# Patient Record
Sex: MALE | Race: WHITE | Employment: OTHER | URBAN - NONMETROPOLITAN AREA
[De-identification: names, ages, dates, MRNs, and addresses within clinical notes are randomized per-mention and may not be internally consistent; named-entity substitution may affect disease eponyms.]

---

## 2017-01-13 DIAGNOSIS — G47.00 INSOMNIA, UNSPECIFIED TYPE: ICD-10-CM

## 2017-01-13 DIAGNOSIS — F31.32 BIPOLAR AFFECTIVE DISORDER, CURRENTLY DEPRESSED, MODERATE (HCC): ICD-10-CM

## 2017-01-13 DIAGNOSIS — F41.9 ANXIETY: ICD-10-CM

## 2017-01-13 RX ORDER — BUSPIRONE HYDROCHLORIDE 10 MG/1
TABLET ORAL
Qty: 60 TABLET | Refills: 2 | Status: SHIPPED | OUTPATIENT
Start: 2017-01-13 | End: 2017-05-10 | Stop reason: SDUPTHER

## 2017-01-13 RX ORDER — RISPERIDONE 3 MG/1
TABLET, FILM COATED ORAL
Qty: 30 TABLET | Refills: 2 | Status: SHIPPED | OUTPATIENT
Start: 2017-01-13 | End: 2017-04-30 | Stop reason: SDUPTHER

## 2017-01-24 DIAGNOSIS — R06.00 NOCTURNAL DYSPNEA: ICD-10-CM

## 2017-01-24 DIAGNOSIS — R06.83 SNORING: ICD-10-CM

## 2017-01-26 ENCOUNTER — TELEPHONE (OUTPATIENT)
Dept: FAMILY MEDICINE CLINIC | Age: 42
End: 2017-01-26

## 2017-01-26 DIAGNOSIS — G47.33 OSA (OBSTRUCTIVE SLEEP APNEA): Primary | ICD-10-CM

## 2017-02-13 DIAGNOSIS — G43.809 OTHER MIGRAINE WITHOUT STATUS MIGRAINOSUS, NOT INTRACTABLE: ICD-10-CM

## 2017-02-13 RX ORDER — SUMATRIPTAN 100 MG/1
TABLET, FILM COATED ORAL
Qty: 27 TABLET | Refills: 0 | OUTPATIENT
Start: 2017-02-13

## 2017-02-13 RX ORDER — SUMATRIPTAN 100 MG/1
100 TABLET, FILM COATED ORAL
Qty: 27 TABLET | Refills: 1 | Status: CANCELLED | OUTPATIENT
Start: 2017-02-13 | End: 2017-02-13

## 2017-02-13 RX ORDER — SUMATRIPTAN 100 MG/1
100 TABLET, FILM COATED ORAL
Qty: 27 TABLET | Refills: 1 | Status: SHIPPED | OUTPATIENT
Start: 2017-02-13 | End: 2017-05-15 | Stop reason: SDUPTHER

## 2017-02-16 ENCOUNTER — HOSPITAL ENCOUNTER (EMERGENCY)
Age: 42
Discharge: HOME | End: 2017-02-16
Attending: EMERGENCY MEDICINE
Payer: MEDICARE

## 2017-02-16 ENCOUNTER — APPOINTMENT (OUTPATIENT)
Dept: GENERAL RADIOLOGY | Age: 42
End: 2017-02-16
Payer: MEDICARE

## 2017-02-16 VITALS
OXYGEN SATURATION: 99 % | HEIGHT: 71 IN | TEMPERATURE: 97.7 F | SYSTOLIC BLOOD PRESSURE: 146 MMHG | DIASTOLIC BLOOD PRESSURE: 73 MMHG | BODY MASS INDEX: 28.98 KG/M2 | WEIGHT: 207 LBS | RESPIRATION RATE: 18 BRPM | HEART RATE: 102 BPM

## 2017-02-16 DIAGNOSIS — J40 BRONCHITIS: Primary | ICD-10-CM

## 2017-02-16 PROCEDURE — 71020 XR CHEST STANDARD TWO VW: CPT | Performed by: RADIOLOGY

## 2017-02-16 PROCEDURE — 99283 EMERGENCY DEPT VISIT LOW MDM: CPT

## 2017-02-16 PROCEDURE — 71020 XR CHEST STANDARD TWO VW: CPT

## 2017-02-16 RX ORDER — PREDNISONE 50 MG/1
50 TABLET ORAL DAILY
Qty: 5 TABLET | Refills: 0 | Status: SHIPPED | OUTPATIENT
Start: 2017-02-16 | End: 2017-02-21 | Stop reason: ALTCHOICE

## 2017-02-16 RX ORDER — ALBUTEROL SULFATE 90 UG/1
2 AEROSOL, METERED RESPIRATORY (INHALATION) 4 TIMES DAILY
Qty: 1 INHALER | Refills: 0 | Status: SHIPPED | OUTPATIENT
Start: 2017-02-16 | End: 2017-05-15 | Stop reason: ALTCHOICE

## 2017-02-17 ENCOUNTER — TELEPHONE (OUTPATIENT)
Dept: FAMILY MEDICINE CLINIC | Age: 42
End: 2017-02-17

## 2017-02-21 ENCOUNTER — OFFICE VISIT (OUTPATIENT)
Dept: FAMILY MEDICINE CLINIC | Age: 42
End: 2017-02-21

## 2017-02-21 VITALS
HEART RATE: 73 BPM | OXYGEN SATURATION: 98 % | HEIGHT: 71 IN | SYSTOLIC BLOOD PRESSURE: 136 MMHG | DIASTOLIC BLOOD PRESSURE: 74 MMHG | BODY MASS INDEX: 28.42 KG/M2 | WEIGHT: 203 LBS

## 2017-02-21 DIAGNOSIS — G43.809 OTHER MIGRAINE WITHOUT STATUS MIGRAINOSUS, NOT INTRACTABLE: ICD-10-CM

## 2017-02-21 DIAGNOSIS — F31.32 BIPOLAR AFFECTIVE DISORDER, CURRENTLY DEPRESSED, MODERATE (HCC): ICD-10-CM

## 2017-02-21 DIAGNOSIS — G47.00 INSOMNIA, UNSPECIFIED TYPE: ICD-10-CM

## 2017-02-21 DIAGNOSIS — F41.9 ANXIETY: Primary | ICD-10-CM

## 2017-02-21 PROCEDURE — G8484 FLU IMMUNIZE NO ADMIN: HCPCS | Performed by: FAMILY MEDICINE

## 2017-02-21 PROCEDURE — 4004F PT TOBACCO SCREEN RCVD TLK: CPT | Performed by: FAMILY MEDICINE

## 2017-02-21 PROCEDURE — 99214 OFFICE O/P EST MOD 30 MIN: CPT | Performed by: FAMILY MEDICINE

## 2017-02-21 PROCEDURE — G8419 CALC BMI OUT NRM PARAM NOF/U: HCPCS | Performed by: FAMILY MEDICINE

## 2017-02-21 PROCEDURE — G8427 DOCREV CUR MEDS BY ELIG CLIN: HCPCS | Performed by: FAMILY MEDICINE

## 2017-02-21 RX ORDER — TOPIRAMATE 50 MG/1
50 TABLET, FILM COATED ORAL 2 TIMES DAILY
Qty: 60 TABLET | Refills: 5 | Status: SHIPPED | OUTPATIENT
Start: 2017-02-21 | End: 2017-09-05 | Stop reason: SDUPTHER

## 2017-02-21 RX ORDER — VENLAFAXINE HYDROCHLORIDE 150 MG/1
150 CAPSULE, EXTENDED RELEASE ORAL DAILY
Qty: 30 CAPSULE | Refills: 5 | Status: SHIPPED | OUTPATIENT
Start: 2017-02-21 | End: 2017-05-15 | Stop reason: SDUPTHER

## 2017-03-26 ENCOUNTER — HOSPITAL ENCOUNTER (EMERGENCY)
Age: 42
Discharge: HOME | End: 2017-03-26
Attending: EMERGENCY MEDICINE
Payer: MEDICARE

## 2017-03-26 VITALS
SYSTOLIC BLOOD PRESSURE: 125 MMHG | DIASTOLIC BLOOD PRESSURE: 64 MMHG | HEART RATE: 88 BPM | RESPIRATION RATE: 16 BRPM | OXYGEN SATURATION: 98 % | TEMPERATURE: 99.3 F

## 2017-03-26 DIAGNOSIS — J10.1 INFLUENZA B: Primary | ICD-10-CM

## 2017-03-26 LAB
DIRECT EXAM: ABNORMAL
Lab: ABNORMAL
SPECIMEN DESCRIPTION: ABNORMAL
STATUS: ABNORMAL

## 2017-03-26 PROCEDURE — 99283 EMERGENCY DEPT VISIT LOW MDM: CPT

## 2017-03-26 PROCEDURE — 94640 AIRWAY INHALATION TREATMENT: CPT

## 2017-03-26 PROCEDURE — 6370000000 HC RX 637 (ALT 250 FOR IP): Performed by: EMERGENCY MEDICINE

## 2017-03-26 PROCEDURE — 87804 INFLUENZA ASSAY W/OPTIC: CPT

## 2017-03-26 PROCEDURE — 94664 DEMO&/EVAL PT USE INHALER: CPT

## 2017-03-26 RX ORDER — BENZONATATE 100 MG/1
200 CAPSULE ORAL 3 TIMES DAILY PRN
Status: DISCONTINUED | OUTPATIENT
Start: 2017-03-26 | End: 2017-03-27 | Stop reason: HOSPADM

## 2017-03-26 RX ORDER — BENZONATATE 100 MG/1
100 CAPSULE ORAL 3 TIMES DAILY PRN
Qty: 30 CAPSULE | Refills: 0 | Status: SHIPPED | OUTPATIENT
Start: 2017-03-26 | End: 2017-04-02

## 2017-03-26 RX ORDER — ALBUTEROL SULFATE 90 UG/1
2 AEROSOL, METERED RESPIRATORY (INHALATION) EVERY 6 HOURS PRN
Status: DISCONTINUED | OUTPATIENT
Start: 2017-03-26 | End: 2017-03-27 | Stop reason: HOSPADM

## 2017-03-26 RX ORDER — ACETAMINOPHEN 325 MG/1
650 TABLET ORAL ONCE
Status: COMPLETED | OUTPATIENT
Start: 2017-03-26 | End: 2017-03-26

## 2017-03-26 RX ADMIN — BENZONATATE 200 MG: 100 CAPSULE ORAL at 22:43

## 2017-03-26 RX ADMIN — ACETAMINOPHEN 650 MG: 325 TABLET ORAL at 22:43

## 2017-03-26 RX ADMIN — ALBUTEROL SULFATE 2 PUFF: 90 AEROSOL, METERED RESPIRATORY (INHALATION) at 22:42

## 2017-03-26 ASSESSMENT — ENCOUNTER SYMPTOMS
EYE REDNESS: 0
SORE THROAT: 0
COUGH: 1
EYE DISCHARGE: 0
TROUBLE SWALLOWING: 0
VOMITING: 0
ABDOMINAL PAIN: 0
NAUSEA: 0
DIARRHEA: 0
RHINORRHEA: 1
VOICE CHANGE: 0
SHORTNESS OF BREATH: 0

## 2017-03-26 ASSESSMENT — PAIN SCALES - GENERAL: PAINLEVEL_OUTOF10: 8

## 2017-03-28 ENCOUNTER — TELEPHONE (OUTPATIENT)
Dept: FAMILY MEDICINE CLINIC | Age: 42
End: 2017-03-28

## 2017-04-30 DIAGNOSIS — G47.00 INSOMNIA, UNSPECIFIED TYPE: ICD-10-CM

## 2017-04-30 DIAGNOSIS — F31.32 BIPOLAR AFFECTIVE DISORDER, CURRENTLY DEPRESSED, MODERATE (HCC): ICD-10-CM

## 2017-05-02 RX ORDER — RISPERIDONE 3 MG/1
TABLET, FILM COATED ORAL
Qty: 30 TABLET | Refills: 5 | Status: SHIPPED | OUTPATIENT
Start: 2017-05-02 | End: 2017-08-22 | Stop reason: ALTCHOICE

## 2017-05-09 ENCOUNTER — PATIENT MESSAGE (OUTPATIENT)
Dept: FAMILY MEDICINE CLINIC | Age: 42
End: 2017-05-09

## 2017-05-10 DIAGNOSIS — F41.9 ANXIETY: ICD-10-CM

## 2017-05-10 RX ORDER — BUSPIRONE HYDROCHLORIDE 10 MG/1
TABLET ORAL
Qty: 60 TABLET | Refills: 0 | Status: SHIPPED | OUTPATIENT
Start: 2017-05-10 | End: 2017-06-09 | Stop reason: SDUPTHER

## 2017-05-12 DIAGNOSIS — F41.9 ANXIETY: ICD-10-CM

## 2017-05-12 DIAGNOSIS — F31.32 BIPOLAR AFFECTIVE DISORDER, CURRENTLY DEPRESSED, MODERATE (HCC): ICD-10-CM

## 2017-05-12 RX ORDER — BUSPIRONE HYDROCHLORIDE 10 MG/1
TABLET ORAL
Qty: 60 TABLET | Refills: 5 | OUTPATIENT
Start: 2017-05-12

## 2017-05-12 RX ORDER — VENLAFAXINE HYDROCHLORIDE 150 MG/1
150 CAPSULE, EXTENDED RELEASE ORAL DAILY
Qty: 30 CAPSULE | Refills: 5 | OUTPATIENT
Start: 2017-05-12

## 2017-05-15 ENCOUNTER — OFFICE VISIT (OUTPATIENT)
Dept: FAMILY MEDICINE CLINIC | Age: 42
End: 2017-05-15
Payer: MEDICARE

## 2017-05-15 VITALS
DIASTOLIC BLOOD PRESSURE: 60 MMHG | HEIGHT: 71 IN | BODY MASS INDEX: 27.72 KG/M2 | OXYGEN SATURATION: 98 % | SYSTOLIC BLOOD PRESSURE: 102 MMHG | WEIGHT: 198 LBS | HEART RATE: 66 BPM

## 2017-05-15 DIAGNOSIS — Z72.0 TOBACCO ABUSE: ICD-10-CM

## 2017-05-15 DIAGNOSIS — G47.00 INSOMNIA, UNSPECIFIED TYPE: ICD-10-CM

## 2017-05-15 DIAGNOSIS — R51.9 WORSENING HEADACHES: ICD-10-CM

## 2017-05-15 DIAGNOSIS — G43.909 MIGRAINE WITHOUT STATUS MIGRAINOSUS, NOT INTRACTABLE, UNSPECIFIED MIGRAINE TYPE: Primary | ICD-10-CM

## 2017-05-15 DIAGNOSIS — F31.32 BIPOLAR AFFECTIVE DISORDER, CURRENTLY DEPRESSED, MODERATE (HCC): ICD-10-CM

## 2017-05-15 DIAGNOSIS — F41.9 ANXIETY: ICD-10-CM

## 2017-05-15 PROCEDURE — 99214 OFFICE O/P EST MOD 30 MIN: CPT | Performed by: FAMILY MEDICINE

## 2017-05-15 PROCEDURE — G8419 CALC BMI OUT NRM PARAM NOF/U: HCPCS | Performed by: FAMILY MEDICINE

## 2017-05-15 PROCEDURE — 4004F PT TOBACCO SCREEN RCVD TLK: CPT | Performed by: FAMILY MEDICINE

## 2017-05-15 PROCEDURE — G8427 DOCREV CUR MEDS BY ELIG CLIN: HCPCS | Performed by: FAMILY MEDICINE

## 2017-05-15 RX ORDER — SUMATRIPTAN 100 MG/1
100 TABLET, FILM COATED ORAL
Qty: 9 TABLET | Refills: 5 | Status: SHIPPED | OUTPATIENT
Start: 2017-05-15 | End: 2017-09-05 | Stop reason: SDUPTHER

## 2017-05-15 RX ORDER — VENLAFAXINE HYDROCHLORIDE 150 MG/1
150 CAPSULE, EXTENDED RELEASE ORAL DAILY
Qty: 30 CAPSULE | Refills: 5 | Status: SHIPPED | OUTPATIENT
Start: 2017-05-15 | End: 2017-11-20 | Stop reason: SDUPTHER

## 2017-05-15 RX ORDER — ALBUTEROL SULFATE 90 UG/1
2 AEROSOL, METERED RESPIRATORY (INHALATION) 4 TIMES DAILY
Qty: 1 INHALER | Refills: 0 | Status: CANCELLED | OUTPATIENT
Start: 2017-05-15

## 2017-05-15 RX ORDER — TOPIRAMATE 25 MG/1
25 TABLET ORAL 2 TIMES DAILY
Qty: 60 TABLET | Refills: 0 | Status: SHIPPED | OUTPATIENT
Start: 2017-05-15 | End: 2017-06-09 | Stop reason: SDUPTHER

## 2017-05-15 RX ORDER — ALBUTEROL SULFATE 90 UG/1
2 AEROSOL, METERED RESPIRATORY (INHALATION) EVERY 6 HOURS PRN
Qty: 1 INHALER | Refills: 5 | Status: SHIPPED | OUTPATIENT
Start: 2017-05-15 | End: 2018-10-19 | Stop reason: SDUPTHER

## 2017-05-15 RX ORDER — VARENICLINE TARTRATE 25 MG
KIT ORAL
Qty: 53 TABLET | Refills: 0 | Status: SHIPPED | OUTPATIENT
Start: 2017-05-15 | End: 2017-06-26 | Stop reason: DRUGHIGH

## 2017-05-15 ASSESSMENT — PATIENT HEALTH QUESTIONNAIRE - PHQ9
SUM OF ALL RESPONSES TO PHQ QUESTIONS 1-9: 0
2. FEELING DOWN, DEPRESSED OR HOPELESS: 0
SUM OF ALL RESPONSES TO PHQ9 QUESTIONS 1 & 2: 0
1. LITTLE INTEREST OR PLEASURE IN DOING THINGS: 0

## 2017-05-15 ASSESSMENT — ENCOUNTER SYMPTOMS
PHOTOPHOBIA: 1
NAUSEA: 1

## 2017-06-09 DIAGNOSIS — G43.909 MIGRAINE WITHOUT STATUS MIGRAINOSUS, NOT INTRACTABLE, UNSPECIFIED MIGRAINE TYPE: ICD-10-CM

## 2017-06-09 DIAGNOSIS — F41.9 ANXIETY: ICD-10-CM

## 2017-06-09 RX ORDER — BUSPIRONE HYDROCHLORIDE 10 MG/1
TABLET ORAL
Qty: 60 TABLET | Refills: 2 | Status: SHIPPED | OUTPATIENT
Start: 2017-06-09 | End: 2017-09-05 | Stop reason: SDUPTHER

## 2017-06-09 RX ORDER — TOPIRAMATE 25 MG/1
TABLET ORAL
Qty: 60 TABLET | Refills: 0 | OUTPATIENT
Start: 2017-06-09

## 2017-06-09 RX ORDER — TOPIRAMATE 25 MG/1
25 TABLET ORAL 2 TIMES DAILY
Qty: 60 TABLET | Refills: 2 | Status: SHIPPED | OUTPATIENT
Start: 2017-06-09 | End: 2017-09-05 | Stop reason: SDUPTHER

## 2017-06-09 RX ORDER — BUSPIRONE HYDROCHLORIDE 10 MG/1
TABLET ORAL
Qty: 60 TABLET | Refills: 0 | OUTPATIENT
Start: 2017-06-09

## 2017-06-26 ENCOUNTER — TELEPHONE (OUTPATIENT)
Dept: FAMILY MEDICINE CLINIC | Age: 42
End: 2017-06-26

## 2017-06-26 RX ORDER — VARENICLINE TARTRATE 1 MG/1
1 TABLET, FILM COATED ORAL 2 TIMES DAILY
Qty: 60 TABLET | Refills: 2 | Status: SHIPPED | OUTPATIENT
Start: 2017-06-26 | End: 2017-08-22 | Stop reason: ALTCHOICE

## 2017-07-11 ENCOUNTER — TELEPHONE (OUTPATIENT)
Dept: FAMILY MEDICINE CLINIC | Age: 42
End: 2017-07-11

## 2017-08-22 ENCOUNTER — OFFICE VISIT (OUTPATIENT)
Dept: FAMILY MEDICINE CLINIC | Age: 42
End: 2017-08-22
Payer: MEDICARE

## 2017-08-22 VITALS
DIASTOLIC BLOOD PRESSURE: 66 MMHG | HEART RATE: 84 BPM | OXYGEN SATURATION: 98 % | WEIGHT: 210 LBS | BODY MASS INDEX: 29.4 KG/M2 | HEIGHT: 71 IN | SYSTOLIC BLOOD PRESSURE: 112 MMHG

## 2017-08-22 DIAGNOSIS — B35.1 ONYCHOMYCOSIS: Primary | ICD-10-CM

## 2017-08-22 DIAGNOSIS — F41.9 ANXIETY: ICD-10-CM

## 2017-08-22 DIAGNOSIS — G47.00 INSOMNIA, UNSPECIFIED TYPE: ICD-10-CM

## 2017-08-22 DIAGNOSIS — F31.32 BIPOLAR AFFECTIVE DISORDER, CURRENTLY DEPRESSED, MODERATE (HCC): ICD-10-CM

## 2017-08-22 PROCEDURE — 1036F TOBACCO NON-USER: CPT | Performed by: FAMILY MEDICINE

## 2017-08-22 PROCEDURE — G8419 CALC BMI OUT NRM PARAM NOF/U: HCPCS | Performed by: FAMILY MEDICINE

## 2017-08-22 PROCEDURE — 99214 OFFICE O/P EST MOD 30 MIN: CPT | Performed by: FAMILY MEDICINE

## 2017-08-22 PROCEDURE — G8427 DOCREV CUR MEDS BY ELIG CLIN: HCPCS | Performed by: FAMILY MEDICINE

## 2017-08-22 RX ORDER — ZOLPIDEM TARTRATE 5 MG/1
5 TABLET ORAL NIGHTLY PRN
Qty: 30 TABLET | Refills: 0 | Status: SHIPPED | OUTPATIENT
Start: 2017-08-22 | End: 2017-09-21 | Stop reason: SDUPTHER

## 2017-09-01 DIAGNOSIS — G43.909 MIGRAINE WITHOUT STATUS MIGRAINOSUS, NOT INTRACTABLE, UNSPECIFIED MIGRAINE TYPE: ICD-10-CM

## 2017-09-05 DIAGNOSIS — G47.00 INSOMNIA, UNSPECIFIED TYPE: ICD-10-CM

## 2017-09-05 DIAGNOSIS — R09.81 NASAL CONGESTION: ICD-10-CM

## 2017-09-05 DIAGNOSIS — G43.909 MIGRAINE WITHOUT STATUS MIGRAINOSUS, NOT INTRACTABLE, UNSPECIFIED MIGRAINE TYPE: ICD-10-CM

## 2017-09-05 DIAGNOSIS — F41.9 ANXIETY: ICD-10-CM

## 2017-09-05 RX ORDER — TOPIRAMATE 25 MG/1
25 TABLET ORAL 2 TIMES DAILY
Qty: 60 TABLET | Refills: 5 | Status: SHIPPED | OUTPATIENT
Start: 2017-09-05 | End: 2017-12-29 | Stop reason: DRUGHIGH

## 2017-09-05 RX ORDER — ZOLPIDEM TARTRATE 5 MG/1
5 TABLET ORAL NIGHTLY PRN
Qty: 30 TABLET | Refills: 0 | Status: CANCELLED | OUTPATIENT
Start: 2017-09-21

## 2017-09-05 RX ORDER — BUSPIRONE HYDROCHLORIDE 10 MG/1
TABLET ORAL
Qty: 60 TABLET | Refills: 5 | Status: SHIPPED | OUTPATIENT
Start: 2017-09-05 | End: 2018-03-12 | Stop reason: SDUPTHER

## 2017-09-05 RX ORDER — FLUTICASONE PROPIONATE 50 MCG
2 SPRAY, SUSPENSION (ML) NASAL DAILY
Qty: 1 BOTTLE | Refills: 5 | Status: SHIPPED | OUTPATIENT
Start: 2017-09-05 | End: 2018-11-26 | Stop reason: SDUPTHER

## 2017-09-05 RX ORDER — SUMATRIPTAN 100 MG/1
100 TABLET, FILM COATED ORAL
Qty: 9 TABLET | Refills: 5 | Status: SHIPPED | OUTPATIENT
Start: 2017-09-05 | End: 2018-02-14 | Stop reason: SDUPTHER

## 2017-09-05 RX ORDER — TOPIRAMATE 50 MG/1
50 TABLET, FILM COATED ORAL 2 TIMES DAILY
Qty: 60 TABLET | Refills: 5 | Status: SHIPPED | OUTPATIENT
Start: 2017-09-05 | End: 2017-12-29 | Stop reason: DRUGHIGH

## 2017-09-06 RX ORDER — TOPIRAMATE 25 MG/1
TABLET ORAL
Qty: 60 TABLET | Refills: 2 | OUTPATIENT
Start: 2017-09-06

## 2017-09-12 DIAGNOSIS — G47.00 INSOMNIA, UNSPECIFIED TYPE: ICD-10-CM

## 2017-09-12 RX ORDER — ZOLPIDEM TARTRATE 5 MG/1
5 TABLET ORAL NIGHTLY PRN
Qty: 30 TABLET | Refills: 0 | Status: CANCELLED | OUTPATIENT
Start: 2017-09-12

## 2017-09-18 DIAGNOSIS — G47.00 INSOMNIA, UNSPECIFIED TYPE: ICD-10-CM

## 2017-09-18 RX ORDER — ZOLPIDEM TARTRATE 5 MG/1
5 TABLET ORAL NIGHTLY PRN
Qty: 30 TABLET | Refills: 0 | Status: CANCELLED | OUTPATIENT
Start: 2017-09-18

## 2017-09-21 DIAGNOSIS — G47.00 INSOMNIA, UNSPECIFIED TYPE: ICD-10-CM

## 2017-09-21 RX ORDER — ZOLPIDEM TARTRATE 5 MG/1
TABLET ORAL
Qty: 30 TABLET | Refills: 0 | Status: SHIPPED | OUTPATIENT
Start: 2017-09-21 | End: 2017-09-22 | Stop reason: CLARIF

## 2017-09-22 DIAGNOSIS — G47.00 INSOMNIA, UNSPECIFIED TYPE: Primary | ICD-10-CM

## 2017-09-22 RX ORDER — ZOLPIDEM TARTRATE 10 MG/1
TABLET ORAL
Qty: 15 TABLET | Refills: 0 | OUTPATIENT
Start: 2017-09-22 | End: 2017-10-23 | Stop reason: SDUPTHER

## 2017-10-01 DIAGNOSIS — G89.29 CHRONIC NECK PAIN: ICD-10-CM

## 2017-10-01 DIAGNOSIS — M54.2 CHRONIC NECK PAIN: ICD-10-CM

## 2017-10-02 DIAGNOSIS — G89.29 CHRONIC NECK PAIN: ICD-10-CM

## 2017-10-02 DIAGNOSIS — M54.2 CHRONIC NECK PAIN: ICD-10-CM

## 2017-10-02 RX ORDER — CYCLOBENZAPRINE HCL 10 MG
10 TABLET ORAL 3 TIMES DAILY PRN
Qty: 90 TABLET | Refills: 1 | Status: SHIPPED | OUTPATIENT
Start: 2017-10-02 | End: 2017-10-07

## 2017-10-02 RX ORDER — CYCLOBENZAPRINE HCL 10 MG
TABLET ORAL
Qty: 90 TABLET | Refills: 2 | OUTPATIENT
Start: 2017-10-02

## 2017-10-02 RX ORDER — TOPIRAMATE 50 MG/1
50 TABLET, FILM COATED ORAL 2 TIMES DAILY
Qty: 60 TABLET | Refills: 5 | Status: CANCELLED | OUTPATIENT
Start: 2017-10-02

## 2017-10-02 NOTE — TELEPHONE ENCOUNTER
From: Ashli Cadena  To: Gregory Kamara DO  Sent: 10/1/2017 12:02 PM EDT  Subject: Medication Renewal Request    Original authorizing provider: DO Ashli Bullock would like a refill of the following medications:  cyclobenzaprine (FLEXERIL) 10 MG tablet Gregory Kamara DO]  topiramate (TOPAMAX) 50 MG tablet Gregory Kamara DO]    Preferred pharmacy: [address] [phone] - F [phone]    Comment:

## 2017-10-07 ENCOUNTER — HOSPITAL ENCOUNTER (EMERGENCY)
Age: 42
Discharge: HOME | End: 2017-10-07
Attending: EMERGENCY MEDICINE
Payer: MEDICARE

## 2017-10-07 VITALS
TEMPERATURE: 98.4 F | HEIGHT: 71 IN | BODY MASS INDEX: 26.6 KG/M2 | RESPIRATION RATE: 15 BRPM | DIASTOLIC BLOOD PRESSURE: 72 MMHG | WEIGHT: 190 LBS | SYSTOLIC BLOOD PRESSURE: 135 MMHG | OXYGEN SATURATION: 96 % | HEART RATE: 84 BPM

## 2017-10-07 DIAGNOSIS — M54.12 CERVICAL RADICULOPATHY: Primary | ICD-10-CM

## 2017-10-07 DIAGNOSIS — G89.29 CHRONIC PAIN OF BOTH SHOULDERS: ICD-10-CM

## 2017-10-07 DIAGNOSIS — M54.2 NECK PAIN: ICD-10-CM

## 2017-10-07 DIAGNOSIS — M25.511 CHRONIC PAIN OF BOTH SHOULDERS: ICD-10-CM

## 2017-10-07 DIAGNOSIS — M25.512 CHRONIC PAIN OF BOTH SHOULDERS: ICD-10-CM

## 2017-10-07 PROCEDURE — 6370000000 HC RX 637 (ALT 250 FOR IP): Performed by: EMERGENCY MEDICINE

## 2017-10-07 PROCEDURE — 99283 EMERGENCY DEPT VISIT LOW MDM: CPT

## 2017-10-07 RX ORDER — PREDNISONE 50 MG/1
50 TABLET ORAL DAILY
Qty: 4 TABLET | Refills: 0 | Status: SHIPPED | OUTPATIENT
Start: 2017-10-07 | End: 2018-01-29 | Stop reason: ALTCHOICE

## 2017-10-07 RX ORDER — PREDNISONE 20 MG/1
60 TABLET ORAL ONCE
Status: COMPLETED | OUTPATIENT
Start: 2017-10-07 | End: 2017-10-07

## 2017-10-07 RX ORDER — ORPHENADRINE CITRATE 100 MG/1
100 TABLET, EXTENDED RELEASE ORAL 2 TIMES DAILY
Qty: 30 TABLET | Refills: 0 | Status: SHIPPED | OUTPATIENT
Start: 2017-10-07 | End: 2017-10-23 | Stop reason: SDUPTHER

## 2017-10-07 RX ADMIN — PREDNISONE 60 MG: 20 TABLET ORAL at 20:34

## 2017-10-07 ASSESSMENT — ENCOUNTER SYMPTOMS
SHORTNESS OF BREATH: 0
VOMITING: 0
BACK PAIN: 0
SORE THROAT: 0
ABDOMINAL PAIN: 0
NAUSEA: 0
DIARRHEA: 0

## 2017-10-07 ASSESSMENT — PAIN DESCRIPTION - ORIENTATION: ORIENTATION: RIGHT;LEFT

## 2017-10-07 ASSESSMENT — PAIN DESCRIPTION - DESCRIPTORS: DESCRIPTORS: ACHING

## 2017-10-07 ASSESSMENT — PAIN - FUNCTIONAL ASSESSMENT: PAIN_FUNCTIONAL_ASSESSMENT: FLACC

## 2017-10-07 ASSESSMENT — PAIN DESCRIPTION - LOCATION: LOCATION: SHOULDER;NECK

## 2017-10-07 ASSESSMENT — PAIN DESCRIPTION - PAIN TYPE: TYPE: CHRONIC PAIN;ACUTE PAIN

## 2017-10-07 ASSESSMENT — PAIN SCALES - GENERAL
PAINLEVEL_OUTOF10: 8
PAINLEVEL_OUTOF10: 8

## 2017-10-08 NOTE — ED PROVIDER NOTES
never used smokeless tobacco. He reports that he does not drink alcohol or use drugs. PHYSICAL EXAM     INITIAL VITALS:  height is 5' 11\" (1.803 m) and weight is 190 lb (86.2 kg). His tympanic temperature is 98.4 °F (36.9 °C). His blood pressure is 135/72 and his pulse is 84. His respiration is 15 and oxygen saturation is 96%. Physical Exam   Constitutional: He is oriented to person, place, and time and well-developed, well-nourished, and in no distress. No distress. HENT:   Head: Normocephalic and atraumatic. Right Ear: External ear normal.   Left Ear: External ear normal.   Nose: Nose normal.   Mouth/Throat: Oropharynx is clear and moist.   Eyes: Conjunctivae and EOM are normal. Pupils are equal, round, and reactive to light. Right eye exhibits no discharge. Left eye exhibits no discharge. No scleral icterus. Neck: Normal range of motion. Neck supple. Muscular tenderness present. No spinous process tenderness present. No tracheal deviation present. Review motion somewhat limited due to discomfort of the neck. No meningeal signs. Cardiovascular: Normal rate, regular rhythm, normal heart sounds and intact distal pulses. Pulmonary/Chest: Effort normal and breath sounds normal. No stridor. No respiratory distress. He has no wheezes. Abdominal: Soft. Bowel sounds are normal. He exhibits no distension. There is no tenderness. There is no guarding. Musculoskeletal: Normal range of motion. He exhibits no edema. Neurological: He is alert and oriented to person, place, and time. He has normal motor skills, normal sensation, normal strength, normal reflexes and intact cranial nerves. No cranial nerve deficit. He exhibits normal muscle tone. He has a normal Cerebellar Exam. Gait normal. Coordination normal. GCS score is 15. Normal neurologic exam.  No focal deficits. Skin: Skin is warm and dry. No rash noted. He is not diaphoretic.    Psychiatric: Mood, memory, affect and judgment normal. Nursing note and vitals reviewed. DIFFERENTIAL DIAGNOSIS/ MDM:     At this time I will switch the patient to Norflex and put him on a short course of steroids. He has been on steroids in the past.  He was advised to follow-up with his PCP on Monday return right away for several new or concerning symptoms. He is neurologically intact without focal deficit. I do not feel this is meningitis or other infectious process. I feel this is an exacerbation of his torticollis and possibly some cervical radiculopathy as well. Patient is comfortable with this plan. I have reviewed the disposition diagnosis with the patient and or their family/guardian. I have answered their questions and given discharge instructions. They voiced understanding of these instructions and did not have any further questions or complaints. DIAGNOSTIC RESULTS     EKG: All EKG's are interpreted by the Emergency Department Physician who either signs or Co-signs this chart in the absence of a cardiologist.        RADIOLOGY:   I directly visualized the following  images and reviewed the radiologist interpretations:  No orders to display       No results found. LABS:  No results found for this visit on 10/07/17.     EMERGENCY DEPARTMENT COURSE:     The patient was given the following medications:  Orders Placed This Encounter   Medications    predniSONE (DELTASONE) 50 MG tablet     Sig: Take 1 tablet by mouth daily     Dispense:  4 tablet     Refill:  0    orphenadrine (NORFLEX) 100 MG extended release tablet     Sig: Take 1 tablet by mouth 2 times daily for 10 days Do not take this while taking Flexeril     Dispense:  30 tablet     Refill:  0    predniSONE (DELTASONE) tablet 60 mg        Vitals:    Vitals:    10/07/17 2013   BP: 135/72   Pulse: 84   Resp: 15   Temp: 98.4 °F (36.9 °C)   TempSrc: Tympanic   SpO2: 96%   Weight: 190 lb (86.2 kg)   Height: 5' 11\" (1.803 m)     -------------------------  BP: 135/72, Temp: 98.4 °F (36.9 °C), Pulse: 84, Resp: 15      Re-evaluation Notes    8:39 PM:   Doing well on reevaluation. No acute changes. Appropriate for discharge and outpatient follow-up. OARRS    Checked and negative     CONSULTS:    None    CRITICAL CARE:     None    PROCEDURES:    None    FINAL IMPRESSION      1. Cervical radiculopathy    2. Neck pain    3. Chronic pain of both shoulders          DISPOSITION/PLAN   DISPOSITION Decision to Discharge    Condition on Disposition    Improved    PATIENT REFERRED TO:  No follow-up provider specified.     DISCHARGE MEDICATIONS:  Discharge Medication List as of 10/7/2017  8:30 PM      START taking these medications    Details   predniSONE (DELTASONE) 50 MG tablet Take 1 tablet by mouth daily, Disp-4 tablet, R-0Print      orphenadrine (NORFLEX) 100 MG extended release tablet Take 1 tablet by mouth 2 times daily for 10 days Do not take this while taking Flexeril, Disp-30 tablet, R-0Print             (Please note that portions of this note were completed with a voice recognition program.  Efforts were made to edit the dictations but occasionally words are mis-transcribed.)    Анна Bruno DO  Attending Emergency Physician           Анна Bruno DO  10/07/17 9461

## 2017-10-10 ENCOUNTER — OFFICE VISIT (OUTPATIENT)
Dept: FAMILY MEDICINE CLINIC | Age: 42
End: 2017-10-10
Payer: MEDICARE

## 2017-10-10 VITALS
BODY MASS INDEX: 29.4 KG/M2 | HEART RATE: 69 BPM | WEIGHT: 210 LBS | SYSTOLIC BLOOD PRESSURE: 116 MMHG | DIASTOLIC BLOOD PRESSURE: 70 MMHG | HEIGHT: 71 IN | OXYGEN SATURATION: 98 %

## 2017-10-10 DIAGNOSIS — M54.12 CERVICAL RADICULOPATHY: Primary | ICD-10-CM

## 2017-10-10 DIAGNOSIS — G47.00 INSOMNIA, UNSPECIFIED TYPE: ICD-10-CM

## 2017-10-10 DIAGNOSIS — K21.9 GASTROESOPHAGEAL REFLUX DISEASE, ESOPHAGITIS PRESENCE NOT SPECIFIED: ICD-10-CM

## 2017-10-10 DIAGNOSIS — G89.29 CHRONIC NECK PAIN: ICD-10-CM

## 2017-10-10 DIAGNOSIS — M54.2 CHRONIC NECK PAIN: ICD-10-CM

## 2017-10-10 DIAGNOSIS — L70.9 ACNE, UNSPECIFIED ACNE TYPE: ICD-10-CM

## 2017-10-10 PROCEDURE — G8484 FLU IMMUNIZE NO ADMIN: HCPCS | Performed by: FAMILY MEDICINE

## 2017-10-10 PROCEDURE — 1036F TOBACCO NON-USER: CPT | Performed by: FAMILY MEDICINE

## 2017-10-10 PROCEDURE — G8417 CALC BMI ABV UP PARAM F/U: HCPCS | Performed by: FAMILY MEDICINE

## 2017-10-10 PROCEDURE — 99213 OFFICE O/P EST LOW 20 MIN: CPT | Performed by: FAMILY MEDICINE

## 2017-10-10 PROCEDURE — G8427 DOCREV CUR MEDS BY ELIG CLIN: HCPCS | Performed by: FAMILY MEDICINE

## 2017-10-10 RX ORDER — GABAPENTIN 100 MG/1
100 CAPSULE ORAL 3 TIMES DAILY
Qty: 90 CAPSULE | Refills: 0 | Status: SHIPPED | OUTPATIENT
Start: 2017-10-10 | End: 2017-10-30 | Stop reason: DRUGHIGH

## 2017-10-10 RX ORDER — OMEPRAZOLE 20 MG/1
20 CAPSULE, DELAYED RELEASE ORAL DAILY
Qty: 30 CAPSULE | Refills: 2 | Status: SHIPPED | OUTPATIENT
Start: 2017-10-10 | End: 2019-04-09

## 2017-10-10 NOTE — PROGRESS NOTES
Family History   Problem Relation Age of Onset    Stroke Mother     High Blood Pressure Mother     High Blood Pressure Father     Stroke Father     Glaucoma Neg Hx      Social History   Substance Use Topics    Smoking status: Former Smoker     Packs/day: 1.00     Years: 25.00     Types: Cigarettes     Quit date: 6/21/2017    Smokeless tobacco: Never Used      Comment: handout given.  Alcohol use No      Current Outpatient Prescriptions   Medication Sig Dispense Refill    gabapentin (NEURONTIN) 100 MG capsule Take 1 capsule by mouth 3 times daily 90 capsule 0    omeprazole (PRILOSEC) 20 MG delayed release capsule Take 1 capsule by mouth daily 30 capsule 2    Benzoyl Peroxide (BENZAC AC) 10 % external wash Apply topically to affected areas twice daily. 1 Bottle 2    predniSONE (DELTASONE) 50 MG tablet Take 1 tablet by mouth daily 4 tablet 0    zolpidem (AMBIEN) 10 MG tablet 0.5 -1 tablet at bedtime as needed for sleep. 15 tablet 0    fluticasone (FLONASE) 50 MCG/ACT nasal spray 2 sprays by Nasal route daily 1 Bottle 5    topiramate (TOPAMAX) 50 MG tablet Take 1 tablet by mouth 2 times daily 60 tablet 5    busPIRone (BUSPAR) 10 MG tablet TAKE ONE TABLET BY MOUTH TWICE DAILY 60 tablet 5    topiramate (TOPAMAX) 25 MG tablet Take 1 tablet by mouth 2 times daily Take with 50 mg tab for total dose of 75 mg BID. 60 tablet 5    ciclopirox (PENLAC) 8 % solution Apply topically to affected toenail nightly.  1 Bottle 2    venlafaxine (EFFEXOR XR) 150 MG extended release capsule Take 1 capsule by mouth daily 30 capsule 5    albuterol sulfate HFA (VENTOLIN HFA) 108 (90 BASE) MCG/ACT inhaler Inhale 2 puffs into the lungs every 6 hours as needed for Wheezing or Shortness of Breath 1 Inhaler 5    SUMAtriptan (IMITREX) 100 MG tablet Take 1 tablet by mouth once as needed for Migraine (May repeat after 2 hours x 1 more dose, if needed; max dose 200 mg per 24 hours.) 9 tablet 5     No current facility-administered medications for this visit. Allergies   Allergen Reactions    Naproxen      ulcers       Health Maintenance   Topic Date Due    HIV screen  09/07/1988    DTaP/Tdap/Td vaccine (1 - Tdap) 09/07/1992    Flu vaccine (1) 09/01/2017    Lipid screen  07/07/2021       Subjective:      Review of Systems   Musculoskeletal: Positive for arthralgias (shoulders) and neck pain. Psychiatric/Behavioral: Positive for sleep disturbance (stable). Objective:     Vitals:    10/10/17 1121   BP: 116/70   Site: Right Arm   Position: Sitting   Cuff Size: Large Adult   Pulse: 69   SpO2: 98%   Weight: 210 lb (95.3 kg)   Height: 5' 11\" (1.803 m)     Physical Exam   Constitutional: He is oriented to person, place, and time. He appears well-developed and well-nourished. No distress. HENT:   Head: Normocephalic and atraumatic. Right Ear: External ear normal.   Left Ear: External ear normal.   Eyes: Conjunctivae are normal.   Neck: Muscular tenderness present. Cardiovascular: Normal rate, regular rhythm and normal heart sounds. Pulmonary/Chest: Effort normal and breath sounds normal. No respiratory distress. Abdominal: Soft. Bowel sounds are normal. He exhibits no distension. There is no tenderness. Musculoskeletal: He exhibits no edema. Neurological: He is alert and oriented to person, place, and time.   + Spurling's test on the L   Skin: Skin is warm and dry. Psychiatric: He has a normal mood and affect. Assessment:      1. Cervical radiculopathy  gabapentin (NEURONTIN) 100 MG capsule   2. Chronic neck pain     3. Insomnia, unspecified type     4. Gastroesophageal reflux disease, esophagitis presence not specified  omeprazole (PRILOSEC) 20 MG delayed release capsule   5. Acne, unspecified acne type  Benzoyl Peroxide (BENZAC AC) 10 % external wash         Plan:      Return if symptoms worsen or fail to improve in 2 weeks.     No orders of the defined types were placed in this

## 2017-10-12 ENCOUNTER — PATIENT MESSAGE (OUTPATIENT)
Dept: FAMILY MEDICINE CLINIC | Age: 42
End: 2017-10-12

## 2017-10-12 NOTE — TELEPHONE ENCOUNTER
From: Tiki Donnelly  To: Samantha Ayers DO  Sent: 10/12/2017 11:33 AM EDT  Subject: Prescription Question    Thanks And alright good to know thanks for answering so quickly ur a awesome doctor glad ur ours :)  ----- Message -----  From: Samantha Ayers DO  Sent: 10/12/2017 11:21 AM EDT  To: Tiki Donnelly  Subject: RE: Prescription Question    Luis Daniel,    Yes, these are fine to take together. Hope the new medication seems to help, and that your migraine goes away quickly.     Thanks,  Dr. Anya Vasquez        ----- Message -----   From: Tiki Donnelly   Sent: 10/12/2017 9:59 AM EDT   To: Samantha Ayers DO  Subject: Prescription Question    Pari Vasquez I was wondering if it is safe to take my sumatripan and the gabapentin together or about the same time as I had a mirgraine this morning and took my migriane med's but also was going to start the new med's but don't want to risk it if they would possible do harm so thought safer to ask you first.  Nalini Fontenot

## 2017-10-16 ENCOUNTER — PATIENT MESSAGE (OUTPATIENT)
Dept: FAMILY MEDICINE CLINIC | Age: 42
End: 2017-10-16

## 2017-10-16 ENCOUNTER — HOSPITAL ENCOUNTER (EMERGENCY)
Age: 42
Discharge: HOME | End: 2017-10-16
Attending: EMERGENCY MEDICINE
Payer: MEDICARE

## 2017-10-16 VITALS
TEMPERATURE: 97.9 F | DIASTOLIC BLOOD PRESSURE: 78 MMHG | RESPIRATION RATE: 12 BRPM | OXYGEN SATURATION: 98 % | SYSTOLIC BLOOD PRESSURE: 146 MMHG | HEART RATE: 89 BPM

## 2017-10-16 DIAGNOSIS — M25.512 CHRONIC LEFT SHOULDER PAIN: Primary | ICD-10-CM

## 2017-10-16 DIAGNOSIS — G89.29 CHRONIC LEFT SHOULDER PAIN: Primary | ICD-10-CM

## 2017-10-16 PROCEDURE — 99282 EMERGENCY DEPT VISIT SF MDM: CPT

## 2017-10-16 ASSESSMENT — PAIN DESCRIPTION - ONSET: ONSET: ON-GOING

## 2017-10-16 ASSESSMENT — PAIN DESCRIPTION - PAIN TYPE: TYPE: ACUTE PAIN

## 2017-10-16 ASSESSMENT — PAIN DESCRIPTION - DESCRIPTORS: DESCRIPTORS: CONSTANT

## 2017-10-16 ASSESSMENT — PAIN DESCRIPTION - PROGRESSION: CLINICAL_PROGRESSION: NOT CHANGED

## 2017-10-16 ASSESSMENT — PAIN DESCRIPTION - FREQUENCY: FREQUENCY: CONTINUOUS

## 2017-10-16 ASSESSMENT — PAIN SCALES - GENERAL
PAINLEVEL_OUTOF10: 6
PAINLEVEL_OUTOF10: 5

## 2017-10-16 ASSESSMENT — PAIN DESCRIPTION - ORIENTATION: ORIENTATION: LEFT

## 2017-10-16 ASSESSMENT — PAIN DESCRIPTION - LOCATION: LOCATION: NECK

## 2017-10-16 NOTE — ED PROVIDER NOTES
is full range of motion of shoulder. He does have some reproducible pain with mild palpation around the joint. No step-offs or deformities. Neurovascularly intact distally    DIFFERENTIAL DIAGNOSIS/ MDM:     Chronic shoulder pain    DIAGNOSTIC RESULTS         EMERGENCY DEPARTMENT COURSE:   Vitals:    Vitals:    10/16/17 1944   BP: (!) 146/78   Pulse: 89   Resp: 12   Temp: 97.9 °F (36.6 °C)   TempSrc: Tympanic   SpO2: 98%     -------------------------  BP: (!) 146/78, Temp: 97.9 °F (36.6 °C), Pulse: 89, Resp: 12    No orders of the defined types were placed in this encounter. Re-evaluation Notes    Patient is no recent trauma to area. Do not feel plain films would be of benefit. He will be instructed to call his primary tomorrow, see if they want change in medications. He does have an allergy as he states to anti-inflammatory second toe ulcers. Return if worse no indication this is cardiac etiology        FINAL IMPRESSION      1. Chronic left shoulder pain          DISPOSITION/PLAN   DISPOSITION Decision to Discharge    Condition on Disposition    stable    PATIENT REFERRED TO:  Ashley Painting DO  [address] [facility] [address]  [phone]    In 1 day        DISCHARGE MEDICATIONS:  New Prescriptions    No medications on file       (Please note that portions of this note were completed with a voice recognition program.  Efforts were made to edit the dictations but occasionally words are mis-transcribed.)    Junior MD, F.A.C.E.P.   Attending Emergency Physician        Luciana Escalona MD  10/16/17 2002

## 2017-10-17 ENCOUNTER — CARE COORDINATION (OUTPATIENT)
Dept: CARE COORDINATION | Age: 42
End: 2017-10-17

## 2017-10-17 ENCOUNTER — PATIENT MESSAGE (OUTPATIENT)
Dept: FAMILY MEDICINE CLINIC | Age: 42
End: 2017-10-17

## 2017-10-23 DIAGNOSIS — G89.29 CHRONIC NECK PAIN: ICD-10-CM

## 2017-10-23 DIAGNOSIS — G47.00 INSOMNIA, UNSPECIFIED TYPE: Primary | ICD-10-CM

## 2017-10-23 DIAGNOSIS — M54.2 CHRONIC NECK PAIN: ICD-10-CM

## 2017-10-23 RX ORDER — ZOLPIDEM TARTRATE 10 MG/1
TABLET ORAL
Qty: 15 TABLET | Refills: 0 | Status: SHIPPED | OUTPATIENT
Start: 2017-10-23 | End: 2017-11-21 | Stop reason: SDUPTHER

## 2017-10-23 RX ORDER — ORPHENADRINE CITRATE 100 MG/1
100 TABLET, EXTENDED RELEASE ORAL 2 TIMES DAILY PRN
Qty: 60 TABLET | Refills: 0 | Status: SHIPPED | OUTPATIENT
Start: 2017-10-23 | End: 2017-12-04 | Stop reason: SDUPTHER

## 2017-10-23 NOTE — TELEPHONE ENCOUNTER
From: Lucas Ivy  Sent: 10/21/2017 4:12 PM EDT  Subject: Medication Renewal Request    Lucas Ivy would like a refill of the following medications:  zolpidem (AMBIEN) 10 MG tablet EUGENE Dominguez    Preferred pharmacy: [id number], [address] [phone] - L [phone]    Comment:  The Gabapentin @ 200 mg works it works really good together with my muscle relaxer norflex.  I need a refill on my norflex 100mg i am done to 4 pills this is on Saturday Thanks, Sarah Richards

## 2017-10-27 ENCOUNTER — PATIENT MESSAGE (OUTPATIENT)
Dept: FAMILY MEDICINE CLINIC | Age: 42
End: 2017-10-27

## 2017-10-27 NOTE — TELEPHONE ENCOUNTER
From: Shandra Burgess  To: Miracle Beltrán DO  Sent: 10/27/2017 3:14 PM EDT  Subject: Non-Urgent Medical Question    Thanks Dr Luana Leonardo we will get a hold Of our chiropractor she what she offers first and get back to you and I've been doing home exercise that i learn from going to physical therapy before and so far it not helping with easing the pain noting at the moment seems to be helping with stopping the pain other then the pain med's and the muscle relaxer's but they ware off don't last the whole 8 hours. ----- Message -----  From: Miracle Beltrán DO  Sent: 10/27/2017 8:47 AM EDT  To: Shandra Burgess  Subject: RE: Non-Urgent Medical Question    Leonel Rodriguez,    Unfortunately, I cannot place referrals to massage therapists, as they are not usually covered by insurance. That is something that you can seek out on your own, and would have to pay out of pocket for. There are some great massage therapists in [place] to choose from. If you would like a referral to physical therapy, however, that may also really help with your pain. They do different things, like stretches, exercises, ultrasound therapy, electronic stimulation and other modalities to help with pain and muscle spasm. There is a physical therapy department in the basement of the clinic, if you would like a referral there. Finally, you can also check with your chiropractor's office on if they have a massage therapist - sometimes, if they bill the massage as part of a chiropractic treatment, it is covered by insurance. You may be able to do it this way.      Hope this helps,  Dr. Launa Leonardo        ----- Message -----   From: Shandra Burgess   Sent: 10/27/2017 12:20 AM EDT   To: Miracle Beltrán DO  Subject: Non-Urgent Medical Question    we was wondering if I could get in to a specialist to give massage on my shoulders and neck as pain pill and mucsle relaxer's work till about an half hour to go then they slowly stop working i love how the work when they work for the 6 and half hours i been using ice and heat on my shoulder ice help's so does heat but don't do as good as the ice does.     Thank's Dr Ceron and you Awesome Team U have there  Work with you [employer] Ur take of our health issues  Liz Awad

## 2017-10-27 NOTE — TELEPHONE ENCOUNTER
From: Lucas Ivy  To: Beverley Johnson DO  Sent: 10/27/2017 12:20 AM EDT  Subject: Non-Urgent Medical Question    we was wondering if I could get in to a specialist to give massage on my shoulders and neck as pain pill and mucsle relaxer's work till about an half hour to go then they slowly stop working i love how the work when they work for the 6 and half hours i been using ice and heat on my shoulder ice help's so does heat but don't do as good as the ice does.     Thank's Dr Nneka Jean and you Awesome Team U have there  Work with you [employer] Ur take of our health issues  [place] Flucatie

## 2017-10-30 ENCOUNTER — PATIENT MESSAGE (OUTPATIENT)
Dept: FAMILY MEDICINE CLINIC | Age: 42
End: 2017-10-30

## 2017-10-30 DIAGNOSIS — F31.32 BIPOLAR AFFECTIVE DISORDER, CURRENTLY DEPRESSED, MODERATE (HCC): ICD-10-CM

## 2017-10-30 DIAGNOSIS — G47.00 INSOMNIA, UNSPECIFIED TYPE: ICD-10-CM

## 2017-10-30 DIAGNOSIS — M54.12 CERVICAL RADICULOPATHY: ICD-10-CM

## 2017-10-30 RX ORDER — RISPERIDONE 3 MG/1
TABLET, FILM COATED ORAL
Qty: 1 TABLET | Refills: 0
Start: 2017-10-30 | End: 2017-11-30 | Stop reason: SDUPTHER

## 2017-10-30 RX ORDER — GABAPENTIN 100 MG/1
200 CAPSULE ORAL 3 TIMES DAILY
Qty: 180 CAPSULE | Refills: 0 | Status: CANCELLED | OUTPATIENT
Start: 2017-10-30

## 2017-10-30 RX ORDER — GABAPENTIN 100 MG/1
200 CAPSULE ORAL 3 TIMES DAILY
Qty: 180 CAPSULE | Refills: 0 | Status: SHIPPED | OUTPATIENT
Start: 2017-10-30 | End: 2017-11-30 | Stop reason: SDUPTHER

## 2017-10-30 NOTE — TELEPHONE ENCOUNTER
Last appt 10-10-17. Next appt 11-27-17. OARRS reviewed. Gabapentin last filled 10-10-17 for #90. Per 10-23-17 [employer] message, pt states the 200 mg was more effective.

## 2017-10-31 NOTE — TELEPHONE ENCOUNTER
Controlled Substances Monitoring:     Attestation: The Prescription Monitoring Report for this patient was reviewed today. (Ezio Suazo, DO)  Documentation: No signs of potential drug abuse or diversion identified. (Ezio Suazo, DO)    Refilling early, as pt increased his dose and was taking 2 capsules at a time; updated Rx to new dosing instructions.

## 2017-10-31 NOTE — TELEPHONE ENCOUNTER
From: Del Villafana  To: Aidan Hong DO  Sent: 10/30/2017 8:10 PM EDT  Subject: Prescription Question    If you remember me telling you before I take the risperidone for 3 days then the ambien for a night so i don't get hooked on it so is there any way you could readd the risperisdone back to the prescrition list :) :) :) thanks     Yaritza Funes

## 2017-11-08 ENCOUNTER — PATIENT MESSAGE (OUTPATIENT)
Dept: FAMILY MEDICINE CLINIC | Age: 42
End: 2017-11-08

## 2017-11-20 DIAGNOSIS — F41.9 ANXIETY: ICD-10-CM

## 2017-11-20 DIAGNOSIS — F31.32 BIPOLAR AFFECTIVE DISORDER, CURRENTLY DEPRESSED, MODERATE (HCC): ICD-10-CM

## 2017-11-20 RX ORDER — VENLAFAXINE HYDROCHLORIDE 150 MG/1
150 CAPSULE, EXTENDED RELEASE ORAL DAILY
Qty: 30 CAPSULE | Refills: 11 | Status: SHIPPED | OUTPATIENT
Start: 2017-11-20 | End: 2018-11-24 | Stop reason: SDUPTHER

## 2017-11-20 NOTE — TELEPHONE ENCOUNTER
From: Patti Lopez  Sent: 11/19/2017 8:50 AM EST  Subject: Medication Renewal Request    Patti Lopez would like a refill of the following medications:  venlafaxine (EFFEXOR XR) 150 MG extended release capsule EUGENE Cardenas    Preferred pharmacy: [address], [address] [place] [address] [address]kw[address] [phone] - F [phone]    Comment:

## 2017-11-21 DIAGNOSIS — G47.00 INSOMNIA, UNSPECIFIED TYPE: ICD-10-CM

## 2017-11-21 RX ORDER — ZOLPIDEM TARTRATE 10 MG/1
TABLET ORAL
Qty: 15 TABLET | Refills: 0 | Status: SHIPPED | OUTPATIENT
Start: 2017-11-21 | End: 2017-12-22 | Stop reason: SDUPTHER

## 2017-11-23 ENCOUNTER — PATIENT MESSAGE (OUTPATIENT)
Dept: FAMILY MEDICINE CLINIC | Age: 42
End: 2017-11-23

## 2017-11-27 ENCOUNTER — PATIENT MESSAGE (OUTPATIENT)
Dept: FAMILY MEDICINE CLINIC | Age: 42
End: 2017-11-27

## 2017-11-27 NOTE — TELEPHONE ENCOUNTER
From: Ashli Cadena  To: Gregory Kamara DO  Sent: 11/23/2017 2:30 PM EST  Subject: Non-Urgent Medical Question    Happy Thanksgiving  [facility] hope you all there at the office have a great Day your all great at what you do keep up the great work you do   Thanks     Fanny Saini and Family :)

## 2017-11-28 NOTE — TELEPHONE ENCOUNTER
From: Joaquin Halsted  To: Dann Quinteros DO  Sent: 11/27/2017 5:32 PM EST  Subject: Non-Urgent Medical Question    Just wanted to sorry about Missing my appointment today something came up and we couldn't make it to the appointment and had to make for another day.

## 2017-11-30 DIAGNOSIS — F31.32 BIPOLAR AFFECTIVE DISORDER, CURRENTLY DEPRESSED, MODERATE (HCC): ICD-10-CM

## 2017-11-30 DIAGNOSIS — G47.00 INSOMNIA, UNSPECIFIED TYPE: ICD-10-CM

## 2017-11-30 DIAGNOSIS — M54.12 CERVICAL RADICULOPATHY: ICD-10-CM

## 2017-11-30 RX ORDER — RISPERIDONE 3 MG/1
TABLET, FILM COATED ORAL
Qty: 30 TABLET | Refills: 5 | Status: SHIPPED | OUTPATIENT
Start: 2017-11-30 | End: 2018-07-19 | Stop reason: SDUPTHER

## 2017-11-30 RX ORDER — GABAPENTIN 100 MG/1
200 CAPSULE ORAL 3 TIMES DAILY
Qty: 180 CAPSULE | Refills: 1 | Status: SHIPPED | OUTPATIENT
Start: 2017-11-30 | End: 2018-12-07

## 2017-12-01 NOTE — TELEPHONE ENCOUNTER
Controlled Substances Monitoring:     Attestation: The Prescription Monitoring Report for this patient was reviewed today.  (Cliff Leblanc, DO)  Documentation: No signs of potential drug abuse or diversion identified. (Cliff Leblanc, DO)

## 2017-12-04 DIAGNOSIS — M54.2 CHRONIC NECK PAIN: ICD-10-CM

## 2017-12-04 DIAGNOSIS — G89.29 CHRONIC NECK PAIN: ICD-10-CM

## 2017-12-04 RX ORDER — ORPHENADRINE CITRATE 100 MG/1
100 TABLET, EXTENDED RELEASE ORAL 2 TIMES DAILY PRN
Qty: 60 TABLET | Refills: 2 | Status: SHIPPED | OUTPATIENT
Start: 2017-12-04 | End: 2018-07-19 | Stop reason: SDUPTHER

## 2017-12-14 ENCOUNTER — OFFICE VISIT (OUTPATIENT)
Dept: OPTOMETRY | Age: 42
End: 2017-12-14
Payer: MEDICARE

## 2017-12-14 DIAGNOSIS — H52.203 MYOPIA OF BOTH EYES WITH ASTIGMATISM AND PRESBYOPIA: Primary | ICD-10-CM

## 2017-12-14 DIAGNOSIS — H52.4 MYOPIA OF BOTH EYES WITH ASTIGMATISM AND PRESBYOPIA: Primary | ICD-10-CM

## 2017-12-14 DIAGNOSIS — H52.13 MYOPIA OF BOTH EYES WITH ASTIGMATISM AND PRESBYOPIA: Primary | ICD-10-CM

## 2017-12-14 PROCEDURE — G8417 CALC BMI ABV UP PARAM F/U: HCPCS | Performed by: OPTOMETRIST

## 2017-12-14 PROCEDURE — G8484 FLU IMMUNIZE NO ADMIN: HCPCS | Performed by: OPTOMETRIST

## 2017-12-14 PROCEDURE — G8427 DOCREV CUR MEDS BY ELIG CLIN: HCPCS | Performed by: OPTOMETRIST

## 2017-12-14 PROCEDURE — 99213 OFFICE O/P EST LOW 20 MIN: CPT | Performed by: OPTOMETRIST

## 2017-12-14 PROCEDURE — 1036F TOBACCO NON-USER: CPT | Performed by: OPTOMETRIST

## 2017-12-14 RX ORDER — BENOXINATE HCL/FLUORESCEIN SOD 0.4%-0.25%
1 DROPS OPHTHALMIC (EYE) ONCE
Status: COMPLETED | OUTPATIENT
Start: 2017-12-14 | End: 2017-12-14

## 2017-12-14 RX ADMIN — Medication 1 DROP: at 11:39

## 2017-12-14 ASSESSMENT — KERATOMETRY
OD_K2POWER_DIOPTERS: 44.25
OD_K1POWER_DIOPTERS: 43.50
OS_AXISANGLE2_DEGREES: 003
OD_AXISANGLE_DEGREES: 110
OS_AXISANGLE_DEGREES: 093
OS_K2POWER_DIOPTERS: 44.75
OD_AXISANGLE2_DEGREES: 020
OS_K1POWER_DIOPTERS: 43.50

## 2017-12-14 ASSESSMENT — REFRACTION_MANIFEST
OS_ADD: +1.25
OD_AXIS: 090
OS_SPHERE: -0.75
OS_CYLINDER: DS
OD_CYLINDER: -0.75
OD_SPHERE: -0.25
OD_ADD: +1.25

## 2017-12-14 ASSESSMENT — REFRACTION_WEARINGRX
OS_AXIS: 094
OD_AXIS: 090
OD_SPHERE: -0.25
OS_CYLINDER: -0.25
OS_SPHERE: -0.75
OD_ADD: +1.00
OS_ADD: +1.00
SPECS_TYPE: SVL
OD_CYLINDER: -1.00

## 2017-12-14 ASSESSMENT — VISUAL ACUITY
OD_SC: 20/25 OU
METHOD: SNELLEN - LINEAR
OS_CC: 20/25
OS_CC+: -2
CORRECTION_TYPE: GLASSES
OD_CC+: -2

## 2017-12-14 ASSESSMENT — TONOMETRY
OS_IOP_MMHG: 17
IOP_METHOD: APPLANATION W FLURESS DROP
OD_IOP_MMHG: 17

## 2017-12-14 ASSESSMENT — SLIT LAMP EXAM - LIDS
COMMENTS: NORMAL
COMMENTS: NORMAL

## 2017-12-14 NOTE — PROGRESS NOTES
Sade Miramontes presents today for   Chief Complaint   Patient presents with    Blurred Vision    Vision Exam    Migraine   . HPI     Blurred Vision   In both eyes. Vision is blurred. Context:  distance vision. Comments   Last Vision Exam: 8/17/2016 AW  Last Ophthalmology Exam: n/a  Last Filled Glasses Rx: 8/17/2016 SVL Only  Insurance: Medicare/Medicaid  Update: Glasses  Takes glasses off to read. Has been getting migraines recently, has had one for the past 3 days  Does have some difficulty seeing through his glasses.                    Main Ophthalmology Exam     External Exam       Right Left    External Normal Normal          Slit Lamp Exam       Right Left    Lids/Lashes Normal Normal    Conjunctiva/Sclera White and quiet White and quiet    Cornea Clear Clear    Anterior Chamber Deep and quiet Deep and quiet    Iris Round and reactive Round and reactive    Lens Clear Clear    Vitreous Normal Normal          Fundus Exam       Right Left    Disc Normal Normal    C/D Ratio 0.2 0.2    Macula Normal Normal    Vessels Normal Normal    78 diopter                    Tonometry     Tonometry (Applanation w Fluress drop, 11:55 AM)       Right Left    Pressure 17 17    Difficult                Visual Acuity (Snellen - Linear)       Right Left    Dist cc 20/25 -2 20/25 -2    Near sc 20/25 OU     Correction:  Glasses        Keratometry     Keratometry       K1 Axis K2 Axis    Right 43.50 020 44.25 110    Left 43.50 003 44.75 093                Ophthalmology Exam     Wearing Rx       Sphere Cylinder Axis Add    Right -0.25 -1.00 090 +1.00    Left -0.75 -0.25 094 +1.00    Age:  1yr    Type:  SVL                Manifest Refraction     Manifest Refraction       Sphere Cylinder [place] Dist VA Add Near [place]    Right -0.25 -0.75 090 20/20 +1.25     Left -0.75 ds  20/20 +1.25 20/20          Manifest Refraction #2 (Auto)       Sphere Cylinder [place] Dist VA Add Near [place]    Right +0.00 -0.75 087       Left -0.50 0.00 000 Final Rx       Sphere Cylinder Axis Add    Right -0.25 -0.75 090 +1.25    Left -0.75 ds  +1.25    Type:  may do distance only if desired     Expiration Date:  12/15/2019            1. Myopia of both eyes with astigmatism and presbyopia           Patient Instructions   New glasses if desired      Return in about 1 year (around 12/14/2018) for complete eye exam.

## 2017-12-22 DIAGNOSIS — G47.00 INSOMNIA, UNSPECIFIED TYPE: ICD-10-CM

## 2017-12-22 RX ORDER — ZOLPIDEM TARTRATE 10 MG/1
TABLET ORAL
Qty: 15 TABLET | Refills: 0 | Status: SHIPPED | OUTPATIENT
Start: 2017-12-22 | End: 2018-01-22 | Stop reason: SDUPTHER

## 2017-12-22 NOTE — TELEPHONE ENCOUNTER
From: Joaquin Halsted  Sent: 12/22/2017 9:07 AM EST  Subject: Medication Renewal Request    Festus Halsted would like a refill of the following medications:  zolpidem (AMBIEN) 10 MG tablet Dann Quinteros DO]    Preferred pharmacy: [address], [address] [place] [address] [address]kw[address] [phone] Liza Stokes [phone]    Comment:  Hope You have A great Sasha :)

## 2017-12-22 NOTE — TELEPHONE ENCOUNTER
Controlled Substances Monitoring:     Attestation: The Prescription Monitoring Report for this patient was reviewed today.  (Charlie Matthew, DO)  Documentation: No signs of potential drug abuse or diversion identified. (Charlie Matthew, DO)

## 2017-12-26 ENCOUNTER — PATIENT MESSAGE (OUTPATIENT)
Dept: FAMILY MEDICINE CLINIC | Age: 42
End: 2017-12-26

## 2017-12-26 ENCOUNTER — OFFICE VISIT (OUTPATIENT)
Dept: PRIMARY CARE CLINIC | Age: 42
End: 2017-12-26
Payer: MEDICARE

## 2017-12-26 VITALS
BODY MASS INDEX: 29.23 KG/M2 | SYSTOLIC BLOOD PRESSURE: 130 MMHG | WEIGHT: 209.6 LBS | OXYGEN SATURATION: 100 % | HEART RATE: 80 BPM | DIASTOLIC BLOOD PRESSURE: 70 MMHG

## 2017-12-26 DIAGNOSIS — G43.009 MIGRAINE WITHOUT AURA AND WITHOUT STATUS MIGRAINOSUS, NOT INTRACTABLE: Primary | ICD-10-CM

## 2017-12-26 PROCEDURE — G8417 CALC BMI ABV UP PARAM F/U: HCPCS | Performed by: NURSE PRACTITIONER

## 2017-12-26 PROCEDURE — G8484 FLU IMMUNIZE NO ADMIN: HCPCS | Performed by: NURSE PRACTITIONER

## 2017-12-26 PROCEDURE — 1036F TOBACCO NON-USER: CPT | Performed by: NURSE PRACTITIONER

## 2017-12-26 PROCEDURE — G8427 DOCREV CUR MEDS BY ELIG CLIN: HCPCS | Performed by: NURSE PRACTITIONER

## 2017-12-26 PROCEDURE — 99202 OFFICE O/P NEW SF 15 MIN: CPT | Performed by: NURSE PRACTITIONER

## 2017-12-26 RX ORDER — IBUPROFEN 600 MG/1
600 TABLET ORAL EVERY 6 HOURS PRN
Qty: 120 TABLET | Refills: 0 | Status: SHIPPED | OUTPATIENT
Start: 2017-12-26 | End: 2018-09-04 | Stop reason: SINTOL

## 2017-12-26 ASSESSMENT — ENCOUNTER SYMPTOMS
VOMITING: 0
NAUSEA: 0
RESPIRATORY NEGATIVE: 1
PHOTOPHOBIA: 1

## 2017-12-26 NOTE — PROGRESS NOTES
Subjective:      Patient ID: Neli Turner is a 38 y.o. male. Headache    This is a new problem. The current episode started yesterday. The problem occurs constantly. The problem has been unchanged. The pain is located in the frontal and retro-orbital region. The pain quality is similar to prior headaches. The pain is at a severity of 7/10. Associated symptoms include phonophobia and photophobia. Pertinent negatives include no nausea or vomiting. Exacerbated by: sounds, light, smoke. Treatments tried: Imitrex. The treatment provided moderate relief. His past medical history is significant for migraine headaches. Review of Systems   Constitutional: Negative. Eyes: Positive for photophobia. Respiratory: Negative. Cardiovascular: Negative. Gastrointestinal: Negative for nausea and vomiting. Musculoskeletal: Negative. Skin: Negative. Neurological: Positive for headaches. Objective:   Physical Exam   Constitutional: He appears well-developed and well-nourished. HENT:   Head: Normocephalic and atraumatic. Right Ear: Tympanic membrane, external ear and ear canal normal.   Left Ear: Tympanic membrane, external ear and ear canal normal.   Nose: Nose normal.   Mouth/Throat: Uvula is midline, oropharynx is clear and moist and mucous membranes are normal.   Eyes: Conjunctivae, EOM and lids are normal. Pupils are equal, round, and reactive to light. Neck: Trachea normal and normal range of motion. Neck supple. Cardiovascular: Normal rate, regular rhythm, normal heart sounds and normal pulses. Pulmonary/Chest: Effort normal and breath sounds normal.   Abdominal: Soft. Bowel sounds are normal.   Musculoskeletal: Normal range of motion. Skin: Skin is warm, dry and intact. Vitals reviewed. Vitals:    12/26/17 1417   BP: 130/70   Pulse: 80   SpO2: 100%     I have reviewed pertinent family and social history. Assessment:      1.  Migraine without aura and without status migrainosus, not intractable  ibuprofen (ADVIL;MOTRIN) 600 MG tablet           Plan:      May use Ibuprofen prn pain. Refill Imitrex when able. Follow up with PCP for continuing migraines. Return to ER or UC for symptoms which worsen or fail to improve. Follow up or establish with PCP for routine health maintenance and monitoring. Allergies   Allergen Reactions    Naproxen      ulcers     Current Outpatient Prescriptions   Medication Sig Dispense Refill    ibuprofen (ADVIL;MOTRIN) 600 MG tablet Take 1 tablet by mouth every 6 hours as needed for Pain 120 tablet 0    zolpidem (AMBIEN) 10 MG tablet Take 0.5 -1 tablet by mouth at bedtime as needed for sleep. . 15 tablet 0    orphenadrine (NORFLEX) 100 MG extended release tablet Take 1 tablet by mouth 2 times daily as needed for Muscle spasms 60 tablet 2    risperiDONE (RISPERDAL) 3 MG tablet TAKE ONE TABLET BY MOUTH ONCE NIGHTLY 30 tablet 5    gabapentin (NEURONTIN) 100 MG capsule Take 2 capsules by mouth 3 times daily 180 capsule 1    venlafaxine (EFFEXOR XR) 150 MG extended release capsule Take 1 capsule by mouth daily 30 capsule 11    omeprazole (PRILOSEC) 20 MG delayed release capsule Take 1 capsule by mouth daily 30 capsule 2    Benzoyl Peroxide (BENZAC AC) 10 % external wash Apply topically to affected areas twice daily. 1 Bottle 2    predniSONE (DELTASONE) 50 MG tablet Take 1 tablet by mouth daily 4 tablet 0    fluticasone (FLONASE) 50 MCG/ACT nasal spray 2 sprays by Nasal route daily 1 Bottle 5    topiramate (TOPAMAX) 50 MG tablet Take 1 tablet by mouth 2 times daily 60 tablet 5    busPIRone (BUSPAR) 10 MG tablet TAKE ONE TABLET BY MOUTH TWICE DAILY 60 tablet 5    topiramate (TOPAMAX) 25 MG tablet Take 1 tablet by mouth 2 times daily Take with 50 mg tab for total dose of 75 mg BID. 60 tablet 5    ciclopirox (PENLAC) 8 % solution Apply topically to affected toenail nightly.  1 Bottle 2    albuterol sulfate HFA (VENTOLIN HFA) 108 (90 BASE)

## 2017-12-26 NOTE — TELEPHONE ENCOUNTER
From: Shanika Antunez  To: Cliff Leblanc DO  Sent: 12/26/2017 11:34 AM EST  Subject: Prescription Question    Is there a Alternative to sumatriptan I've had a headache all week am out of my medicine and my insurance won't cover any more until about the 8th and Sumatriptan is the only thing i find that works on getting rid of my migriane's i like them a lot i wish my insurance would work with me on covering them for me.

## 2017-12-26 NOTE — PATIENT INSTRUCTIONS
flashing lights or dark spots, or sensitivity to bright light or loud noise. Note if the headache occurred near your period. List anything that might have triggered the headache. Triggers may include certain foods (chocolate, cheese, wine) or odors, smoke, bright light, stress, or lack of sleep. · If your doctor has prescribed medicine for your migraines, take it as directed. You may have medicine that you take only when you get a migraine and medicine that you take all the time to help prevent migraines. ¨ If your doctor has prescribed medicine for when you get a headache, take it at the first sign of a migraine, unless your doctor has given you other instructions. ¨ If your doctor has prescribed medicine to prevent migraines, take it exactly as prescribed. Call your doctor if you think you are having a problem with your medicine. · Find healthy ways to deal with stress. Migraines are most common during or right after stressful times. Take time to relax before and after you do something that has caused a migraine in the past.  · Try to keep your muscles relaxed by keeping good posture. Check your jaw, face, neck, and shoulder muscles for tension. Try to relax them. When you sit at a desk, change positions often. And make sure to stretch for 30 seconds each hour. · Get plenty of sleep and exercise. · Eat meals on a regular schedule. Avoid foods and drinks that often trigger migraines. These include chocolate, alcohol (especially red wine and port), aspartame, monosodium glutamate (MSG), and some additives found in foods (such as hot dogs, jacobs, cold cuts, aged cheeses, and pickled foods). · Limit caffeine. Don't drink too much coffee, tea, or soda. But don't quit caffeine suddenly. That can also give you migraines. · Do not smoke or allow others to smoke around you. If you need help quitting, talk to your doctor about stop-smoking programs and medicines.  These can increase your chances of quitting for good.  · If you are taking birth control pills or hormone therapy, talk to your doctor about whether they are triggering your migraines. When should you call for help? Call 911 anytime you think you may need emergency care. For example, call if:  ? · You have signs of a stroke. These may include:  ¨ Sudden numbness, paralysis, or weakness in your face, arm, or leg, especially on only one side of your body. ¨ Sudden vision changes. ¨ Sudden trouble speaking. ¨ Sudden confusion or trouble understanding simple statements. ¨ Sudden problems with walking or balance. ¨ A sudden, severe headache that is different from past headaches. ?Call your doctor now or seek immediate medical care if:  ? · You have new or worse nausea and vomiting. ? · You have a new or higher fever. ? · Your headache gets much worse. ? Watch closely for changes in your health, and be sure to contact your doctor if:  ? · You are not getting better after 2 days (48 hours). Where can you learn more? Go to https://Quik.io.Enzymotec. org and sign in to your MUBI account. Enter K599 in the Upower box to learn more about \"Migraine Headache: Care Instructions. \"     If you do not have an account, please click on the \"Sign Up Now\" link. Current as of: October 14, 2016  Content Version: 11.4  © 6148-3356 Healthwise, Incorporated. Care instructions adapted under license by [facility] ([facility]). If you have questions about a medical condition or this instruction, always ask your healthcare professional. [place] [address] any warranty or liability for your use of this information.

## 2017-12-27 ENCOUNTER — PATIENT MESSAGE (OUTPATIENT)
Dept: FAMILY MEDICINE CLINIC | Age: 42
End: 2017-12-27

## 2017-12-28 ENCOUNTER — PATIENT MESSAGE (OUTPATIENT)
Dept: FAMILY MEDICINE CLINIC | Age: 42
End: 2017-12-28

## 2017-12-28 DIAGNOSIS — G43.009 MIGRAINE WITHOUT AURA AND WITHOUT STATUS MIGRAINOSUS, NOT INTRACTABLE: Primary | ICD-10-CM

## 2017-12-29 DIAGNOSIS — G43.909 MIGRAINE WITHOUT STATUS MIGRAINOSUS, NOT INTRACTABLE, UNSPECIFIED MIGRAINE TYPE: ICD-10-CM

## 2017-12-29 RX ORDER — TOPIRAMATE 100 MG/1
100 TABLET, FILM COATED ORAL 2 TIMES DAILY
Qty: 60 TABLET | Refills: 2 | Status: SHIPPED | OUTPATIENT
Start: 2017-12-29 | End: 2019-04-09 | Stop reason: ALTCHOICE

## 2017-12-29 RX ORDER — TOPIRAMATE 25 MG/1
25 TABLET ORAL 2 TIMES DAILY
Qty: 60 TABLET | Refills: 5 | Status: CANCELLED | OUTPATIENT
Start: 2017-12-29

## 2017-12-31 ENCOUNTER — PATIENT MESSAGE (OUTPATIENT)
Dept: FAMILY MEDICINE CLINIC | Age: 42
End: 2017-12-31

## 2018-01-02 NOTE — TELEPHONE ENCOUNTER
From: Aric Riggs  To: Laurie Anderson DO  Sent: 12/31/2017 8:59 AM EST  Subject: Non-Urgent Yoni Solorzano.  to you your family and your staff :) hope your year is Lisa Brayan :)

## 2018-01-03 ENCOUNTER — PATIENT MESSAGE (OUTPATIENT)
Dept: FAMILY MEDICINE CLINIC | Age: 43
End: 2018-01-03

## 2018-01-16 ENCOUNTER — PATIENT MESSAGE (OUTPATIENT)
Dept: FAMILY MEDICINE CLINIC | Age: 43
End: 2018-01-16

## 2018-01-16 DIAGNOSIS — G89.29 CHRONIC NONINTRACTABLE HEADACHE, UNSPECIFIED HEADACHE TYPE: Primary | ICD-10-CM

## 2018-01-16 DIAGNOSIS — R51.9 CHRONIC NONINTRACTABLE HEADACHE, UNSPECIFIED HEADACHE TYPE: Primary | ICD-10-CM

## 2018-01-16 NOTE — TELEPHONE ENCOUNTER
From: Nick Ashby  To: Smiley Isidro DO  Sent: 1/16/2018 11:11 AM EST  Subject: Prescription Question    The 100mg doesn't seem to help like it did the last week i had headache 3 day when i woke not bad pain just enough to say it there to bug me it not bad to take a sumatriptan every time it gets that bad i take one you get what i mean right.

## 2018-01-21 ENCOUNTER — PATIENT MESSAGE (OUTPATIENT)
Dept: FAMILY MEDICINE CLINIC | Age: 43
End: 2018-01-21

## 2018-01-22 DIAGNOSIS — G47.00 INSOMNIA, UNSPECIFIED TYPE: ICD-10-CM

## 2018-01-22 RX ORDER — ZOLPIDEM TARTRATE 10 MG/1
TABLET ORAL
Qty: 15 TABLET | Refills: 0 | Status: SHIPPED | OUTPATIENT
Start: 2018-01-22 | End: 2018-02-26 | Stop reason: SDUPTHER

## 2018-01-23 ENCOUNTER — PATIENT MESSAGE (OUTPATIENT)
Dept: FAMILY MEDICINE CLINIC | Age: 43
End: 2018-01-23

## 2018-01-23 NOTE — TELEPHONE ENCOUNTER
From: Lm Speak  To: Kyaace Isidro DO  Sent: 1/23/2018 6:58 AM EST  Subject: Non-Urgent Medical Question    Good Morning Dr Gerardo Burrell I wanted to explain the Migriane's while they was fresh on the mind i could be 3 days good no pain no migraine's all normal and then out of no where migriane and it stops if i have medican but comes back the next day around 5 or 6 pm then take more med's to get rid of it and this is why I run out of my sumatriptan so fast as i got more migraine days then i got with out it really bite's just wanted to share with my aswesome Dr. Eriln Dinh for [address] [place] [address] [address]

## 2018-01-29 ENCOUNTER — OFFICE VISIT (OUTPATIENT)
Dept: FAMILY MEDICINE CLINIC | Age: 43
End: 2018-01-29
Payer: MEDICARE

## 2018-01-29 VITALS
SYSTOLIC BLOOD PRESSURE: 110 MMHG | OXYGEN SATURATION: 98 % | WEIGHT: 211 LBS | BODY MASS INDEX: 29.43 KG/M2 | HEART RATE: 80 BPM | DIASTOLIC BLOOD PRESSURE: 78 MMHG

## 2018-01-29 DIAGNOSIS — F41.9 ANXIETY: ICD-10-CM

## 2018-01-29 DIAGNOSIS — M54.9 CHRONIC UPPER BACK PAIN: ICD-10-CM

## 2018-01-29 DIAGNOSIS — G43.909 MIGRAINE WITHOUT STATUS MIGRAINOSUS, NOT INTRACTABLE, UNSPECIFIED MIGRAINE TYPE: Primary | ICD-10-CM

## 2018-01-29 DIAGNOSIS — G47.00 INSOMNIA, UNSPECIFIED TYPE: ICD-10-CM

## 2018-01-29 DIAGNOSIS — G89.29 CHRONIC NECK PAIN: ICD-10-CM

## 2018-01-29 DIAGNOSIS — M54.2 CHRONIC NECK PAIN: ICD-10-CM

## 2018-01-29 DIAGNOSIS — G89.29 CHRONIC UPPER BACK PAIN: ICD-10-CM

## 2018-01-29 DIAGNOSIS — F31.32 BIPOLAR AFFECTIVE DISORDER, CURRENTLY DEPRESSED, MODERATE (HCC): ICD-10-CM

## 2018-01-29 PROCEDURE — G8484 FLU IMMUNIZE NO ADMIN: HCPCS | Performed by: FAMILY MEDICINE

## 2018-01-29 PROCEDURE — 99214 OFFICE O/P EST MOD 30 MIN: CPT | Performed by: FAMILY MEDICINE

## 2018-01-29 PROCEDURE — G8417 CALC BMI ABV UP PARAM F/U: HCPCS | Performed by: FAMILY MEDICINE

## 2018-01-29 PROCEDURE — G8427 DOCREV CUR MEDS BY ELIG CLIN: HCPCS | Performed by: FAMILY MEDICINE

## 2018-01-29 PROCEDURE — 1036F TOBACCO NON-USER: CPT | Performed by: FAMILY MEDICINE

## 2018-01-29 NOTE — PROGRESS NOTES
[address]  [address], [address] [place] [address] [place]  [phone]      Luis Thompson is a 38 y.o. male who presents today for his medical conditions/complaints as noted below. Luis Thompson is c/o of Back Pain (mid to low back, reports pain is a 7)      HPI:     Pt here today for follow-up of headaches and back pain. Has been taking Norflex 100 mg BID as needed - taking this usually once per day; does help with his upper back and neck pain. Pt has been going through his 9 Imitrex in approx 10 days, and then is without med for 20 days. Has not been taking any OTC analgesics for HA's. Did not see any major improvement with increasing his Topamax 100 mg BID; denies side effects. Has Neurology consult appt on 6/94 with Dr. Jessica Riggs. Pt notes that he has been more tearful and emotional recently - states that it can be over the \"smallest thing\". Has been happening over the past couple months. Denies known trigger. Still Effexor  mg daily and Buspar 10 mg BID - stable on these. Taking Risperdal 3 mg nightly for insomnia. Taking Ambien 10 mg every 2-3 nights as needed - when he feels he needs more sleep, if he has not slept well the few nights prior, he will take it (in place of the Risperdal). Quit smoking 7 months ago - 6/2017. Has been riding his exercise bike for 10 miles/day in 2 sessions.           Past Medical History:   Diagnosis Date    Allergic rhinitis     Bipolar disorder (HCC)     Diagnosed in 1998    Depression     Headache(784.0)     Osteoarthritis     TMJ (dislocation of temporomandibular joint)     Torticollis       Past Surgical History:   Procedure Laterality Date    TONSILLECTOMY       Family History   Problem Relation Age of Onset    Stroke Mother     High Blood Pressure Mother     High Blood Pressure Father     Stroke Father     Glaucoma Neg Hx     Diabetes Neg Hx     Cataracts Neg Hx      Social History Substance Use Topics    Smoking status: Former Smoker     Packs/day: 1.00     Years: 25.00     Types: Cigarettes     Start date: 10/16/1992     Quit date: 6/21/2017    Smokeless tobacco: Never Used      Comment: handout given.  Alcohol use No      Current Outpatient Prescriptions   Medication Sig Dispense Refill    Tens Unit MISC Apply to affected area as needed. 1 each 0    zolpidem (AMBIEN) 10 MG tablet Take 0.5 -1 tablet by mouth at bedtime as needed for sleep. . 15 tablet 0    topiramate (TOPAMAX) 100 MG tablet Take 1 tablet by mouth 2 times daily 60 tablet 2    ibuprofen (ADVIL;MOTRIN) 600 MG tablet Take 1 tablet by mouth every 6 hours as needed for Pain 120 tablet 0    orphenadrine (NORFLEX) 100 MG extended release tablet Take 1 tablet by mouth 2 times daily as needed for Muscle spasms 60 tablet 2    risperiDONE (RISPERDAL) 3 MG tablet TAKE ONE TABLET BY MOUTH ONCE NIGHTLY 30 tablet 5    gabapentin (NEURONTIN) 100 MG capsule Take 2 capsules by mouth 3 times daily 180 capsule 1    venlafaxine (EFFEXOR XR) 150 MG extended release capsule Take 1 capsule by mouth daily 30 capsule 11    omeprazole (PRILOSEC) 20 MG delayed release capsule Take 1 capsule by mouth daily 30 capsule 2    Benzoyl Peroxide (BENZAC AC) 10 % external wash Apply topically to affected areas twice daily. 1 Bottle 2    fluticasone (FLONASE) 50 MCG/ACT nasal spray 2 sprays by Nasal route daily 1 Bottle 5    busPIRone (BUSPAR) 10 MG tablet TAKE ONE TABLET BY MOUTH TWICE DAILY 60 tablet 5    ciclopirox (PENLAC) 8 % solution Apply topically to affected toenail nightly.  1 Bottle 2    albuterol sulfate HFA (VENTOLIN HFA) 108 (90 BASE) MCG/ACT inhaler Inhale 2 puffs into the lungs every 6 hours as needed for Wheezing or Shortness of Breath 1 Inhaler 5    SUMAtriptan (IMITREX) 100 MG tablet Take 1 tablet by mouth once as needed for Migraine (May repeat after 2 hours x 1 more dose, if needed; max dose 200 mg per 24 hours.) 9 tablet 5     No current facility-administered medications for this visit. Allergies   Allergen Reactions    Naproxen      ulcers       Health Maintenance   Topic Date Due    HIV screen  09/07/1988    DTaP/Tdap/Td vaccine (1 - Tdap) 09/07/1992    Flu vaccine (1) 09/01/2017    Lipid screen  07/07/2021       Subjective:      Review of Systems   Musculoskeletal: Positive for neck pain (chronic). Neurological: Positive for headaches. Psychiatric/Behavioral: Positive for confusion (having more trouble with his memory) and dysphoric mood. Objective:     Vitals:    01/29/18 1422   BP: 110/78   Site: Right Arm   Position: Sitting   Cuff Size: Large Adult   Pulse: 80   SpO2: 98%   Weight: 211 lb (95.7 kg)     Physical Exam   Constitutional: He is oriented to person, place, and time. He appears well-developed and well-nourished. No distress. HENT:   Head: Normocephalic and atraumatic. Right Ear: External ear normal.   Left Ear: External ear normal.   Eyes: Conjunctivae are normal.   Cardiovascular: Normal rate, regular rhythm and normal heart sounds. Pulmonary/Chest: Effort normal and breath sounds normal. No respiratory distress. Abdominal: Soft. Bowel sounds are normal. He exhibits no distension. There is no tenderness. Musculoskeletal: He exhibits no edema. Neurological: He is alert and oriented to person, place, and time. Skin: Skin is warm and dry. Psychiatric: He has a normal mood and affect. Assessment:      1. Migraine without status migrainosus, not intractable, unspecified migraine type     2. Insomnia, unspecified type     3. Anxiety     4. Bipolar affective disorder, currently depressed, moderate ([address]     5. Chronic neck pain  Tens Unit MISC   6. Chronic upper back pain  Tens Unit MISC         Plan: Will taper off Gabapentin over 10 days - instructions given to pt today. Declined flu vaccine and Tdap booster today.       Return in about 6 months (around 7/29/2018) for

## 2018-01-29 NOTE — PATIENT INSTRUCTIONS
aspartame, monosodium glutamate (MSG), and some additives found in foods (such as hot dogs, jacobs, cold cuts, aged cheeses, and pickled foods). · Limit caffeine. Don't drink too much coffee, tea, or soda. But don't quit caffeine suddenly. That can also give you migraines. · Do not smoke or allow others to smoke around you. If you need help quitting, talk to your doctor about stop-smoking programs and medicines. These can increase your chances of quitting for good. · If you are taking birth control pills or hormone therapy, talk to your doctor about whether they are triggering your migraines. When should you call for help? Call 911 anytime you think you may need emergency care. For example, call if:  ? · You have signs of a stroke. These may include:  ¨ Sudden numbness, paralysis, or weakness in your face, arm, or leg, especially on only one side of your body. ¨ Sudden vision changes. ¨ Sudden trouble speaking. ¨ Sudden confusion or trouble understanding simple statements. ¨ Sudden problems with walking or balance. ¨ A sudden, severe headache that is different from past headaches. ?Call your doctor now or seek immediate medical care if:  ? · You have new or worse nausea and vomiting. ? · You have a new or higher fever. ? · Your headache gets much worse. ? Watch closely for changes in your health, and be sure to contact your doctor if:  ? · You are not getting better after 2 days (48 hours). Where can you learn more? Go to https://Arclight [employer] Technologyliz[employer]Calysta Energy.Data Driven Delivery System. org and sign in to your Moverati account. Enter A102 in the [facility] box to learn more about \"Migraine Headache: Care Instructions. \"     If you do not have an account, please click on the \"Sign Up Now\" link. Current as of: October 14, 2016  Content Version: 11.5  © 4937-8216 Healthwise, Incorporated. Care instructions adapted under license by [facility] ([facility]).  If you have questions about a medical condition or this instruction,

## 2018-02-14 ENCOUNTER — PATIENT MESSAGE (OUTPATIENT)
Dept: FAMILY MEDICINE CLINIC | Age: 43
End: 2018-02-14

## 2018-02-14 DIAGNOSIS — G43.909 MIGRAINE WITHOUT STATUS MIGRAINOSUS, NOT INTRACTABLE, UNSPECIFIED MIGRAINE TYPE: ICD-10-CM

## 2018-02-14 RX ORDER — SUMATRIPTAN 100 MG/1
100 TABLET, FILM COATED ORAL
Qty: 9 TABLET | Refills: 5 | OUTPATIENT
Start: 2018-02-14 | End: 2018-02-14

## 2018-02-14 RX ORDER — SUMATRIPTAN 100 MG/1
100 TABLET, FILM COATED ORAL
Qty: 9 TABLET | Refills: 5 | Status: SHIPPED | OUTPATIENT
Start: 2018-02-14 | End: 2018-09-24 | Stop reason: SDUPTHER

## 2018-02-14 NOTE — TELEPHONE ENCOUNTER
From: Shirley Cruz  To: Jennifer Santillan DO  Sent: 2/14/2018 8:45 AM EST  Subject: Non-Urgent Medical Question    Good Morning Dr Connee Gilford I ran out of My Sumatriptan refills I also stopped taking Topamax as it was causing major back pain I have been having too roll out of bed and using my cane to get out of my chair since I stopped using it the pain in my back is less and I am able to move better the migraine are the same with the topamax as without so no real change with out it.   thanks   Jak Beltran

## 2018-02-25 ENCOUNTER — PATIENT MESSAGE (OUTPATIENT)
Dept: FAMILY MEDICINE CLINIC | Age: 43
End: 2018-02-25

## 2018-02-25 DIAGNOSIS — G47.00 INSOMNIA, UNSPECIFIED TYPE: ICD-10-CM

## 2018-02-27 ENCOUNTER — PATIENT MESSAGE (OUTPATIENT)
Dept: FAMILY MEDICINE CLINIC | Age: 43
End: 2018-02-27

## 2018-02-27 RX ORDER — ZOLPIDEM TARTRATE 10 MG/1
TABLET ORAL
Qty: 15 TABLET | Refills: 0 | Status: SHIPPED | OUTPATIENT
Start: 2018-02-27 | End: 2018-03-26 | Stop reason: SDUPTHER

## 2018-02-28 NOTE — TELEPHONE ENCOUNTER
Controlled Substances Monitoring: The Prescription Monitoring Report for this patient was reviewed today.  (Kyle Covarrubias, DO)    No signs of potential drug abuse or diversion identified. (Kyle Covarrubias, DO)
Last appt 1-29-18. Next appt 7-30-18. Zolpidem last filled 1-22-18 for #15 per [place].
of it, and discuss further treatment options with Dr. Jess Wen at your appointment next month. Thanks,  Dr. Brian Solorzano        ----- Message -----   From: Diane Khan. Kitty   Sent: 2/14/2018 8:45 AM EST   To: Laurie Anderson DO  Subject: Non-Urgent Medical Question    Good Morning Dr Brian Solorzano I ran out of My Sumatriptan refills I also stopped taking Topamax as it was causing major back pain I have been having too roll out of bed and using my cane to get out of my chair since I stopped using it the pain in my back is less and I am able to move better the migraine are the same with the topamax as without so no real change with out it.   thanks   Beata Jones

## 2018-03-12 ENCOUNTER — OFFICE VISIT (OUTPATIENT)
Dept: NEUROLOGY | Age: 43
End: 2018-03-12
Payer: MEDICARE

## 2018-03-12 VITALS
HEIGHT: 71 IN | SYSTOLIC BLOOD PRESSURE: 126 MMHG | OXYGEN SATURATION: 96 % | HEART RATE: 71 BPM | WEIGHT: 210.98 LBS | DIASTOLIC BLOOD PRESSURE: 74 MMHG | BODY MASS INDEX: 29.54 KG/M2

## 2018-03-12 DIAGNOSIS — F41.9 ANXIETY: ICD-10-CM

## 2018-03-12 DIAGNOSIS — Z87.891 FORMER SMOKER: ICD-10-CM

## 2018-03-12 DIAGNOSIS — G89.29 CHRONIC LOW BACK PAIN WITHOUT SCIATICA, UNSPECIFIED BACK PAIN LATERALITY: ICD-10-CM

## 2018-03-12 DIAGNOSIS — M54.2 CHRONIC NECK PAIN: ICD-10-CM

## 2018-03-12 DIAGNOSIS — F32.A DEPRESSION, UNSPECIFIED DEPRESSION TYPE: ICD-10-CM

## 2018-03-12 DIAGNOSIS — F31.32 BIPOLAR AFFECTIVE DISORDER, CURRENTLY DEPRESSED, MODERATE (HCC): ICD-10-CM

## 2018-03-12 DIAGNOSIS — R41.3 MEMORY PROBLEM: ICD-10-CM

## 2018-03-12 DIAGNOSIS — F31.9 BIPOLAR AFFECTIVE DISORDER, REMISSION STATUS UNSPECIFIED (HCC): ICD-10-CM

## 2018-03-12 DIAGNOSIS — M54.50 CHRONIC LOW BACK PAIN WITHOUT SCIATICA, UNSPECIFIED BACK PAIN LATERALITY: ICD-10-CM

## 2018-03-12 DIAGNOSIS — G89.29 CHRONIC NECK PAIN: ICD-10-CM

## 2018-03-12 DIAGNOSIS — G47.00 INSOMNIA, UNSPECIFIED TYPE: ICD-10-CM

## 2018-03-12 DIAGNOSIS — G43.019 INTRACTABLE MIGRAINE WITHOUT AURA AND WITHOUT STATUS MIGRAINOSUS: Primary | ICD-10-CM

## 2018-03-12 PROBLEM — R51 HEADACHE(784.0): Status: ACTIVE | Noted: 2018-03-12

## 2018-03-12 PROCEDURE — G8427 DOCREV CUR MEDS BY ELIG CLIN: HCPCS | Performed by: PSYCHIATRY & NEUROLOGY

## 2018-03-12 PROCEDURE — G8417 CALC BMI ABV UP PARAM F/U: HCPCS | Performed by: PSYCHIATRY & NEUROLOGY

## 2018-03-12 PROCEDURE — G8484 FLU IMMUNIZE NO ADMIN: HCPCS | Performed by: PSYCHIATRY & NEUROLOGY

## 2018-03-12 PROCEDURE — 1036F TOBACCO NON-USER: CPT | Performed by: PSYCHIATRY & NEUROLOGY

## 2018-03-12 PROCEDURE — 99205 OFFICE O/P NEW HI 60 MIN: CPT | Performed by: PSYCHIATRY & NEUROLOGY

## 2018-03-12 RX ORDER — VENLAFAXINE HYDROCHLORIDE 150 MG/1
150 CAPSULE, EXTENDED RELEASE ORAL DAILY
Qty: 30 CAPSULE | Refills: 11 | Status: CANCELLED | OUTPATIENT
Start: 2018-03-12

## 2018-03-12 ASSESSMENT — ENCOUNTER SYMPTOMS
SINUS PRESSURE: 0
WHEEZING: 0
SCALP TENDERNESS: 0
COUGH: 0
BOWEL INCONTINENCE: 0
FACIAL SWELLING: 0
CHEST TIGHTNESS: 0
CHOKING: 0
ABDOMINAL DISTENTION: 0
SHORTNESS OF BREATH: 0
EYE DISCHARGE: 0
BACK PAIN: 1
CONSTIPATION: 0
EYE PAIN: 0
VOMITING: 0
PHOTOPHOBIA: 1
HEARTBURN: 0
NAUSEA: 0
ABDOMINAL PAIN: 0
SWOLLEN GLANDS: 0
COLOR CHANGE: 0
VOICE CHANGE: 0
EYE ITCHING: 0
BLOOD IN STOOL: 0
TROUBLE SWALLOWING: 0
EYE WATERING: 0
EYE REDNESS: 0
VISUAL CHANGE: 0
SORE THROAT: 0
FACIAL SWEATING: 0
RHINORRHEA: 0
BLURRED VISION: 0
DIARRHEA: 0
APNEA: 0

## 2018-03-12 NOTE — PROGRESS NOTES
Bottle 5    busPIRone (BUSPAR) 10 MG tablet TAKE ONE TABLET BY MOUTH TWICE DAILY 60 tablet 5    ciclopirox (PENLAC) 8 % solution Apply topically to affected toenail nightly. 1 Bottle 2    albuterol sulfate HFA (VENTOLIN HFA) 108 (90 BASE) MCG/ACT inhaler Inhale 2 puffs into the lungs every 6 hours as needed for Wheezing or Shortness of Breath 1 Inhaler 5    SUMAtriptan (IMITREX) 100 MG tablet Take 1 tablet by mouth once as needed for Migraine (May repeat after 2 hours x 1 more dose, if needed; max dose 200 mg per 24 hours.) 9 tablet 5    topiramate (TOPAMAX) 100 MG tablet Take 1 tablet by mouth 2 times daily 60 tablet 2    ibuprofen (ADVIL;MOTRIN) 600 MG tablet Take 1 tablet by mouth every 6 hours as needed for Pain 120 tablet 0    gabapentin (NEURONTIN) 100 MG capsule Take 2 capsules by mouth 3 times daily 180 capsule 1    Benzoyl Peroxide (BENZAC AC) 10 % external wash Apply topically to affected areas twice daily. 1 Bottle 2     No current facility-administered medications for this visit. Allergies   Allergen Reactions    Naproxen      ulcers         Family History   Problem Relation Age of Onset    Stroke Mother     High Blood Pressure Mother     High Blood Pressure Father     Stroke Father     Glaucoma Neg Hx     Diabetes Neg Hx     Cataracts Neg Hx          Social History     Social History    Marital status:      Spouse name: N/A    Number of children: N/A    Years of education: N/A     Occupational History    Not on file. Social History Main Topics    Smoking status: Former Smoker     Packs/day: 1.00     Years: 25.00     Types: Cigarettes     Start date: 10/16/1992     Quit date: 6/21/2017    Smokeless tobacco: Never Used      Comment: handout given.      Alcohol use No    Drug use: No    Sexual activity: Yes     Partners: Female     Other Topics Concern    Not on file     Social History Narrative    No narrative on file       Vitals:    03/12/18 1042   BP: 126/74   Pulse: 71   SpO2: 96%         Wt Readings from Last 3 Encounters:   03/12/18 210 lb 15.7 oz (95.7 kg)   01/29/18 211 lb (95.7 kg)   12/26/17 209 lb 9.6 oz (95.1 kg)         BP Readings from Last 3 Encounters:   03/12/18 126/74   01/29/18 110/78   12/26/17 130/70       Hematology and Coagulation  Lab Results   Component Value Date    WBC 10.7 07/07/2016    RBC 5.21 07/07/2016    HGB 15.9 07/07/2016    HCT 47.8 07/07/2016    MCV 91.7 07/07/2016    MCH 30.5 07/07/2016    MCHC 33.3 07/07/2016    RDW 14.2 07/07/2016     07/07/2016    MPV 7.9 07/07/2016       Chemistries  Lab Results   Component Value Date     07/07/2016    K 3.8 07/07/2016     07/07/2016    CO2 25 07/07/2016    BUN 8 07/07/2016    CREATININE 1.01 07/07/2016    CALCIUM 9.1 07/07/2016       Lab Results   Component Value Date    BUN 8 07/07/2016    CREATININE 1.01 07/07/2016     Lab Results   Component Value Date    CALCIUM 9.1 07/07/2016       Review of Systems   Constitutional: Negative for appetite change, chills, diaphoresis, fatigue, fever, unexpected weight change and weight loss. HENT: Negative for congestion, dental problem, drooling, ear discharge, ear pain, facial swelling, hearing loss, mouth sores, nosebleeds, postnasal drip, rhinorrhea, sinus pressure, sore throat, tinnitus, trouble swallowing and voice change. Eyes: Positive for photophobia. Negative for blurred vision, pain, discharge, redness, itching and visual disturbance. Respiratory: Negative for apnea, cough, choking, chest tightness, shortness of breath and wheezing. Cardiovascular: Negative for chest pain, palpitations, leg swelling and near-syncope. Gastrointestinal: Negative for abdominal distention, abdominal pain, anorexia, blood in stool, bowel incontinence, constipation, diarrhea, heartburn, nausea and vomiting. Endocrine: Negative for cold intolerance, heat intolerance, polydipsia, polyphagia and polyuria.    Genitourinary: Negative for agitated, not aggressive, not hyperactive, not withdrawn, not actively hallucinating and not combative. Thought content is not paranoid and not delusional. Cognition and memory are impaired. He does not express impulsivity or inappropriate judgment. He exhibits a depressed mood. He expresses no homicidal and no suicidal ideation. He expresses no suicidal plans and no homicidal plans. He exhibits abnormal recent memory. He exhibits normal remote memory. He is attentive. Neurologic Exam    NEUROLOGICAL EXAMINATION :    A) MENTAL STATUS:                   Alert and  oriented  To time, place  And  Person. No Aphasia. No  Dysarthria. Able   To  Follow three  Step commands without   Any  Difficulty. No right  To left confusion. Normal  Speech  And language function. Insight and  Judgment ,Fund  Of  Knowledge   DECREASED              Recent  And  Remote memory   DECRESED              Attention &Concentration are   DECREASED                                                 B) CRANIAL NERVES :             2 CN : Visual  Acuity  And  Visual fields  within normal limits                      Fundi  Could  Not  Be  Could  Not  Be  Evaluated. 3,4,6 CN : Both  Pupils are   Reactive and  Equal.                          Extraocular   Movements  Are  Intact. No  Nystagmus. No  CATY. No  Afferent  Pupillary  Defect noted. 5 CN :  Normal  Facial sensations and Corneal  Reflexes         7 CN :  Normal  Facial  Symmetry  And  Strength. No facial  Weakness.          8 CN :  Hearing  Appears within normal limits        9, 10 CN: Normal spontaneous, reflex palate movements       11 CN:   Normal  Shoulder shrug and  strength       12 CN :   Normal  Tongue movements and  Tongue  In midline                      No tongue   Fasciculations or atrophy     C) MOTOR  EXAM:                 Strength  In upper  And  Lower * Second  Neurological  Opinion  And  Evaluations  In  [facility] AND [facility]  Setting  If  Patient  Is  Interested. *  If  The  Patient remains  Neurologically  Stable   Return   To  [facility] Neurology Department   IN  1-2   MONTHS  TIME   FOR  FURTHER  FOLLOW UP.                 *  If   There is  Any  Significant  Worsening   Of  Current  Symptoms  And  Or  If patient  Develops   Any additional  New  Neurological  Symptoms  Or  Significant  Concerns   Should  Call  911 or  Go  To  Emergency  Department  For  Further  Immediate  Evaluation. *   The  Neurological   Findings,  Possible  Diagnosis,  Differential diagnoses   And  Options  For    Further   Investigations   And  management   Are  Discussed  Comprehensively. Medications   And  Prescription   Risks  And  Side effects  Are   Also  Discussed. The  Above  Were  Reviewed  With  patient and girlfriend and   questions  Answered  In  Detail. More   Than   50% of face  To face Time   Was  Spent  On  Counseling   And   Coordination  Of  Care   Of   Patient's multiple   Neurological  Problems   And   Comorbid  Medical   Conditions. Electronically signed by Duglas Singleton MD   Board Certified in  Neurology &  In  [employer] ([employer])      DISCLAIMER:   Although every effort was made to ensure the accuracy of this  electronic transcription, some errors in transcription may have occurred. GENERAL PATIENT INSTRUCTIONS:     A Healthy Lifestyle: Care Instructions  Your Care Instructions  A healthy lifestyle can help you feel good, stay at a healthy weight, and have plenty of energy for both work and play. A healthy lifestyle is something you can share with your whole family. A healthy lifestyle also can lower your risk for serious health problems, such as high blood pressure, heart disease, and diabetes.   You can your use of this information.

## 2018-03-13 RX ORDER — BUSPIRONE HYDROCHLORIDE 10 MG/1
TABLET ORAL
Qty: 60 TABLET | Refills: 5 | Status: SHIPPED | OUTPATIENT
Start: 2018-03-13 | End: 2018-09-07 | Stop reason: SDUPTHER

## 2018-03-15 ENCOUNTER — PATIENT MESSAGE (OUTPATIENT)
Dept: FAMILY MEDICINE CLINIC | Age: 43
End: 2018-03-15

## 2018-03-16 NOTE — TELEPHONE ENCOUNTER
From: Shirley Cruz  To: Jennifer Santillan DO  Sent: 3/15/2018 4:53 PM EDT  Subject: Prescription Question    Good Evening Dr Connee Gilford i got a question about what Dr Melani Lemons told me to start doing taking Baby asprin do I take 4 like it says to on the bottle or a lesser dose?    Thank you    Jak Beltran

## 2018-03-23 ENCOUNTER — PATIENT MESSAGE (OUTPATIENT)
Dept: FAMILY MEDICINE CLINIC | Age: 43
End: 2018-03-23

## 2018-03-23 ENCOUNTER — HOSPITAL ENCOUNTER (OUTPATIENT)
Dept: GENERAL RADIOLOGY | Age: 43
Discharge: HOME | End: 2018-03-25
Payer: MEDICARE

## 2018-03-23 ENCOUNTER — HOSPITAL ENCOUNTER (OUTPATIENT)
Dept: LAB | Age: 43
Setting detail: SPECIMEN
Discharge: HOME | End: 2018-03-23
Payer: MEDICARE

## 2018-03-23 ENCOUNTER — HOSPITAL ENCOUNTER (OUTPATIENT)
Age: 43
Setting detail: SPECIMEN
Discharge: HOME | End: 2018-03-23
Payer: MEDICARE

## 2018-03-23 DIAGNOSIS — G43.019 INTRACTABLE MIGRAINE WITHOUT AURA AND WITHOUT STATUS MIGRAINOSUS: ICD-10-CM

## 2018-03-23 DIAGNOSIS — E78.2 MIXED HYPERLIPIDEMIA: Primary | ICD-10-CM

## 2018-03-23 DIAGNOSIS — M54.2 CHRONIC NECK PAIN: ICD-10-CM

## 2018-03-23 DIAGNOSIS — Z87.891 FORMER SMOKER: ICD-10-CM

## 2018-03-23 DIAGNOSIS — G89.29 CHRONIC LOW BACK PAIN WITHOUT SCIATICA, UNSPECIFIED BACK PAIN LATERALITY: ICD-10-CM

## 2018-03-23 DIAGNOSIS — R41.3 MEMORY PROBLEM: ICD-10-CM

## 2018-03-23 DIAGNOSIS — M54.50 CHRONIC LOW BACK PAIN WITHOUT SCIATICA, UNSPECIFIED BACK PAIN LATERALITY: ICD-10-CM

## 2018-03-23 DIAGNOSIS — G89.29 CHRONIC NECK PAIN: ICD-10-CM

## 2018-03-23 LAB
ALBUMIN SERPL-MCNC: 4.6 G/DL (ref 3.5–5.2)
ALBUMIN/GLOBULIN RATIO: 1.5 (ref 1–2.5)
ALP BLD-CCNC: 112 U/L (ref 40–129)
ALT SERPL-CCNC: 44 U/L (ref 5–41)
AMPHETAMINE SCREEN URINE: NEGATIVE
ANION GAP SERPL CALCULATED.3IONS-SCNC: 14 MMOL/L (ref 9–17)
AST SERPL-CCNC: 26 U/L
BARBITURATE SCREEN URINE: NEGATIVE
BENZODIAZEPINE SCREEN, URINE: NEGATIVE
BILIRUB SERPL-MCNC: 0.53 MG/DL (ref 0.3–1.2)
BUN BLDV-MCNC: 12 MG/DL (ref 6–20)
BUN/CREAT BLD: 13 (ref 9–20)
BUPRENORPHINE URINE: NEGATIVE
CALCIUM SERPL-MCNC: 9.3 MG/DL (ref 8.6–10.4)
CANNABINOID SCREEN URINE: NEGATIVE
CHLORIDE BLD-SCNC: 98 MMOL/L (ref 98–107)
CO2: 28 MMOL/L (ref 20–31)
COCAINE METABOLITE, URINE: NEGATIVE
CREAT SERPL-MCNC: 0.93 MG/DL (ref 0.7–1.2)
FOLATE: <2 NG/ML
GFR AFRICAN AMERICAN: >60 ML/MIN
GFR NON-AFRICAN AMERICAN: >60 ML/MIN
GFR SERPL CREATININE-BSD FRML MDRD: ABNORMAL ML/MIN/{1.73_M2}
GFR SERPL CREATININE-BSD FRML MDRD: ABNORMAL ML/MIN/{1.73_M2}
GLUCOSE BLD-MCNC: 100 MG/DL (ref 70–99)
HCT VFR BLD CALC: 44.4 % (ref 41–53)
HEMOGLOBIN: 15 G/DL (ref 13.5–17.5)
MCH RBC QN AUTO: 33.5 PG (ref 26–34)
MCHC RBC AUTO-ENTMCNC: 33.9 G/DL (ref 31–37)
MCV RBC AUTO: 99 FL (ref 80–100)
MDMA URINE: NORMAL
METHADONE SCREEN, URINE: NEGATIVE
METHAMPHETAMINE, URINE: NEGATIVE
NRBC AUTOMATED: ABNORMAL PER 100 WBC
OPIATES, URINE: NEGATIVE
OXYCODONE SCREEN URINE: NEGATIVE
PDW BLD-RTO: 14.9 % (ref 11–14.5)
PHENCYCLIDINE, URINE: NEGATIVE
PLATELET # BLD: 302 K/UL (ref 140–450)
PMV BLD AUTO: 7.7 FL (ref 6–12)
POTASSIUM SERPL-SCNC: 3.8 MMOL/L (ref 3.7–5.3)
PROPOXYPHENE, URINE: NEGATIVE
RBC # BLD: 4.48 M/UL (ref 4.5–5.9)
RHEUMATOID FACTOR: <10 IU/ML
SEDIMENTATION RATE, ERYTHROCYTE: 3 MM (ref 0–15)
SODIUM BLD-SCNC: 140 MMOL/L (ref 135–144)
T. PALLIDUM, IGG: NONREACTIVE
TEST INFORMATION: NORMAL
TOTAL PROTEIN: 7.6 G/DL (ref 6.4–8.3)
TRICYCLIC ANTIDEPRESSANTS, UR: NEGATIVE
TSH SERPL DL<=0.05 MIU/L-ACNC: 2.46 MIU/L (ref 0.3–5)
VITAMIN B-12: 232 PG/ML (ref 232–1245)
WBC # BLD: 8.2 K/UL (ref 3.5–11)

## 2018-03-23 PROCEDURE — 82607 VITAMIN B-12: CPT

## 2018-03-23 PROCEDURE — 86147 CARDIOLIPIN ANTIBODY EA IG: CPT

## 2018-03-23 PROCEDURE — 80061 LIPID PANEL: CPT

## 2018-03-23 PROCEDURE — 86038 ANTINUCLEAR ANTIBODIES: CPT

## 2018-03-23 PROCEDURE — 85730 THROMBOPLASTIN TIME PARTIAL: CPT

## 2018-03-23 PROCEDURE — 86235 NUCLEAR ANTIGEN ANTIBODY: CPT

## 2018-03-23 PROCEDURE — 85027 COMPLETE CBC AUTOMATED: CPT

## 2018-03-23 PROCEDURE — 85651 RBC SED RATE NONAUTOMATED: CPT

## 2018-03-23 PROCEDURE — 86780 TREPONEMA PALLIDUM: CPT

## 2018-03-23 PROCEDURE — 80053 COMPREHEN METABOLIC PANEL: CPT

## 2018-03-23 PROCEDURE — 82746 ASSAY OF FOLIC ACID SERUM: CPT

## 2018-03-23 PROCEDURE — 72100 X-RAY EXAM L-S SPINE 2/3 VWS: CPT

## 2018-03-23 PROCEDURE — 71046 X-RAY EXAM CHEST 2 VIEWS: CPT

## 2018-03-23 PROCEDURE — 86431 RHEUMATOID FACTOR QUANT: CPT

## 2018-03-23 PROCEDURE — 86225 DNA ANTIBODY NATIVE: CPT

## 2018-03-23 PROCEDURE — 85610 PROTHROMBIN TIME: CPT

## 2018-03-23 PROCEDURE — 85613 RUSSELL VIPER VENOM DILUTED: CPT

## 2018-03-23 PROCEDURE — 84443 ASSAY THYROID STIM HORMONE: CPT

## 2018-03-23 PROCEDURE — 80306 DRUG TEST PRSMV INSTRMNT: CPT

## 2018-03-23 PROCEDURE — 72040 X-RAY EXAM NECK SPINE 2-3 VW: CPT

## 2018-03-23 PROCEDURE — 36415 COLL VENOUS BLD VENIPUNCTURE: CPT

## 2018-03-26 DIAGNOSIS — G47.00 INSOMNIA, UNSPECIFIED TYPE: ICD-10-CM

## 2018-03-26 LAB
ANA REFERENCE RANGE:: ABNORMAL
ANTI DNA DOUBLE STRANDED: 6 IU/ML
ANTI JO-1 IGG: 18 U/ML
ANTI RNP AB: 508 U/ML
ANTI SSA: 52 U/ML
ANTI SSB: 12 U/ML
ANTI-CENTROMERE: 14 U/ML
ANTI-NUCLEAR ANTIBODY (ANA): POSITIVE
ANTI-SCLERODERMA: 6 U/ML
ANTI-SMITH: 7 U/ML
HISTONE ANTIBODY: 13 U/ML

## 2018-03-27 LAB
ANTICARDIOLIPIN IGA ANTIBODY: 8.2 APU
ANTICARDIOLIPIN IGG ANTIBODY: 8.6 GPU
CARDIOLIPIN AB IGM: 15.4 MPU
DILUTE RUSSELL VIPER VENOM TIME: NORMAL
INR BLD: 1
LUPUS ANTICOAG: NORMAL
PARTIAL THROMBOPLASTIN TIME: 26.7 SEC (ref 20.5–30.5)
PROTHROMBIN TIME: 10.4 SEC (ref 9–12)

## 2018-03-28 ENCOUNTER — PATIENT MESSAGE (OUTPATIENT)
Dept: FAMILY MEDICINE CLINIC | Age: 43
End: 2018-03-28

## 2018-03-28 DIAGNOSIS — G47.00 INSOMNIA, UNSPECIFIED TYPE: ICD-10-CM

## 2018-03-28 DIAGNOSIS — E78.2 MIXED HYPERLIPIDEMIA: ICD-10-CM

## 2018-03-28 DIAGNOSIS — E78.2 MIXED HYPERLIPIDEMIA: Primary | ICD-10-CM

## 2018-03-28 LAB
CHOLESTEROL/HDL RATIO: 8.8
CHOLESTEROL: 212 MG/DL
HDLC SERPL-MCNC: 24 MG/DL
LDL CHOLESTEROL: 123 MG/DL (ref 0–130)
TRIGL SERPL-MCNC: 327 MG/DL
VLDLC SERPL CALC-MCNC: ABNORMAL MG/DL (ref 1–30)

## 2018-03-28 RX ORDER — ZOLPIDEM TARTRATE 10 MG/1
TABLET ORAL
Qty: 15 TABLET | Refills: 0 | Status: CANCELLED | OUTPATIENT
Start: 2018-03-28 | End: 2018-04-26

## 2018-03-29 ENCOUNTER — PATIENT MESSAGE (OUTPATIENT)
Dept: FAMILY MEDICINE CLINIC | Age: 43
End: 2018-03-29

## 2018-03-29 DIAGNOSIS — G43.909 MIGRAINE WITHOUT STATUS MIGRAINOSUS, NOT INTRACTABLE, UNSPECIFIED MIGRAINE TYPE: ICD-10-CM

## 2018-03-29 RX ORDER — SUMATRIPTAN 100 MG/1
100 TABLET, FILM COATED ORAL
Qty: 9 TABLET | Refills: 5 | Status: CANCELLED | OUTPATIENT
Start: 2018-03-29 | End: 2018-03-29

## 2018-03-29 RX ORDER — ZOLPIDEM TARTRATE 10 MG/1
TABLET ORAL
Qty: 15 TABLET | Refills: 0 | Status: SHIPPED | OUTPATIENT
Start: 2018-03-30 | End: 2018-04-23 | Stop reason: SDUPTHER

## 2018-03-29 NOTE — TELEPHONE ENCOUNTER
Controlled Substances Monitoring: The Prescription Monitoring Report for this patient was reviewed today.  (Gabino Gao, DO)    No signs of potential drug abuse or diversion identified. (Gabino Gao, DO)

## 2018-03-30 DIAGNOSIS — E78.1 HYPERTRIGLYCERIDEMIA: Primary | ICD-10-CM

## 2018-03-30 RX ORDER — FENOFIBRATE 160 MG/1
160 TABLET ORAL DAILY
Qty: 90 TABLET | Refills: 1 | Status: SHIPPED | OUTPATIENT
Start: 2018-03-30 | End: 2018-09-21 | Stop reason: SDUPTHER

## 2018-04-23 DIAGNOSIS — G47.00 INSOMNIA, UNSPECIFIED TYPE: ICD-10-CM

## 2018-04-24 ENCOUNTER — HOSPITAL ENCOUNTER (OUTPATIENT)
Dept: NEUROLOGY | Age: 43
Discharge: HOME | End: 2018-04-24
Payer: MEDICARE

## 2018-04-24 DIAGNOSIS — R41.3 MEMORY PROBLEM: ICD-10-CM

## 2018-04-24 DIAGNOSIS — G43.019 INTRACTABLE MIGRAINE WITHOUT AURA AND WITHOUT STATUS MIGRAINOSUS: ICD-10-CM

## 2018-04-24 PROCEDURE — 95957 EEG DIGITAL ANALYSIS: CPT

## 2018-04-24 PROCEDURE — 95813 EEG EXTND MNTR 61-119 MIN: CPT

## 2018-04-27 RX ORDER — ZOLPIDEM TARTRATE 10 MG/1
TABLET ORAL
Qty: 15 TABLET | Refills: 0 | Status: SHIPPED | OUTPATIENT
Start: 2018-04-29 | End: 2018-05-22 | Stop reason: SDUPTHER

## 2018-05-17 ENCOUNTER — OFFICE VISIT (OUTPATIENT)
Dept: NEUROLOGY | Age: 43
End: 2018-05-17
Payer: MEDICARE

## 2018-05-17 VITALS
DIASTOLIC BLOOD PRESSURE: 76 MMHG | BODY MASS INDEX: 31.36 KG/M2 | HEART RATE: 82 BPM | OXYGEN SATURATION: 95 % | WEIGHT: 224 LBS | SYSTOLIC BLOOD PRESSURE: 124 MMHG | HEIGHT: 71 IN

## 2018-05-17 DIAGNOSIS — M54.50 CHRONIC BILATERAL LOW BACK PAIN WITHOUT SCIATICA: ICD-10-CM

## 2018-05-17 DIAGNOSIS — G89.29 CHRONIC BILATERAL LOW BACK PAIN WITHOUT SCIATICA: ICD-10-CM

## 2018-05-17 DIAGNOSIS — Z87.891 FORMER SMOKER: ICD-10-CM

## 2018-05-17 DIAGNOSIS — F41.9 ANXIETY: ICD-10-CM

## 2018-05-17 DIAGNOSIS — E53.8 FOLIC ACID DEFICIENCY: ICD-10-CM

## 2018-05-17 DIAGNOSIS — G43.019 INTRACTABLE MIGRAINE WITHOUT AURA AND WITHOUT STATUS MIGRAINOSUS: Primary | ICD-10-CM

## 2018-05-17 DIAGNOSIS — G47.00 INSOMNIA, UNSPECIFIED TYPE: ICD-10-CM

## 2018-05-17 DIAGNOSIS — E53.8 B12 DEFICIENCY: ICD-10-CM

## 2018-05-17 DIAGNOSIS — R41.3 MEMORY PROBLEM: ICD-10-CM

## 2018-05-17 DIAGNOSIS — M54.2 CHRONIC NECK PAIN: ICD-10-CM

## 2018-05-17 DIAGNOSIS — F32.A DEPRESSION, UNSPECIFIED DEPRESSION TYPE: ICD-10-CM

## 2018-05-17 DIAGNOSIS — G89.29 CHRONIC NECK PAIN: ICD-10-CM

## 2018-05-17 DIAGNOSIS — E78.1 HYPERTRIGLYCERIDEMIA: ICD-10-CM

## 2018-05-17 DIAGNOSIS — F31.32 BIPOLAR AFFECTIVE DISORDER, CURRENTLY DEPRESSED, MODERATE (HCC): ICD-10-CM

## 2018-05-17 PROCEDURE — 99214 OFFICE O/P EST MOD 30 MIN: CPT | Performed by: PSYCHIATRY & NEUROLOGY

## 2018-05-17 PROCEDURE — 1036F TOBACCO NON-USER: CPT | Performed by: PSYCHIATRY & NEUROLOGY

## 2018-05-17 PROCEDURE — G8427 DOCREV CUR MEDS BY ELIG CLIN: HCPCS | Performed by: PSYCHIATRY & NEUROLOGY

## 2018-05-17 PROCEDURE — G8417 CALC BMI ABV UP PARAM F/U: HCPCS | Performed by: PSYCHIATRY & NEUROLOGY

## 2018-05-17 RX ORDER — DIVALPROEX SODIUM 500 MG/1
TABLET, EXTENDED RELEASE ORAL
Qty: 60 TABLET | Refills: 1 | Status: SHIPPED | OUTPATIENT
Start: 2018-05-17 | End: 2018-07-19 | Stop reason: SDUPTHER

## 2018-05-17 ASSESSMENT — ENCOUNTER SYMPTOMS
CONSTIPATION: 0
FACIAL SWELLING: 0
APNEA: 0
BOWEL INCONTINENCE: 0
WHEEZING: 0
EYE ITCHING: 0
VOICE CHANGE: 0
ABDOMINAL DISTENTION: 0
CHEST TIGHTNESS: 0
CHOKING: 0
DIARRHEA: 0
EYE DISCHARGE: 0
TROUBLE SWALLOWING: 0
HEARTBURN: 0
BLOOD IN STOOL: 0
COLOR CHANGE: 0
SHORTNESS OF BREATH: 0

## 2018-05-22 ENCOUNTER — PATIENT MESSAGE (OUTPATIENT)
Dept: FAMILY MEDICINE CLINIC | Age: 43
End: 2018-05-22

## 2018-05-22 DIAGNOSIS — G47.00 INSOMNIA, UNSPECIFIED TYPE: ICD-10-CM

## 2018-05-29 RX ORDER — ZOLPIDEM TARTRATE 10 MG/1
TABLET ORAL
Qty: 15 TABLET | Refills: 0 | Status: SHIPPED | OUTPATIENT
Start: 2018-05-29 | End: 2018-07-09 | Stop reason: SDUPTHER

## 2018-06-06 ENCOUNTER — PATIENT MESSAGE (OUTPATIENT)
Dept: FAMILY MEDICINE CLINIC | Age: 43
End: 2018-06-06

## 2018-07-02 ENCOUNTER — OFFICE VISIT (OUTPATIENT)
Dept: PRIMARY CARE CLINIC | Age: 43
End: 2018-07-02
Payer: MEDICARE

## 2018-07-02 VITALS
DIASTOLIC BLOOD PRESSURE: 80 MMHG | SYSTOLIC BLOOD PRESSURE: 130 MMHG | BODY MASS INDEX: 32.51 KG/M2 | HEART RATE: 88 BPM | WEIGHT: 233 LBS | TEMPERATURE: 97.6 F | OXYGEN SATURATION: 97 %

## 2018-07-02 DIAGNOSIS — L23.7 POISON IVY: Primary | ICD-10-CM

## 2018-07-02 PROCEDURE — G8417 CALC BMI ABV UP PARAM F/U: HCPCS | Performed by: FAMILY MEDICINE

## 2018-07-02 PROCEDURE — 1036F TOBACCO NON-USER: CPT | Performed by: FAMILY MEDICINE

## 2018-07-02 PROCEDURE — 99214 OFFICE O/P EST MOD 30 MIN: CPT | Performed by: FAMILY MEDICINE

## 2018-07-02 PROCEDURE — G8427 DOCREV CUR MEDS BY ELIG CLIN: HCPCS | Performed by: FAMILY MEDICINE

## 2018-07-02 RX ORDER — PREDNISONE 20 MG/1
TABLET ORAL
Qty: 15 TABLET | Refills: 0 | Status: SHIPPED | OUTPATIENT
Start: 2018-07-02 | End: 2018-07-30 | Stop reason: ALTCHOICE

## 2018-07-02 RX ORDER — NYSTATIN 100000 [USP'U]/G
POWDER TOPICAL
Qty: 45 G | Refills: 0 | Status: SHIPPED | OUTPATIENT
Start: 2018-07-02 | End: 2019-04-09 | Stop reason: ALTCHOICE

## 2018-07-02 ASSESSMENT — ENCOUNTER SYMPTOMS
VOMITING: 0
DOUBLE VISION: 0
DIARRHEA: 0
WHEEZING: 0
CHEST TIGHTNESS: 0
EYE REDNESS: 0
BLURRED VISION: 0
NAUSEA: 0
ABDOMINAL PAIN: 0
SHORTNESS OF BREATH: 0
EYE DISCHARGE: 0
EYE ITCHING: 1

## 2018-07-02 NOTE — PROGRESS NOTES
shortness of breath and wheezing. Cardiovascular: Negative for palpitations. Gastrointestinal: Negative for abdominal pain, diarrhea, nausea and vomiting. Skin: Positive for rash. Objective:     Physical Exam   Constitutional: He appears well-developed and well-nourished. HENT:   Right Ear: Tympanic membrane, external ear and ear canal normal.   Left Ear: Tympanic membrane, external ear and ear canal normal.   Nose: Mucosal edema present. Right sinus exhibits no maxillary sinus tenderness and no frontal sinus tenderness. Left sinus exhibits no maxillary sinus tenderness and no frontal sinus tenderness. Mouth/Throat: No posterior oropharyngeal erythema. Eyes: Conjunctivae and EOM are normal. Pupils are equal, round, and reactive to light. Left eye exhibits no discharge and no exudate. No foreign body present in the left eye. Neck: Normal range of motion. Neck supple. Cardiovascular: Normal rate, regular rhythm, normal heart sounds and intact distal pulses. No murmur heard. Pulmonary/Chest: Effort normal and breath sounds normal. No respiratory distress. He has no wheezes. Abdominal: Soft. Bowel sounds are normal. There is no tenderness. There is no guarding. Lymphadenopathy:     He has no cervical adenopathy. Skin: Rash noted. Rash is vesicular. Nursing note and vitals reviewed. /80 (Site: Left Arm, Position: Sitting, Cuff Size: Large Adult)   Pulse 88   Temp 97.6 °F (36.4 °C) (Tympanic)   Wt 233 lb (105.7 kg)   SpO2 97%   BMI 32.51 kg/m²     Assessment:       Diagnosis Orders   1. Poison ivy               Plan:       Poison ivy: new; he was given a steroid taper. I believe poison ivy is causing his eye lid swelling. he was advised to wash his hands and clothes if he comes into contact with poison ivy in the future. Return if symptoms worsen or fail to improve.     Orders Placed This Encounter   Medications    predniSONE (DELTASONE) 20 MG tablet     Sig:

## 2018-07-06 ENCOUNTER — PATIENT MESSAGE (OUTPATIENT)
Dept: FAMILY MEDICINE CLINIC | Age: 43
End: 2018-07-06

## 2018-07-06 DIAGNOSIS — G47.00 INSOMNIA, UNSPECIFIED TYPE: ICD-10-CM

## 2018-07-09 RX ORDER — ZOLPIDEM TARTRATE 10 MG/1
TABLET ORAL
Qty: 15 TABLET | Refills: 0 | Status: SHIPPED | OUTPATIENT
Start: 2018-07-09 | End: 2018-08-13 | Stop reason: SDUPTHER

## 2018-07-09 NOTE — TELEPHONE ENCOUNTER
From: Lila Lemus  To: Dav Norton DO  Sent: 7/6/2018 5:52 PM EDT  Subject: Prescription Question    Pari Khan I hope u had a great 4th we did :) I was wondering if I could get a refill on my Zolpidem thank you    Mac Brock

## 2018-07-18 ENCOUNTER — PATIENT MESSAGE (OUTPATIENT)
Dept: FAMILY MEDICINE CLINIC | Age: 43
End: 2018-07-18

## 2018-07-19 ENCOUNTER — PATIENT MESSAGE (OUTPATIENT)
Dept: FAMILY MEDICINE CLINIC | Age: 43
End: 2018-07-19

## 2018-07-19 DIAGNOSIS — F31.32 BIPOLAR AFFECTIVE DISORDER, CURRENTLY DEPRESSED, MODERATE (HCC): ICD-10-CM

## 2018-07-19 DIAGNOSIS — G43.019 INTRACTABLE MIGRAINE WITHOUT AURA AND WITHOUT STATUS MIGRAINOSUS: Primary | ICD-10-CM

## 2018-07-19 DIAGNOSIS — G47.00 INSOMNIA, UNSPECIFIED TYPE: ICD-10-CM

## 2018-07-19 DIAGNOSIS — G89.29 CHRONIC NECK PAIN: ICD-10-CM

## 2018-07-19 DIAGNOSIS — M54.2 CHRONIC NECK PAIN: ICD-10-CM

## 2018-07-19 RX ORDER — RISPERIDONE 3 MG/1
3 TABLET, FILM COATED ORAL NIGHTLY
Qty: 30 TABLET | Refills: 5 | Status: SHIPPED | OUTPATIENT
Start: 2018-07-19 | End: 2019-01-18 | Stop reason: SDUPTHER

## 2018-07-19 RX ORDER — ORPHENADRINE CITRATE 100 MG/1
100 TABLET, EXTENDED RELEASE ORAL 2 TIMES DAILY PRN
Qty: 60 TABLET | Refills: 2 | Status: SHIPPED | OUTPATIENT
Start: 2018-07-19 | End: 2019-04-09 | Stop reason: SDUPTHER

## 2018-07-19 RX ORDER — DIVALPROEX SODIUM 500 MG/1
TABLET, EXTENDED RELEASE ORAL
Qty: 60 TABLET | Refills: 1 | Status: SHIPPED | OUTPATIENT
Start: 2018-07-19 | End: 2018-07-30 | Stop reason: SDUPTHER

## 2018-07-19 NOTE — TELEPHONE ENCOUNTER
From: Nidia Millan  To: Sukh Mahan DO  Sent: 7/18/2018 11:28 PM EDT  Subject: Non-Urgent Medical Question    I just noticed that I have refills left on my flonase so i don't need a refill on those just the other one Thanks

## 2018-07-19 NOTE — TELEPHONE ENCOUNTER
From: Rogelio Langley  To: Barbie Farooq DO  Sent: 7/19/2018 7:40 AM EDT  Subject: Non-Urgent Medical Question    I need a refill on Divalproex er 500mg, risperidone 3mg I am not out but I got no refills left, Orpjemadrine er 100mg those 3 meds I am out of refills on  Thank's Dr Suad Arenas  ----- Message -----  From: Barbie Farooq DO  Sent: 7/19/2018 7:34 AM EDT  To: Minh Tang  Subject: RE: Non-Urgent Medical Question    Tereasa Manus - I don't think the message is coming across correctly. You don't need a refill of Flonase, but you did need a refill of which med? Dr. Fe Valladares        ----- Message -----   From: Minh Tang   Sent: 7/18/2018 11:28 PM EDT   To: Barbie Farooq DO  Subject: Non-Urgent Medical Question    I just noticed that I have refills left on my flonase so i don't need a refill on those just the other one Thanks

## 2018-07-30 ENCOUNTER — OFFICE VISIT (OUTPATIENT)
Dept: NEUROLOGY | Age: 43
End: 2018-07-30
Payer: MEDICARE

## 2018-07-30 VITALS
WEIGHT: 233 LBS | BODY MASS INDEX: 32.62 KG/M2 | HEIGHT: 71 IN | SYSTOLIC BLOOD PRESSURE: 132 MMHG | HEART RATE: 72 BPM | DIASTOLIC BLOOD PRESSURE: 78 MMHG

## 2018-07-30 DIAGNOSIS — E53.8 B12 DEFICIENCY: ICD-10-CM

## 2018-07-30 DIAGNOSIS — M54.2 CHRONIC NECK PAIN: ICD-10-CM

## 2018-07-30 DIAGNOSIS — G89.29 CHRONIC LOW BACK PAIN WITHOUT SCIATICA, UNSPECIFIED BACK PAIN LATERALITY: ICD-10-CM

## 2018-07-30 DIAGNOSIS — Z87.891 FORMER SMOKER: ICD-10-CM

## 2018-07-30 DIAGNOSIS — F31.0 BIPOLAR AFFECTIVE DISORDER, CURRENT EPISODE HYPOMANIC (HCC): ICD-10-CM

## 2018-07-30 DIAGNOSIS — M54.50 CHRONIC LOW BACK PAIN WITHOUT SCIATICA, UNSPECIFIED BACK PAIN LATERALITY: ICD-10-CM

## 2018-07-30 DIAGNOSIS — F32.A DEPRESSION, UNSPECIFIED DEPRESSION TYPE: ICD-10-CM

## 2018-07-30 DIAGNOSIS — G47.00 INSOMNIA, UNSPECIFIED TYPE: ICD-10-CM

## 2018-07-30 DIAGNOSIS — F41.9 ANXIETY: ICD-10-CM

## 2018-07-30 DIAGNOSIS — F31.9 BIPOLAR AFFECTIVE DISORDER, REMISSION STATUS UNSPECIFIED (HCC): ICD-10-CM

## 2018-07-30 DIAGNOSIS — R41.3 MEMORY PROBLEM: ICD-10-CM

## 2018-07-30 DIAGNOSIS — G43.019 INTRACTABLE MIGRAINE WITHOUT AURA AND WITHOUT STATUS MIGRAINOSUS: Primary | ICD-10-CM

## 2018-07-30 DIAGNOSIS — G89.29 CHRONIC NECK PAIN: ICD-10-CM

## 2018-07-30 DIAGNOSIS — E78.1 HYPERTRIGLYCERIDEMIA: ICD-10-CM

## 2018-07-30 PROCEDURE — 1036F TOBACCO NON-USER: CPT | Performed by: PSYCHIATRY & NEUROLOGY

## 2018-07-30 PROCEDURE — G8417 CALC BMI ABV UP PARAM F/U: HCPCS | Performed by: PSYCHIATRY & NEUROLOGY

## 2018-07-30 PROCEDURE — G8427 DOCREV CUR MEDS BY ELIG CLIN: HCPCS | Performed by: PSYCHIATRY & NEUROLOGY

## 2018-07-30 PROCEDURE — 99214 OFFICE O/P EST MOD 30 MIN: CPT | Performed by: PSYCHIATRY & NEUROLOGY

## 2018-07-30 RX ORDER — DIVALPROEX SODIUM 500 MG/1
TABLET, EXTENDED RELEASE ORAL
Qty: 60 TABLET | Refills: 2 | Status: SHIPPED | OUTPATIENT
Start: 2018-07-30 | End: 2018-11-01 | Stop reason: SDUPTHER

## 2018-07-30 ASSESSMENT — ENCOUNTER SYMPTOMS
CHEST TIGHTNESS: 0
BLOOD IN STOOL: 0
EYE DISCHARGE: 0
ABDOMINAL DISTENTION: 0
TROUBLE SWALLOWING: 0
SHORTNESS OF BREATH: 0
APNEA: 0
CHOKING: 0
HEARTBURN: 0
EYE ITCHING: 0
BOWEL INCONTINENCE: 0
CONSTIPATION: 0
DIARRHEA: 0
VOICE CHANGE: 0
FACIAL SWELLING: 0
COLOR CHANGE: 0
WHEEZING: 0

## 2018-07-30 NOTE — PROGRESS NOTES
[facility]  Neurology  [address] [place] [address]  Phone: [phone]   Fax: [phone]    SUBJECTIVE:     PATIENT ID:  Krzysztof Boo is a  RIGHT HANDED 38 y.o. male. Headache    This is a chronic problem. Episode onset: MORE  THAN   10  YEARS. The problem occurs intermittently. The problem has been unchanged. The pain is located in the right unilateral region. The pain does not radiate. The pain quality is similar to prior headaches. The quality of the pain is described as aching. The pain is mild. Nothing aggravates the symptoms. He has tried NSAIDs, triptans and acetaminophen for the symptoms. The treatment provided mild relief. Neurologic Problem   The patient's primary symptoms include memory loss. The patient's pertinent negatives include no altered mental status, clumsiness, focal sensory loss, focal weakness, near-syncope, slurred speech or syncope. This is a chronic problem. Episode onset: MORE  THAN   10  YEARS. The neurological problem developed insidiously. The problem is unchanged. There was no focality noted. Associated symptoms include headaches. Pertinent negatives include no auditory change, aura, bladder incontinence, bowel incontinence, chest pain, confusion, diaphoresis, fatigue, light-headedness, palpitations, shortness of breath or vertigo. Past treatments include nothing. The treatment provided no relief. His past medical history is significant for dementia. There is no history of a bleeding disorder, a clotting disorder, a CVA, liver disease, mood changes or seizures. Mental Health Problem   The primary symptoms include negative symptoms and somatic symptoms. The primary symptoms do not include dysphoric mood, delusions, hallucinations, bizarre behavior or disorganized speech. This is a chronic problem. The somatic symptoms have been unchanged since their onset. The symptoms are mild. Somatic symptoms include headaches.  Somatic symptoms do not include fatigue, heartburn, constipation or myalgias. The onset of the illness is precipitated by emotional stress. The degree of incapacity that he is experiencing as a consequence of his illness is mild. Additional symptoms of the illness include attention impairment, distractible, poor judgment and headaches. Additional symptoms of the illness do not include anhedonia, hypersomnia, appetite change, unexpected weight change, fatigue, agitation, feelings of worthlessness, euphoric mood, increased goal-directed activity, flight of ideas, inflated self-esteem or decreased need for sleep. He does not admit to suicidal ideas. He does not have a plan to commit suicide. He does not contemplate harming himself. He has not already injured self. He does not contemplate injuring another person. He has not already  injured another person. Risk factors that are present for mental illness include neurological disease, a family history of mental illness and a history of mental illness. History obtained from  The patient    and other  available medical records were  Also  reviewed. The  Duration,  Quality,  Severity,  Location,  Timing,  Context,  Modifying  Factors   Of   The   Chief   Complaint   And  Present  Illness   Was   Reviewed   In   Chronological   Manner. CURRENT PATIENT'S  MAIN  CONCERNS INCLUDE :                     1)  H/O   CHRONIC  MIGRAINES    FOR  MORE  THAN   10   YEARS                       2)  TRIED  TOPAMAX,  IMITREX     AND    DID  NOT  HELP  MUCH  IN   THE  PAST                      3)   HAD    BOTOX  INJECTIONS  IN  [facility]     IN  THE  PAST                           4)   H/O   CHRONIC  ANXIETY,  DEPRESSION ,  BIPOLAR  DISORDER                                 ON   EFFEXOR,  BUSPAR,   RISPERDAL    FROM  HIS  PCP.                         PATIENT    TO  FOLLOW  WITH  MENTAL   HEALTH PROFESSIONALS                          5)   FAMILY  H/O  MIGRAINES VERY  WELL  CONTROLLED   AND  PATIENT   SUBJECTIVELY                                                 HAPPY.                                                              PRECIPITATING  FACTORS: including  fever/infection, exertion/relaxation, position change, stress, weather change, medications/alcohol, time of day/darkness/light  Are    absent                                                             MODIFYING  FACTORS:  fever/infection, exertion/relaxation, position change, stress, weather change, medications/alcohol, time of day/darkness/light     Are  absent         Patient   Indicates   The  Presence   And  The  Absence  Of  The  Following  Associated  And   Additional  Neurological    Symptoms:                                Balance  And coordination problems  absent           Gait problems     absent            Headaches      present              Migraines           present           Memory problems        present           Confusion        absent            Paresthesia numbness          absent           Seizures  And  Starring  Episodes           absent           Syncope,  Near  syncopal episodes         absent           Speech problems           absent             Swallowing  Problems      absent            Dizziness,  Light headedness           absent                        Vertigo        absent             Generalized   Weakness    absent              focal  Weakness     absent             Tremors         absent              Sleep  Problems     present             History  Of   Recent   Head  Injury     absent             History  Of   Recent  TIA     absent             History  Of   Recent    Stroke     absent             Neck  Pain and  Neck muscle  Spasms  absent             Radiating  down   And   Weakness           absent            Lower back   Pain  And     Spasms  present            Radiating    Down   And   Weakness          present                H/O   FALLS        absent mouth every 6 hours as needed for Pain 120 tablet 0    gabapentin (NEURONTIN) 100 MG capsule Take 2 capsules by mouth 3 times daily 180 capsule 1    venlafaxine (EFFEXOR XR) 150 MG extended release capsule Take 1 capsule by mouth daily 30 capsule 11    omeprazole (PRILOSEC) 20 MG delayed release capsule Take 1 capsule by mouth daily 30 capsule 2    Benzoyl Peroxide (BENZAC AC) 10 % external wash Apply topically to affected areas twice daily. 1 Bottle 2    fluticasone (FLONASE) 50 MCG/ACT nasal spray 2 sprays by Nasal route daily 1 Bottle 5    ciclopirox (PENLAC) 8 % solution Apply topically to affected toenail nightly. 1 Bottle 2    albuterol sulfate HFA (VENTOLIN HFA) 108 (90 BASE) MCG/ACT inhaler Inhale 2 puffs into the lungs every 6 hours as needed for Wheezing or Shortness of Breath 1 Inhaler 5    SUMAtriptan (IMITREX) 100 MG tablet Take 1 tablet by mouth once as needed for Migraine (May repeat after 2 hours x 1 more dose, if needed; max dose 200 mg per 24 hours.) 9 tablet 5     No current facility-administered medications for this visit. Allergies   Allergen Reactions    Naproxen      ulcers         Family History   Problem Relation Age of Onset    Stroke Mother     High Blood Pressure Mother     High Blood Pressure Father     Stroke Father     Glaucoma Neg Hx     Diabetes Neg Hx     Cataracts Neg Hx          Social History     Social History    Marital status:      Spouse name: N/A    Number of children: N/A    Years of education: N/A     Occupational History    Not on file. Social History Main Topics    Smoking status: Former Smoker     Packs/day: 1.00     Years: 25.00     Types: Cigarettes     Start date: 10/16/1992     Quit date: 6/21/2017    Smokeless tobacco: Never Used      Comment: handout given.      Alcohol use No    Drug use: No    Sexual activity: Yes     Partners: Female     Other Topics Concern    Not on file     Social History Narrative    No narrative on file       Vitals:    07/30/18 1024   BP: 132/78   Pulse: 72         Wt Readings from Last 3 Encounters:   07/30/18 233 lb (105.7 kg)   07/02/18 233 lb (105.7 kg)   05/17/18 224 lb (101.6 kg)         BP Readings from Last 3 Encounters:   07/30/18 132/78   07/02/18 130/80   05/17/18 124/76       Hematology and Coagulation  Lab Results   Component Value Date    WBC 8.2 03/23/2018    RBC 4.48 03/23/2018    HGB 15.0 03/23/2018    HCT 44.4 03/23/2018    MCV 99.0 03/23/2018    MCH 33.5 03/23/2018    MCHC 33.9 03/23/2018    RDW 14.9 03/23/2018     03/23/2018    MPV 7.7 03/23/2018       Chemistries  Lab Results   Component Value Date     03/23/2018    K 3.8 03/23/2018    CL 98 03/23/2018    CO2 28 03/23/2018    BUN 12 03/23/2018    CREATININE 0.93 03/23/2018    CALCIUM 9.3 03/23/2018    PROT 7.6 03/23/2018    LABALBU 4.6 03/23/2018    BILITOT 0.53 03/23/2018    ALKPHOS 112 03/23/2018    AST 26 03/23/2018    ALT 44 03/23/2018       Lab Results   Component Value Date    BUN 12 03/23/2018    CREATININE 0.93 03/23/2018     Lab Results   Component Value Date    CALCIUM 9.3 03/23/2018       Review of Systems   Constitutional: Negative for appetite change, chills, diaphoresis, fatigue and unexpected weight change. HENT: Negative for congestion, dental problem, drooling, ear discharge, facial swelling, mouth sores, nosebleeds, postnasal drip, trouble swallowing and voice change. Eyes: Negative for discharge, itching and visual disturbance. Respiratory: Negative for apnea, choking, chest tightness, shortness of breath and wheezing. Cardiovascular: Negative for chest pain, palpitations, leg swelling and near-syncope. Gastrointestinal: Negative for abdominal distention, blood in stool, bowel incontinence, constipation, diarrhea and heartburn. Endocrine: Negative for cold intolerance, heat intolerance, polydipsia, polyphagia and polyuria. Genitourinary: Negative for bladder incontinence. Musculoskeletal: Negative for arthralgias, gait problem, joint swelling, myalgias and neck stiffness. Skin: Negative for color change, pallor, rash and wound. Allergic/Immunologic: Negative for environmental allergies, food allergies and immunocompromised state. Neurological: Positive for headaches. Negative for vertigo, tremors, focal weakness, syncope, facial asymmetry, speech difficulty and light-headedness. Hematological: Negative for adenopathy. Does not bruise/bleed easily. Psychiatric/Behavioral: Positive for decreased concentration, memory loss and sleep disturbance. Negative for agitation, behavioral problems, confusion, dysphoric mood, hallucinations, self-injury and suicidal ideas. The patient is nervous/anxious. The patient is not hyperactive. OBJECTIVE:    Physical Exam   Constitutional: He appears well-developed and well-nourished. He is cooperative. HENT:   Head: Normocephalic and atraumatic. Head is without raccoon's eyes and without Garcia's sign. Right Ear: External ear normal.   Left Ear: External ear normal.   Nose: Nose normal.   Mouth/Throat: Oropharynx is clear and moist.   Eyes: Conjunctivae are normal.   Neck: Normal range of motion. Neck supple. No muscular tenderness present. Carotid bruit is not present. No neck rigidity. No tracheal deviation and normal range of motion present. No Brudzinski's sign and no Kernig's sign noted. No thyroid mass and no thyromegaly present. Cardiovascular: Normal rate and regular rhythm. Pulmonary/Chest: Effort normal.   Musculoskeletal: He exhibits no edema or tenderness. Skin: Skin is warm. No rash noted. No cyanosis or erythema. No pallor. Nails show no clubbing. Psychiatric: His mood appears anxious. His affect is not blunt, not labile and not inappropriate. He is slowed. He is not agitated, not aggressive, not hyperactive, not withdrawn, not actively hallucinating and not combative.  Thought content is not paranoid and not WITHOUT  ANY  SIGNIFICANT  SIDE  EFFECTS   &  GETTING BENEFIT.      *    MIGRAINE  DAIRY   WITH  MONITORING  OF  DURATION  AND  FREQUENCY. *  May   Use  Pill  Box,    If  Needed      *    To  Continue  The    Antiplatelet  therapy    As   Recommended  Was   Discussed      *    Prophylactic  Use   Of     Vitamin   B   Complex,  Folic  Acid,    Vitamin  B12    Multivitamin,   Calcium  With  magnesium  And  Vit D    Supplementations   Over  The  Counter  Discussed       *  PATIENT  IS  ALSO   COUNSELED   TO  KEEP    ACTIVITIES       A)   SIMPLE      B)  ORGANIZED      C)  WRITE   DOWN                    Controlled Substances Monitoring: Attestation: The Prescription Monitoring Report for this patient was reviewed today. Mervin Gan MD)              Orders Placed This Encounter   Medications    divalproex (DEPAKOTE ER) 500 MG extended release tablet     Sig: THEN   ONE  TABLET  TWICE  DAILY     Dispense:  60 tablet     Refill:  2                 *PATIENT   TO  FOLLOW  UP  WITH   PRIMARY  CARE   AND   OTHER  CONSULTANTS  AS  BEFORE.           *TO  FOLLOW  WITH   MENTAL  HEALTH  PROFESSIONALS ,  INCLUDING          PSYCHOLOGICAL  COUNSELING   AND  PSYCHIATRIC  EVALUTIONS        *  Maintain   Healthy  Life Style    With   Periodic  Monitoring  Of    Any  Medical  Conditions  Including   Elevated  Blood  Pressure,  Lipid  Profile,   Blood  Sugar levels  And   Heart  Disease. *   Period   Screening  For  Cancers  Involving  Breast,  Colon,  Prostate, lungs  And  Other  Organs  As  Applicable,  In consultation   With  Your  Primary Care Providers. * Second  Neurological  Opinion  And  Evaluations  In  [facility]  Setting  If  Patient  Is  Interested.                        *  If  The  Patient remains  Neurologically  Stable   Return   To  [facility] Neurology Department   IN  3   MONTHS  TIME   FOR  FURTHER  FOLLOW UP.                 *  If   There is  Any Significant  Worsening   Of  Current  Symptoms  And  Or  If patient  Develops   Any additional  New  Neurological  Symptoms  Or  Significant  Concerns   Should  Call  911 or  Go  To  Emergency  Department  For  Further  Immediate  Evaluation. *   The  Neurological   Findings,  Possible  Diagnosis,  Differential diagnoses   And  Options  For    Further   Investigations   And  management   Are  Discussed  Comprehensively. Medications   And  Prescription   Risks  And  Side effects  Are   Also  Discussed. The  Above  Were  Reviewed  With  patient and girlfriend and   questions  Answered  In  Detail. More   Than   50% of face  To face Time   Was  Spent  On  Counseling   And   Coordination  Of  Care   Of   Patient's multiple   Neurological  Problems   And   Comorbid  Medical   Conditions. Electronically signed by Ebony Richardson MD   Board Certified in  Neurology &  In  [facility] ([facility])      DISCLAIMER:   Although every effort was made to ensure the accuracy of this  electronic transcription, some errors in transcription may have occurred. GENERAL PATIENT INSTRUCTIONS:     A Healthy Lifestyle: Care Instructions  Your Care Instructions  A healthy lifestyle can help you feel good, stay at a healthy weight, and have plenty of energy for both work and play. A healthy lifestyle is something you can share with your whole family. A healthy lifestyle also can lower your risk for serious health problems, such as high blood pressure, heart disease, and diabetes. You can follow a few steps listed below to improve your health and the health of your family. Follow-up care is a key part of your treatment and safety. Be sure to make and go to all appointments, and call your doctor if you are having problems. Its also a good idea to know your test results and keep a list of the medicines you take.   How can you care for yourself at home? Do not eat too much sugar, fat, or fast foods. You can still have dessert and treats now and then. The goal is moderation. Start small to improve your eating habits. Pay attention to portion sizes, drink less juice and soda pop, and eat more fruits and vegetables. Eat a healthy amount of food. A 3-ounce serving of meat, for example, is about the size of a deck of cards. Fill the rest of your plate with vegetables and whole grains. Limit the amount of soda and sports drinks you have every day. Drink more water when you are thirsty. Eat at least 5 servings of fruits and vegetables every day. It may seem like a lot, but it is not hard to reach this goal. A serving or helping is 1 piece of fruit, 1 cup of vegetables, or 2 cups of leafy, raw vegetables. Have an apple or some carrot sticks as an afternoon snack instead of a candy bar. Try to have fruits and/or vegetables at every meal.  Make exercise part of your daily routine. You may want to start with simple activities, such as walking, bicycling, or slow swimming. Try to be active 30 to 60 minutes every day. You do not need to do all 30 to 60 minutes all at once. For example, you can exercise 3 times a day for 10 or 20 minutes. Moderate exercise is safe for most people, but it is always a good idea to talk to your doctor before starting an exercise program.  Keep moving. [place]ami[place] Putt the lawn, work in the garden, or eReceipts. Take the stairs instead of the elevator at work. If you smoke, quit. People who smoke have an increased risk for heart attack, stroke, cancer, and other lung illnesses. Quitting is hard, but there are ways to boost your chance of quitting tobacco for good. Use nicotine gum, patches, or lozenges. Ask your doctor about stop-smoking programs and medicines. Keep trying.   In addition to reducing your risk of diseases in the future, you will notice some benefits soon after you stop using tobacco. If you have shortness of

## 2018-07-30 NOTE — PATIENT INSTRUCTIONS
*   ADEQUATE   FLUID  INTAKE   AND  ELECTROLYTE  BALANCE           * KEEP  DAIRY  OF   THE  NEUROLOGICAL  SYMPTOMS        *  TO  MAINTAIN  REGULAR  SLEEP  WAKE  CYCLES. *   TO  HAVE  ADEQUATE  REST  AND   SLEEP    HOURS.    *    AVOID  USAGE OF            TOBACCO,  EXCESSIVE  ALCOHOL  AND   ILLEGAL   SUBSTANCES,  IF  ANY        *  Maintain   Healthy  Life Style    With   Periodic  Monitoring  Of    Any  Medical  Conditions  Including   Elevated  Blood  Pressure,  Lipid  Profile,   Blood  Sugar levels  And   Heart  Disease. *   Period   Screening  For  Cancers  Involving  Breast,  Colon,   lungs  And  Other  Organs  As  Applicable,  In consultation   With  Your  Primary Care Providers. *  If   There is  Any  Significant  Worsening   Of  Current  Symptoms  And  Or  If    Any additional  New  Neurological  Symptoms  Or  Significant  Concerns   Should  Call  911 or  Go  To  Emergency  Department  For  Further  Immediate  Evaluation.

## 2018-08-05 PROBLEM — R51.9 HEADACHE: Status: ACTIVE | Noted: 2018-03-12

## 2018-08-07 ENCOUNTER — PATIENT MESSAGE (OUTPATIENT)
Dept: FAMILY MEDICINE CLINIC | Age: 43
End: 2018-08-07

## 2018-08-07 DIAGNOSIS — G47.00 INSOMNIA, UNSPECIFIED TYPE: ICD-10-CM

## 2018-08-13 ENCOUNTER — PATIENT MESSAGE (OUTPATIENT)
Dept: FAMILY MEDICINE CLINIC | Age: 43
End: 2018-08-13

## 2018-08-13 RX ORDER — ZOLPIDEM TARTRATE 10 MG/1
TABLET ORAL
Qty: 15 TABLET | Refills: 0 | Status: SHIPPED | OUTPATIENT
Start: 2018-08-13 | End: 2018-09-27 | Stop reason: SDUPTHER

## 2018-08-15 ENCOUNTER — PATIENT MESSAGE (OUTPATIENT)
Dept: FAMILY MEDICINE CLINIC | Age: 43
End: 2018-08-15

## 2018-08-16 NOTE — TELEPHONE ENCOUNTER
From: Lila Lemus  To: Dav Norton DO  Sent: 8/15/2018 11:34 PM EDT  Subject: Non-Urgent Medical Question    Why is it when i bend over I have a hard time breathing I have to set up or i feel like I could pass out or worse

## 2018-08-17 ENCOUNTER — PATIENT MESSAGE (OUTPATIENT)
Dept: FAMILY MEDICINE CLINIC | Age: 43
End: 2018-08-17

## 2018-08-20 ENCOUNTER — PATIENT MESSAGE (OUTPATIENT)
Dept: FAMILY MEDICINE CLINIC | Age: 43
End: 2018-08-20

## 2018-08-21 NOTE — TELEPHONE ENCOUNTER
From: Mustapha Frye  To: Merly Gonzalez DO  Sent: 8/20/2018 8:15 PM EDT  Subject: Non-Urgent Medical Question    Thank you Dr Zurdo Jackson I will keep a watch on it Have a great rest of ur week    Timothy Wiley  ----- Message -----  From: Merly Gonzalez DO  Sent: 8/20/2018 6:44 PM EDT  To: Kip Tang  Subject: RE: Non-Urgent Medical Question    Timothy Wiley,     Monitor your symptoms for me over the next couple weeks, and we can decide if you need any further testing when you come in on 9/4. Please let me know if it worsens prior to your appointment. Dr. Zurdo Jackson        ----- Message -----   From: Kip Bethea. Kitty   Sent: 8/17/2018 4:34 PM EDT   To: Merly Gonzalez DO  Subject: Non-Urgent Medical Question    At times i have a hard time taking a breath other then that no other issue then what i have told you  ----- Message -----  From: Merly Gonzalez DO  Sent: 8/16/2018 9:38 PM EDT  To: Kip Tang  Subject: RE: Non-Urgent Medical Question    Timothy Inez,    It is likely that you are restricting the ability of your chest cavity to expand enough to fill your lungs with as much oxygen/air, thereby making you feel short of breath. Has your breathing been affected when you are not bent over? If not, I would recommend taking breaks when you have to be bent over for any prolonged period of time, and we can discuss further at your upcoming appointment on 9/4. Thanks,  Dr. Zurdo Jackson      ----- Message -----   From: Kip Tang   Sent: 8/15/2018 11:34 PM EDT   To: Merly Gonzalez DO  Subject: Non-Urgent Medical Question    Why is it when i bend over I have a hard time breathing I have to set up or i feel like I could pass out or worse

## 2018-09-04 ENCOUNTER — OFFICE VISIT (OUTPATIENT)
Dept: FAMILY MEDICINE CLINIC | Age: 43
End: 2018-09-04
Payer: MEDICARE

## 2018-09-04 VITALS
SYSTOLIC BLOOD PRESSURE: 114 MMHG | HEIGHT: 71 IN | BODY MASS INDEX: 33.18 KG/M2 | HEART RATE: 80 BPM | DIASTOLIC BLOOD PRESSURE: 64 MMHG | OXYGEN SATURATION: 96 % | WEIGHT: 237 LBS

## 2018-09-04 DIAGNOSIS — F41.9 ANXIETY: ICD-10-CM

## 2018-09-04 DIAGNOSIS — R06.02 SOB (SHORTNESS OF BREATH) ON EXERTION: ICD-10-CM

## 2018-09-04 DIAGNOSIS — F31.32 BIPOLAR AFFECTIVE DISORDER, CURRENTLY DEPRESSED, MODERATE (HCC): ICD-10-CM

## 2018-09-04 DIAGNOSIS — E78.1 HYPERTRIGLYCERIDEMIA: Primary | ICD-10-CM

## 2018-09-04 DIAGNOSIS — R60.0 BILATERAL LOWER EXTREMITY EDEMA: ICD-10-CM

## 2018-09-04 DIAGNOSIS — F32.A DEPRESSION, UNSPECIFIED DEPRESSION TYPE: ICD-10-CM

## 2018-09-04 DIAGNOSIS — G43.019 INTRACTABLE MIGRAINE WITHOUT AURA AND WITHOUT STATUS MIGRAINOSUS: ICD-10-CM

## 2018-09-04 PROCEDURE — 1036F TOBACCO NON-USER: CPT | Performed by: FAMILY MEDICINE

## 2018-09-04 PROCEDURE — G8427 DOCREV CUR MEDS BY ELIG CLIN: HCPCS | Performed by: FAMILY MEDICINE

## 2018-09-04 PROCEDURE — 99214 OFFICE O/P EST MOD 30 MIN: CPT | Performed by: FAMILY MEDICINE

## 2018-09-04 PROCEDURE — G8417 CALC BMI ABV UP PARAM F/U: HCPCS | Performed by: FAMILY MEDICINE

## 2018-09-04 RX ORDER — ZOLPIDEM TARTRATE 10 MG/1
TABLET ORAL
COMMUNITY
End: 2018-09-28 | Stop reason: SDUPTHER

## 2018-09-04 ASSESSMENT — PATIENT HEALTH QUESTIONNAIRE - PHQ9
1. LITTLE INTEREST OR PLEASURE IN DOING THINGS: 0
SUM OF ALL RESPONSES TO PHQ QUESTIONS 1-9: 0
2. FEELING DOWN, DEPRESSED OR HOPELESS: 0
SUM OF ALL RESPONSES TO PHQ9 QUESTIONS 1 & 2: 0
SUM OF ALL RESPONSES TO PHQ QUESTIONS 1-9: 0

## 2018-09-04 ASSESSMENT — ENCOUNTER SYMPTOMS
VOMITING: 0
DIARRHEA: 0
BLOOD IN STOOL: 0

## 2018-09-04 NOTE — PROGRESS NOTES
distress. HENT:   Head: Normocephalic and atraumatic. Right Ear: External ear normal.   Left Ear: External ear normal.   Eyes: Conjunctivae are normal.   Cardiovascular: Normal rate, regular rhythm and normal heart sounds. Pulmonary/Chest: Effort normal and breath sounds normal. No respiratory distress. Abdominal: Soft. Bowel sounds are normal. He exhibits no distension. There is no tenderness. Musculoskeletal: He exhibits edema (1+ pitting edema noted b/l). Neurological: He is alert and oriented to person, place, and time. Skin: Skin is warm and dry. Psychiatric: He has a normal mood and affect. Assessment:       Diagnosis Orders   1. Hypertriglyceridemia  Comprehensive Metabolic Panel    Lipid Panel   2. SOB (shortness of breath) on exertion  FULL PFT STUDY WITH PRE AND POST    Echocardiogram complete   3. Depression, unspecified depression type     4. Anxiety     5. Bipolar affective disorder, currently depressed, moderate ([address]     6. Intractable migraine without aura and without status migrainosus     7. Bilateral lower extremity edema  Echocardiogram complete         Plan:      Return in about 3 months (around 12/4/2018) for Follow-up SOB. Orders Placed This Encounter   Procedures    Comprehensive Metabolic Panel     Standing Status:   Future     Standing Expiration Date:   9/4/2019    Lipid Panel     Standing Status:   Future     Standing Expiration Date:   9/4/2019     Order Specific Question:   Is Patient Fasting?/# of Hours     Answer:   12    Echocardiogram complete     Standing Status:   Future     Standing Expiration Date:   11/3/2018     Order Specific Question:   Reason for exam:     Answer:   SOB on exertion, lower extremity edema x past 6 months    FULL PFT STUDY WITH PRE AND POST     Standing Status:   Future     Standing Expiration Date:   9/4/2019     No orders of the defined types were placed in this encounter.       Patient given educational materials - see patient

## 2018-09-04 NOTE — PATIENT INSTRUCTIONS
yourself.    Call your doctor now or seek immediate medical care if:    · Your shortness of breath gets worse or you start to wheeze. Wheezing is a high-pitched sound when you breathe.     · You wake up at night out of breath or have to prop your head up on several pillows to breathe.     · You are short of breath after only light activity or while at rest.    Watch closely for changes in your health, and be sure to contact your doctor if:    · You do not get better over the next 1 to 2 days. Where can you learn more? Go to https://[employer].howsimple. org and sign in to your [employer] account. Enter S780 in the [employer] box to learn more about \"Shortness of Breath: Care Instructions. \"     If you do not have an account, please click on the \"Sign Up Now\" link. Current as of: December 6, 2017  Content Version: 11.7  © 4536-6396 [employer]. Care instructions adapted under license by [facility] ([facility]). If you have questions about a medical condition or this instruction, always ask your healthcare professional. [place] [address] any warranty or liability for your use of this information.

## 2018-09-05 ASSESSMENT — ENCOUNTER SYMPTOMS: SHORTNESS OF BREATH: 1

## 2018-09-07 DIAGNOSIS — F41.9 ANXIETY: ICD-10-CM

## 2018-09-07 RX ORDER — BUSPIRONE HYDROCHLORIDE 10 MG/1
TABLET ORAL
Qty: 60 TABLET | Refills: 5 | Status: SHIPPED | OUTPATIENT
Start: 2018-09-07 | End: 2019-03-08 | Stop reason: SDUPTHER

## 2018-09-21 DIAGNOSIS — E78.1 HYPERTRIGLYCERIDEMIA: ICD-10-CM

## 2018-09-22 RX ORDER — FENOFIBRATE 160 MG/1
160 TABLET ORAL DAILY
Qty: 90 TABLET | Refills: 0 | Status: SHIPPED | OUTPATIENT
Start: 2018-09-22 | End: 2018-12-07 | Stop reason: SDUPTHER

## 2018-09-24 DIAGNOSIS — G43.909 MIGRAINE WITHOUT STATUS MIGRAINOSUS, NOT INTRACTABLE, UNSPECIFIED MIGRAINE TYPE: ICD-10-CM

## 2018-09-24 RX ORDER — FENOFIBRATE 160 MG/1
TABLET ORAL
Qty: 90 TABLET | Refills: 1 | OUTPATIENT
Start: 2018-09-24

## 2018-09-24 RX ORDER — SUMATRIPTAN 100 MG/1
100 TABLET, FILM COATED ORAL
Qty: 9 TABLET | Refills: 5 | Status: SHIPPED | OUTPATIENT
Start: 2018-09-24 | End: 2019-08-02 | Stop reason: SDUPTHER

## 2018-09-26 ENCOUNTER — HOSPITAL ENCOUNTER (OUTPATIENT)
Dept: NON INVASIVE DIAGNOSTICS | Age: 43
Discharge: HOME | End: 2018-09-26
Payer: MEDICARE

## 2018-09-26 ENCOUNTER — HOSPITAL ENCOUNTER (OUTPATIENT)
Dept: PULMONOLOGY | Age: 43
Discharge: HOME | End: 2018-09-26
Payer: MEDICARE

## 2018-09-26 DIAGNOSIS — R60.0 BILATERAL LOWER EXTREMITY EDEMA: ICD-10-CM

## 2018-09-26 DIAGNOSIS — R06.02 SOB (SHORTNESS OF BREATH) ON EXERTION: ICD-10-CM

## 2018-09-26 LAB
LV EF: 55 %
LVEF MODALITY: NORMAL

## 2018-09-26 PROCEDURE — 94726 PLETHYSMOGRAPHY LUNG VOLUMES: CPT

## 2018-09-26 PROCEDURE — 94640 AIRWAY INHALATION TREATMENT: CPT

## 2018-09-26 PROCEDURE — 93306 TTE W/DOPPLER COMPLETE: CPT

## 2018-09-26 PROCEDURE — 94729 DIFFUSING CAPACITY: CPT

## 2018-09-26 PROCEDURE — 94060 EVALUATION OF WHEEZING: CPT

## 2018-09-26 PROCEDURE — 6360000002 HC RX W HCPCS: Performed by: INTERNAL MEDICINE

## 2018-09-26 RX ORDER — ALBUTEROL SULFATE 2.5 MG/3ML
2.5 SOLUTION RESPIRATORY (INHALATION) ONCE
Status: COMPLETED | OUTPATIENT
Start: 2018-09-26 | End: 2018-09-26

## 2018-09-26 RX ADMIN — ALBUTEROL SULFATE 2.5 MG: 2.5 SOLUTION RESPIRATORY (INHALATION) at 15:47

## 2018-09-27 ENCOUNTER — PATIENT MESSAGE (OUTPATIENT)
Dept: FAMILY MEDICINE CLINIC | Age: 43
End: 2018-09-27

## 2018-09-27 DIAGNOSIS — G47.00 INSOMNIA, UNSPECIFIED TYPE: ICD-10-CM

## 2018-09-27 NOTE — TELEPHONE ENCOUNTER
Last Appt:  9/4/2018  Next Appt:   12/7/2018  Med verified in [address] pulled for review. Patient last filled 08/13/18 for 15 tablets.

## 2018-09-27 NOTE — TELEPHONE ENCOUNTER
From: Kerwin Moya  To: Luis Enrique Mazariegos DO  Sent: 9/27/2018 12:44 PM EDT  Subject: Prescription Question    Dr Lobo Childers  I am not sure if it is time yet to refill my zolpidem but can i get a refill on them Thank you     Otto Kuo

## 2018-09-28 RX ORDER — ZOLPIDEM TARTRATE 10 MG/1
TABLET ORAL
Qty: 15 TABLET | Refills: 0 | Status: SHIPPED | OUTPATIENT
Start: 2018-09-28 | End: 2018-10-09

## 2018-09-28 NOTE — TELEPHONE ENCOUNTER
Controlled Substances Monitoring:     RX Monitoring 9/28/2018   Attestation The Prescription Monitoring Report for this patient was reviewed today. Documentation No signs of potential drug abuse or diversion identified.

## 2018-10-01 ENCOUNTER — HOSPITAL ENCOUNTER (OUTPATIENT)
Dept: LAB | Age: 43
Setting detail: SPECIMEN
Discharge: HOME | End: 2018-10-01
Payer: MEDICARE

## 2018-10-01 DIAGNOSIS — E78.1 HYPERTRIGLYCERIDEMIA: ICD-10-CM

## 2018-10-01 LAB
ALBUMIN SERPL-MCNC: 4.4 G/DL (ref 3.5–5.2)
ALBUMIN/GLOBULIN RATIO: 1.8 (ref 1–2.5)
ALP BLD-CCNC: 68 U/L (ref 40–129)
ALT SERPL-CCNC: 38 U/L (ref 5–41)
ANION GAP SERPL CALCULATED.3IONS-SCNC: 11 MMOL/L (ref 9–17)
AST SERPL-CCNC: 31 U/L
BILIRUB SERPL-MCNC: 0.42 MG/DL (ref 0.3–1.2)
BUN BLDV-MCNC: 15 MG/DL (ref 6–20)
BUN/CREAT BLD: 15 (ref 9–20)
CALCIUM SERPL-MCNC: 8.8 MG/DL (ref 8.6–10.4)
CHLORIDE BLD-SCNC: 103 MMOL/L (ref 98–107)
CHOLESTEROL/HDL RATIO: 6.9
CHOLESTEROL: 187 MG/DL
CO2: 26 MMOL/L (ref 20–31)
CREAT SERPL-MCNC: 1 MG/DL (ref 0.7–1.2)
GFR AFRICAN AMERICAN: >60 ML/MIN
GFR NON-AFRICAN AMERICAN: >60 ML/MIN
GFR SERPL CREATININE-BSD FRML MDRD: NORMAL ML/MIN/{1.73_M2}
GFR SERPL CREATININE-BSD FRML MDRD: NORMAL ML/MIN/{1.73_M2}
GLUCOSE BLD-MCNC: 98 MG/DL (ref 70–99)
HDLC SERPL-MCNC: 27 MG/DL
LDL CHOLESTEROL: 115 MG/DL (ref 0–130)
POTASSIUM SERPL-SCNC: 3.7 MMOL/L (ref 3.7–5.3)
SODIUM BLD-SCNC: 140 MMOL/L (ref 135–144)
TOTAL PROTEIN: 6.9 G/DL (ref 6.4–8.3)
TRIGL SERPL-MCNC: 226 MG/DL
VLDLC SERPL CALC-MCNC: ABNORMAL MG/DL (ref 1–30)

## 2018-10-01 PROCEDURE — 80053 COMPREHEN METABOLIC PANEL: CPT

## 2018-10-01 PROCEDURE — 80061 LIPID PANEL: CPT

## 2018-10-01 PROCEDURE — 36415 COLL VENOUS BLD VENIPUNCTURE: CPT

## 2018-10-03 DIAGNOSIS — G47.00 INSOMNIA, UNSPECIFIED TYPE: ICD-10-CM

## 2018-10-03 DIAGNOSIS — R06.02 SOB (SHORTNESS OF BREATH) ON EXERTION: Primary | ICD-10-CM

## 2018-10-03 DIAGNOSIS — J44.9 CHRONIC OBSTRUCTIVE PULMONARY DISEASE, UNSPECIFIED COPD TYPE (HCC): ICD-10-CM

## 2018-10-03 RX ORDER — ZOLPIDEM TARTRATE 10 MG/1
TABLET ORAL
Qty: 15 TABLET | Refills: 0 | OUTPATIENT
Start: 2018-10-03 | End: 2018-10-14

## 2018-10-03 NOTE — TELEPHONE ENCOUNTER
From: Camilo Brandt  Sent: 10/3/2018 1:07 PM EDT  Subject: Medication Renewal Request    Erika Enrique.  Edwardo Treviño would like a refill of the following medications:     zolpidem (AMBIEN) 10 MG tablet Rene Chairez DO]    Preferred pharmacy: [facility], [address] [phone] - F [phone]

## 2018-10-09 ENCOUNTER — TELEPHONE (OUTPATIENT)
Dept: FAMILY MEDICINE CLINIC | Age: 43
End: 2018-10-09

## 2018-10-09 NOTE — TELEPHONE ENCOUNTER
Aminata Gil calling requesting a script to be sent [place] [address] [facility] [place].

## 2018-10-11 NOTE — TELEPHONE ENCOUNTER
Unfortunately, because I do not have any official diagnoses in his chart to supply to his insurance, the stockings may not be covered. They are welcome to try buying an OTC pair at any local pharmacy, or if they would like to discuss further to get custom stockings made or try to get covered through insurance, I would need to see him in the office to discuss. Pt has next appt in 12/2018, or can make a sooner appt, if desired.

## 2018-10-19 DIAGNOSIS — Z72.0 TOBACCO ABUSE: ICD-10-CM

## 2018-10-19 RX ORDER — ALBUTEROL SULFATE 90 UG/1
1-2 AEROSOL, METERED RESPIRATORY (INHALATION) EVERY 6 HOURS PRN
Qty: 1 INHALER | Refills: 2 | Status: SHIPPED | OUTPATIENT
Start: 2018-10-19 | End: 2019-01-18 | Stop reason: SDUPTHER

## 2018-10-19 RX ORDER — FENOFIBRATE 160 MG/1
TABLET ORAL
Qty: 90 TABLET | Refills: 0 | OUTPATIENT
Start: 2018-10-19

## 2018-11-01 ENCOUNTER — OFFICE VISIT (OUTPATIENT)
Dept: NEUROLOGY | Age: 43
End: 2018-11-01
Payer: MEDICARE

## 2018-11-01 VITALS
HEART RATE: 81 BPM | OXYGEN SATURATION: 95 % | DIASTOLIC BLOOD PRESSURE: 64 MMHG | WEIGHT: 236.99 LBS | HEIGHT: 71 IN | BODY MASS INDEX: 33.18 KG/M2 | SYSTOLIC BLOOD PRESSURE: 118 MMHG

## 2018-11-01 DIAGNOSIS — Z87.891 FORMER SMOKER: ICD-10-CM

## 2018-11-01 DIAGNOSIS — R41.3 MEMORY PROBLEM: ICD-10-CM

## 2018-11-01 DIAGNOSIS — G89.29 CHRONIC LOW BACK PAIN WITHOUT SCIATICA, UNSPECIFIED BACK PAIN LATERALITY: ICD-10-CM

## 2018-11-01 DIAGNOSIS — G89.29 CHRONIC NECK PAIN: ICD-10-CM

## 2018-11-01 DIAGNOSIS — G47.00 INSOMNIA, UNSPECIFIED TYPE: ICD-10-CM

## 2018-11-01 DIAGNOSIS — F32.A DEPRESSION, UNSPECIFIED DEPRESSION TYPE: ICD-10-CM

## 2018-11-01 DIAGNOSIS — F31.0 BIPOLAR AFFECTIVE DISORDER, CURRENT EPISODE HYPOMANIC (HCC): ICD-10-CM

## 2018-11-01 DIAGNOSIS — M54.50 CHRONIC LOW BACK PAIN WITHOUT SCIATICA, UNSPECIFIED BACK PAIN LATERALITY: ICD-10-CM

## 2018-11-01 DIAGNOSIS — E78.1 HYPERTRIGLYCERIDEMIA: ICD-10-CM

## 2018-11-01 DIAGNOSIS — F51.01 PRIMARY INSOMNIA: ICD-10-CM

## 2018-11-01 DIAGNOSIS — M54.50 CHRONIC BILATERAL LOW BACK PAIN WITHOUT SCIATICA: ICD-10-CM

## 2018-11-01 DIAGNOSIS — E53.8 B12 DEFICIENCY: ICD-10-CM

## 2018-11-01 DIAGNOSIS — G43.019 INTRACTABLE MIGRAINE WITHOUT AURA AND WITHOUT STATUS MIGRAINOSUS: Primary | ICD-10-CM

## 2018-11-01 DIAGNOSIS — M54.2 CHRONIC NECK PAIN: ICD-10-CM

## 2018-11-01 DIAGNOSIS — F41.9 ANXIETY: ICD-10-CM

## 2018-11-01 DIAGNOSIS — E53.8 FOLIC ACID DEFICIENCY: ICD-10-CM

## 2018-11-01 DIAGNOSIS — F32.0 CURRENT MILD EPISODE OF MAJOR DEPRESSIVE DISORDER, UNSPECIFIED WHETHER RECURRENT (HCC): ICD-10-CM

## 2018-11-01 DIAGNOSIS — G44.221 CHRONIC TENSION-TYPE HEADACHE, INTRACTABLE: ICD-10-CM

## 2018-11-01 DIAGNOSIS — F31.32 BIPOLAR AFFECTIVE DISORDER, CURRENTLY DEPRESSED, MODERATE (HCC): ICD-10-CM

## 2018-11-01 DIAGNOSIS — G89.29 CHRONIC BILATERAL LOW BACK PAIN WITHOUT SCIATICA: ICD-10-CM

## 2018-11-01 PROCEDURE — G8417 CALC BMI ABV UP PARAM F/U: HCPCS | Performed by: PSYCHIATRY & NEUROLOGY

## 2018-11-01 PROCEDURE — 1036F TOBACCO NON-USER: CPT | Performed by: PSYCHIATRY & NEUROLOGY

## 2018-11-01 PROCEDURE — G8427 DOCREV CUR MEDS BY ELIG CLIN: HCPCS | Performed by: PSYCHIATRY & NEUROLOGY

## 2018-11-01 PROCEDURE — G8484 FLU IMMUNIZE NO ADMIN: HCPCS | Performed by: PSYCHIATRY & NEUROLOGY

## 2018-11-01 PROCEDURE — 99214 OFFICE O/P EST MOD 30 MIN: CPT | Performed by: PSYCHIATRY & NEUROLOGY

## 2018-11-01 RX ORDER — DIVALPROEX SODIUM 500 MG/1
TABLET, EXTENDED RELEASE ORAL
Qty: 60 TABLET | Refills: 5 | Status: SHIPPED | OUTPATIENT
Start: 2018-11-01 | End: 2019-06-09 | Stop reason: SDUPTHER

## 2018-11-01 ASSESSMENT — ENCOUNTER SYMPTOMS
FACIAL SWELLING: 0
CHOKING: 0
APNEA: 0
EYE ITCHING: 0
BLURRED VISION: 0
RHINORRHEA: 0
FACIAL SWEATING: 0
HEARTBURN: 0
ABDOMINAL PAIN: 0
DIARRHEA: 0
VOMITING: 1
CHEST TIGHTNESS: 0
BOWEL INCONTINENCE: 0
SINUS PRESSURE: 0
SHORTNESS OF BREATH: 0
SWOLLEN GLANDS: 0
BACK PAIN: 1
NAUSEA: 1
COUGH: 0
COLOR CHANGE: 0
EYE DISCHARGE: 0
CONSTIPATION: 0
VISUAL CHANGE: 0
TROUBLE SWALLOWING: 0
EYE REDNESS: 0
BLOOD IN STOOL: 0
PHOTOPHOBIA: 1
WHEEZING: 0
EYE WATERING: 0
EYE PAIN: 0
SORE THROAT: 0
SCALP TENDERNESS: 0
ABDOMINAL DISTENTION: 0
VOICE CHANGE: 0

## 2018-11-01 NOTE — PROGRESS NOTES
[facility]  Neurology  [address] [place] [address]  Phone: [phone]   Fax: [phone]    SUBJECTIVE:     PATIENT ID:  Talisha Weiss is a  RIGHT HANDED 37 y.o. male. Migraine    This is a chronic problem. Episode onset: MORE  THAN  10  YEARS. The problem occurs intermittently. The problem has been rapidly improving. The pain is located in the vertex, frontal and bilateral region. The pain does not radiate. The pain quality is similar to prior headaches. The quality of the pain is described as aching, stabbing and pulsating. The pain is at a severity of 3/10. The pain is moderate. Associated symptoms include back pain, nausea, neck pain, phonophobia, photophobia, seizures and vomiting. Pertinent negatives include no abdominal pain, abnormal behavior, anorexia, blurred vision, coughing, dizziness, drainage, ear pain, eye pain, eye redness, eye watering, facial sweating, fever, hearing loss, insomnia, loss of balance, muscle aches, numbness, rhinorrhea, scalp tenderness, sinus pressure, sore throat, swollen glands, tingling, tinnitus, visual change, weakness or weight loss. The symptoms are aggravated by unknown. Treatments tried: DEPAKOTE   The treatment provided moderate relief. His past medical history is significant for migraine headaches, migraines in the family and obesity. There is no history of cancer, cluster headaches, hypertension, immunosuppression, pseudotumor cerebri, recent head traumas, sinus disease or TMJ. Headache    This is a chronic problem. Episode onset: MORE  THAN   10  YEARS. The problem occurs intermittently. The problem has been unchanged. The pain is located in the right unilateral region. The pain does not radiate. The pain quality is similar to prior headaches. The quality of the pain is described as aching. The pain is mild. Associated symptoms include back pain, nausea, neck pain, phonophobia, photophobia, seizures and vomiting.  Pertinent hallucinations, bizarre behavior or disorganized speech. This is a chronic problem. The somatic symptoms have been unchanged since their onset. The symptoms are mild. Somatic symptoms include headaches and back pain. Somatic symptoms do not include fatigue, abdominal pain, heartburn, constipation or myalgias. The onset of the illness is precipitated by emotional stress. The degree of incapacity that he is experiencing as a consequence of his illness is mild. Additional symptoms of the illness include attention impairment, distractible, poor judgment, headaches and seizures. Additional symptoms of the illness do not include anhedonia, insomnia, hypersomnia, appetite change, unexpected weight change, fatigue, agitation, feelings of worthlessness, euphoric mood, increased goal-directed activity, flight of ideas, inflated self-esteem, decreased need for sleep, visual change or abdominal pain. He does not admit to suicidal ideas. He does not have a plan to commit suicide. He does not contemplate harming himself. He has not already injured self. He does not contemplate injuring another person. He has not already  injured another person. Risk factors that are present for mental illness include neurological disease, a family history of mental illness and a history of mental illness. History obtained from  The patient    and other  available medical records were  Also  reviewed. The  Duration,  Quality,  Severity,  Location,  Timing,  Context,  Modifying  Factors   Of   The   Chief   Complaint   And  Present  Illness   Was   Reviewed   In   Chronological   Manner.                                         CURRENT PATIENT'S  MAIN  CONCERNS INCLUDE :                     1)  H/O   CHRONIC  MIGRAINES    FOR  MORE  THAN   10   YEARS                       2)  TRIED  TOPAMAX,  IMITREX     AND    DID  NOT  HELP  MUCH  IN   THE  PAST                      3)   HAD    BOTOX  INJECTIONS  IN  [facility]     IN  THE  PAST extended release tablet Take 1 tablet by mouth 2 times daily as needed for Muscle spasms 60 tablet 2    risperiDONE (RISPERDAL) 3 MG tablet Take 1 tablet by mouth nightly 30 tablet 5    nystatin (MYCOSTATIN) 174816 UNIT/GM powder Apply 3 times daily. 45 g 0    Tens Unit MISC Apply to affected area as needed. 1 each 0    topiramate (TOPAMAX) 100 MG tablet Take 1 tablet by mouth 2 times daily 60 tablet 2    gabapentin (NEURONTIN) 100 MG capsule Take 2 capsules by mouth 3 times daily 180 capsule 1    venlafaxine (EFFEXOR XR) 150 MG extended release capsule Take 1 capsule by mouth daily 30 capsule 11    omeprazole (PRILOSEC) 20 MG delayed release capsule Take 1 capsule by mouth daily 30 capsule 2    Benzoyl Peroxide (BENZAC AC) 10 % external wash Apply topically to affected areas twice daily. 1 Bottle 2    fluticasone (FLONASE) 50 MCG/ACT nasal spray 2 sprays by Nasal route daily 1 Bottle 5    ciclopirox (PENLAC) 8 % solution Apply topically to affected toenail nightly. 1 Bottle 2    SUMAtriptan (IMITREX) 100 MG tablet Take 1 tablet by mouth once as needed for Migraine (May repeat after 2 hours x 1 more dose, if needed; max dose 200 mg per 24 hours.) 9 tablet 5     No current facility-administered medications for this visit. Allergies   Allergen Reactions    Ibuprofen      GI upset    Naproxen      ulcers         Family History   Problem Relation Age of Onset    Stroke Mother     High Blood Pressure Mother     High Blood Pressure Father     Stroke Father     Glaucoma Neg Hx     Diabetes Neg Hx     Cataracts Neg Hx          Social History     Social History    Marital status:      Spouse name: N/A    Number of children: N/A    Years of education: N/A     Occupational History    Not on file.      Social History Main Topics    Smoking status: Former Smoker     Packs/day: 1.00     Years: 25.00     Types: Cigarettes     Start date: 10/16/1992     Quit date: 6/21/2017    Smokeless and wheezing. Cardiovascular: Negative for chest pain, palpitations, leg swelling and near-syncope. Gastrointestinal: Positive for nausea and vomiting. Negative for abdominal distention, abdominal pain, anorexia, blood in stool, bowel incontinence, constipation, diarrhea and heartburn. Endocrine: Negative for cold intolerance, heat intolerance, polydipsia, polyphagia and polyuria. Genitourinary: Negative for bladder incontinence. Musculoskeletal: Positive for back pain and neck pain. Negative for arthralgias, gait problem, joint swelling, myalgias and neck stiffness. Skin: Negative for color change, pallor, rash and wound. Allergic/Immunologic: Negative for environmental allergies, food allergies and immunocompromised state. Neurological: Positive for seizures and headaches. Negative for dizziness, vertigo, tingling, tremors, focal weakness, syncope, facial asymmetry, speech difficulty, weakness, light-headedness, numbness and loss of balance. Hematological: Negative for adenopathy. Does not bruise/bleed easily. Psychiatric/Behavioral: Positive for decreased concentration, memory loss and sleep disturbance. Negative for agitation, behavioral problems, confusion, dysphoric mood, hallucinations, self-injury and suicidal ideas. The patient is nervous/anxious. The patient does not have insomnia and is not hyperactive. OBJECTIVE:    Physical Exam   Constitutional: He appears well-developed and well-nourished. He is cooperative. HENT:   Head: Normocephalic and atraumatic. Head is without raccoon's eyes and without Garcia's sign. Right Ear: External ear normal.   Left Ear: External ear normal.   Nose: Nose normal.   Mouth/Throat: Oropharynx is clear and moist.   Eyes: Conjunctivae are normal.   Neck: Normal range of motion. Neck supple. No muscular tenderness present. Carotid bruit is not present. No neck rigidity. No tracheal deviation and normal range of motion present.  No Brudzinski's 7 CN :  Normal  Facial  Symmetry  And  Strength. No facial  Weakness. 8 CN :  Hearing  Appears within normal limits        9, 10 CN: Normal spontaneous, reflex palate movements       11 CN:   Normal  Shoulder shrug and  strength       12 CN :   Normal  Tongue movements and  Tongue  In midline                      No tongue   Fasciculations or atrophy     C) MOTOR  EXAM:                 Strength  In upper  And  Lower extremities   within normal limits             No  Drift. No  Atrophy             Rapid alternating  And  repetitions  Movements  within normal limits               Muscle  Tone  In upper  And  Lower  Extremities  normal              No rigidity. No  Spasticity. Bradykinesia   absent               No  Asterixis. Sustention  Tremor , Resting  Tremor   absent                  No other  Abnormal  Movements noted         D) SENSORY :             light touch, pinprick, position  And  Vibration  within normal limits      E) REFLEXES:                 Deep  Tendon  Reflexes normal                  No pathological  Reflexes  Bilaterally.                                 F) COORDINATION  AND  GAIT :                              Station and  Gait  normal                                      Romberg's test negative                        Ataxia negative    ASSESSMENT:    Patient Active Problem List   Diagnosis    Chronic neck pain    Anxiety    Bipolar affective disorder, currently depressed, moderate (HCC)    Insomnia    Depression    Bipolar disorder (HCC)    Headache    Intractable migraine without aura and without status migrainosus    Memory problem    Chronic low back pain without sciatica    Hypertriglyceridemia    Folic acid deficiency    B12 deficiency     MRI OF THE BRAIN WITHOUT CONTRAST  5/23/2017 6:25 pm       TECHNIQUE:   Multiplanar multisequence MRI of the brain was performed without the   administration of intravenous contrast.       COMPARISON: Bipolar affective disorder, current episode hypomanic ([facility] [address] F31.0    13. Folic acid deficiency Z78.0    14. Former smoker Z87.891    13. Insomnia, unspecified type G47.00    16. Depression, unspecified depression type F32.9    17. Chronic low back pain without sciatica, unspecified back pain laterality M54.5     G89.29               CONCERNS   &   INCREASED   RISK   FOR            *  POORLY  CONTROLLED   &  COMPLICATED  MIGRAINES      *     CHRONIC  TENSION  HEADACHES      *   COGNITIVE  &   MEMORY PROBLEMS  AND  DEMENTIAS              VARIOUS  RISK   FACTORS   WERE  REVIEWED   AND   DISCUSSED. *  PATIENT   HAS  MULTIPLE   MEDICAL, MENTAL HEALTH        NEUROLOGICAL   PROBLEMS . PATIENT'S   MANAGEMENT  IS  CHALLENGING. PLAN:       Jimenez Reid  Of  The  Diagnoses,  The  Management & Treatment  Options           AND    Care  plan  Were        Reviewed and   Discussed   With  patient. * Goals  And  Expectations  Of  The  Therapy  Discussed   And  Reviewed. *   Benefits   And   Side  Effect  Profile  Of  Medication/s   Were   Discussed             * Need   For  Further   Follow up For  The  Various  Problems  Were discussed. * Results  Of  The  Previous  Diagnostic tests were reviewed and questions answered. patient  understand the same. Medical  Decision  Making  Was  Made  Based on the   Complexity  Of  Above  Mentioned  Diagnoses,    Data reviewed   & diagnostic  Tests  Reviewed,  Risk  Of  Significant   Co morbidities and complicating   Factors. Medical  Decision  Was   High  Complexity  Due   To  The  Patient's  Multiple  Symptoms,  Advancing   Disease,  Complex  Treatment  Regimen,  Multiple medications and   Risk  Of   Side  Effects,  Difficulty  In  Medication  Management  And  Diagnostic  Challenges   In  View  Of  The  Associated   Co  Morbid  Conditions   And  Problems.         *   ABSOLUTELY   NO  DRIVING      *   BE  CAREFUL  WITH

## 2018-11-19 DIAGNOSIS — G43.909 MIGRAINE WITHOUT STATUS MIGRAINOSUS, NOT INTRACTABLE, UNSPECIFIED MIGRAINE TYPE: ICD-10-CM

## 2018-11-19 DIAGNOSIS — G43.019 INTRACTABLE MIGRAINE WITHOUT AURA AND WITHOUT STATUS MIGRAINOSUS: ICD-10-CM

## 2018-11-19 RX ORDER — SUMATRIPTAN 100 MG/1
100 TABLET, FILM COATED ORAL
Qty: 9 TABLET | Refills: 5 | Status: CANCELLED | OUTPATIENT
Start: 2018-11-19 | End: 2018-11-19

## 2018-11-19 RX ORDER — DIVALPROEX SODIUM 500 MG/1
TABLET, EXTENDED RELEASE ORAL
Qty: 60 TABLET | Refills: 5 | Status: CANCELLED | OUTPATIENT
Start: 2018-11-19

## 2018-11-24 DIAGNOSIS — F31.32 BIPOLAR AFFECTIVE DISORDER, CURRENTLY DEPRESSED, MODERATE (HCC): ICD-10-CM

## 2018-11-24 DIAGNOSIS — F41.9 ANXIETY: ICD-10-CM

## 2018-11-25 RX ORDER — VENLAFAXINE HYDROCHLORIDE 150 MG/1
CAPSULE, EXTENDED RELEASE ORAL
Qty: 30 CAPSULE | Refills: 11 | Status: SHIPPED | OUTPATIENT
Start: 2018-11-25 | End: 2019-10-11 | Stop reason: SDUPTHER

## 2018-11-26 DIAGNOSIS — F31.32 BIPOLAR AFFECTIVE DISORDER, CURRENTLY DEPRESSED, MODERATE (HCC): ICD-10-CM

## 2018-11-26 DIAGNOSIS — R09.81 NASAL CONGESTION: ICD-10-CM

## 2018-11-26 DIAGNOSIS — F41.9 ANXIETY: ICD-10-CM

## 2018-11-26 RX ORDER — VENLAFAXINE HYDROCHLORIDE 150 MG/1
CAPSULE, EXTENDED RELEASE ORAL
Qty: 30 CAPSULE | Refills: 11 | Status: CANCELLED | OUTPATIENT
Start: 2018-11-26

## 2018-11-27 RX ORDER — FLUTICASONE PROPIONATE 50 MCG
2 SPRAY, SUSPENSION (ML) NASAL DAILY
Qty: 1 BOTTLE | Refills: 5 | Status: SHIPPED | OUTPATIENT
Start: 2018-11-27 | End: 2019-04-09 | Stop reason: SDUPTHER

## 2018-12-03 DIAGNOSIS — J44.9 CHRONIC OBSTRUCTIVE PULMONARY DISEASE, UNSPECIFIED COPD TYPE (HCC): Primary | ICD-10-CM

## 2018-12-05 ENCOUNTER — OFFICE VISIT (OUTPATIENT)
Dept: PULMONOLOGY | Age: 43
End: 2018-12-05
Payer: MEDICARE

## 2018-12-05 VITALS
DIASTOLIC BLOOD PRESSURE: 80 MMHG | HEART RATE: 76 BPM | WEIGHT: 257.6 LBS | RESPIRATION RATE: 20 BRPM | SYSTOLIC BLOOD PRESSURE: 115 MMHG | HEIGHT: 71 IN | BODY MASS INDEX: 36.06 KG/M2 | OXYGEN SATURATION: 94 %

## 2018-12-05 DIAGNOSIS — J44.9 STAGE 2 MODERATE COPD BY GOLD CLASSIFICATION (HCC): Primary | ICD-10-CM

## 2018-12-05 DIAGNOSIS — J43.2 CENTRILOBULAR EMPHYSEMA (HCC): ICD-10-CM

## 2018-12-05 PROCEDURE — 1036F TOBACCO NON-USER: CPT | Performed by: INTERNAL MEDICINE

## 2018-12-05 PROCEDURE — G8926 SPIRO NO PERF OR DOC: HCPCS | Performed by: INTERNAL MEDICINE

## 2018-12-05 PROCEDURE — G8427 DOCREV CUR MEDS BY ELIG CLIN: HCPCS | Performed by: INTERNAL MEDICINE

## 2018-12-05 PROCEDURE — G8417 CALC BMI ABV UP PARAM F/U: HCPCS | Performed by: INTERNAL MEDICINE

## 2018-12-05 PROCEDURE — 3023F SPIROM DOC REV: CPT | Performed by: INTERNAL MEDICINE

## 2018-12-05 PROCEDURE — G8484 FLU IMMUNIZE NO ADMIN: HCPCS | Performed by: INTERNAL MEDICINE

## 2018-12-05 PROCEDURE — 99204 OFFICE O/P NEW MOD 45 MIN: CPT | Performed by: INTERNAL MEDICINE

## 2018-12-05 NOTE — PROGRESS NOTES
REASON FOR THE CONSULTATION:  COPD   HISTORY OF PRESENT ILLNESS:    Karolina Santiago is a 37y.o. year old male here for evaluation of shortness of breath which has worsened since he stopped smoking in 2016. He has a 30-pack-year history of smoking. He gets short of breath with minimal exertion. Denies any cough, hemoptysis or sputum. Denies wheezing. He has albuterol but only uses it occasionally. On further inquiry he does say that he wheezes with exertion. He's never had any surgeries on his throat has no voice problems. He tells me that he does have history of sleep study done which was negative  His AHI is1              LUNG CANCER SCREENING     1. CRITERIA MET    []     CT ORDERED  []      2. CRITERIA NOT MET   [x]      3. REFUSED                    []        REASON CRITERIA NOT MET     1. SMOKING LESS THAN 30 PY  []      2. AGE LESS THAN 55 or GREATER 77 YEARS  [x]      3. QUIT SMOKING 15 YEARS OR GREATER   []      4. RECENT CT WITH IN 11 MONTHS    []      5. LIFE EXPECTANCY < 5 YEARS   []      6. SIGNS  AND SYMPTOMS OF LUNG CANCER   []         Immunization     There is no immunization history on file for this patient.      Pneumococcal Vaccine     [] Up to date    [] Indicated   [x] Refused  [] Contraindicated       Influenza Vaccine   [] Up to date    [] Indicated   [x] Refused  [] Contraindicated        PAST MEDICAL HISTORY:       Diagnosis Date    Allergic rhinitis     Bipolar disorder ([facility] [address]     Diagnosed in 1998    Depression     Headache(784.0)     Osteoarthritis     TMJ (dislocation of temporomandibular joint)     Torticollis          Family History:   Family History   Problem Relation Age of Onset    Stroke Mother     High Blood Pressure Mother     High Blood Pressure Father     Stroke Father     Glaucoma Neg Hx     Diabetes Neg Hx     Cataracts Neg Hx        SURGICAL HISTORY:   Past Surgical History:   Procedure Laterality Date    TONSILLECTOMY             SOCIAL AND OCCUPATIONAL HEALTH:      There  NO history of TB or TB exposure. There  NO asbestos or silica dust exposure. The patient reports  NO coal, foundry, quarry or [employer] exposure. Travel history reveals NO. There  NO history of recreational or IV drug use. There  NO hot tube exposure. Pets  NO    Occupational history NOT WORKING . TOBACCO:   reports that he quit smoking about 17 months ago. His smoking use included Cigarettes. He started smoking about 26 years ago. He has a 25.00 pack-year smoking history. He has never used smokeless tobacco.  ETOH:   reports that he does not drink alcohol. ALLERGIES:      Allergies   Allergen Reactions    Ibuprofen      GI upset    Naproxen      ulcers         Home Meds:   Prior to Admission medications    Medication Sig Start Date End Date Taking?  Authorizing Provider   Tiotropium Bromide-Olodaterol (STIOLTO RESPIMAT) 2.5-2.5 MCG/ACT AERS Inhale 2 puffs into the lungs daily 12/5/18 1/4/19 Yes Raji Salcedo MD   fluticasone (FLONASE) 50 MCG/ACT nasal spray 2 sprays by Nasal route daily 11/27/18  Yes Karen Vance, DO   venlafaxine (EFFEXOR XR) 150 MG extended release capsule TAKE ONE CAPSULE BY MOUTH ONCE DAILY 11/25/18  Yes Mil Vance DO   divalproex (DEPAKOTE ER) 500 MG extended release tablet THEN   ONE  TABLET  TWICE  DAILY 11/1/18  Yes Heydi Marsh MD   albuterol sulfate HFA (VENTOLIN HFA) 108 (90 Base) MCG/ACT inhaler Inhale 1-2 puffs into the lungs every 6 hours as needed for Wheezing 10/19/18  Yes Mil Vance DO   fenofibrate 160 MG tablet Take 1 tablet by mouth daily 9/22/18  Yes Mil Vance DO   busPIRone (BUSPAR) 10 MG tablet TAKE ONE TABLET BY MOUTH TWICE DAILY 9/7/18  Yes Mli Vance DO   orphenadrine (NORFLEX) 100 MG extended release tablet Take 1 tablet by mouth 2 times daily as needed for Muscle spasms 7/19/18  Yes Mil Vance DO   risperiDONE (RISPERDAL) 3 MG tablet Take 1 tablet by mouth nightly 7/19/18  Yes

## 2018-12-07 ENCOUNTER — OFFICE VISIT (OUTPATIENT)
Dept: FAMILY MEDICINE CLINIC | Age: 43
End: 2018-12-07
Payer: MEDICARE

## 2018-12-07 VITALS
HEART RATE: 79 BPM | SYSTOLIC BLOOD PRESSURE: 108 MMHG | WEIGHT: 259 LBS | DIASTOLIC BLOOD PRESSURE: 60 MMHG | BODY MASS INDEX: 36.25 KG/M2 | OXYGEN SATURATION: 98 %

## 2018-12-07 DIAGNOSIS — F51.01 PRIMARY INSOMNIA: ICD-10-CM

## 2018-12-07 DIAGNOSIS — R60.0 BILATERAL LOWER EXTREMITY EDEMA: ICD-10-CM

## 2018-12-07 DIAGNOSIS — E78.1 HYPERTRIGLYCERIDEMIA: Primary | ICD-10-CM

## 2018-12-07 DIAGNOSIS — R63.5 WEIGHT GAIN: ICD-10-CM

## 2018-12-07 DIAGNOSIS — F41.9 ANXIETY: ICD-10-CM

## 2018-12-07 PROCEDURE — G8427 DOCREV CUR MEDS BY ELIG CLIN: HCPCS | Performed by: FAMILY MEDICINE

## 2018-12-07 PROCEDURE — 1036F TOBACCO NON-USER: CPT | Performed by: FAMILY MEDICINE

## 2018-12-07 PROCEDURE — G8484 FLU IMMUNIZE NO ADMIN: HCPCS | Performed by: FAMILY MEDICINE

## 2018-12-07 PROCEDURE — 99214 OFFICE O/P EST MOD 30 MIN: CPT | Performed by: FAMILY MEDICINE

## 2018-12-07 PROCEDURE — G8417 CALC BMI ABV UP PARAM F/U: HCPCS | Performed by: FAMILY MEDICINE

## 2018-12-07 RX ORDER — FENOFIBRATE 160 MG/1
160 TABLET ORAL DAILY
Qty: 90 TABLET | Refills: 3 | Status: SHIPPED | OUTPATIENT
Start: 2018-12-07 | End: 2020-01-19 | Stop reason: SDUPTHER

## 2018-12-07 NOTE — PROGRESS NOTES
[facility] [address]  [address]  [phone]      Wero Pugh is a 37 y.o. male who presents today for his medical conditions/complaintsas noted below. Wero Pugh is c/o of Shortness of Breath (3 month follow-up. )      HPI:     Pt here today for follow-up of SOB. Started on Stiolto per Dr. Claudette Wu at appt on 12/5 for moderate COPD. Will also have CT chest done before next appt with him in 4/2019. Pt has had significant weight gain since 1/2018 (48 lbs) - pt states he knows he's been less active and eating larger portions. Pt eats for the first time for the day around 10:00 am - will eat sausage (fries this in the frying pan) and eggs (sometimes); will drink 1 bottle of orange soda. Will then snack again around 2:00-3:00 - will make lunchmeat sandwich or eat a Blayne Aquas. For dinner around 5:00-6:00, he will have pork chops, chicken nuggets, mac 'n cheese, baked beans, mashed potatoes. Had been drinking 2 Mt. Dew per day; now only having the one orange soda. Having more pain in his calves when he tries to stand up; also has pain in his L ankle. Makes it hard for him to walk or be active. Taking Depakote  mg BID for migraine prevention per Dr. Ben Arteaga - has helped him tremendously. Has only had one migraine in the past month. No longer taking Ambien, as it does not seem to work as well for him; last dose in Aug or Sept, 2018.       Past Medical History:   Diagnosis Date    Allergic rhinitis     Bipolar disorder (HCC)     Diagnosed in 1998    Depression     Headache(784.0)     Osteoarthritis     TMJ (dislocation of temporomandibular joint)     Torticollis       Past Surgical History:   Procedure Laterality Date    TONSILLECTOMY       Family History   Problem Relation Age of Onset    Stroke Mother     High Blood Pressure Mother     High Blood Pressure Father     Stroke Father     Glaucoma Neg Hx    

## 2018-12-08 ASSESSMENT — ENCOUNTER SYMPTOMS: SHORTNESS OF BREATH: 1

## 2018-12-11 ENCOUNTER — PATIENT MESSAGE (OUTPATIENT)
Dept: FAMILY MEDICINE CLINIC | Age: 43
End: 2018-12-11

## 2018-12-12 ENCOUNTER — PATIENT MESSAGE (OUTPATIENT)
Dept: FAMILY MEDICINE CLINIC | Age: 43
End: 2018-12-12

## 2018-12-21 ENCOUNTER — PATIENT MESSAGE (OUTPATIENT)
Dept: FAMILY MEDICINE CLINIC | Age: 43
End: 2018-12-21

## 2018-12-26 ENCOUNTER — PATIENT MESSAGE (OUTPATIENT)
Dept: FAMILY MEDICINE CLINIC | Age: 43
End: 2018-12-26

## 2019-01-18 DIAGNOSIS — Z72.0 TOBACCO ABUSE: ICD-10-CM

## 2019-01-18 DIAGNOSIS — F31.32 BIPOLAR AFFECTIVE DISORDER, CURRENTLY DEPRESSED, MODERATE (HCC): ICD-10-CM

## 2019-01-18 DIAGNOSIS — G47.00 INSOMNIA, UNSPECIFIED TYPE: ICD-10-CM

## 2019-01-19 RX ORDER — RISPERIDONE 3 MG/1
TABLET, FILM COATED ORAL
Qty: 90 TABLET | Refills: 3 | Status: SHIPPED | OUTPATIENT
Start: 2019-01-19 | End: 2020-01-19 | Stop reason: SDUPTHER

## 2019-02-11 DIAGNOSIS — G47.00 INSOMNIA, UNSPECIFIED TYPE: ICD-10-CM

## 2019-02-11 DIAGNOSIS — F31.32 BIPOLAR AFFECTIVE DISORDER, CURRENTLY DEPRESSED, MODERATE (HCC): ICD-10-CM

## 2019-02-11 RX ORDER — RISPERIDONE 3 MG/1
TABLET, FILM COATED ORAL
Qty: 90 TABLET | Refills: 3 | Status: CANCELLED | OUTPATIENT
Start: 2019-02-11

## 2019-03-05 ENCOUNTER — TELEPHONE (OUTPATIENT)
Dept: INTERNAL MEDICINE | Age: 44
End: 2019-03-05

## 2019-03-05 RX ORDER — ALBUTEROL SULFATE 2.5 MG/3ML
2.5 SOLUTION RESPIRATORY (INHALATION) EVERY 6 HOURS PRN
Qty: 120 EACH | Refills: 3 | Status: SHIPPED | OUTPATIENT
Start: 2019-03-05 | End: 2019-03-07 | Stop reason: SDUPTHER

## 2019-03-07 ENCOUNTER — HOSPITAL ENCOUNTER (OUTPATIENT)
Dept: LAB | Age: 44
Discharge: HOME | End: 2019-03-07
Payer: MEDICARE

## 2019-03-07 ENCOUNTER — OFFICE VISIT (OUTPATIENT)
Dept: NEUROLOGY | Age: 44
End: 2019-03-07
Payer: MEDICARE

## 2019-03-07 VITALS
DIASTOLIC BLOOD PRESSURE: 76 MMHG | OXYGEN SATURATION: 97 % | HEIGHT: 71 IN | SYSTOLIC BLOOD PRESSURE: 128 MMHG | HEART RATE: 80 BPM | WEIGHT: 264 LBS | BODY MASS INDEX: 36.96 KG/M2

## 2019-03-07 DIAGNOSIS — G44.221 CHRONIC TENSION-TYPE HEADACHE, INTRACTABLE: ICD-10-CM

## 2019-03-07 DIAGNOSIS — M54.50 CHRONIC BILATERAL LOW BACK PAIN WITHOUT SCIATICA: ICD-10-CM

## 2019-03-07 DIAGNOSIS — G47.00 INSOMNIA, UNSPECIFIED TYPE: ICD-10-CM

## 2019-03-07 DIAGNOSIS — G89.29 CHRONIC BILATERAL LOW BACK PAIN WITHOUT SCIATICA: ICD-10-CM

## 2019-03-07 DIAGNOSIS — E78.1 HYPERTRIGLYCERIDEMIA: ICD-10-CM

## 2019-03-07 DIAGNOSIS — M54.50 CHRONIC LOW BACK PAIN WITHOUT SCIATICA, UNSPECIFIED BACK PAIN LATERALITY: ICD-10-CM

## 2019-03-07 DIAGNOSIS — F31.0 BIPOLAR AFFECTIVE DISORDER, CURRENT EPISODE HYPOMANIC (HCC): ICD-10-CM

## 2019-03-07 DIAGNOSIS — F31.9 BIPOLAR AFFECTIVE DISORDER, REMISSION STATUS UNSPECIFIED (HCC): ICD-10-CM

## 2019-03-07 DIAGNOSIS — E53.8 B12 DEFICIENCY: ICD-10-CM

## 2019-03-07 DIAGNOSIS — Z87.891 FORMER SMOKER: ICD-10-CM

## 2019-03-07 DIAGNOSIS — G43.019 INTRACTABLE MIGRAINE WITHOUT AURA AND WITHOUT STATUS MIGRAINOSUS: ICD-10-CM

## 2019-03-07 DIAGNOSIS — G89.29 CHRONIC LOW BACK PAIN WITHOUT SCIATICA, UNSPECIFIED BACK PAIN LATERALITY: ICD-10-CM

## 2019-03-07 DIAGNOSIS — G43.009 MIGRAINE WITHOUT AURA AND WITHOUT STATUS MIGRAINOSUS, NOT INTRACTABLE: ICD-10-CM

## 2019-03-07 DIAGNOSIS — G43.009 MIGRAINE WITHOUT AURA AND WITHOUT STATUS MIGRAINOSUS, NOT INTRACTABLE: Primary | ICD-10-CM

## 2019-03-07 DIAGNOSIS — F51.01 PRIMARY INSOMNIA: ICD-10-CM

## 2019-03-07 DIAGNOSIS — G89.29 CHRONIC NECK PAIN: ICD-10-CM

## 2019-03-07 DIAGNOSIS — F41.9 ANXIETY: ICD-10-CM

## 2019-03-07 DIAGNOSIS — E53.8 FOLIC ACID DEFICIENCY: ICD-10-CM

## 2019-03-07 DIAGNOSIS — F31.32 BIPOLAR AFFECTIVE DISORDER, CURRENTLY DEPRESSED, MODERATE (HCC): ICD-10-CM

## 2019-03-07 DIAGNOSIS — M54.2 CHRONIC NECK PAIN: ICD-10-CM

## 2019-03-07 DIAGNOSIS — F32.0 CURRENT MILD EPISODE OF MAJOR DEPRESSIVE DISORDER, UNSPECIFIED WHETHER RECURRENT (HCC): ICD-10-CM

## 2019-03-07 DIAGNOSIS — R41.3 MEMORY PROBLEM: ICD-10-CM

## 2019-03-07 DIAGNOSIS — J44.9 CHRONIC OBSTRUCTIVE PULMONARY DISEASE, UNSPECIFIED COPD TYPE (HCC): Primary | ICD-10-CM

## 2019-03-07 DIAGNOSIS — F32.A DEPRESSION, UNSPECIFIED DEPRESSION TYPE: ICD-10-CM

## 2019-03-07 LAB
ALT SERPL-CCNC: 45 U/L (ref 5–41)
ANION GAP SERPL CALCULATED.3IONS-SCNC: 13 MMOL/L (ref 9–17)
AST SERPL-CCNC: 34 U/L
CHLORIDE BLD-SCNC: 99 MMOL/L (ref 98–107)
CO2: 27 MMOL/L (ref 20–31)
HCT VFR BLD CALC: 40.2 % (ref 41–53)
HEMOGLOBIN: 13.4 G/DL (ref 13.5–17.5)
MCH RBC QN AUTO: 31.5 PG (ref 26–34)
MCHC RBC AUTO-ENTMCNC: 33.4 G/DL (ref 31–37)
MCV RBC AUTO: 94.4 FL (ref 80–100)
NRBC AUTOMATED: ABNORMAL PER 100 WBC
PDW BLD-RTO: 14 % (ref 11–14.5)
PLATELET # BLD: 339 K/UL (ref 140–450)
PMV BLD AUTO: 7.7 FL (ref 6–12)
POTASSIUM SERPL-SCNC: 4 MMOL/L (ref 3.7–5.3)
RBC # BLD: 4.26 M/UL (ref 4.5–5.9)
SODIUM BLD-SCNC: 139 MMOL/L (ref 135–144)
VALPROIC ACID LEVEL: 77 UG/ML (ref 50–125)
VALPROIC DATE LAST DOSE: NORMAL
VALPROIC DOSE AMOUNT: NORMAL
VALPROIC TIME LAST DOSE: NORMAL
WBC # BLD: 6.9 K/UL (ref 3.5–11)

## 2019-03-07 PROCEDURE — G8427 DOCREV CUR MEDS BY ELIG CLIN: HCPCS | Performed by: PSYCHIATRY & NEUROLOGY

## 2019-03-07 PROCEDURE — 80051 ELECTROLYTE PANEL: CPT

## 2019-03-07 PROCEDURE — G8484 FLU IMMUNIZE NO ADMIN: HCPCS | Performed by: PSYCHIATRY & NEUROLOGY

## 2019-03-07 PROCEDURE — 1036F TOBACCO NON-USER: CPT | Performed by: PSYCHIATRY & NEUROLOGY

## 2019-03-07 PROCEDURE — 36415 COLL VENOUS BLD VENIPUNCTURE: CPT

## 2019-03-07 PROCEDURE — 99215 OFFICE O/P EST HI 40 MIN: CPT | Performed by: PSYCHIATRY & NEUROLOGY

## 2019-03-07 PROCEDURE — G8417 CALC BMI ABV UP PARAM F/U: HCPCS | Performed by: PSYCHIATRY & NEUROLOGY

## 2019-03-07 PROCEDURE — 80164 ASSAY DIPROPYLACETIC ACD TOT: CPT

## 2019-03-07 PROCEDURE — 84460 ALANINE AMINO (ALT) (SGPT): CPT

## 2019-03-07 PROCEDURE — 85027 COMPLETE CBC AUTOMATED: CPT

## 2019-03-07 PROCEDURE — 84450 TRANSFERASE (AST) (SGOT): CPT

## 2019-03-07 RX ORDER — ALBUTEROL SULFATE 2.5 MG/3ML
2.5 SOLUTION RESPIRATORY (INHALATION) EVERY 6 HOURS PRN
Qty: 120 EACH | Refills: 3 | Status: SHIPPED | OUTPATIENT
Start: 2019-03-07 | End: 2019-03-11 | Stop reason: SDUPTHER

## 2019-03-07 ASSESSMENT — ENCOUNTER SYMPTOMS
EYE REDNESS: 0
SWOLLEN GLANDS: 0
SORE THROAT: 0
COUGH: 0
FACIAL SWELLING: 0
CONSTIPATION: 0
EYE WATERING: 0
EYE ITCHING: 0
BLURRED VISION: 0
FACIAL SWEATING: 0
TROUBLE SWALLOWING: 0
NAUSEA: 1
BLOOD IN STOOL: 0
COLOR CHANGE: 0
HEARTBURN: 0
VISUAL CHANGE: 0
RHINORRHEA: 0
ABDOMINAL PAIN: 0
APNEA: 0
SHORTNESS OF BREATH: 0
EYE DISCHARGE: 0
EYE PAIN: 0
CHOKING: 0
VOMITING: 1
CHEST TIGHTNESS: 0
ABDOMINAL DISTENTION: 0
VOICE CHANGE: 0
BACK PAIN: 1
WHEEZING: 0
DIARRHEA: 0
PHOTOPHOBIA: 1
SINUS PRESSURE: 0
BOWEL INCONTINENCE: 0
SCALP TENDERNESS: 0

## 2019-03-08 ENCOUNTER — PATIENT MESSAGE (OUTPATIENT)
Dept: FAMILY MEDICINE CLINIC | Age: 44
End: 2019-03-08

## 2019-03-08 DIAGNOSIS — F41.9 ANXIETY: ICD-10-CM

## 2019-03-08 RX ORDER — BUSPIRONE HYDROCHLORIDE 10 MG/1
TABLET ORAL
Qty: 60 TABLET | Refills: 11 | Status: SHIPPED | OUTPATIENT
Start: 2019-03-08 | End: 2019-10-11 | Stop reason: DRUGHIGH

## 2019-03-11 DIAGNOSIS — J44.9 CHRONIC OBSTRUCTIVE PULMONARY DISEASE, UNSPECIFIED COPD TYPE (HCC): ICD-10-CM

## 2019-03-11 RX ORDER — ALBUTEROL SULFATE 2.5 MG/3ML
2.5 SOLUTION RESPIRATORY (INHALATION) EVERY 6 HOURS PRN
Qty: 120 EACH | Refills: 3 | Status: SHIPPED | OUTPATIENT
Start: 2019-03-11 | End: 2019-07-22

## 2019-03-12 ENCOUNTER — PATIENT MESSAGE (OUTPATIENT)
Dept: FAMILY MEDICINE CLINIC | Age: 44
End: 2019-03-12

## 2019-04-09 ENCOUNTER — OFFICE VISIT (OUTPATIENT)
Dept: FAMILY MEDICINE CLINIC | Age: 44
End: 2019-04-09
Payer: MEDICARE

## 2019-04-09 VITALS
WEIGHT: 270 LBS | DIASTOLIC BLOOD PRESSURE: 70 MMHG | HEIGHT: 71 IN | BODY MASS INDEX: 37.8 KG/M2 | SYSTOLIC BLOOD PRESSURE: 118 MMHG | HEART RATE: 85 BPM | OXYGEN SATURATION: 97 %

## 2019-04-09 DIAGNOSIS — G89.29 CHRONIC NECK PAIN: ICD-10-CM

## 2019-04-09 DIAGNOSIS — R09.81 NASAL CONGESTION: ICD-10-CM

## 2019-04-09 DIAGNOSIS — R60.0 BILATERAL LOWER EXTREMITY EDEMA: ICD-10-CM

## 2019-04-09 DIAGNOSIS — R63.5 WEIGHT GAIN: ICD-10-CM

## 2019-04-09 DIAGNOSIS — M54.2 CHRONIC NECK PAIN: ICD-10-CM

## 2019-04-09 DIAGNOSIS — F41.9 ANXIETY: ICD-10-CM

## 2019-04-09 DIAGNOSIS — R06.02 SOB (SHORTNESS OF BREATH) ON EXERTION: Primary | ICD-10-CM

## 2019-04-09 DIAGNOSIS — Z13.1 SCREENING FOR DIABETES MELLITUS: ICD-10-CM

## 2019-04-09 DIAGNOSIS — E78.1 HYPERTRIGLYCERIDEMIA: ICD-10-CM

## 2019-04-09 DIAGNOSIS — F31.32 BIPOLAR AFFECTIVE DISORDER, CURRENTLY DEPRESSED, MODERATE (HCC): ICD-10-CM

## 2019-04-09 DIAGNOSIS — G43.909 MIGRAINE WITHOUT STATUS MIGRAINOSUS, NOT INTRACTABLE, UNSPECIFIED MIGRAINE TYPE: ICD-10-CM

## 2019-04-09 PROCEDURE — G8417 CALC BMI ABV UP PARAM F/U: HCPCS | Performed by: FAMILY MEDICINE

## 2019-04-09 PROCEDURE — G8427 DOCREV CUR MEDS BY ELIG CLIN: HCPCS | Performed by: FAMILY MEDICINE

## 2019-04-09 PROCEDURE — 1036F TOBACCO NON-USER: CPT | Performed by: FAMILY MEDICINE

## 2019-04-09 PROCEDURE — 99214 OFFICE O/P EST MOD 30 MIN: CPT | Performed by: FAMILY MEDICINE

## 2019-04-09 RX ORDER — ORPHENADRINE CITRATE 100 MG/1
100 TABLET, EXTENDED RELEASE ORAL NIGHTLY PRN
Qty: 60 TABLET | Refills: 5 | Status: SHIPPED | OUTPATIENT
Start: 2019-04-09 | End: 2019-10-11 | Stop reason: SDUPTHER

## 2019-04-09 RX ORDER — FLUTICASONE PROPIONATE 50 MCG
2 SPRAY, SUSPENSION (ML) NASAL DAILY
Qty: 3 BOTTLE | Refills: 3 | Status: SHIPPED | OUTPATIENT
Start: 2019-04-09 | End: 2020-05-13 | Stop reason: SDUPTHER

## 2019-04-09 RX ORDER — ALBUTEROL SULFATE 90 UG/1
1-2 AEROSOL, METERED RESPIRATORY (INHALATION) EVERY 6 HOURS PRN
Qty: 3 INHALER | Refills: 3 | Status: SHIPPED | OUTPATIENT
Start: 2019-04-09 | End: 2020-05-13 | Stop reason: SDUPTHER

## 2019-04-09 NOTE — PATIENT INSTRUCTIONS
have signs of a blood clot, such as:  ? Pain in your calf, back of the knee, thigh, or groin. ? Redness and swelling in your leg or groin.     · You have symptoms of infection, such as:  ? Increased pain, swelling, warmth, or redness. ? Red streaks or pus. ? A fever.    Watch closely for changes in your health, and be sure to contact your doctor if:    · Your swelling is getting worse.     · You have new or worsening pain in your legs.     · You do not get better as expected. Where can you learn more? Go to https://[employer]. org and sign in to your [employer] account. Enter S313 in the [employer] box to learn more about \"Leg and Ankle Edema: Care Instructions. \"     If you do not have an account, please click on the \"Sign Up Now\" link. Current as of: September 23, 2018  Content Version: 11.9  © 7079-6438 [employer]. Care instructions adapted under license by [facility] ([facility]). If you have questions about a medical condition or this instruction, always ask your healthcare professional. [place] [address] any warranty or liability for your use of this information.

## 2019-04-09 NOTE — PROGRESS NOTES
comment: handout given. Substance Use Topics    Alcohol use: No      Current Outpatient Medications   Medication Sig Dispense Refill    orphenadrine (NORFLEX) 100 MG extended release tablet Take 1 tablet by mouth nightly as needed for Muscle spasms 60 tablet 5    fluticasone (FLONASE) 50 MCG/ACT nasal spray 2 sprays by Nasal route daily 3 Bottle 3    albuterol sulfate HFA (VENTOLIN HFA) 108 (90 Base) MCG/ACT inhaler Inhale 1-2 puffs into the lungs every 6 hours as needed for Wheezing 3 Inhaler 3    albuterol (PROVENTIL) (2.5 MG/3ML) 0.083% nebulizer solution Take 3 mLs by nebulization every 6 hours as needed for Wheezing [id number] 120 each 3    busPIRone (BUSPAR) 10 MG tablet TAKE 1 TABLET BY MOUTH TWICE DAILY 60 tablet 11    risperiDONE (RISPERDAL) 3 MG tablet TAKE 1 TABLET BY MOUTH IN THE EVENING 90 tablet 3    fenofibrate 160 MG tablet Take 1 tablet by mouth daily 90 tablet 3    Compression Stockings MISC by Does not apply route Wear on bilateral lower extremities daily as needed for edema. 1 each 1    venlafaxine (EFFEXOR XR) 150 MG extended release capsule TAKE ONE CAPSULE BY MOUTH ONCE DAILY 30 capsule 11    divalproex (DEPAKOTE ER) 500 MG extended release tablet THEN   ONE  TABLET  TWICE  DAILY 60 tablet 5    SUMAtriptan (IMITREX) 100 MG tablet Take 1 tablet by mouth once as needed for Migraine (May repeat after 2 hours x 1 more dose, if needed; max dose 200 mg per 24 hours.) 9 tablet 5     No current facility-administered medications for this visit.       Allergies   Allergen Reactions    Ibuprofen      GI upset    Naproxen      ulcers       Health Maintenance   Topic Date Due    Pneumococcal 0-64 years Vaccine (1 of 1 - PPSV23) 09/07/1979    DTaP/Tdap/Td vaccine (1 - Tdap) 09/04/2019 (Originally 9/7/1992)    Flu vaccine (Season Ended) 09/04/2019 (Originally 9/1/2019)    HIV screen  09/04/2019 (Originally 9/7/1988)    Lipid screen  10/01/2023       Subjective:      Review of Systems Constitutional: Unexpected weight change: weight gain. Cardiovascular: Positive for leg swelling. Neurological: Positive for headaches (improved). Objective:     Vitals:    04/09/19 1421   BP: 118/70   Pulse: 85   SpO2: 97%   Weight: 270 lb (122.5 kg)   Height: 5' 11\" (1.803 m)     Physical Exam   Constitutional: He is oriented to person, place, and time. He appears well-developed and well-nourished. No distress. HENT:   Head: Normocephalic and atraumatic. Right Ear: External ear normal.   Left Ear: External ear normal.   Eyes: Conjunctivae are normal.   Cardiovascular: Normal rate, regular rhythm and normal heart sounds. Pulmonary/Chest: Effort normal and breath sounds normal. No respiratory distress. Abdominal: Soft. Bowel sounds are normal. He exhibits no distension. There is no tenderness. Musculoskeletal: He exhibits no edema. Neurological: He is alert and oriented to person, place, and time. Skin: Skin is warm and dry. Psychiatric: He has a normal mood and affect. Assessment:       Diagnosis Orders   1. SOB (shortness of breath) on exertion  albuterol sulfate HFA (VENTOLIN HFA) 108 (90 Base) MCG/ACT inhaler   2. Bipolar affective disorder, currently depressed, moderate ([address]     3. Anxiety     4. Chronic neck pain  orphenadrine (NORFLEX) 100 MG extended release tablet   5. Migraine without status migrainosus, not intractable, unspecified migraine type     6. Nasal congestion  fluticasone (FLONASE) 50 MCG/ACT nasal spray   7. Hypertriglyceridemia  Lipid Panel   8. Bilateral lower extremity edema     9. Weight gain     10. Screening for diabetes mellitus  Comprehensive Metabolic Panel         Plan:      Return in about 6 months (around 10/9/2019) for Follow-up.     Orders Placed This Encounter   Procedures    Comprehensive Metabolic Panel     Standing Status:   Future     Standing Expiration Date:   4/9/2020    Lipid Panel     Standing Status:   Future     Standing Expiration Date:   2020     Order Specific Question:   Is Patient Fasting?/# of Hours     Answer:   12     Orders Placed This Encounter   Medications    orphenadrine (NORFLEX) 100 MG extended release tablet     Sig: Take 1 tablet by mouth nightly as needed for Muscle spasms     Dispense:  60 tablet     Refill:  5    fluticasone (FLONASE) 50 MCG/ACT nasal spray     Si sprays by Nasal route daily     Dispense:  3 Bottle     Refill:  3    albuterol sulfate HFA (VENTOLIN HFA) 108 (90 Base) MCG/ACT inhaler     Sig: Inhale 1-2 puffs into the lungs every 6 hours as needed for Wheezing     Dispense:  3 Inhaler     Refill:  3     Please consider 90 day supplies to promote better adherence       Patient given educational materials - see patient instructions. Discussed use, benefit, and side effects of prescribed medications. All patient questions answered. Pt voiced understanding. Reviewed health maintenance.             Electronically signed by Tre Thurston DO on 2019 at 11:47 PM

## 2019-04-16 ENCOUNTER — HOSPITAL ENCOUNTER (OUTPATIENT)
Dept: CT IMAGING | Age: 44
Discharge: HOME | End: 2019-04-18
Payer: MEDICARE

## 2019-04-16 DIAGNOSIS — J44.9 CHRONIC OBSTRUCTIVE PULMONARY DISEASE, UNSPECIFIED COPD TYPE (HCC): Primary | ICD-10-CM

## 2019-04-16 DIAGNOSIS — J44.9 CHRONIC OBSTRUCTIVE PULMONARY DISEASE, UNSPECIFIED COPD TYPE (HCC): ICD-10-CM

## 2019-04-16 PROCEDURE — 71250 CT THORAX DX C-: CPT

## 2019-04-23 ENCOUNTER — OFFICE VISIT (OUTPATIENT)
Dept: PULMONOLOGY | Age: 44
End: 2019-04-23
Payer: MEDICARE

## 2019-04-23 VITALS
BODY MASS INDEX: 37.94 KG/M2 | OXYGEN SATURATION: 96 % | RESPIRATION RATE: 20 BRPM | WEIGHT: 265 LBS | SYSTOLIC BLOOD PRESSURE: 126 MMHG | HEIGHT: 70 IN | HEART RATE: 86 BPM | DIASTOLIC BLOOD PRESSURE: 88 MMHG

## 2019-04-23 DIAGNOSIS — J44.9 STAGE 2 MODERATE CHRONIC OBSTRUCTIVE PULMONARY DISEASE BY GLOBAL INITIATIVE FOR CHRONIC OBSTRUCTIVE LUNG DISEASE CLASSIFICATION (HCC): Primary | ICD-10-CM

## 2019-04-23 DIAGNOSIS — J43.2 CENTRILOBULAR EMPHYSEMA (HCC): ICD-10-CM

## 2019-04-23 PROCEDURE — 3023F SPIROM DOC REV: CPT | Performed by: NURSE PRACTITIONER

## 2019-04-23 PROCEDURE — G8427 DOCREV CUR MEDS BY ELIG CLIN: HCPCS | Performed by: NURSE PRACTITIONER

## 2019-04-23 PROCEDURE — 1036F TOBACCO NON-USER: CPT | Performed by: NURSE PRACTITIONER

## 2019-04-23 PROCEDURE — G8417 CALC BMI ABV UP PARAM F/U: HCPCS | Performed by: NURSE PRACTITIONER

## 2019-04-23 PROCEDURE — 99213 OFFICE O/P EST LOW 20 MIN: CPT | Performed by: NURSE PRACTITIONER

## 2019-04-23 PROCEDURE — G8926 SPIRO NO PERF OR DOC: HCPCS | Performed by: NURSE PRACTITIONER

## 2019-04-23 NOTE — PROGRESS NOTES
PULMONARY OP  PROGRESS NOTE      Patient:  Kavon Joshua  YOB: 1975    MRN: [id number]     Acct:        Pt seen and Chart reviewed. Mrs. Kavon Joshua is here in followup for    Diagnosis Orders   1. Stage 2 moderate chronic obstructive pulmonary disease by Global Initiative for Chronic Obstructive Lung Disease classification ([facility] [address]     2. Centrilobular emphysema (HCC)         Pt is here for f/u stage 2 COPD s/p CT chest to evaluate underlying parenchymal lung disease 2/2 severely reduced DLCO. CT Chest reviewed - no acute abnormality noted; d/w patient. He states his SOB is stable; has not worsened but not necessarily better either. He denies wheezing, cough, sputum, hemoptysis, fever/chills. Reports SOB with moderate exertion and notes new swelling to BLE to mid shin. Compliant with BD as ordered with rare need for albuterol rescue inhaler. Subjective:   Review of Systems -   Constitutional ROS: negative  ENT ROS: negative  Allergy and Immunology ROS: negative  Respiratory ROS: SOB with moderate exertion, stable   Cardiovascular ROS: BLE swelling   Gastrointestinal ROS: negative  Musculoskeletal ROS: negative    Allergies:   Allergies   Allergen Reactions    Ibuprofen      GI upset    Naproxen      ulcers       Medications:    Current Outpatient Medications:     orphenadrine (NORFLEX) 100 MG extended release tablet, Take 1 tablet by mouth nightly as needed for Muscle spasms, Disp: 60 tablet, Rfl: 5    fluticasone (FLONASE) 50 MCG/ACT nasal spray, 2 sprays by Nasal route daily, Disp: 3 Bottle, Rfl: 3    albuterol sulfate HFA (VENTOLIN HFA) 108 (90 Base) MCG/ACT inhaler, Inhale 1-2 puffs into the lungs every 6 hours as needed for Wheezing, Disp: 3 Inhaler, Rfl: 3    albuterol (PROVENTIL) (2.5 MG/3ML) 0.083% nebulizer solution, Take 3 mLs by nebulization every 6 hours as needed for Wheezing [id number], Disp: 120 each, Rfl: 3    busPIRone (BUSPAR) 10 MG tablet, TAKE 1 TABLET BY MOUTH TWICE DAILY, Disp: 60 tablet, Rfl: 11    risperiDONE (RISPERDAL) 3 MG tablet, TAKE 1 TABLET BY MOUTH IN THE EVENING, Disp: 90 tablet, Rfl: 3    fenofibrate 160 MG tablet, Take 1 tablet by mouth daily, Disp: 90 tablet, Rfl: 3    Compression Stockings MISC, by Does not apply route Wear on bilateral lower extremities daily as needed for edema., Disp: 1 each, Rfl: 1    venlafaxine (EFFEXOR XR) 150 MG extended release capsule, TAKE ONE CAPSULE BY MOUTH ONCE DAILY, Disp: 30 capsule, Rfl: 11    divalproex (DEPAKOTE ER) 500 MG extended release tablet, THEN   ONE  TABLET  TWICE  DAILY, Disp: 60 tablet, Rfl: 5    SUMAtriptan (IMITREX) 100 MG tablet, Take 1 tablet by mouth once as needed for Migraine (May repeat after 2 hours x 1 more dose, if needed; max dose 200 mg per 24 hours.), Disp: 9 tablet, Rfl: 5      Objective:    Physical Exam:  Vitals: /88 (Site: Right Upper Arm, Position: Sitting, Cuff Size: Large Adult)   Pulse 86   Resp 20   Ht 5' 10\" (1.778 m)   Wt 265 lb (120.2 kg)   SpO2 96% Comment: room air  BMI 38.02 kg/m²   Last 3 weights: Wt Readings from Last 3 Encounters:   04/23/19 265 lb (120.2 kg)   04/09/19 270 lb (122.5 kg)   03/07/19 264 lb (119.7 kg)     Body mass index is 38.02 kg/m². Physical Examination:   Constitutional: Appears well, no distress; obese   EENT: PERRLA,  sclera clear, anicteric, oropharynx clear, no lesions, neck supple with midline trachea. Neck: Supple, symmetrical, trachea midline, no adenopathy, no goiter, no jvd skin normal  Respiratory: Diminished BS BB; clear to auscultation, no wheezes or rales and unlabored breathing.  No intercostal tenderness  Cardiovascular: regular rate and rhythm, normal S1, S2, no murmur noted  Abdomen: soft, nontender, nondistended, no masses or organomegaly  Extremities:  1+ pedal edema to mid shin, no clubbing       Radiological reports:    CT Scans    CT Chest w/o contrast 4/16/19    No acute abnormality     Assessment:     Diagnosis Orders   1. Stage 2 moderate chronic obstructive pulmonary disease by Global Initiative for Chronic Obstructive Lung Disease classification ([address]     2. Centrilobular emphysema (HCC)           PLAN:    CT Chest reviewed and d/w patient - no evidence of parenchymal lung disease   Continue BD as ordered  Weight loss discussed and pt motivated to lose weight - goal of 45 lbs - believe this may improve activity tolerance and SOB  F/U PCP and/or cardiology re: edema   Refills were provided   Educated and clarified the medication use. Recommend flu vaccination in the fall annually. Recommendations given regarding pneumococcal vaccinations. Patient is up-to-date with vaccinations from pulmonary perspective. Maintain an active lifestyle. Questions patient had were answered to his/her satisfaction.     We'll see the patient back in 6 months      Paula Jacques CNP             4/23/2019, 3:11 PM

## 2019-06-05 ENCOUNTER — OFFICE VISIT (OUTPATIENT)
Dept: OPTOMETRY | Age: 44
End: 2019-06-05
Payer: MEDICARE

## 2019-06-05 DIAGNOSIS — H52.203 MYOPIA OF BOTH EYES WITH ASTIGMATISM AND PRESBYOPIA: ICD-10-CM

## 2019-06-05 DIAGNOSIS — H53.8 BLURRY VISION, BILATERAL: Primary | ICD-10-CM

## 2019-06-05 DIAGNOSIS — H52.13 MYOPIA OF BOTH EYES WITH ASTIGMATISM AND PRESBYOPIA: ICD-10-CM

## 2019-06-05 DIAGNOSIS — H52.4 MYOPIA OF BOTH EYES WITH ASTIGMATISM AND PRESBYOPIA: ICD-10-CM

## 2019-06-05 PROCEDURE — G8427 DOCREV CUR MEDS BY ELIG CLIN: HCPCS | Performed by: OPTOMETRIST

## 2019-06-05 PROCEDURE — 1036F TOBACCO NON-USER: CPT | Performed by: OPTOMETRIST

## 2019-06-05 PROCEDURE — G8417 CALC BMI ABV UP PARAM F/U: HCPCS | Performed by: OPTOMETRIST

## 2019-06-05 PROCEDURE — 99213 OFFICE O/P EST LOW 20 MIN: CPT | Performed by: OPTOMETRIST

## 2019-06-05 ASSESSMENT — REFRACTION_WEARINGRX
OD_SPHERE: -0.25
OS_CYLINDER: -0.25
OD_ADD: +1.00
OD_AXIS: 090
OS_AXIS: 094
OS_ADD: +1.00
SPECS_TYPE: SVL
OS_SPHERE: -0.75
OD_CYLINDER: -1.00

## 2019-06-05 ASSESSMENT — KERATOMETRY
OS_AXISANGLE2_DEGREES: 177
OS_AXISANGLE_DEGREES: 087
OS_K2POWER_DIOPTERS: 44.50
OS_K1POWER_DIOPTERS: 43.50

## 2019-06-05 ASSESSMENT — ENCOUNTER SYMPTOMS
EYES NEGATIVE: 0
ALLERGIC/IMMUNOLOGIC NEGATIVE: 0
GASTROINTESTINAL NEGATIVE: 0
RESPIRATORY NEGATIVE: 1

## 2019-06-05 ASSESSMENT — VISUAL ACUITY
OD_SC: 20/40 OU
METHOD: SNELLEN - LINEAR
OS_CC: 20/20
CORRECTION_TYPE: GLASSES

## 2019-06-05 ASSESSMENT — REFRACTION_MANIFEST
OD_SPHERE: PLANO
OD_CYLINDER: -1.00
OS_AXIS: 115
OS_CYLINDER: -0.25
OS_SPHERE: -0.75
OD_ADD: +1.50
OS_ADD: +1.50
OD_AXIS: 090

## 2019-06-05 ASSESSMENT — TONOMETRY: IOP_METHOD: PALPATION

## 2019-06-05 ASSESSMENT — SLIT LAMP EXAM - LIDS
COMMENTS: NORMAL
COMMENTS: NORMAL

## 2019-06-05 NOTE — PROGRESS NOTES
Danna Holt presents today for   Chief Complaint   Patient presents with    Blurred Vision    Vision Exam   .    HPI     Blurred Vision     In both eyes. Vision is blurred. Context:  distance vision and reading. Comments     Last Vision Exam: 12/14/2017 AW  Last Ophthalmology Exam: n/a  Last Filled Glasses Rx: 8/17/2016 SVL  Insurance: Medicare/ QMB  Update: Glasses  Distance and reading are getting more blurry  Did not get bifocals in his last pair of glasses. Takes glasses off to read anything  Doesn't like bifocal but knows needs it                Current Outpatient Medications   Medication Sig Dispense Refill    orphenadrine (NORFLEX) 100 MG extended release tablet Take 1 tablet by mouth nightly as needed for Muscle spasms 60 tablet 5    fluticasone (FLONASE) 50 MCG/ACT nasal spray 2 sprays by Nasal route daily 3 Bottle 3    albuterol sulfate HFA (VENTOLIN HFA) 108 (90 Base) MCG/ACT inhaler Inhale 1-2 puffs into the lungs every 6 hours as needed for Wheezing 3 Inhaler 3    albuterol (PROVENTIL) (2.5 MG/3ML) 0.083% nebulizer solution Take 3 mLs by nebulization every 6 hours as needed for Wheezing [id number] 120 each 3    busPIRone (BUSPAR) 10 MG tablet TAKE 1 TABLET BY MOUTH TWICE DAILY 60 tablet 11    risperiDONE (RISPERDAL) 3 MG tablet TAKE 1 TABLET BY MOUTH IN THE EVENING 90 tablet 3    fenofibrate 160 MG tablet Take 1 tablet by mouth daily 90 tablet 3    Compression Stockings MISC by Does not apply route Wear on bilateral lower extremities daily as needed for edema.  1 each 1    venlafaxine (EFFEXOR XR) 150 MG extended release capsule TAKE ONE CAPSULE BY MOUTH ONCE DAILY 30 capsule 11    divalproex (DEPAKOTE ER) 500 MG extended release tablet THEN   ONE  TABLET  TWICE  DAILY 60 tablet 5    SUMAtriptan (IMITREX) 100 MG tablet Take 1 tablet by mouth once as needed for Migraine (May repeat after 2 hours x 1 more dose, if needed; max dose 200 mg per 24 hours.) 9 tablet 5 No current facility-administered medications for this visit. Family History   Problem Relation Age of Onset    Stroke Mother     High Blood Pressure Mother     High Blood Pressure Father     Stroke Father     Glaucoma Neg Hx     Diabetes Neg Hx     Cataracts Neg Hx      Social History     Socioeconomic History    Marital status:      Spouse name: None    Number of children: None    Years of education: None    Highest education level: None   Occupational History    None   Social Needs    Financial resource strain: None    Food insecurity:     Worry: None     Inability: None    Transportation needs:     Medical: None     Non-medical: None   Tobacco Use    Smoking status: Former Smoker     Packs/day: 1.00     Years: 25.00     Pack years: 25.00     Types: Cigarettes     Start date: 10/16/1992     Last attempt to quit: 2017     Years since quittin.9    Smokeless tobacco: Never Used    Tobacco comment: handout given.     Substance and Sexual Activity    Alcohol use: No    Drug use: No    Sexual activity: Yes     Partners: Female   Lifestyle    Physical activity:     Days per week: None     Minutes per session: None    Stress: None   Relationships    Social connections:     Talks on phone: None     Gets together: None     Attends Qu[employer] service: None     Active member of club or organization: None     Attends meetings of clubs or organizations: None     Relationship status: None    Intimate partner violence:     Fear of current or ex partner: None     Emotionally abused: None     Physically abused: None     Forced sexual activity: None   Other Topics Concern    None   Social History Narrative    None     Past Medical History:   Diagnosis Date    Allergic rhinitis     Bipolar disorder ([facility] [address]     Diagnosed in     Depression     Headache(784.0)     Osteoarthritis     TMJ (dislocation of temporomandibular joint)     Torticollis        ROS     Positive for: Respiratory    Negative for: Constitutional, Gastrointestinal, Neurological, Skin, Genitourinary, Musculoskeletal, HENT, Endocrine, Cardiovascular, Eyes, Psychiatric, Allergic/Imm, Heme/Lymph          Main Ophthalmology Exam     External Exam       Right Left    External Normal Normal          Slit Lamp Exam       Right Left    Lids/Lashes Normal Normal    Conjunctiva/Sclera White and quiet White and quiet    Cornea Clear Clear    Anterior Chamber Deep and quiet Deep and quiet    Iris Round and reactive Round and reactive    Lens Clear Clear    Vitreous Normal Normal          Fundus Exam       Right Left    Disc Normal Normal    C/D Ratio 0.2 0.2    Macula Normal Normal    Vessels Normal Normal                   Tonometry     Tonometry (Palpation, 1:29 PM)    Unable    Palpation tonometry revealed soft and symmetrical interocular pressures within normal limits                  Not recorded         Not recorded          Visual Acuity (Snellen - Linear)       Right Left    Dist cc 20/20 20/20    Near sc 20/40 OU     Correction:  Glasses          Pupils     Pupils       Pupils    Right PERRL    Left PERRL              Neuro/Psych     Neuro/Psych     Oriented x3:  Yes    Mood/Affect:  Normal              Keratometry     Keratometry       K1 Axis K2 Axis    Right        Left 43.50 177 44.50 087                  Ophthalmology Exam     Wearing Rx       Sphere Cylinder Axis Add    Right -0.25 -1.00 090 +1.00    Left -0.75 -0.25 094 +1.00    Age:  1yrs    Type:  SVL              Manifest Refraction     Manifest Refraction       Sphere Cylinder [place] Dist [place] Add [place]    Right [place] -1.00 090 20/20 +1.50     Left -0.75 -0.25 115 20/20 +1.50 20/20ou          Manifest Refraction #2 (Auto)       Sphere Cylinder [place] Dist [place] Add [place]    Right -0.25 -1.00 091       Left -0.50 -0.25 117                  Final Rx       Sphere Cylinder Axis Dist VA Add    Right [place] -1.00 090 20/20 +1.50    Left -0.75 -0.25 115 20/20 +1.50 Expiration Date:  6/5/2021            1. Blurry vision, bilateral    2.  Myopia of both eyes with astigmatism and presbyopia           Patient Instructions    New glasses recommended      Return in about 2 years (around 6/5/2021) for complete eye exam.

## 2019-06-09 DIAGNOSIS — G43.019 INTRACTABLE MIGRAINE WITHOUT AURA AND WITHOUT STATUS MIGRAINOSUS: ICD-10-CM

## 2019-06-10 RX ORDER — DIVALPROEX SODIUM 500 MG/1
TABLET, EXTENDED RELEASE ORAL
Qty: 60 TABLET | Refills: 5 | Status: SHIPPED | OUTPATIENT
Start: 2019-06-10 | End: 2019-11-11 | Stop reason: SDUPTHER

## 2019-07-05 ENCOUNTER — OFFICE VISIT (OUTPATIENT)
Dept: PRIMARY CARE CLINIC | Age: 44
End: 2019-07-05
Payer: MEDICARE

## 2019-07-05 VITALS
TEMPERATURE: 99.4 F | RESPIRATION RATE: 18 BRPM | WEIGHT: 261 LBS | OXYGEN SATURATION: 94 % | HEART RATE: 100 BPM | HEIGHT: 71 IN | BODY MASS INDEX: 36.54 KG/M2 | DIASTOLIC BLOOD PRESSURE: 70 MMHG | SYSTOLIC BLOOD PRESSURE: 144 MMHG

## 2019-07-05 DIAGNOSIS — L25.5 PLANT DERMATITIS: ICD-10-CM

## 2019-07-05 DIAGNOSIS — R21 RASH: Primary | ICD-10-CM

## 2019-07-05 PROCEDURE — G8427 DOCREV CUR MEDS BY ELIG CLIN: HCPCS | Performed by: PHYSICIAN ASSISTANT

## 2019-07-05 PROCEDURE — 1036F TOBACCO NON-USER: CPT | Performed by: PHYSICIAN ASSISTANT

## 2019-07-05 PROCEDURE — 99213 OFFICE O/P EST LOW 20 MIN: CPT | Performed by: PHYSICIAN ASSISTANT

## 2019-07-05 PROCEDURE — G8417 CALC BMI ABV UP PARAM F/U: HCPCS | Performed by: PHYSICIAN ASSISTANT

## 2019-07-05 RX ORDER — METHYLPREDNISOLONE 4 MG/1
TABLET ORAL
Qty: 1 KIT | Refills: 0 | Status: SHIPPED | OUTPATIENT
Start: 2019-07-05 | End: 2019-07-22

## 2019-07-05 RX ORDER — LORATADINE 10 MG/1
10 TABLET ORAL DAILY
Qty: 30 TABLET | Refills: 2 | Status: SHIPPED | OUTPATIENT
Start: 2019-07-05 | End: 2019-10-11 | Stop reason: SDUPTHER

## 2019-07-05 ASSESSMENT — ENCOUNTER SYMPTOMS
DIARRHEA: 0
NAUSEA: 0
COUGH: 0
VOMITING: 0
SHORTNESS OF BREATH: 0

## 2019-07-05 NOTE — PROGRESS NOTES
Subjective:      Patient ID: John Kenyon is a 37 y.o. male. Patient is seen for rash on neck frontal area and torso. It has been present 3-4 days now and unchanged. No recent illness. No one else has this. No travel or changes. He also has poison ivy on the right mid forearm. No treatment has been used. Review of Systems   Constitutional: Negative for appetite change, chills, fatigue and fever. Respiratory: Negative for cough and shortness of breath. Cardiovascular: Negative for chest pain and palpitations. Gastrointestinal: Negative for diarrhea, nausea and vomiting. Musculoskeletal: Negative for myalgias. Skin: Positive for rash. Neurological: Negative for light-headedness, numbness and headaches. Psychiatric/Behavioral: Negative for sleep disturbance. The patient is not nervous/anxious. Past Medical History:   Diagnosis Date    Allergic rhinitis     B[facility] disorder ([facility])     Diagnosed in     Depression     Headache(784.0)     Osteoarthritis     TMJ (dislocation of temporomandibular joint)     Torticollis      Past Surgical History:   Procedure Laterality Date    TONSILLECTOMY       Social History     Socioeconomic History    Marital status:      Spouse name: Not on file    Number of children: Not on file    Years of education: Not on file    Highest education level: Not on file   Occupational History    Not on file   Social Needs    Financial resource strain: Not on file    Food insecurity:     Worry: Not on file     Inability: Not on file    Transportation needs:     Medical: Not on file     Non-medical: Not on file   Tobacco Use    Smoking status: Former Smoker     Packs/day: 1.00     Years: 25.00     Pack years: 25.00     Types: Cigarettes     Start date: 10/16/1992     Last attempt to quit: 2017     Years since quittin.0    Smokeless tobacco: Never Used    Tobacco comment: handout given.     Substance and Sexual Activity    (PROVENTIL) (2.5 MG/3ML) 0.083% nebulizer solution Take 3 mLs by nebulization every 6 hours as needed for Wheezing [id number] 120 each 3    busPIRone (BUSPAR) 10 MG tablet TAKE 1 TABLET BY MOUTH TWICE DAILY 60 tablet 11    risperiDONE (RISPERDAL) 3 MG tablet TAKE 1 TABLET BY MOUTH IN THE EVENING 90 tablet 3    fenofibrate 160 MG tablet Take 1 tablet by mouth daily 90 tablet 3    Compression Stockings MISC by Does not apply route Wear on bilateral lower extremities daily as needed for edema. 1 each 1    venlafaxine (EFFEXOR XR) 150 MG extended release capsule TAKE ONE CAPSULE BY MOUTH ONCE DAILY 30 capsule 11    SUMAtriptan (IMITREX) 100 MG tablet Take 1 tablet by mouth once as needed for Migraine (May repeat after 2 hours x 1 more dose, if needed; max dose 200 mg per 24 hours.) 9 tablet 5     No current facility-administered medications for this visit. Objective:   Physical Exam   Constitutional: He appears well-developed and well-nourished. No distress. HENT:   Head: Normocephalic and atraumatic. Nose: Nose normal.   Eyes: No scleral icterus. Pulmonary/Chest: No accessory muscle usage. Tachypnea noted. No respiratory distress. Is in no distress but breathing heavy while seated in the office. They state this is nl. Musculoskeletal: He exhibits no edema. Arms:  Skin: Skin is warm and dry. Rash noted. He has maculopapular rash frontal area anterior chest, under the breasts, under the right axilla and the back. The extremities are spared of this rash. He has linear vesicular rash right mid forearm palmar aspect. Psychiatric: He has a normal mood and affect. Nursing note and vitals reviewed. Assessment:       Diagnosis Orders   1.  Rash  loratadine (CLARITIN) 10 MG tablet    methylPREDNISolone (MEDROL, LOC,) 4 MG tablet     Plant dermatitis      Plan:      He did not want a shot tonight   Follow up not improving or worsens  Follow up with PCP and specialists as

## 2019-07-08 ENCOUNTER — OFFICE VISIT (OUTPATIENT)
Dept: NEUROLOGY | Age: 44
End: 2019-07-08
Payer: MEDICARE

## 2019-07-08 VITALS
HEART RATE: 81 BPM | BODY MASS INDEX: 37.94 KG/M2 | OXYGEN SATURATION: 93 % | WEIGHT: 271 LBS | DIASTOLIC BLOOD PRESSURE: 86 MMHG | SYSTOLIC BLOOD PRESSURE: 134 MMHG | HEIGHT: 71 IN

## 2019-07-08 DIAGNOSIS — F32.0 CURRENT MILD EPISODE OF MAJOR DEPRESSIVE DISORDER, UNSPECIFIED WHETHER RECURRENT (HCC): ICD-10-CM

## 2019-07-08 DIAGNOSIS — F31.0 BIPOLAR AFFECTIVE DISORDER, CURRENT EPISODE HYPOMANIC (HCC): ICD-10-CM

## 2019-07-08 DIAGNOSIS — E53.8 FOLIC ACID DEFICIENCY: ICD-10-CM

## 2019-07-08 DIAGNOSIS — G44.221 CHRONIC TENSION-TYPE HEADACHE, INTRACTABLE: ICD-10-CM

## 2019-07-08 DIAGNOSIS — G47.00 INSOMNIA, UNSPECIFIED TYPE: ICD-10-CM

## 2019-07-08 DIAGNOSIS — F41.9 ANXIETY: ICD-10-CM

## 2019-07-08 DIAGNOSIS — Z87.891 FORMER SMOKER: ICD-10-CM

## 2019-07-08 DIAGNOSIS — F32.A DEPRESSION, UNSPECIFIED DEPRESSION TYPE: ICD-10-CM

## 2019-07-08 DIAGNOSIS — F51.01 PRIMARY INSOMNIA: ICD-10-CM

## 2019-07-08 DIAGNOSIS — M54.50 CHRONIC BILATERAL LOW BACK PAIN WITHOUT SCIATICA: ICD-10-CM

## 2019-07-08 DIAGNOSIS — F31.9 BIPOLAR AFFECTIVE DISORDER, REMISSION STATUS UNSPECIFIED (HCC): ICD-10-CM

## 2019-07-08 DIAGNOSIS — G89.29 CHRONIC NECK PAIN: ICD-10-CM

## 2019-07-08 DIAGNOSIS — E53.8 B12 DEFICIENCY: ICD-10-CM

## 2019-07-08 DIAGNOSIS — M54.50 CHRONIC LOW BACK PAIN WITHOUT SCIATICA, UNSPECIFIED BACK PAIN LATERALITY: ICD-10-CM

## 2019-07-08 DIAGNOSIS — R41.3 MEMORY PROBLEM: ICD-10-CM

## 2019-07-08 DIAGNOSIS — G89.29 CHRONIC BILATERAL LOW BACK PAIN WITHOUT SCIATICA: ICD-10-CM

## 2019-07-08 DIAGNOSIS — F31.32 BIPOLAR AFFECTIVE DISORDER, CURRENTLY DEPRESSED, MODERATE (HCC): ICD-10-CM

## 2019-07-08 DIAGNOSIS — M54.2 CHRONIC NECK PAIN: ICD-10-CM

## 2019-07-08 DIAGNOSIS — G89.29 CHRONIC LOW BACK PAIN WITHOUT SCIATICA, UNSPECIFIED BACK PAIN LATERALITY: ICD-10-CM

## 2019-07-08 DIAGNOSIS — G43.019 INTRACTABLE MIGRAINE WITHOUT AURA AND WITHOUT STATUS MIGRAINOSUS: Primary | ICD-10-CM

## 2019-07-08 DIAGNOSIS — E78.1 HYPERTRIGLYCERIDEMIA: ICD-10-CM

## 2019-07-08 PROCEDURE — 1036F TOBACCO NON-USER: CPT | Performed by: PSYCHIATRY & NEUROLOGY

## 2019-07-08 PROCEDURE — 99215 OFFICE O/P EST HI 40 MIN: CPT | Performed by: PSYCHIATRY & NEUROLOGY

## 2019-07-08 PROCEDURE — G8427 DOCREV CUR MEDS BY ELIG CLIN: HCPCS | Performed by: PSYCHIATRY & NEUROLOGY

## 2019-07-08 PROCEDURE — G8417 CALC BMI ABV UP PARAM F/U: HCPCS | Performed by: PSYCHIATRY & NEUROLOGY

## 2019-07-08 ASSESSMENT — ENCOUNTER SYMPTOMS
DIARRHEA: 0
VISUAL CHANGE: 0
CONSTIPATION: 0
EYE WATERING: 0
NAUSEA: 1
SCALP TENDERNESS: 0
ABDOMINAL DISTENTION: 0
EYE DISCHARGE: 0
FACIAL SWELLING: 0
EYE REDNESS: 0
VOICE CHANGE: 0
TROUBLE SWALLOWING: 0
BLURRED VISION: 0
PHOTOPHOBIA: 1
CHEST TIGHTNESS: 0
COLOR CHANGE: 0
SINUS PRESSURE: 0
WHEEZING: 0
HEARTBURN: 0
ABDOMINAL PAIN: 0
BOWEL INCONTINENCE: 0
CHOKING: 0
FACIAL SWEATING: 0
EYE PAIN: 0
COUGH: 0
EYE ITCHING: 0
SORE THROAT: 0
APNEA: 0
BACK PAIN: 1
VOMITING: 1
SWOLLEN GLANDS: 0
RHINORRHEA: 0
SHORTNESS OF BREATH: 0
BLOOD IN STOOL: 0

## 2019-07-08 NOTE — PROGRESS NOTES
Swallowing  Problems      absent            Dizziness,  Light headedness           absent                        Vertigo        absent             Generalized   Weakness    absent              focal  Weakness     absent             Tremors         absent              Sleep  Problems     present             History  Of   Recent   Head  Injury     absent             History  Of   Recent  TIA     absent             History  Of   Recent    Stroke     absent             Neck  Pain and  Neck muscle  Spasms  Absent               Radiating  down   And   Weakness           absent            Lower back   Pain  And     Spasms  Present              Radiating    Down   And   Weakness          present                H/O   FALLS        absent               History  Of   Visual  Symptoms    Absent                  Associated   Diplopia       absent                                Also   Additional   Symptoms   Present    As  Documented    In   The detailed    Review  Of  Systems   And    Please   Refer   To    Them for   Additional  Information. Any components  That are either  Unobtainable  Or  Limited  In   HPI, ROS  And/or PFSH   Are   Due   To   Patient's  Medical  Problems,  Clinical  Condition and/or lack of other  Alternate resources.           RECORDS   REVIEWED:    historical medical records       INFORMATION   REVIEWED:     MEDICAL   HISTORY,     SURGICAL   HISTORY,     MEDICATIONS   LIST,   ALLERGIES AND  DRUG  INTOLERANCES,       FAMILY   HISTORY,  SOCIAL  HISTORY,      PROBLEM  LIST   FOR  PATIENT  CARE   COORDINATION        Past Medical History:   Diagnosis Date    Allergic rhinitis     Bipolar disorder ([facility] [address]     Diagnosed in 1998    Depression     Headache(784.0)     Osteoarthritis     TMJ (dislocation of temporomandibular joint)     Torticollis          Past Surgical History:   Procedure Laterality Date    TONSILLECTOMY           Current Outpatient Medications   Medication Sig 03/07/2019    MCH 31.5 03/07/2019    MCHC 33.4 03/07/2019    RDW 14.0 03/07/2019     03/07/2019    MPV 7.7 03/07/2019       Chemistries  Lab Results   Component Value Date     03/07/2019    K 4.0 03/07/2019    CL 99 03/07/2019    CO2 27 03/07/2019    BUN 15 10/01/2018    CREATININE 1.00 10/01/2018    CALCIUM 8.8 10/01/2018    PROT 6.9 10/01/2018    LABALBU 4.4 10/01/2018    BILITOT 0.42 10/01/2018    ALKPHOS 68 10/01/2018    AST 34 03/07/2019    ALT 45 03/07/2019       Lab Results   Component Value Date    BUN 15 10/01/2018    CREATININE 1.00 10/01/2018     Lab Results   Component Value Date    CALCIUM 8.8 10/01/2018       Review of Systems   Constitutional: Negative for appetite change, chills, diaphoresis, fatigue, fever, unexpected weight change and weight loss. HENT: Negative for congestion, dental problem, drooling, ear discharge, ear pain, facial swelling, hearing loss, mouth sores, nosebleeds, postnasal drip, rhinorrhea, sinus pressure, sore throat, tinnitus, trouble swallowing and voice change. Eyes: Positive for photophobia. Negative for blurred vision, pain, discharge, redness, itching and visual disturbance. Respiratory: Negative for apnea, cough, choking, chest tightness, shortness of breath and wheezing. Cardiovascular: Negative for chest pain, palpitations, leg swelling and near-syncope. Gastrointestinal: Positive for nausea and vomiting. Negative for abdominal distention, abdominal pain, anorexia, blood in stool, bowel incontinence, constipation, diarrhea and heartburn. Endocrine: Negative for cold intolerance, heat intolerance, polydipsia, polyphagia and polyuria. Genitourinary: Negative for bladder incontinence. Musculoskeletal: Positive for back pain and neck pain. Negative for arthralgias, gait problem, joint swelling, myalgias and neck stiffness. Skin: Negative for color change, pallor, rash and wound.    Allergic/Immunologic: Negative for environmental allergies, shift. No abnormal extra-axial fluid collection. The ventricles   and sulci are normal in size and configuration.  The sellar/suprasellar   regions appear unremarkable.  The normal signal voids within the major   intracranial vessels appear maintained.       ORBITS: The visualized portion of the orbits demonstrate no acute abnormality.       SINUSES: The visualized paranasal sinuses and mastoid air cells are well   aerated.       BONES/SOFT TISSUES: The bone marrow signal intensity appears normal. The   craniocervical junction is normal in appearance.           Impression   No acute intracranial abnormality detected. VISITING DIAGNOSIS:      ICD-10-CM    1. Intractable migraine without aura and without status migrainosus G43.019    2. Chronic neck pain M54.2     G89.29    3. Bipolar affective disorder, currently depressed, moderate ([address] F31.32    4. Current mild episode of major depressive disorder, unspecified whether recurrent ([address] F32.0    5. Chronic tension-type headache, intractable G44.221    6. Chronic bilateral low back pain without sciatica M54.5     G89.29    7. Folic acid deficiency A21.6    8. B12 deficiency E53.8    9. Hypertriglyceridemia E78.1    10. Memory problem R41.3    11. Bipolar affective disorder, current episode hypomanic ([address] F31.0    12. Primary insomnia F51.01    13. Anxiety F41.9    14. Former smoker Z87.891    13. Depression, unspecified depression type F32.9    16. Bipolar affective disorder, remission status unspecified ([address] F31.9               CONCERNS   &   INCREASED   RISK   FOR            *  POORLY  CONTROLLED   &  COMPLICATED  MIGRAINES      *     CHRONIC  TENSION  HEADACHES      *   COGNITIVE  &   MEMORY PROBLEMS                  VARIOUS  RISK   FACTORS   WERE  REVIEWED   AND   DISCUSSED. *  PATIENT   HAS  MULTIPLE   MEDICAL, MENTAL HEALTH    AND   NEUROLOGICAL   PROBLEMS . PATIENT'S   MANAGEMENT  IS  CHALLENGING.       PLAN:       Carol Riling  Of  The Diagnoses,  The  Management & Treatment  Options           AND    Care  plan  Were        Reviewed and   Discussed   With  patient. * Goals  And  Expectations  Of  The  Therapy  Discussed   And  Reviewed. *   Benefits   And   Side  Effect  Profile  Of  Medication/s   Were   Discussed             * Need   For  Further   Follow up For  The  Various  Problems  Were discussed. * Results  Of  The  Previous  Diagnostic tests were reviewed and questions answered. patient  understand the same. Medical  Decision  Making  Was  Made  Based on the   Complexity  Of  Above  Mentioned  Diagnoses,    Data reviewed   & diagnostic  Tests  Reviewed,  Risk  Of  Significant   Co morbidities and complicating   Factors. Medical  Decision  Was   High  Complexity  Due   To  The  Patient's  Multiple  Symptoms,  Advancing   Disease,  Complex  Treatment  Regimen,  Multiple medications and   Risk  Of   Side  Effects,  Difficulty  In  Medication  Management  And  Diagnostic  Challenges   In  View  Of  The  Associated   Co  Morbid  Conditions   And  Problems. *   BE  CAREFUL  WITH  ACTIVITIES   INCLUDING  DRIVING. *   AVOID   NECK  AND/ BACK  STRAINING  ACTIVITIES    *   LOCAL  HEAT   AND MUSCLE  RELAXANTS   EXTERNALLY   AS  TOLERATED         *   ADEQUATE   FLUID  INTAKE   AND  ELECTROLYTE  BALANCE           * KEEP  DAIRY  OF   THE  NEUROLOGICAL  SYMPTOMS        RECORDING THE    DURATION  AND  FREQUENCY. *  AVOID    CONDITIONS  AND  FACTORS   THAT  MAKE   NEUROLOGICAL  SYMPTOMS  WORSE. *  TO  MAINTAIN  REGULAR  SLEEP  WAKE  CYCLES.      *   TO  HAVE  ADEQUATE  REST  AND   SLEEP    HOURS.          *    AVOID  ANY USAGE OF                   TOBACCO,  EXCESSIVE  ALCOHOL  AND   ILLEGAL   SUBSTANCES        *  CONTINUE MEDICATIONS PRESCRIBED BY NEUROLOGIST AS    RECOMMENDED       *   Compliance   With  Medications   And  Instructions            * CURRENTLY family. A healthy lifestyle also can lower your risk for serious health problems, such as high blood pressure, heart disease, and diabetes. You can follow a few steps listed below to improve your health and the health of your family. Follow-up care is a key part of your treatment and safety. Be sure to make and go to all appointments, and call your doctor if you are having problems. Its also a good idea to know your test results and keep a list of the medicines you take. How can you care for yourself at home? Do not eat too much sugar, fat, or fast foods. You can still have dessert and treats now and then. The goal is moderation. Start small to improve your eating habits. Pay attention to portion sizes, drink less juice and soda pop, and eat more fruits and vegetables. Eat a healthy amount of food. A 3-ounce serving of meat, for example, is about the size of a deck of cards. Fill the rest of your plate with vegetables and whole grains. Limit the amount of soda and sports drinks you have every day. Drink more water when you are thirsty. Eat at least 5 servings of fruits and vegetables every day. It may seem like a lot, but it is not hard to reach this goal. A serving or helping is 1 piece of fruit, 1 cup of vegetables, or 2 cups of leafy, raw vegetables. Have an apple or some carrot sticks as an afternoon snack instead of a candy bar. Try to have fruits and/or vegetables at every meal.  Make exercise part of your daily routine. You may want to start with simple activities, such as walking, bicycling, or slow swimming. Try to be active 30 to 60 minutes every day. You do not need to do all 30 to 60 minutes all at once. For example, you can exercise 3 times a day for 10 or 20 minutes. Moderate exercise is safe for most people, but it is always a good idea to talk to your doctor before starting an exercise program.  Keep moving. Adelaide Chairez the lawn, work in the garden, or [place].  Take the stairs instead of the elevator at work. If you smoke, quit. People who smoke have an increased risk for heart attack, stroke, cancer, and other lung illnesses. Quitting is hard, but there are ways to boost your chance of quitting tobacco for good. Use nicotine gum, patches, or lozenges. Ask your doctor about stop-smoking programs and medicines. Keep trying. In addition to reducing your risk of diseases in the future, you will notice some benefits soon after you stop using tobacco. If you have shortness of breath or asthma symptoms, they will likely get better within a few weeks after you quit. Limit how much alcohol you drink. Moderate amounts of alcohol (up to 2 drinks a day for men, 1 drink a day for women) are okay. But drinking too much can lead to liver problems, high blood pressure, and other health problems. Family health  If you have a family, there are many things you can do together to improve your health. Eat meals together as a family as often as possible. Eat healthy foods. This includes fruits, vegetables, lean meats and dairy, and whole grains. Include your family in your fitness plan. Most people think of activities such as jogging or tennis as the way to fitness, but there are many ways you and your family can be more active. Anything that makes you breathe hard and gets your heart pumping is exercise. Here are some tips:  Walk to do errands or to take your child to school or the bus. Go for a family bike ride after dinner instead of watching TV. Where can you learn more? Go to https://[email or web address]-traxheather.[employer]. org and sign in to your [employer] account. Enter S046 in the [facility] box to learn more about \"A Healthy Lifestyle: Care Instructions. \"     If you do not have an account, please click on the \"Sign Up Now\" link. Current as of: July 26, 2016  Content Version: 11.2  © 5880-0860 [employer]. Care instructions adapted under license by [facility] ([facility]).  If you have questions

## 2019-07-22 ENCOUNTER — APPOINTMENT (OUTPATIENT)
Dept: GENERAL RADIOLOGY | Age: 44
End: 2019-07-22
Payer: MEDICARE

## 2019-07-22 ENCOUNTER — HOSPITAL ENCOUNTER (EMERGENCY)
Age: 44
Discharge: HOME | End: 2019-07-22
Attending: EMERGENCY MEDICINE
Payer: MEDICARE

## 2019-07-22 VITALS
RESPIRATION RATE: 17 BRPM | HEART RATE: 100 BPM | WEIGHT: 280 LBS | SYSTOLIC BLOOD PRESSURE: 150 MMHG | BODY MASS INDEX: 39.2 KG/M2 | OXYGEN SATURATION: 95 % | TEMPERATURE: 101.5 F | DIASTOLIC BLOOD PRESSURE: 70 MMHG | HEIGHT: 71 IN

## 2019-07-22 DIAGNOSIS — J44.1 COPD EXACERBATION (HCC): Primary | ICD-10-CM

## 2019-07-22 DIAGNOSIS — J40 BRONCHITIS: ICD-10-CM

## 2019-07-22 LAB
ABSOLUTE EOS #: 0 K/UL (ref 0–0.4)
ABSOLUTE IMMATURE GRANULOCYTE: ABNORMAL K/UL (ref 0–0.3)
ABSOLUTE LYMPH #: 2.1 K/UL (ref 1–4.8)
ABSOLUTE MONO #: 0.95 K/UL (ref 0.1–1.2)
ALBUMIN SERPL-MCNC: 4.5 G/DL (ref 3.5–5.2)
ALBUMIN/GLOBULIN RATIO: 1.7 (ref 1–2.5)
ALP BLD-CCNC: 82 U/L (ref 40–129)
ALT SERPL-CCNC: 75 U/L (ref 5–41)
ANION GAP SERPL CALCULATED.3IONS-SCNC: 14 MMOL/L (ref 9–17)
AST SERPL-CCNC: 40 U/L
BASOPHILS # BLD: 0 % (ref 0–2)
BASOPHILS ABSOLUTE: 0 K/UL (ref 0–0.2)
BILIRUB SERPL-MCNC: 0.45 MG/DL (ref 0.3–1.2)
BUN BLDV-MCNC: 9 MG/DL (ref 6–20)
BUN/CREAT BLD: 9 (ref 9–20)
CALCIUM SERPL-MCNC: 9.4 MG/DL (ref 8.6–10.4)
CHLORIDE BLD-SCNC: 99 MMOL/L (ref 98–107)
CO2: 23 MMOL/L (ref 20–31)
CREAT SERPL-MCNC: 1 MG/DL (ref 0.7–1.2)
DIFFERENTIAL TYPE: ABNORMAL
EOSINOPHILS RELATIVE PERCENT: 0 % (ref 1–8)
GFR AFRICAN AMERICAN: >60 ML/MIN
GFR NON-AFRICAN AMERICAN: >60 ML/MIN
GFR SERPL CREATININE-BSD FRML MDRD: ABNORMAL ML/MIN/{1.73_M2}
GFR SERPL CREATININE-BSD FRML MDRD: ABNORMAL ML/MIN/{1.73_M2}
GLUCOSE BLD-MCNC: 112 MG/DL (ref 70–99)
HCT VFR BLD CALC: 36.4 % (ref 41–53)
HEMOGLOBIN: 12.9 G/DL (ref 13.5–17.5)
IMMATURE GRANULOCYTES: ABNORMAL %
LACTIC ACID: 1.5 MMOL/L (ref 0.5–2.2)
LYMPHOCYTES # BLD: 20 % (ref 15–43)
MCH RBC QN AUTO: 33.1 PG (ref 26–34)
MCHC RBC AUTO-ENTMCNC: 35.4 G/DL (ref 31–37)
MCV RBC AUTO: 93.5 FL (ref 80–100)
MONOCYTES # BLD: 9 % (ref 6–14)
MORPHOLOGY: ABNORMAL
NRBC AUTOMATED: ABNORMAL PER 100 WBC
PDW BLD-RTO: 14.1 % (ref 11–14.5)
PLATELET # BLD: 338 K/UL (ref 140–450)
PLATELET ESTIMATE: ABNORMAL
PMV BLD AUTO: 8.2 FL (ref 6–12)
POTASSIUM SERPL-SCNC: 3.7 MMOL/L (ref 3.7–5.3)
RBC # BLD: 3.9 M/UL (ref 4.5–5.9)
RBC # BLD: ABNORMAL 10*6/UL
SEG NEUTROPHILS: 71 % (ref 44–74)
SEGMENTED NEUTROPHILS ABSOLUTE COUNT: 7.45 K/UL (ref 1.8–7.7)
SODIUM BLD-SCNC: 136 MMOL/L (ref 135–144)
TOTAL PROTEIN: 7.2 G/DL (ref 6.4–8.3)
WBC # BLD: 10.5 K/UL (ref 3.5–11)
WBC # BLD: ABNORMAL 10*3/UL

## 2019-07-22 PROCEDURE — 71046 X-RAY EXAM CHEST 2 VIEWS: CPT

## 2019-07-22 PROCEDURE — 83605 ASSAY OF LACTIC ACID: CPT

## 2019-07-22 PROCEDURE — 6370000000 HC RX 637 (ALT 250 FOR IP): Performed by: EMERGENCY MEDICINE

## 2019-07-22 PROCEDURE — 99285 EMERGENCY DEPT VISIT HI MDM: CPT

## 2019-07-22 PROCEDURE — 2580000003 HC RX 258: Performed by: EMERGENCY MEDICINE

## 2019-07-22 PROCEDURE — 96374 THER/PROPH/DIAG INJ IV PUSH: CPT

## 2019-07-22 PROCEDURE — 94640 AIRWAY INHALATION TREATMENT: CPT

## 2019-07-22 PROCEDURE — 6360000002 HC RX W HCPCS: Performed by: EMERGENCY MEDICINE

## 2019-07-22 PROCEDURE — 36415 COLL VENOUS BLD VENIPUNCTURE: CPT

## 2019-07-22 PROCEDURE — 80053 COMPREHEN METABOLIC PANEL: CPT

## 2019-07-22 PROCEDURE — 85025 COMPLETE CBC W/AUTO DIFF WBC: CPT

## 2019-07-22 RX ORDER — 0.9 % SODIUM CHLORIDE 0.9 %
1000 INTRAVENOUS SOLUTION INTRAVENOUS ONCE
Status: COMPLETED | OUTPATIENT
Start: 2019-07-22 | End: 2019-07-22

## 2019-07-22 RX ORDER — METHYLPREDNISOLONE 4 MG/1
TABLET ORAL
Qty: 1 KIT | Refills: 0 | Status: SHIPPED | OUTPATIENT
Start: 2019-07-22 | End: 2019-07-28

## 2019-07-22 RX ORDER — PREDNISONE 50 MG/1
50 TABLET ORAL DAILY
Qty: 4 TABLET | Refills: 0 | Status: SHIPPED | OUTPATIENT
Start: 2019-07-22 | End: 2019-07-22

## 2019-07-22 RX ORDER — NEBULIZER ACCESSORIES
1 KIT MISCELLANEOUS ONCE
Qty: 1 KIT | Refills: 0 | Status: SHIPPED | OUTPATIENT
Start: 2019-07-22 | End: 2021-05-10 | Stop reason: SDUPTHER

## 2019-07-22 RX ORDER — METHYLPREDNISOLONE SODIUM SUCCINATE 125 MG/2ML
80 INJECTION, POWDER, LYOPHILIZED, FOR SOLUTION INTRAMUSCULAR; INTRAVENOUS ONCE
Status: COMPLETED | OUTPATIENT
Start: 2019-07-22 | End: 2019-07-22

## 2019-07-22 RX ORDER — AZITHROMYCIN 250 MG/1
TABLET, FILM COATED ORAL
Qty: 1 PACKET | Refills: 0 | Status: SHIPPED | OUTPATIENT
Start: 2019-07-22 | End: 2019-10-11 | Stop reason: ALTCHOICE

## 2019-07-22 RX ORDER — ACETAMINOPHEN 500 MG
1000 TABLET ORAL ONCE
Status: COMPLETED | OUTPATIENT
Start: 2019-07-22 | End: 2019-07-22

## 2019-07-22 RX ORDER — IPRATROPIUM BROMIDE AND ALBUTEROL SULFATE 2.5; .5 MG/3ML; MG/3ML
1 SOLUTION RESPIRATORY (INHALATION) ONCE
Status: COMPLETED | OUTPATIENT
Start: 2019-07-22 | End: 2019-07-22

## 2019-07-22 RX ADMIN — METHYLPREDNISOLONE SODIUM SUCCINATE 80 MG: 125 INJECTION, POWDER, FOR SOLUTION INTRAMUSCULAR; INTRAVENOUS at 01:38

## 2019-07-22 RX ADMIN — IPRATROPIUM BROMIDE AND ALBUTEROL SULFATE 1 AMPULE: .5; 3 SOLUTION RESPIRATORY (INHALATION) at 02:17

## 2019-07-22 RX ADMIN — ALBUTEROL SULFATE 5 MG: 5 SOLUTION RESPIRATORY (INHALATION) at 01:15

## 2019-07-22 RX ADMIN — SODIUM CHLORIDE 1000 ML: 9 INJECTION, SOLUTION INTRAVENOUS at 01:40

## 2019-07-22 RX ADMIN — ACETAMINOPHEN 1000 MG: 500 TABLET, FILM COATED ORAL at 01:40

## 2019-07-22 ASSESSMENT — ENCOUNTER SYMPTOMS
COUGH: 1
VOMITING: 0
SHORTNESS OF BREATH: 1
BACK PAIN: 0

## 2019-07-22 ASSESSMENT — PAIN SCALES - GENERAL
PAINLEVEL_OUTOF10: 0
PAINLEVEL_OUTOF10: 0

## 2019-07-22 NOTE — ED PROVIDER NOTES
children: None    Years of education: None    Highest education level: None   Occupational History    None   Social Needs    Financial resource strain: None    Food insecurity:     Worry: None     Inability: None    Transportation needs:     Medical: None     Non-medical: None   Tobacco Use    Smoking status: Former Smoker     Packs/day: 1.00     Years: 25.00     Pack years: 25.00     Types: Cigarettes     Start date: 10/16/1992     Last attempt to quit: 2017     Years since quittin.0    Smokeless tobacco: Never Used    Tobacco comment: handout given. Substance and Sexual Activity    Alcohol use: No    Drug use: No    Sexual activity: Yes     Partners: Female   Lifestyle    Physical activity:     Days per week: None     Minutes per session: None    Stress: None   Relationships    Social connections:     Talks on phone: None     Gets together: None     Attends [employer] service: None     Active member of club or organization: None     Attends meetings of clubs or organizations: None     Relationship status: None    Intimate partner violence:     Fear of current or ex partner: None     Emotionally abused: None     Physically abused: None     Forced sexual activity: None   Other Topics Concern    None   Social History Narrative    None       SCREENINGS    [place] Coma Scale  Eye Opening: Spontaneous  Best Verbal Response: Oriented  Best Motor Response: Obeys commands  [place] Coma Scale Score: 15        PHYSICAL EXAM    (up to 7 for level 4, 8 or more for level 5)     ED Triage Vitals [19 0057]   BP Temp Temp Source Pulse Resp SpO2 Height Weight   (!) 171/85 101.5 °F (38.6 °C) Tympanic 102 22 95 % 5' 11\" (1.803 m) 280 lb (127 kg)       Physical Exam   Constitutional: He appears well-developed and well-nourished. No distress. HENT:   Head: Normocephalic and atraumatic.    Mouth/Throat: Oropharynx is clear and moist.   Eyes: Conjunctivae are normal.   Pupils are equally round and reacting normally. Extraoccular muscles are grossly intact. Neck: Normal range of motion. No tracheal deviation present. Cardiovascular: Regular rhythm, normal heart sounds and intact distal pulses. Exam reveals no friction rub. No murmur heard. Tachycardic   Pulmonary/Chest: No stridor. No respiratory distress. He has wheezes. He has no rales. He exhibits no tenderness. Patient is slightly tachypneic, he has overall decreased airflow, slight wheezes are noted   Abdominal: Soft. He exhibits no distension and no mass. There is no tenderness. There is no rebound and no guarding. Musculoskeletal: Normal range of motion. He exhibits no edema or deformity. Neurological: He is alert. He exhibits normal muscle tone. Answering questions appropriately. No gaze deficit. No gait abnormality. Moving all extremities. Skin: Skin is warm and dry. Capillary refill takes 2 to 3 seconds. Psychiatric: He has a normal mood and affect. Judgment normal.       EMERGENCY DEPARTMENT COURSE and DIFFERENTIAL DIAGNOSIS/MDM:   Vitals:    Vitals:    07/22/19 0057   BP: (!) 171/85   Pulse: 102   Resp: 22   Temp: 101.5 °F (38.6 °C)   TempSrc: Tympanic   SpO2: 95%   Weight: 280 lb (127 kg)   Height: 5' 11\" (1.803 m)       Patient presents to the emergency department with the complaint described above. He is hypertensive, slightly tachycardic, does have a low-grade fever, he is tachypneic, low/normal pulse ox. He is not in respiratory distress. At this time, differential diagnosis includes acute bronchitis, COPD exacerbation, possible pneumonia, possible sepsis. I lower suspicion that this is cardiac in nature or cause by things such as pulmonary embolus.   I will get blood work, lactic acid, chest x-ray and I will give some fluids, Tylenol, Solu-Medrol and a breathing treatment and I will reevaluate      DIAGNOSTIC RESULTS     LABS:  Labs Reviewed   CBC WITH AUTO DIFFERENTIAL - Abnormal; Notable for the following components:

## 2019-08-02 DIAGNOSIS — G43.909 MIGRAINE WITHOUT STATUS MIGRAINOSUS, NOT INTRACTABLE, UNSPECIFIED MIGRAINE TYPE: ICD-10-CM

## 2019-08-03 RX ORDER — SUMATRIPTAN 100 MG/1
100 TABLET, FILM COATED ORAL
Qty: 9 TABLET | Refills: 3 | Status: SHIPPED | OUTPATIENT
Start: 2019-08-03 | End: 2020-01-19 | Stop reason: SDUPTHER

## 2019-08-17 ENCOUNTER — HOSPITAL ENCOUNTER (EMERGENCY)
Age: 44
Discharge: HOME | End: 2019-08-17
Attending: EMERGENCY MEDICINE
Payer: MEDICARE

## 2019-08-17 VITALS
HEART RATE: 82 BPM | DIASTOLIC BLOOD PRESSURE: 84 MMHG | SYSTOLIC BLOOD PRESSURE: 165 MMHG | TEMPERATURE: 97.9 F | WEIGHT: 260 LBS | RESPIRATION RATE: 16 BRPM | BODY MASS INDEX: 36.4 KG/M2 | OXYGEN SATURATION: 95 % | HEIGHT: 71 IN

## 2019-08-17 DIAGNOSIS — S05.02XA ABRASION OF LEFT CORNEA, INITIAL ENCOUNTER: ICD-10-CM

## 2019-08-17 DIAGNOSIS — H20.9 TRAUMATIC IRITIS: Primary | ICD-10-CM

## 2019-08-17 PROCEDURE — 6370000000 HC RX 637 (ALT 250 FOR IP)

## 2019-08-17 PROCEDURE — 6370000000 HC RX 637 (ALT 250 FOR IP): Performed by: EMERGENCY MEDICINE

## 2019-08-17 PROCEDURE — 99283 EMERGENCY DEPT VISIT LOW MDM: CPT

## 2019-08-17 RX ORDER — TETRACAINE HYDROCHLORIDE 5 MG/ML
1 SOLUTION OPHTHALMIC ONCE
Status: COMPLETED | OUTPATIENT
Start: 2019-08-17 | End: 2019-08-17

## 2019-08-17 RX ORDER — CYCLOPENTOLATE HYDROCHLORIDE 10 MG/ML
1 SOLUTION/ DROPS OPHTHALMIC ONCE
Status: COMPLETED | OUTPATIENT
Start: 2019-08-17 | End: 2019-08-17

## 2019-08-17 RX ORDER — ERYTHROMYCIN 5 MG/G
OINTMENT OPHTHALMIC ONCE
Status: COMPLETED | OUTPATIENT
Start: 2019-08-17 | End: 2019-08-17

## 2019-08-17 RX ORDER — TETRACAINE HYDROCHLORIDE 5 MG/ML
SOLUTION OPHTHALMIC
Status: COMPLETED
Start: 2019-08-17 | End: 2019-08-17

## 2019-08-17 RX ADMIN — TETRACAINE HYDROCHLORIDE 1 DROP: 5 SOLUTION OPHTHALMIC at 05:28

## 2019-08-17 RX ADMIN — ERYTHROMYCIN: 5 OINTMENT OPHTHALMIC at 05:40

## 2019-08-17 RX ADMIN — CYCLOPENTOLATE HYDROCHLORIDE 1 DROP: 10 SOLUTION/ DROPS OPHTHALMIC at 05:39

## 2019-08-17 ASSESSMENT — PAIN DESCRIPTION - LOCATION: LOCATION: EYE

## 2019-08-17 ASSESSMENT — ENCOUNTER SYMPTOMS
EYE PAIN: 1
EYE REDNESS: 1
PHOTOPHOBIA: 1

## 2019-08-17 ASSESSMENT — PAIN DESCRIPTION - ORIENTATION: ORIENTATION: LEFT

## 2019-08-17 ASSESSMENT — PAIN DESCRIPTION - PAIN TYPE: TYPE: ACUTE PAIN

## 2019-08-17 ASSESSMENT — PAIN SCALES - GENERAL
PAINLEVEL_OUTOF10: 7
PAINLEVEL_OUTOF10: 5

## 2019-08-17 ASSESSMENT — PAIN - FUNCTIONAL ASSESSMENT: PAIN_FUNCTIONAL_ASSESSMENT: 0-10

## 2019-08-17 ASSESSMENT — VISUAL ACUITY
OS: 20/70
OD: 20/100

## 2019-08-17 NOTE — ED PROVIDER NOTES
[facility] ED  [address]  [place] [address]  Phone: [phone]  eMERGENCY dEPARTMENT eNCOUnter      Pt Name: Leellen Cheadle  MRN: [id number]  Nadyagfmarco antonio 1975  Date of evaluation: 8/17/19      CHIEF COMPLAINT     Chief Complaint   Patient presents with   [address] [place] [address] Dr FARRELL is a 37 y.o. male who presents today with acute complaint of left eye pain. Injury occurred at approximately 11:00 last night. He was poked in the eye with a family member's fingernail. Increased pain and redness of the eye. No nausea no vomiting no headache. Tetanus shot is up-to-date. Denies any UV light exposure grinding or popping or metals. Wears glasses but which she does not have with him. Denies wearing contact lenses. REVIEW OF SYSTEMS     Review of Systems   Constitutional: Negative for fever. HENT: Negative for congestion. Eyes: Positive for photophobia, pain and redness. Neurological: Negative for headaches. All other systems reviewed and are negative. PAST MEDICAL HISTORY    has a past medical history of Allergic rhinitis, Bipolar disorder ([address] Depression, Headache(784.0), Osteoarthritis, TMJ (dislocation of temporomandibular joint), and Torticollis. SURGICAL HISTORY      has a past surgical history that includes Tonsillectomy.     CURRENT MEDICATIONS       Discharge Medication List as of 8/17/2019  6:13 AM      CONTINUE these medications which have NOT CHANGED    Details   SUMAtriptan (IMITREX) 100 MG tablet Take 1 tablet by mouth once as needed for Migraine (May repeat after 2 hours x 1 more dose, if needed; max dose 200 mg per 24 hours.), Disp-9 tablet, R-3Normal      azithromycin (ZITHROMAX Z-LOC) 250 MG tablet Follow directions on package labelling, Disp-1 packet, R-0Print      Respiratory Therapy Supplies (NEBULIZER COMPRESSOR) KIT ONCE Starting Mon 7/22/2019, For 1 dose, Disp-1 kit, R-0, Print      Respiratory Therapy Supplies history includes High Blood Pressure in his father and mother; Stroke in his father and mother. SOCIAL HISTORY      reports that he quit smoking about 2 years ago. His smoking use included cigarettes. He started smoking about 26 years ago. He has a 25.00 pack-year smoking history. He has never used smokeless tobacco. He reports that he does not drink alcohol or use drugs. PHYSICAL EXAM     INITIAL VITALS:  height is 5' 11\" (1.803 m) and weight is 260 lb (117.9 kg). His tympanic temperature is 97.9 °F (36.6 °C). His blood pressure is 165/84 (abnormal) and his pulse is 82. His respiration is 16 and oxygen saturation is 95%. Physical Exam   Constitutional: He is oriented to person, place, and time. He appears well-developed and well-nourished. No distress. HENT:   Head: Normocephalic and atraumatic. Eyes: EOM are normal.   Pupils the right eyes PERRL. The limited exam unremarkable. Slit exam of the left eye shows conjunctivitis with a florescein   there is a corneal defect in the 5:00 position, does not cover the pupil. Pupil is constricted. Neurological: He is alert and oriented to person, place, and time. No cranial nerve deficit. Skin: Skin is warm and dry. He is not diaphoretic. No pallor. Vitals reviewed. DIFFERENTIAL DIAGNOSIS / MDM / EMERGENCY DEPARTMENT COURSE:     Examination is consistent with corneal abrasion and traumatic iritis. We will treat with cycloplegic and erythromycin recently. Tetanus shot is up-to-date. Patient was educated on the warning signs which return to the emergency department. Patient did not tolerate the exam well. My suspicion for glaucoma or similar is low given the history. Patient did not want to pursue eye pressure testing at this time. Patient was offered Toradol for additional pain control he declined this. Patient educated to follow-up with his PCP for elevated blood pressure in the emergency department.     I have reviewed the disposition diagnosis with the patient and or their family/guardian. I have answered their questions and givendischarge instructions. They voiced understanding of these instructions and did not have any further questions or complaints. DIAGNOSTIC RESULTS     EKG: All EKG's are interpreted by the Emergency Department Physician who either signs or Co-signs this chart inthe absence of a cardiologist.        RADIOLOGY:   I directly visualized the following plain film images and reviewed the radiologistinterpretations of radiologic studies:    No orders to display        Xr Chest Standard (2 Vw)    Result Date: 7/22/2019  EXAMINATION: TWO XRAY VIEWS OF THE CHEST 7/22/2019 1:30 am COMPARISON: CT chest without contrast 04/16/2019. Two views of the chest 03/23/2018. HISTORY: ORDERING SYSTEM PROVIDED HISTORY: cough, fever TECHNOLOGIST PROVIDED HISTORY: cough, fever Reason for Exam: Shortness of breath for about 3 days, hx of COPD Acuity: Acute Type of Exam: Initial FINDINGS: The heart size and mediastinal contours are normal.  No focal lung consolidation or evidence of pulmonary edema. There is no pleural effusion. No acute findings. LABS:  No results found for this visit on 08/17/19. EMERGENCY DEPARTMENT COURSE:   Vitals:    Vitals:    08/17/19 0515 08/17/19 0618   BP: (!) 174/86 (!) 165/84   Pulse: 88 82   Resp: 16 16   Temp: 97.9 °F (36.6 °C)    TempSrc: Tympanic    SpO2: 95% 95%   Weight: 260 lb (117.9 kg)    Height: 5' 11\" (1.803 m)      -------------------------  BP: (!) 165/84, Temp: 97.9 °F (36.6 °C), Pulse: 82, Resp: 16      CONSULTS:  None    PROCEDURES:  None    FINAL IMPRESSION      1. Traumatic iritis    2.  Abrasion of left cornea, initial encounter          DISPOSITION/PLAN   DISPOSITION Decision To Discharge 08/17/2019 05:37:17 AM      PATIENT REFERRED TO:  Zia Wu MD  [address] [place] [address]  [phone]    In 2 days        DISCHARGE MEDICATIONS:  Discharge Medication List as of 8/17/2019  6:13 AM              (Please note that portions of this note were completed with avoice recognition program.  Efforts were made to edit the dictations but occasionally words are mis-transcribed.)    Shamika Parnell MD, [address] [facility], [place], operated [facility],[address]  Attending Emergency Medicine Physician        Shamika Parnell MD  08/17/19 [id number]

## 2019-08-19 ENCOUNTER — OFFICE VISIT (OUTPATIENT)
Dept: OPHTHALMOLOGY | Age: 44
End: 2019-08-19
Payer: MEDICARE

## 2019-08-19 DIAGNOSIS — S05.02XA CORNEA ABRASION, LEFT, INITIAL ENCOUNTER: Primary | ICD-10-CM

## 2019-08-19 PROCEDURE — G8427 DOCREV CUR MEDS BY ELIG CLIN: HCPCS | Performed by: OPHTHALMOLOGY

## 2019-08-19 PROCEDURE — 1036F TOBACCO NON-USER: CPT | Performed by: OPHTHALMOLOGY

## 2019-08-19 PROCEDURE — G8417 CALC BMI ABV UP PARAM F/U: HCPCS | Performed by: OPHTHALMOLOGY

## 2019-08-19 PROCEDURE — 99213 OFFICE O/P EST LOW 20 MIN: CPT | Performed by: OPHTHALMOLOGY

## 2019-08-19 PROCEDURE — 65435 CURETTE/TREAT CORNEA: CPT | Performed by: OPHTHALMOLOGY

## 2019-08-19 RX ORDER — GATIFLOXACIN 5 MG/ML
1 SOLUTION/ DROPS OPHTHALMIC 4 TIMES DAILY
Qty: 1 BOTTLE | Refills: 0 | Status: SHIPPED | OUTPATIENT
Start: 2019-08-19 | End: 2019-08-29

## 2019-08-19 ASSESSMENT — VISUAL ACUITY
OD_SC: 20/30
METHOD: SNELLEN - LINEAR
OS_PH_SC: 20/70
OS_SC: 20/200
OD_PH_SC: 20/25

## 2019-08-19 ASSESSMENT — SLIT LAMP EXAM - LIDS
COMMENTS: MILD BLEPHARITIS. MILD MGD
COMMENTS: MILD BLEPHARITIS. MILD MGD

## 2019-08-20 ENCOUNTER — TELEPHONE (OUTPATIENT)
Dept: OPHTHALMOLOGY | Age: 44
End: 2019-08-20

## 2019-08-20 NOTE — TELEPHONE ENCOUNTER
Patient called stating he was seen on 8/19/19 with a cornea abrasion of left eye. A bandage contact lens was placed on patients eye. Patient stated his eye is matting and is asking if this is normal and what can he do to clean it or help prevent it from happening? Patient has a follow up appointment on 8/21/19.   Can call patient back at [phone]

## 2019-08-21 ENCOUNTER — OFFICE VISIT (OUTPATIENT)
Dept: OPHTHALMOLOGY | Age: 44
End: 2019-08-21
Payer: MEDICARE

## 2019-08-21 DIAGNOSIS — H43.813 DEGENERATION OF POSTERIOR VITREOUS BODY OF BOTH EYES: ICD-10-CM

## 2019-08-21 DIAGNOSIS — H25.813 COMBINED FORMS OF AGE-RELATED CATARACT OF BOTH EYES: ICD-10-CM

## 2019-08-21 DIAGNOSIS — S05.00XD CORNEAL ABRASION, SUBSEQUENT ENCOUNTER: Primary | ICD-10-CM

## 2019-08-21 PROCEDURE — 99214 OFFICE O/P EST MOD 30 MIN: CPT | Performed by: OPHTHALMOLOGY

## 2019-08-21 PROCEDURE — G8417 CALC BMI ABV UP PARAM F/U: HCPCS | Performed by: OPHTHALMOLOGY

## 2019-08-21 PROCEDURE — G8427 DOCREV CUR MEDS BY ELIG CLIN: HCPCS | Performed by: OPHTHALMOLOGY

## 2019-08-21 PROCEDURE — 1036F TOBACCO NON-USER: CPT | Performed by: OPHTHALMOLOGY

## 2019-08-21 RX ORDER — PHENYLEPHRINE HCL 2.5 %
1 DROPS OPHTHALMIC (EYE) ONCE
Status: SHIPPED | OUTPATIENT
Start: 2019-08-21

## 2019-08-21 RX ORDER — TROPICAMIDE 10 MG/ML
1 SOLUTION/ DROPS OPHTHALMIC ONCE
Status: SHIPPED | OUTPATIENT
Start: 2019-08-21

## 2019-08-21 ASSESSMENT — TONOMETRY
OD_IOP_MMHG: 19
IOP_METHOD: NON-CONTACT AIR PUFF
OS_IOP_MMHG: 28

## 2019-08-21 ASSESSMENT — ENCOUNTER SYMPTOMS
RESPIRATORY NEGATIVE: 0
EYES NEGATIVE: 0
ALLERGIC/IMMUNOLOGIC NEGATIVE: 0
GASTROINTESTINAL NEGATIVE: 0

## 2019-08-21 ASSESSMENT — SLIT LAMP EXAM - LIDS
COMMENTS: MILD BLEPHARITIS. MILD MGD
COMMENTS: MILD BLEPHARITIS. MILD MGD

## 2019-08-21 ASSESSMENT — VISUAL ACUITY
METHOD: SNELLEN - LINEAR
OS_SC: 20/25

## 2019-08-21 NOTE — PROGRESS NOTES
 Depression     Headache(784.0)     Osteoarthritis     TMJ (dislocation of temporomandibular joint)     Torticollis           Current Outpatient Medications:     gatifloxacin (ZYMAR) 0.5 % SOLN, Place 1 drop into the left eye 4 times daily for 10 days, Disp: 1 Bottle, Rfl: 0    azithromycin (ZITHROMAX Z-LOC) 250 MG tablet, Follow directions on package labelling, Disp: 1 packet, Rfl: 0    albuterol (PROVENTIL) (5 MG/ML) 0.5% nebulizer solution, Take 0.5 mLs by nebulization every 6 hours as needed for Wheezing, Disp: 21 each, Rfl: 0    loratadine (CLARITIN) 10 MG tablet, Take 1 tablet by mouth daily, Disp: 30 tablet, Rfl: 2    divalproex (DEPAKOTE ER) 500 MG extended release tablet, THEN   ONE  TABLET  TWICE  DAILY, Disp: 60 tablet, Rfl: 5    orphenadrine (NORFLEX) 100 MG extended release tablet, Take 1 tablet by mouth nightly as needed for Muscle spasms, Disp: 60 tablet, Rfl: 5    fluticasone (FLONASE) 50 MCG/ACT nasal spray, 2 sprays by Nasal route daily, Disp: 3 Bottle, Rfl: 3    albuterol sulfate HFA (VENTOLIN HFA) 108 (90 Base) MCG/ACT inhaler, Inhale 1-2 puffs into the lungs every 6 hours as needed for Wheezing, Disp: 3 Inhaler, Rfl: 3    busPIRone (BUSPAR) 10 MG tablet, TAKE 1 TABLET BY MOUTH TWICE DAILY, Disp: 60 tablet, Rfl: 11    risperiDONE (RISPERDAL) 3 MG tablet, TAKE 1 TABLET BY MOUTH IN THE EVENING, Disp: 90 tablet, Rfl: 3    fenofibrate 160 MG tablet, Take 1 tablet by mouth daily, Disp: 90 tablet, Rfl: 3    Compression Stockings MISC, by Does not apply route Wear on bilateral lower extremities daily as needed for edema., Disp: 1 each, Rfl: 1    venlafaxine (EFFEXOR XR) 150 MG extended release capsule, TAKE ONE CAPSULE BY MOUTH ONCE DAILY, Disp: 30 capsule, Rfl: 11    SUMAtriptan (IMITREX) 100 MG tablet, Take 1 tablet by mouth once as needed for Migraine (May repeat after 2 hours x 1 more dose, if needed; max dose 200 mg per 24 hours.), Disp: 9 tablet, Rfl: 3    Respiratory Therapy

## 2019-09-17 DIAGNOSIS — G43.909 MIGRAINE WITHOUT STATUS MIGRAINOSUS, NOT INTRACTABLE, UNSPECIFIED MIGRAINE TYPE: ICD-10-CM

## 2019-09-17 RX ORDER — SUMATRIPTAN 100 MG/1
100 TABLET, FILM COATED ORAL
Qty: 9 TABLET | Refills: 3 | Status: CANCELLED | OUTPATIENT
Start: 2019-09-17 | End: 2019-09-17

## 2019-09-18 ENCOUNTER — PATIENT MESSAGE (OUTPATIENT)
Dept: FAMILY MEDICINE CLINIC | Age: 44
End: 2019-09-18

## 2019-09-19 NOTE — TELEPHONE ENCOUNTER
From: Ashlie Horne  To: Catherine Ames DO  Sent: 9/18/2019 3:37 PM EDT  Subject: Non-Urgent Medical Question    I was wondering if you could give me a script for a knee brace if at all possible ?

## 2019-10-09 ENCOUNTER — OFFICE VISIT (OUTPATIENT)
Dept: OPHTHALMOLOGY | Age: 44
End: 2019-10-09
Payer: MEDICARE

## 2019-10-09 DIAGNOSIS — H18.832 RECURRENT CORNEAL EROSION, LEFT: Primary | ICD-10-CM

## 2019-10-09 PROBLEM — S05.00XD: Status: RESOLVED | Noted: 2019-08-21 | Resolved: 2019-10-09

## 2019-10-09 PROCEDURE — G8427 DOCREV CUR MEDS BY ELIG CLIN: HCPCS | Performed by: OPHTHALMOLOGY

## 2019-10-09 PROCEDURE — 99213 OFFICE O/P EST LOW 20 MIN: CPT | Performed by: OPHTHALMOLOGY

## 2019-10-09 PROCEDURE — G8417 CALC BMI ABV UP PARAM F/U: HCPCS | Performed by: OPHTHALMOLOGY

## 2019-10-09 PROCEDURE — 1036F TOBACCO NON-USER: CPT | Performed by: OPHTHALMOLOGY

## 2019-10-09 PROCEDURE — G8484 FLU IMMUNIZE NO ADMIN: HCPCS | Performed by: OPHTHALMOLOGY

## 2019-10-09 RX ORDER — GATIFLOXACIN 5 MG/ML
1 SOLUTION/ DROPS OPHTHALMIC
Qty: 1 BOTTLE | Refills: 1 | Status: SHIPPED | OUTPATIENT
Start: 2019-10-09 | End: 2019-11-08

## 2019-10-09 ASSESSMENT — SLIT LAMP EXAM - LIDS: COMMENTS: SCURF

## 2019-10-10 ENCOUNTER — OFFICE VISIT (OUTPATIENT)
Dept: OPHTHALMOLOGY | Age: 44
End: 2019-10-10
Payer: MEDICARE

## 2019-10-10 DIAGNOSIS — H18.832 RECURRENT CORNEAL EROSION, LEFT: Primary | ICD-10-CM

## 2019-10-10 PROCEDURE — 1036F TOBACCO NON-USER: CPT | Performed by: OPHTHALMOLOGY

## 2019-10-10 PROCEDURE — 99213 OFFICE O/P EST LOW 20 MIN: CPT | Performed by: OPHTHALMOLOGY

## 2019-10-10 PROCEDURE — G8417 CALC BMI ABV UP PARAM F/U: HCPCS | Performed by: OPHTHALMOLOGY

## 2019-10-10 PROCEDURE — G8484 FLU IMMUNIZE NO ADMIN: HCPCS | Performed by: OPHTHALMOLOGY

## 2019-10-10 PROCEDURE — G8427 DOCREV CUR MEDS BY ELIG CLIN: HCPCS | Performed by: OPHTHALMOLOGY

## 2019-10-10 ASSESSMENT — TONOMETRY
IOP_METHOD: PALPATION
OS_IOP_MMHG: SOFT

## 2019-10-10 ASSESSMENT — SLIT LAMP EXAM - LIDS: COMMENTS: SCURF

## 2019-10-10 ASSESSMENT — VISUAL ACUITY
OS_SC: 20/400
METHOD: SNELLEN - LINEAR
OS_PH_SC: 20/400
OD_SC: 20/30
OD_PH_SC: 20/30

## 2019-10-11 ENCOUNTER — OFFICE VISIT (OUTPATIENT)
Dept: OPHTHALMOLOGY | Age: 44
End: 2019-10-11
Payer: MEDICARE

## 2019-10-11 ENCOUNTER — OFFICE VISIT (OUTPATIENT)
Dept: FAMILY MEDICINE CLINIC | Age: 44
End: 2019-10-11
Payer: MEDICARE

## 2019-10-11 VITALS
OXYGEN SATURATION: 98 % | SYSTOLIC BLOOD PRESSURE: 138 MMHG | HEART RATE: 84 BPM | BODY MASS INDEX: 36.54 KG/M2 | DIASTOLIC BLOOD PRESSURE: 68 MMHG | WEIGHT: 261 LBS | HEIGHT: 71 IN

## 2019-10-11 DIAGNOSIS — J44.9 CHRONIC OBSTRUCTIVE PULMONARY DISEASE, UNSPECIFIED COPD TYPE (HCC): ICD-10-CM

## 2019-10-11 DIAGNOSIS — G89.29 CHRONIC NECK PAIN: ICD-10-CM

## 2019-10-11 DIAGNOSIS — G43.909 MIGRAINE WITHOUT STATUS MIGRAINOSUS, NOT INTRACTABLE, UNSPECIFIED MIGRAINE TYPE: ICD-10-CM

## 2019-10-11 DIAGNOSIS — H18.832 RECURRENT CORNEAL EROSION, LEFT: Primary | ICD-10-CM

## 2019-10-11 DIAGNOSIS — M54.2 CHRONIC NECK PAIN: ICD-10-CM

## 2019-10-11 DIAGNOSIS — F41.9 ANXIETY: Primary | ICD-10-CM

## 2019-10-11 DIAGNOSIS — E78.1 HYPERTRIGLYCERIDEMIA: ICD-10-CM

## 2019-10-11 DIAGNOSIS — F51.01 PRIMARY INSOMNIA: ICD-10-CM

## 2019-10-11 DIAGNOSIS — F31.32 BIPOLAR AFFECTIVE DISORDER, CURRENTLY DEPRESSED, MODERATE (HCC): ICD-10-CM

## 2019-10-11 DIAGNOSIS — S05.02XA CORNEAL ABRASION, LEFT, INITIAL ENCOUNTER: ICD-10-CM

## 2019-10-11 DIAGNOSIS — M25.562 ACUTE PAIN OF LEFT KNEE: ICD-10-CM

## 2019-10-11 PROCEDURE — G8484 FLU IMMUNIZE NO ADMIN: HCPCS | Performed by: OPHTHALMOLOGY

## 2019-10-11 PROCEDURE — G8484 FLU IMMUNIZE NO ADMIN: HCPCS | Performed by: FAMILY MEDICINE

## 2019-10-11 PROCEDURE — G8417 CALC BMI ABV UP PARAM F/U: HCPCS | Performed by: FAMILY MEDICINE

## 2019-10-11 PROCEDURE — 99214 OFFICE O/P EST MOD 30 MIN: CPT | Performed by: FAMILY MEDICINE

## 2019-10-11 PROCEDURE — 1036F TOBACCO NON-USER: CPT | Performed by: OPHTHALMOLOGY

## 2019-10-11 PROCEDURE — G8926 SPIRO NO PERF OR DOC: HCPCS | Performed by: FAMILY MEDICINE

## 2019-10-11 PROCEDURE — 3023F SPIROM DOC REV: CPT | Performed by: FAMILY MEDICINE

## 2019-10-11 PROCEDURE — 1036F TOBACCO NON-USER: CPT | Performed by: FAMILY MEDICINE

## 2019-10-11 PROCEDURE — 99213 OFFICE O/P EST LOW 20 MIN: CPT | Performed by: OPHTHALMOLOGY

## 2019-10-11 PROCEDURE — G8427 DOCREV CUR MEDS BY ELIG CLIN: HCPCS | Performed by: OPHTHALMOLOGY

## 2019-10-11 PROCEDURE — 92071 CONTACT LENS FITTING FOR TX: CPT | Performed by: OPHTHALMOLOGY

## 2019-10-11 PROCEDURE — G8417 CALC BMI ABV UP PARAM F/U: HCPCS | Performed by: OPHTHALMOLOGY

## 2019-10-11 PROCEDURE — G8427 DOCREV CUR MEDS BY ELIG CLIN: HCPCS | Performed by: FAMILY MEDICINE

## 2019-10-11 RX ORDER — LORATADINE 10 MG/1
10 TABLET ORAL DAILY
Qty: 90 TABLET | Refills: 3 | Status: SHIPPED | OUTPATIENT
Start: 2019-10-11 | End: 2020-11-09 | Stop reason: SDUPTHER

## 2019-10-11 RX ORDER — FENOFIBRATE 160 MG/1
160 TABLET ORAL DAILY
Qty: 90 TABLET | Refills: 1 | Status: CANCELLED | OUTPATIENT
Start: 2019-10-11

## 2019-10-11 RX ORDER — ORPHENADRINE CITRATE 100 MG/1
100 TABLET, EXTENDED RELEASE ORAL NIGHTLY PRN
Qty: 90 TABLET | Refills: 1 | Status: SHIPPED | OUTPATIENT
Start: 2019-10-11 | End: 2020-02-17

## 2019-10-11 RX ORDER — BUSPIRONE HYDROCHLORIDE 15 MG/1
15 TABLET ORAL 2 TIMES DAILY
Qty: 60 TABLET | Refills: 1 | Status: SHIPPED | OUTPATIENT
Start: 2019-10-11 | End: 2019-12-23

## 2019-10-11 RX ORDER — VENLAFAXINE HYDROCHLORIDE 150 MG/1
CAPSULE, EXTENDED RELEASE ORAL
Qty: 90 CAPSULE | Refills: 3 | Status: SHIPPED | OUTPATIENT
Start: 2019-10-11 | End: 2020-11-02

## 2019-10-11 ASSESSMENT — ENCOUNTER SYMPTOMS
BLOOD IN STOOL: 0
SHORTNESS OF BREATH: 1

## 2019-10-11 ASSESSMENT — VISUAL ACUITY
OD_PH_SC: 20/25
OS_SC: 20/70
OS_PH_SC: 20/60
OD_SC: 20/25
METHOD: SNELLEN - LINEAR

## 2019-10-11 ASSESSMENT — SLIT LAMP EXAM - LIDS: COMMENTS: SCURF

## 2019-10-14 ENCOUNTER — OFFICE VISIT (OUTPATIENT)
Dept: OPHTHALMOLOGY | Age: 44
End: 2019-10-14
Payer: MEDICARE

## 2019-10-14 DIAGNOSIS — H18.832 RECURRENT CORNEAL EROSION, LEFT: Primary | ICD-10-CM

## 2019-10-14 PROCEDURE — G8484 FLU IMMUNIZE NO ADMIN: HCPCS | Performed by: OPHTHALMOLOGY

## 2019-10-14 PROCEDURE — G8417 CALC BMI ABV UP PARAM F/U: HCPCS | Performed by: OPHTHALMOLOGY

## 2019-10-14 PROCEDURE — 1036F TOBACCO NON-USER: CPT | Performed by: OPHTHALMOLOGY

## 2019-10-14 PROCEDURE — 99213 OFFICE O/P EST LOW 20 MIN: CPT | Performed by: OPHTHALMOLOGY

## 2019-10-14 PROCEDURE — G8427 DOCREV CUR MEDS BY ELIG CLIN: HCPCS | Performed by: OPHTHALMOLOGY

## 2019-10-14 ASSESSMENT — SLIT LAMP EXAM - LIDS: COMMENTS: SCURF

## 2019-10-14 ASSESSMENT — VISUAL ACUITY
METHOD: SNELLEN - LINEAR
OD_SC: 20/25
OS_PH_SC: 20/70
OS_SC: 20/200
OD_PH_SC: 20/25

## 2019-11-04 ENCOUNTER — PATIENT MESSAGE (OUTPATIENT)
Dept: FAMILY MEDICINE CLINIC | Age: 44
End: 2019-11-04

## 2019-11-04 NOTE — TELEPHONE ENCOUNTER
From: Maria G Dillon  To: Maci López DO  Sent: 11/4/2019 2:10 PM EST  Subject: Visit Follow-Up Question    My insurance will pay for a knee brace from your office as long as you give me a scrip for it and then hand it to me

## 2019-11-11 ENCOUNTER — HOSPITAL ENCOUNTER (OUTPATIENT)
Dept: LAB | Age: 44
Discharge: HOME | End: 2019-11-11
Payer: MEDICARE

## 2019-11-11 ENCOUNTER — OFFICE VISIT (OUTPATIENT)
Dept: NEUROLOGY | Age: 44
End: 2019-11-11
Payer: MEDICARE

## 2019-11-11 VITALS
BODY MASS INDEX: 38.81 KG/M2 | HEIGHT: 71 IN | WEIGHT: 277.2 LBS | DIASTOLIC BLOOD PRESSURE: 68 MMHG | SYSTOLIC BLOOD PRESSURE: 122 MMHG | HEART RATE: 92 BPM | RESPIRATION RATE: 16 BRPM

## 2019-11-11 DIAGNOSIS — G44.221 CHRONIC TENSION-TYPE HEADACHE, INTRACTABLE: ICD-10-CM

## 2019-11-11 DIAGNOSIS — E53.8 B12 DEFICIENCY: ICD-10-CM

## 2019-11-11 DIAGNOSIS — F41.9 ANXIETY: ICD-10-CM

## 2019-11-11 DIAGNOSIS — E78.1 HYPERTRIGLYCERIDEMIA: ICD-10-CM

## 2019-11-11 DIAGNOSIS — G43.009 MIGRAINE WITHOUT AURA AND WITHOUT STATUS MIGRAINOSUS, NOT INTRACTABLE: Primary | ICD-10-CM

## 2019-11-11 DIAGNOSIS — R41.3 MEMORY PROBLEM: ICD-10-CM

## 2019-11-11 DIAGNOSIS — G89.29 CHRONIC NECK PAIN: ICD-10-CM

## 2019-11-11 DIAGNOSIS — F51.01 PRIMARY INSOMNIA: ICD-10-CM

## 2019-11-11 DIAGNOSIS — M54.50 CHRONIC BILATERAL LOW BACK PAIN WITHOUT SCIATICA: ICD-10-CM

## 2019-11-11 DIAGNOSIS — F31.32 BIPOLAR AFFECTIVE DISORDER, CURRENTLY DEPRESSED, MODERATE (HCC): ICD-10-CM

## 2019-11-11 DIAGNOSIS — M54.2 CHRONIC NECK PAIN: ICD-10-CM

## 2019-11-11 DIAGNOSIS — F32.0 CURRENT MILD EPISODE OF MAJOR DEPRESSIVE DISORDER, UNSPECIFIED WHETHER RECURRENT (HCC): ICD-10-CM

## 2019-11-11 DIAGNOSIS — G43.009 MIGRAINE WITHOUT AURA AND WITHOUT STATUS MIGRAINOSUS, NOT INTRACTABLE: ICD-10-CM

## 2019-11-11 DIAGNOSIS — Z13.1 SCREENING FOR DIABETES MELLITUS: ICD-10-CM

## 2019-11-11 DIAGNOSIS — E53.8 FOLIC ACID DEFICIENCY: ICD-10-CM

## 2019-11-11 DIAGNOSIS — G89.29 CHRONIC BILATERAL LOW BACK PAIN WITHOUT SCIATICA: ICD-10-CM

## 2019-11-11 DIAGNOSIS — F31.0 BIPOLAR AFFECTIVE DISORDER, CURRENT EPISODE HYPOMANIC (HCC): ICD-10-CM

## 2019-11-11 LAB
ALBUMIN SERPL-MCNC: 4.7 G/DL (ref 3.5–5.2)
ALBUMIN/GLOBULIN RATIO: 1.7 (ref 1–2.5)
ALP BLD-CCNC: 80 U/L (ref 40–129)
ALT SERPL-CCNC: 64 U/L (ref 5–41)
ANION GAP SERPL CALCULATED.3IONS-SCNC: 11 MMOL/L (ref 9–17)
AST SERPL-CCNC: 40 U/L
BILIRUB SERPL-MCNC: 0.35 MG/DL (ref 0.3–1.2)
BUN BLDV-MCNC: 10 MG/DL (ref 6–20)
BUN/CREAT BLD: 11 (ref 9–20)
CALCIUM SERPL-MCNC: 9.7 MG/DL (ref 8.6–10.4)
CHLORIDE BLD-SCNC: 100 MMOL/L (ref 98–107)
CHOLESTEROL/HDL RATIO: 7.3
CHOLESTEROL: 205 MG/DL
CO2: 28 MMOL/L (ref 20–31)
CREAT SERPL-MCNC: 0.94 MG/DL (ref 0.7–1.2)
GFR AFRICAN AMERICAN: >60 ML/MIN
GFR NON-AFRICAN AMERICAN: >60 ML/MIN
GFR SERPL CREATININE-BSD FRML MDRD: ABNORMAL ML/MIN/{1.73_M2}
GFR SERPL CREATININE-BSD FRML MDRD: ABNORMAL ML/MIN/{1.73_M2}
GLUCOSE BLD-MCNC: 100 MG/DL (ref 70–99)
HDLC SERPL-MCNC: 28 MG/DL
LDL CHOLESTEROL: 139 MG/DL (ref 0–130)
POTASSIUM SERPL-SCNC: 4.1 MMOL/L (ref 3.7–5.3)
SODIUM BLD-SCNC: 139 MMOL/L (ref 135–144)
TOTAL PROTEIN: 7.4 G/DL (ref 6.4–8.3)
TRIGL SERPL-MCNC: 190 MG/DL
VALPROIC ACID LEVEL: 81 UG/ML (ref 50–125)
VALPROIC DATE LAST DOSE: NORMAL
VALPROIC DOSE AMOUNT: NORMAL
VALPROIC TIME LAST DOSE: NORMAL
VLDLC SERPL CALC-MCNC: ABNORMAL MG/DL (ref 1–30)

## 2019-11-11 PROCEDURE — 82746 ASSAY OF FOLIC ACID SERUM: CPT

## 2019-11-11 PROCEDURE — G8484 FLU IMMUNIZE NO ADMIN: HCPCS | Performed by: PSYCHIATRY & NEUROLOGY

## 2019-11-11 PROCEDURE — 80164 ASSAY DIPROPYLACETIC ACD TOT: CPT

## 2019-11-11 PROCEDURE — 36415 COLL VENOUS BLD VENIPUNCTURE: CPT

## 2019-11-11 PROCEDURE — 80061 LIPID PANEL: CPT

## 2019-11-11 PROCEDURE — 1036F TOBACCO NON-USER: CPT | Performed by: PSYCHIATRY & NEUROLOGY

## 2019-11-11 PROCEDURE — 80053 COMPREHEN METABOLIC PANEL: CPT

## 2019-11-11 PROCEDURE — 99215 OFFICE O/P EST HI 40 MIN: CPT | Performed by: PSYCHIATRY & NEUROLOGY

## 2019-11-11 PROCEDURE — G8427 DOCREV CUR MEDS BY ELIG CLIN: HCPCS | Performed by: PSYCHIATRY & NEUROLOGY

## 2019-11-11 PROCEDURE — G8417 CALC BMI ABV UP PARAM F/U: HCPCS | Performed by: PSYCHIATRY & NEUROLOGY

## 2019-11-11 PROCEDURE — 82607 VITAMIN B-12: CPT

## 2019-11-11 RX ORDER — DIVALPROEX SODIUM 500 MG/1
TABLET, EXTENDED RELEASE ORAL
Qty: 60 TABLET | Refills: 5 | Status: SHIPPED | OUTPATIENT
Start: 2019-11-11 | End: 2019-12-25 | Stop reason: SDUPTHER

## 2019-11-11 ASSESSMENT — ENCOUNTER SYMPTOMS
VISUAL CHANGE: 0
PHOTOPHOBIA: 1
COUGH: 0
COLOR CHANGE: 0
WHEEZING: 0
EYE WATERING: 0
BOWEL INCONTINENCE: 0
FACIAL SWELLING: 0
APNEA: 0
SWOLLEN GLANDS: 0
BACK PAIN: 1
VOMITING: 1
BLURRED VISION: 0
EYE REDNESS: 0
HEARTBURN: 0
TROUBLE SWALLOWING: 0
RHINORRHEA: 0
VOICE CHANGE: 0
SORE THROAT: 0
BLOOD IN STOOL: 0
DIARRHEA: 0
FACIAL SWEATING: 0
CHOKING: 0
ABDOMINAL PAIN: 0
CONSTIPATION: 0
NAUSEA: 1
EYE DISCHARGE: 0
CHEST TIGHTNESS: 0
EYE PAIN: 0
SINUS PRESSURE: 0
SHORTNESS OF BREATH: 0
ABDOMINAL DISTENTION: 0
SCALP TENDERNESS: 0
EYE ITCHING: 0

## 2019-11-12 LAB
FOLATE: 16.8 NG/ML
VITAMIN B-12: >2000 PG/ML (ref 232–1245)

## 2019-12-21 DIAGNOSIS — F41.9 ANXIETY: ICD-10-CM

## 2019-12-23 DIAGNOSIS — F41.9 ANXIETY: ICD-10-CM

## 2019-12-23 RX ORDER — BUSPIRONE HYDROCHLORIDE 15 MG/1
15 TABLET ORAL 2 TIMES DAILY
Qty: 60 TABLET | Refills: 5 | Status: CANCELLED | OUTPATIENT
Start: 2019-12-23

## 2019-12-24 RX ORDER — BUSPIRONE HYDROCHLORIDE 15 MG/1
TABLET ORAL
Qty: 60 TABLET | Refills: 1 | Status: SHIPPED | OUTPATIENT
Start: 2019-12-24 | End: 2020-02-20 | Stop reason: SDUPTHER

## 2020-01-03 RX ORDER — DIVALPROEX SODIUM 500 MG/1
TABLET, EXTENDED RELEASE ORAL
Qty: 60 TABLET | Refills: 5 | Status: SHIPPED | OUTPATIENT
Start: 2020-01-03 | End: 2020-02-25 | Stop reason: SDUPTHER

## 2020-01-15 ENCOUNTER — TELEPHONE (OUTPATIENT)
Dept: FAMILY MEDICINE CLINIC | Age: 45
End: 2020-01-15

## 2020-01-15 NOTE — TELEPHONE ENCOUNTER
Pt would like to see about getting a script for knee braces. Pts knees give out when he gets up. [facility].  [phone]

## 2020-01-20 RX ORDER — SUMATRIPTAN 100 MG/1
100 TABLET, FILM COATED ORAL
Qty: 9 TABLET | Refills: 0 | Status: SHIPPED | OUTPATIENT
Start: 2020-01-20 | End: 2020-02-25 | Stop reason: SDUPTHER

## 2020-01-20 RX ORDER — RISPERIDONE 3 MG/1
3 TABLET, FILM COATED ORAL NIGHTLY
Qty: 30 TABLET | Refills: 0 | Status: SHIPPED | OUTPATIENT
Start: 2020-01-20 | End: 2020-05-13 | Stop reason: SDUPTHER

## 2020-01-20 RX ORDER — SUMATRIPTAN 100 MG/1
TABLET, FILM COATED ORAL
Qty: 9 TABLET | Refills: 2 | OUTPATIENT
Start: 2020-01-20

## 2020-01-20 RX ORDER — FENOFIBRATE 160 MG/1
160 TABLET ORAL DAILY
Qty: 30 TABLET | Refills: 0 | Status: SHIPPED | OUTPATIENT
Start: 2020-01-20 | End: 2020-03-12 | Stop reason: SDUPTHER

## 2020-01-20 NOTE — TELEPHONE ENCOUNTER
Last Appt:  10/11/2019  Next Appt:   4/13/2020  Med verified in [address] pt, [facility] notes; will you fill?

## 2020-01-24 ENCOUNTER — HOSPITAL ENCOUNTER (EMERGENCY)
Age: 45
Discharge: HOME | End: 2020-01-24
Attending: EMERGENCY MEDICINE
Payer: MEDICARE

## 2020-01-24 VITALS
WEIGHT: 230 LBS | OXYGEN SATURATION: 97 % | HEIGHT: 71 IN | SYSTOLIC BLOOD PRESSURE: 132 MMHG | BODY MASS INDEX: 32.2 KG/M2 | RESPIRATION RATE: 18 BRPM | HEART RATE: 107 BPM | TEMPERATURE: 99.8 F | DIASTOLIC BLOOD PRESSURE: 81 MMHG

## 2020-01-24 LAB
DIRECT EXAM: ABNORMAL
DIRECT EXAM: ABNORMAL
Lab: ABNORMAL
SPECIMEN DESCRIPTION: ABNORMAL

## 2020-01-24 PROCEDURE — 87804 INFLUENZA ASSAY W/OPTIC: CPT

## 2020-01-24 PROCEDURE — 99283 EMERGENCY DEPT VISIT LOW MDM: CPT

## 2020-01-24 RX ORDER — OSELTAMIVIR PHOSPHATE 75 MG/1
75 CAPSULE ORAL 2 TIMES DAILY
Qty: 10 CAPSULE | Refills: 1 | Status: SHIPPED | OUTPATIENT
Start: 2020-01-24 | End: 2020-01-29

## 2020-01-24 NOTE — ED PROVIDER NOTES
[facility] ED  [address]  [place] [address]  Phone: [phone]    Pt Name: Jada Tamayo  MRN: [id number]  [place] 1975  Date of evaluation: 1/24/2020      CHIEF COMPLAINT       Chief Complaint   Patient presents with    Cough    Pharyngitis    Generalized Body Aches         HISTORY OF PRESENT ILLNESS     Jada Tamayo is a 53 y.o. male who presents cough syrup throat and generalized body aches. His been no shortness of breath or wheezing. He has a history of COPD. He has not smoked recently. His heart rate is 107 but other vital signs are normal upon admission. He is brought in by a relative. Denies any HEENT complaints. There is no evidence of meningitis or stiff neck. He has no neurologic deficits. REVIEW OF SYSTEMS         REVIEW OF SYSTEMS    Constitutional:  Denies fever, chills, or weakness   Eyes:  Denies vision changes  HEENT:  As above  Respiratory:  As above  Cardiovascular:  Denies chest pain  GI:  Denies abdominal pain, nausea, vomiting, or diarrhea   Musculoskeletal:  Denies back pain   Skin:  No rash on exposed surfaces   Neurologic:  Normal baseline mentation. No new deficits. Lymphatic:   No nodes or infection  Psychiatric:  No psychosis. Alert and interacting normally. Other ROS negative except as noted above. PAST MEDICAL HISTORY    has a past medical history of Allergic rhinitis, Bipolar disorder ([address] Depression, Headache(784.0), Osteoarthritis, TMJ (dislocation of temporomandibular joint), and Torticollis. SURGICAL HISTORY      has a past surgical history that includes Tonsillectomy.     CURRENT MEDICATIONS       Previous Medications    ALBUTEROL (PROVENTIL) (5 MG/ML) 0.5% NEBULIZER SOLUTION    Take 0.5 mLs by nebulization every 6 hours as needed for Wheezing    ALBUTEROL SULFATE HFA (VENTOLIN HFA) 108 (90 BASE) MCG/ACT INHALER    Inhale 1-2 puffs into the lungs every 6 hours as needed for Wheezing    BUSPIRONE (BUSPAR) 15 MG height is 5' 11\" (1.803 m) and weight is 230 lb (104.3 kg). His tympanic temperature is 99.8 °F (37.7 °C). His blood pressure is 132/81 and his pulse is 107. His respiration is 18 and oxygen saturation is 97%. Constitutional: The patient is alert and well-developed, vital signs as noted. Psychiatric: Oriented to time, place and person, affect is appropriate for age. Eyes: Pupils equal and reactive to light. EOMI. Ears, nose, and throat: Oropharynx clear  Neck: No masses, trachea midline, and no thyromegaly. Respiratory: Clear to auscultation, full aeration throughout all fields. Cardiovascular: No murmurs, heart sounds normal, no thrills. Gastrointestinal: No masses, no hepatosplenomegaly, bowel sounds positive. No tenderness. Skin: No rashes or lesions on exposed surfaces, good skin turgor. Neurological: Deep tendon reflexes 2+, sensation intact bilaterally, no focal neurological findings. Extremities: Good range of motion, no edema. DIFFERENTIAL DIAGNOSIS/ MEDICAL DECISION MAKING:     Tamiflu as directed    No evidence of sepsis or pneumonia at this time    Follow Exit Care instructions closely. I have reviewed the disposition diagnosis with the patient and or their family/guardian. I have answered their questions and given discharge instructions. They voiced understanding of these instructions and did not have any further questions or complaints. DIAGNOSTIC RESULTS     RADIOLOGY:   Non-plain film images such as CT, Ultrasound and MRI are read by the radiologist. [place] radiographic images are visualized and preliminarily interpreted by the emergency physician with the below findings:  No orders to display       LABS:  Results for orders placed or performed during the hospital encounter of 01/24/20   Rapid influenza A/B antigens   Result Value Ref Range    Specimen Description . NASOPHARYNGEAL SWAB     Special Requests NOT REPORTED     Direct Exam POSITIVE for Influenza A Antigen (A)

## 2020-01-27 ENCOUNTER — PATIENT MESSAGE (OUTPATIENT)
Dept: FAMILY MEDICINE CLINIC | Age: 45
End: 2020-01-27

## 2020-01-27 ENCOUNTER — TELEPHONE (OUTPATIENT)
Dept: NEUROLOGY | Age: 45
End: 2020-01-27

## 2020-01-27 ENCOUNTER — CARE COORDINATION (OUTPATIENT)
Dept: CARE COORDINATION | Age: 45
End: 2020-01-27

## 2020-01-27 NOTE — CARE COORDINATION
Ambulatory Care Coordination ED Follow up Call       Reason for ED Visit:  Influenza with respiratory manifestation other than pneumonia   Care Management Risk Score: CMRS 5    Patient was called to follow up with most recent ER visit. There was no answer. A message was left on voicemail to have patient call back regarding ER visit. Office number given [phone].     FU appts/Provider:    Future Appointments   Date Time Provider Haley Corado   2/21/2020  2:00 PM Paul Walton MD [facility]   4/13/2020 10:20 AM DO JUAN Gonzalez [facility]   5/14/2020  4:61 PM [facility]       Health Maintenance Due   Topic Date Due    Annual Wellness Visit (AWV)  05/29/2019

## 2020-01-28 RX ORDER — IBUPROFEN 600 MG/1
600 TABLET ORAL EVERY 8 HOURS PRN
Qty: 90 TABLET | Refills: 3 | Status: SHIPPED | OUTPATIENT
Start: 2020-01-28 | End: 2021-01-31 | Stop reason: SDUPTHER

## 2020-02-03 ENCOUNTER — APPOINTMENT (OUTPATIENT)
Dept: GENERAL RADIOLOGY | Age: 45
End: 2020-02-03
Payer: MEDICARE

## 2020-02-03 ENCOUNTER — HOSPITAL ENCOUNTER (EMERGENCY)
Age: 45
Discharge: HOME | End: 2020-02-03
Attending: EMERGENCY MEDICINE
Payer: MEDICARE

## 2020-02-03 VITALS
DIASTOLIC BLOOD PRESSURE: 74 MMHG | TEMPERATURE: 98.7 F | BODY MASS INDEX: 32.08 KG/M2 | SYSTOLIC BLOOD PRESSURE: 136 MMHG | RESPIRATION RATE: 20 BRPM | OXYGEN SATURATION: 94 % | HEART RATE: 91 BPM | WEIGHT: 230 LBS

## 2020-02-03 PROCEDURE — 6370000000 HC RX 637 (ALT 250 FOR IP): Performed by: EMERGENCY MEDICINE

## 2020-02-03 PROCEDURE — 99284 EMERGENCY DEPT VISIT MOD MDM: CPT

## 2020-02-03 PROCEDURE — 6360000002 HC RX W HCPCS: Performed by: EMERGENCY MEDICINE

## 2020-02-03 PROCEDURE — 71046 X-RAY EXAM CHEST 2 VIEWS: CPT

## 2020-02-03 PROCEDURE — 94640 AIRWAY INHALATION TREATMENT: CPT

## 2020-02-03 RX ORDER — PREDNISONE 20 MG/1
60 TABLET ORAL ONCE
Status: COMPLETED | OUTPATIENT
Start: 2020-02-03 | End: 2020-02-03

## 2020-02-03 RX ORDER — PREDNISONE 10 MG/1
TABLET ORAL
Qty: 20 TABLET | Refills: 0 | Status: SHIPPED | OUTPATIENT
Start: 2020-02-03 | End: 2020-02-13

## 2020-02-03 RX ORDER — ALBUTEROL SULFATE 2.5 MG/3ML
2.5 SOLUTION RESPIRATORY (INHALATION) ONCE
Status: COMPLETED | OUTPATIENT
Start: 2020-02-03 | End: 2020-02-03

## 2020-02-03 RX ADMIN — PREDNISONE 60 MG: 20 TABLET ORAL at 22:18

## 2020-02-03 RX ADMIN — ALBUTEROL SULFATE 2.5 MG: 2.5 SOLUTION RESPIRATORY (INHALATION) at 21:35

## 2020-02-03 ASSESSMENT — ENCOUNTER SYMPTOMS
WHEEZING: 1
VOMITING: 0
NAUSEA: 0
COUGH: 1
SORE THROAT: 0
ABDOMINAL PAIN: 0
DIARRHEA: 0
SHORTNESS OF BREATH: 1
CHOKING: 0

## 2020-02-04 ENCOUNTER — CARE COORDINATION (OUTPATIENT)
Dept: CARE COORDINATION | Age: 45
End: 2020-02-04

## 2020-02-04 NOTE — CARE COORDINATION
Ambulatory Care Coordination ED Follow up Call       Reason for ED Visit:  COPD exaserbation  Care Management Risk Score: CMRS 5  Patient was called to follow up with most recent ER visit. There was no answer. A message was left on voicemail to have patient call back regarding ER visit. Office number given [phone].       FU appts/Provider:    Future Appointments   Date Time Provider Haley Corado   2/21/2020  2:00 PM MD Jake Jaimes [facility]   4/13/2020 10:20 AM Heyward Phoenix, DO [facility]   5/14/2020  0:91 PM [facility]       Health Maintenance Due   Topic Date Due    Annual Wellness Visit (AWV)  05/29/2019

## 2020-02-04 NOTE — ED PROVIDER NOTES
MOUTH TWICE A DAY    COMPRESSION STOCKINGS MISC    by Does not apply route Wear on bilateral lower extremities daily as needed for edema. DIVALPROEX (DEPAKOTE ER) 500 MG EXTENDED RELEASE TABLET    THEN   ONE  TABLET  TWICE  DAILY    FENOFIBRATE 160 MG TABLET    Take 1 tablet by mouth daily    FLUTICASONE (FLONASE) 50 MCG/ACT NASAL SPRAY    2 sprays by Nasal route daily    IBUPROFEN (ADVIL;MOTRIN) 600 MG TABLET    Take 1 tablet by mouth every 8 hours as needed for Pain    LORATADINE (CLARITIN) 10 MG TABLET    Take 1 tablet by mouth daily    ORPHENADRINE (NORFLEX) 100 MG EXTENDED RELEASE TABLET    Take 1 tablet by mouth nightly as needed for Muscle spasms    RESPIRATORY THERAPY SUPPLIES (NEBULIZER COMPRESSOR) KIT    1 kit by Does not apply route once for 1 dose    RESPIRATORY THERAPY SUPPLIES (NEBULIZER/TUBING/MOUTHPIECE) KIT    1 kit by Does not apply route once for 1 dose    RISPERIDONE (RISPERDAL) 3 MG TABLET    Take 1 tablet by mouth nightly    SUMATRIPTAN (IMITREX) 100 MG TABLET    Take 1 tablet by mouth once as needed for Migraine (May repeat after 2 hours x 1 more dose, if needed; max dose 200 mg per 24 hours.)    TIOTROPIUM BROMIDE-OLODATEROL (STIOLTO RESPIMAT) 2.5-2.5 MCG/ACT AERS    Inhale 2 puffs into the lungs daily    VENLAFAXINE (EFFEXOR XR) 150 MG EXTENDED RELEASE CAPSULE    TAKE ONE CAPSULE BY MOUTH ONCE DAILY       ALLERGIES     is allergic to naproxen. FAMILY HISTORY     He indicated that the status of his mother is unknown. He indicated that the status of his father is unknown. He indicated that the status of his neg hx is unknown.     family history includes High Blood Pressure in his father and mother; Stroke in his father and mother. SOCIAL HISTORY      reports that he quit smoking about 2 years ago. His smoking use included cigarettes. He started smoking about 27 years ago. He has a 25.00 pack-year smoking history.  He has never used smokeless tobacco. He reports that he does not drink alcohol or use drugs. PHYSICAL EXAM     INITIAL VITALS:  weight is 230 lb (104.3 kg). His tympanic temperature is 98.7 °F (37.1 °C). His blood pressure is 136/74 and his pulse is 91. His respiration is 20 and oxygen saturation is 94%. Physical Exam   Constitutional: He appears well-developed and well-nourished. No distress. HENT:   Head: Normocephalic and atraumatic. Right Ear: Tympanic membrane, external ear and ear canal normal. Tympanic membrane is not erythematous. Left Ear: Tympanic membrane, external ear and ear canal normal. Tympanic membrane is not erythematous. Nose: Nose normal.   Mouth/Throat: Uvula is midline, oropharynx is clear and moist and mucous membranes are normal. No oropharyngeal exudate, posterior oropharyngeal edema or tonsillar abscesses. Eyes: Lids are normal. Right eye exhibits no discharge. Left eye exhibits no discharge. Neck: No tracheal deviation present. Cardiovascular: Normal rate and regular rhythm. Pulmonary/Chest: Effort normal. No accessory muscle usage. No tachypnea. No respiratory distress. He has no decreased breath sounds. He has wheezes. He has no rhonchi. Scattered wheezes noted. Better after albuterol. No respiratory distress. Otherwise benign exam.   Abdominal: Soft. There is no abdominal tenderness. Neurological: He is alert. GCS eye subscore is 4. GCS verbal subscore is 5. GCS motor subscore is 6. Skin: Skin is warm and dry. Psychiatric: He has a normal mood and affect. His behavior is normal.         DIFFERENTIAL DIAGNOSIS/ MDM:     Plan at this time will be to give albuterol and check a chest x-ray. Clinically he appears well and otherwise nontoxic. We will also start steroids.     DIAGNOSTIC RESULTS     EKG: All EKG's are interpreted by the Emergency Department Physician who either signs or Co-signs this chart in the absence of a cardiologist.        RADIOLOGY:   I directly visualized the following  images and reviewed the radiologist interpretations:  XR CHEST STANDARD (2 VW)   Final Result   No evidence of acute cardiopulmonary process. Xr Chest Standard (2 Vw)    Result Date: 2/3/2020  EXAMINATION: TWO XRAY VIEWS OF THE CHEST 2/3/2020 9:45 pm COMPARISON: 22 July 2019 HISTORY: ORDERING SYSTEM PROVIDED HISTORY: Cough TECHNOLOGIST PROVIDED HISTORY: Cough Reason for Exam: Cough, shortness of breath, tested positive for influenza A on 1/26 Acuity: Acute Type of Exam: Initial FINDINGS: AP portable view of the chest obtained at 1733 hours demonstrates normal cardiac size. Overlying cardiac monitoring electrodes are noted. Lungs are clear without vascular congestion, consolidation, effusion, or pneumothorax. Osseous and mediastinal structures are unremarkable. No evidence of acute cardiopulmonary process. LABS:  No results found for this visit on 02/03/20. EMERGENCY DEPARTMENT COURSE:     The patient was given the following medications:  Orders Placed This Encounter   Medications    albuterol (PROVENTIL) nebulizer solution 2.5 mg    predniSONE (DELTASONE) 10 MG tablet     Sig: Take 4 tablets by mouth once daily for 5 days     Dispense:  20 tablet     Refill:  0    predniSONE (DELTASONE) tablet 60 mg        Vitals:    Vitals:    02/03/20 2115   BP: 136/74   Pulse: 91   Resp: 20   Temp: 98.7 °F (37.1 °C)   TempSrc: Tympanic   SpO2: 94%   Weight: 230 lb (104.3 kg)     -------------------------  BP: 136/74, Temp: 98.7 °F (37.1 °C), Pulse: 91, Resp: 20      Re-evaluation Notes    Patient doing well on reevaluation. No acute changes. Chest x-ray negative for any signs of secondary pneumonia. He feels better after the albuterol. We will start him on prednisone. Advised to follow-up with his PCP return right away if worsening or for any new or concerning symptoms. He is comfortable with the plan. I have reviewed the disposition diagnosis with the patient and or their family/guardian.   I have answered their questions and given discharge instructions. They voiced understanding of these instructions and did not have any further questions or complaints. CONSULTS:    None    CRITICAL CARE:     None    PROCEDURES:    None    FINAL IMPRESSION      1. COPD exacerbation ([address]    2.  Influenza A          DISPOSITION/PLAN   DISPOSITION Decision To Discharge 02/03/2020 10:20:07 PM      Condition on Disposition    Improved    PATIENT REFERRED TO:  Willow Sharma DO  [address] [facility] [address] [phone]    Schedule an appointment as soon as possible for a visit in 2 days        DISCHARGE MEDICATIONS:  New Prescriptions    PREDNISONE (DELTASONE) 10 MG TABLET    Take 4 tablets by mouth once daily for 5 days       (Please note that portions of this note were completed with a voice recognition program.  Efforts were made to edit the dictations but occasionally words are mis-transcribed.)    Aleyda Prakash DO  Attending Emergency Physician       Aleyda Prakash DO  02/03/20 1870

## 2020-02-17 ENCOUNTER — HOSPITAL ENCOUNTER (EMERGENCY)
Age: 45
Discharge: HOME | End: 2020-02-17
Attending: EMERGENCY MEDICINE
Payer: MEDICARE

## 2020-02-17 VITALS
WEIGHT: 260 LBS | SYSTOLIC BLOOD PRESSURE: 141 MMHG | DIASTOLIC BLOOD PRESSURE: 84 MMHG | HEART RATE: 90 BPM | RESPIRATION RATE: 16 BRPM | TEMPERATURE: 98.6 F | HEIGHT: 71 IN | OXYGEN SATURATION: 95 % | BODY MASS INDEX: 36.4 KG/M2

## 2020-02-17 PROCEDURE — 99282 EMERGENCY DEPT VISIT SF MDM: CPT

## 2020-02-17 RX ORDER — CYCLOBENZAPRINE HCL 10 MG
10 TABLET ORAL NIGHTLY PRN
Qty: 15 TABLET | Refills: 0 | Status: SHIPPED | OUTPATIENT
Start: 2020-02-17 | End: 2020-03-12 | Stop reason: SDUPTHER

## 2020-02-17 ASSESSMENT — ENCOUNTER SYMPTOMS
EYES NEGATIVE: 1
RESPIRATORY NEGATIVE: 1
ALLERGIC/IMMUNOLOGIC NEGATIVE: 1
GASTROINTESTINAL NEGATIVE: 1

## 2020-02-17 ASSESSMENT — PAIN DESCRIPTION - ORIENTATION: ORIENTATION: LEFT

## 2020-02-17 ASSESSMENT — PAIN DESCRIPTION - LOCATION: LOCATION: SHOULDER

## 2020-02-17 ASSESSMENT — PAIN SCALES - GENERAL: PAINLEVEL_OUTOF10: 7

## 2020-02-17 NOTE — ED PROVIDER NOTES
eMERGENCY dEPARTMENT eNCOUnter      Pt Name: Chase Summers  MRN: [id number]  [place] 1975  Date of evaluation: 2/17/2020      CHIEF COMPLAINT       Chief Complaint   Patient presents with    Shoulder Pain     left          HISTORY OF PRESENT ILLNESS    Chase Summers is a 53 y.o. male who presents the emergency department for evaluation of left shoulder pain. Patient states that he had some torticollis and he feels like it is radiating down into his left shoulder. His left shoulder is fully with normal by exam has good range of motion we offered the patient some Toradol which he refused we will go ahead and give him a prescription for Flexeril. REVIEW OF SYSTEMS         Review of Systems   Constitutional: Negative. HENT: Negative. Eyes: Negative. Respiratory: Negative. Cardiovascular: Negative. Gastrointestinal: Negative. Endocrine: Negative. Genitourinary: Negative. Musculoskeletal: Positive for myalgias and neck stiffness. Skin: Negative. Allergic/Immunologic: Negative. Neurological: Negative. Hematological: Negative. Psychiatric/Behavioral: Negative. PAST MEDICAL HISTORY    has a past medical history of Allergic rhinitis, Bipolar disorder ([address] Depression, Headache(784.0), Osteoarthritis, TMJ (dislocation of temporomandibular joint), and Torticollis. SURGICAL HISTORY      has a past surgical history that includes Tonsillectomy.     CURRENT MEDICATIONS       Discharge Medication List as of 2/17/2020  1:38 AM      CONTINUE these medications which have NOT CHANGED    Details   Tiotropium Bromide-Olodaterol (STIOLTO RESPIMAT) 2.5-2.5 MCG/ACT AERS Inhale 2 puffs into the lungs daily, Disp-1 Inhaler, R-3Normal      ibuprofen (ADVIL;MOTRIN) 600 MG tablet Take 1 tablet by mouth every 8 hours as needed for Pain, Disp-90 tablet, R-3Normal      fenofibrate 160 MG tablet Take 1 tablet by mouth daily, Disp-30 tablet, R-0Normal      risperiDONE (RISPERDAL) 3 MG tablet Take 1 tablet by mouth nightly, Disp-30 tablet, R-0Please consider 90 day supplies to promote better adherenceNormal      SUMAtriptan (IMITREX) 100 MG tablet Take 1 tablet by mouth once as needed for Migraine (May repeat after 2 hours x 1 more dose, if needed; max dose 200 mg per 24 hours.), Disp-9 tablet, R-0Normal      divalproex (DEPAKOTE ER) 500 MG extended release tablet THEN   ONE  TABLET  TWICE  DAILY, Disp-60 tablet, R-5Normal      busPIRone (BUSPAR) 15 MG tablet TAKE ONE TABLET BY MOUTH TWICE A DAY, Disp-60 tablet, R-1Normal      loratadine (CLARITIN) 10 MG tablet Take 1 tablet by mouth daily, Disp-90 tablet, R-3Normal      venlafaxine (EFFEXOR XR) 150 MG extended release capsule TAKE ONE CAPSULE BY MOUTH ONCE DAILY, Disp-90 capsule, R-3Please consider 90 day supplies to promote better adherenceNormal      Respiratory Therapy Supplies (NEBULIZER COMPRESSOR) KIT ONCE Starting Mon 7/22/2019, For 1 dose, Disp-1 kit, R-0, Print      Respiratory Therapy Supplies (NEBULIZER/TUBING/MOUTHPIECE) KIT ONCE Starting Mon 7/22/2019, For 1 dose, Disp-1 kit, R-0, Print      albuterol (PROVENTIL) (5 MG/ML) 0.5% nebulizer solution Take 0.5 mLs by nebulization every 6 hours as needed for Wheezing, Disp-21 each, R-0Print      fluticasone (FLONASE) 50 MCG/ACT nasal spray 2 sprays by Nasal route daily, Disp-3 Bottle, R-3Normal      albuterol sulfate HFA (VENTOLIN HFA) 108 (90 Base) MCG/ACT inhaler Inhale 1-2 puffs into the lungs every 6 hours as needed for Wheezing, Disp-3 Inhaler, R-3Please consider 90 day supplies to promote better adherenceNormal      Compression Stockings MISC Starting Fri 12/7/2018, Disp-1 each, R-1, PrintWear on bilateral lower extremities daily as needed for edema. ALLERGIES     is allergic to naproxen. FAMILY HISTORY     He indicated that the status of his mother is unknown. He indicated that the status of his father is unknown.  He indicated that the status of his neg hx is unknown.     family history includes High Blood Pressure in his father and mother; Stroke in his father and mother. SOCIAL HISTORY      reports that he quit smoking about 2 years ago. His smoking use included cigarettes. He started smoking about 27 years ago. He has a 25.00 pack-year smoking history. He has never used smokeless tobacco. He reports that he does not drink alcohol or use drugs. PHYSICAL EXAM     INITIAL VITALS:  height is 5' 11\" (1.803 m) and weight is 260 lb (117.9 kg). His tympanic temperature is 98.6 °F (37 °C). His blood pressure is 141/84 (abnormal) and his pulse is 90. His respiration is 16 and oxygen saturation is 95%. Physical Exam  Vitals signs reviewed. Constitutional:       Appearance: Normal appearance. He is normal weight. HENT:      Head: Normocephalic and atraumatic. Nose: Nose normal.      Mouth/Throat:      Mouth: Mucous membranes are moist.   Eyes:      Extraocular Movements: Extraocular movements intact. Pupils: Pupils are equal, round, and reactive to light. Neck:      Musculoskeletal: Normal range of motion. Muscular tenderness present. Cardiovascular:      Rate and Rhythm: Normal rate and regular rhythm. Pulses: Normal pulses. Heart sounds: Normal heart sounds. Pulmonary:      Effort: Pulmonary effort is normal.      Breath sounds: Normal breath sounds. Abdominal:      General: Abdomen is flat. Bowel sounds are normal.      Palpations: Abdomen is soft. Musculoskeletal: Normal range of motion. Skin:     General: Skin is warm and dry. Capillary Refill: Capillary refill takes 2 to 3 seconds. Neurological:      General: No focal deficit present. Mental Status: He is alert and oriented to person, place, and time.    Psychiatric:         Mood and Affect: Mood normal.           DIFFERENTIAL DIAGNOSIS/ MDM:     Patient comes in complaining of a sore shoulder he wants a refill on his musculoskeletal relaxing medicine which we will give him. He was refusing medication here. DIAGNOSTIC RESULTS     EKG: All EKG's are interpreted by the Emergency Department Physician who either signs or Co-signs this chart in the absence of a cardiologist.        Not indicated unless otherwise documented above    LABS:  No results found for this visit on 02/17/20. Not indicated unless otherwise documented above    RADIOLOGY:   I reviewed the radiologist interpretations:  No orders to display       Not indicated unless otherwise documented above    EMERGENCY DEPARTMENT COURSE:     The patient was given the following medications:  Orders Placed This Encounter   Medications    cyclobenzaprine (FLEXERIL) 10 MG tablet     Sig: Take 1 tablet by mouth nightly as needed for Muscle spasms     Dispense:  15 tablet     Refill:  0        Vitals:    Vitals:    02/17/20 0108   BP: (!) 141/84   Pulse: 90   Resp: 16   Temp: 98.6 °F (37 °C)   TempSrc: Tympanic   SpO2: 95%   Weight: 260 lb (117.9 kg)   Height: 5' 11\" (1.803 m)     -------------------------  BP (!) 141/84   Pulse 90   Temp 98.6 °F (37 °C) (Tympanic)   Resp 16   Ht 5' 11\" (1.803 m)   Wt 260 lb (117.9 kg)   SpO2 95%   BMI 36.26 kg/m²         I have reviewed the disposition diagnosis with the patient and or their family/guardian. I have answered their questions and given discharge instructions. They voiced understanding of these instructions and did not have any further questions or complaints. CRITICAL CARE:    None    CONSULTS:    None    PROCEDURES:    None      OARRS Report if indicated             FINAL IMPRESSION      1. Spasm of muscle    2. Chronic left shoulder pain          DISPOSITION/PLAN   DISPOSITION Decision To Discharge    I have reviewed the disposition diagnosis with the patient and or their family/guardian. I have answered their questions and given discharge instructions.   They voiced understanding of these instructions and did not have any further questions or

## 2020-02-18 ENCOUNTER — PATIENT MESSAGE (OUTPATIENT)
Dept: FAMILY MEDICINE CLINIC | Age: 45
End: 2020-02-18

## 2020-02-18 ENCOUNTER — CARE COORDINATION (OUTPATIENT)
Dept: CARE COORDINATION | Age: 45
End: 2020-02-18

## 2020-02-18 NOTE — CARE COORDINATION
Ambulatory Care Coordination ED Follow up Call       Reason for ED Visit:  Spasm of muscle, chronic left shoulder pain  Care Management Risk Score: CMRS 5    Left message with wife to return call to office at [phone] for ER follow up    FU appts/Provider:    Future Appointments   Date Time Provider Haley Corado   2/21/2020  2:00 PM MD Leticia Gregorio [facility]   2/25/2020  9:34 PM [facility]   4/13/2020 10:20 AM DO JUAN Pereira [facility]   5/14/2020  3:35 PM Alexandria Du MD [facility]       Health Maintenance Due   Topic Date Due    Annual Wellness Visit (AWV)  05/29/2019

## 2020-02-19 NOTE — TELEPHONE ENCOUNTER
From: Miquel Solomon  To: Roberto Del Cid DO  Sent: 2/18/2020 9:12 PM EST  Subject: Non-Urgent Medical Question    I was seen at the ER and was put on cyclobenzaprine again and they don't work, either did the other ones I was on when i went to the ER I need some thing new for a muscle relaxer my shoulder been giving me problems and makes it hard to sleep.     Thank you Dr Dayne Mcbride

## 2020-02-21 ENCOUNTER — OFFICE VISIT (OUTPATIENT)
Dept: OPHTHALMOLOGY | Age: 45
End: 2020-02-21
Payer: MEDICARE

## 2020-02-21 PROCEDURE — 92083 EXTENDED VISUAL FIELD XM: CPT | Performed by: OPHTHALMOLOGY

## 2020-02-21 PROCEDURE — 92014 COMPRE OPH EXAM EST PT 1/>: CPT | Performed by: OPHTHALMOLOGY

## 2020-02-21 PROCEDURE — 92250 FUNDUS PHOTOGRAPHY W/I&R: CPT | Performed by: OPHTHALMOLOGY

## 2020-02-21 ASSESSMENT — ENCOUNTER SYMPTOMS
GASTROINTESTINAL NEGATIVE: 0
RESPIRATORY NEGATIVE: 0

## 2020-02-21 ASSESSMENT — REFRACTION_WEARINGRX
OS_AXIS: 094
SPECS_TYPE: BIFOCALS
OD_CYLINDER: -1.00
OS_ADD: +1.00
OD_SPHERE: -0.25
OD_ADD: +1.00
OS_CYLINDER: -0.25
OD_AXIS: 090
OS_SPHERE: -0.75

## 2020-02-21 ASSESSMENT — TONOMETRY
OD_IOP_MMHG: 20
OS_IOP_MMHG: 18
IOP_METHOD: TONOPEN

## 2020-02-21 ASSESSMENT — CONF VISUAL FIELD
METHOD: COUNTING FINGERS
OS_NORMAL: 1
OD_NORMAL: 1

## 2020-02-21 ASSESSMENT — SLIT LAMP EXAM - LIDS
COMMENTS: SCURF
COMMENTS: MILD BLEPHARITIS. MILD MGD

## 2020-02-21 ASSESSMENT — VISUAL ACUITY
OS_CC: 20/20
METHOD: SNELLEN - LINEAR
CORRECTION_TYPE: GLASSES

## 2020-02-21 NOTE — PROGRESS NOTES
Lucille Harrison is a 53 y.o. male here for a complete eye exam.      Chief Complaint   Patient presents with    3 Month Follow-Up       HPI     Patient is here for a check up from a recurring corneal erosion of the left eye. Patient says its still a little itchy but tries not to touch it with his fingers. Patient isn't using anything to ease the irritation, OTC drops made it worse. No pain, no vision loss          Review of Systems  ROS     Positive for: Musculoskeletal (neck and shoulders left side)    Negative for: Constitutional, Gastrointestinal, Neurological, Genitourinary, Cardiovascular, Respiratory          Main Ophthalmology Exam     External Exam       Right Left    External Normal Normal          Slit Lamp Exam       Right Left    Lids/Lashes Mild Blepharitis. Mild MGD Scurf    Conjunctiva/Sclera Clear and white White and quiet    Cornea ?   Inferior ABMD changes Cluster SPK extending from inferior per below, no dendritic staining, punctate subepithelial scarring central and paracentral, Punctate stain, Haze    Anterior Chamber Deep, no cells, no flare Deep, no cells, no flare    Iris Round and reactive Round and reactive    Lens 1+ Nuclear sclerosis, 1+ Cortical cataract 1+ Nuclear sclerosis, 1+ Cortical cataract    Vitreous Vitreous syneresis Vitreous syneresis          Fundus Exam       Right Left    Disc No edema and no pallor No edema and no pallor    C/D Ratio Vertical 0.25 0.25    C/D Ratio Horizontal 0.25 0.25    Macula Normal architecture Normal architecture    Vessels Normal course and caliber Normal course and caliber    Periphery No breaks, tears, or detachments No breaks, tears, or detachments; faint nevus approximately 5 disc area in size just superior the superior temporal arcade, no subretinal fluid no orange pigment                    Not recorded        Visual Acuity     Visual Acuity (Snellen - Linear)       Right Left    Dist cc 20/20 20/20    Correction:  Glasses into the lungs every 6 hours as needed for Wheezing, Disp: 3 Inhaler, Rfl: 3    Compression Stockings MISC, by Does not apply route Wear on bilateral lower extremities daily as needed for edema., Disp: 1 each, Rfl: 1    SUMAtriptan (IMITREX) 100 MG tablet, Take 1 tablet by mouth once as needed for Migraine (May repeat after 2 hours x 1 more dose, if needed; max dose 200 mg per 24 hours.), Disp: 9 tablet, Rfl: 0    Respiratory Therapy Supplies (NEBULIZER COMPRESSOR) KIT, 1 kit by Does not apply route once for 1 dose, Disp: 1 kit, Rfl: 0    Respiratory Therapy Supplies (NEBULIZER/TUBING/MOUTHPIECE) KIT, 1 kit by Does not apply route once for 1 dose, Disp: 1 kit, Rfl: 0     Allergies   Allergen Reactions    Naproxen      ulcers        IMPRESSION:  1. Recurrent corneal erosion, left    2. Keratoconjunctivitis sicca of both eyes not specified as Sjogren's    3. Combined forms of age-related cataract of both eyes    4. Choroidal nevus, left eye        PLAN:    1. Cornea chronically irritated OS after fingernail injury from his mother-in-law. Patient not tolerating branded tears. Recommend frequent preservative-free tears and trying gel drops. Will consider epithelial debridement versus referral for [address] with cornea if unimproved at follow-up with change in dry eye therapy. There may be an element of ABMD given the subtle appearance in the right eye. 2. Artificial tears per above  3. NVS, observe  4. Baseline photo taken. No suspicious characteristics. No history of personal or family melanoma. Return in about 6 months (around 8/21/2020) for nDFE, JAMES f/u, traumatic abrasion OS.     Electronically signed by Todd Goldman MD on 2/21/2020 at 2:52 PM

## 2020-02-22 RX ORDER — BUSPIRONE HYDROCHLORIDE 15 MG/1
15 TABLET ORAL 2 TIMES DAILY PRN
Qty: 60 TABLET | Refills: 1 | Status: SHIPPED | OUTPATIENT
Start: 2020-02-22 | End: 2020-04-20 | Stop reason: SDUPTHER

## 2020-02-25 ENCOUNTER — OFFICE VISIT (OUTPATIENT)
Dept: NEUROLOGY | Age: 45
End: 2020-02-25
Payer: MEDICARE

## 2020-02-25 ENCOUNTER — HOSPITAL ENCOUNTER (OUTPATIENT)
Dept: LAB | Age: 45
Discharge: HOME | End: 2020-02-25
Payer: MEDICARE

## 2020-02-25 VITALS
DIASTOLIC BLOOD PRESSURE: 68 MMHG | WEIGHT: 275 LBS | SYSTOLIC BLOOD PRESSURE: 122 MMHG | HEART RATE: 88 BPM | RESPIRATION RATE: 20 BRPM | BODY MASS INDEX: 38.5 KG/M2 | HEIGHT: 71 IN

## 2020-02-25 LAB
ANION GAP SERPL CALCULATED.3IONS-SCNC: 13 MMOL/L (ref 9–17)
CHLORIDE BLD-SCNC: 100 MMOL/L (ref 98–107)
CO2: 26 MMOL/L (ref 20–31)
HCT VFR BLD CALC: 39.2 % (ref 40.7–50.3)
HEMOGLOBIN: 13 G/DL (ref 13–17)
MCH RBC QN AUTO: 31.4 PG (ref 25.2–33.5)
MCHC RBC AUTO-ENTMCNC: 33.2 G/DL (ref 25.2–33.5)
MCV RBC AUTO: 94.7 FL (ref 82.6–102.9)
NRBC AUTOMATED: 0 PER 100 WBC
PDW BLD-RTO: 13 % (ref 11.8–14.4)
PLATELET # BLD: 327 K/UL (ref 138–453)
PMV BLD AUTO: 9.8 FL (ref 8.1–13.5)
POTASSIUM SERPL-SCNC: 3.9 MMOL/L (ref 3.7–5.3)
RBC # BLD: 4.14 M/UL (ref 4.21–5.77)
SEDIMENTATION RATE, ERYTHROCYTE: 4 MM (ref 0–15)
SODIUM BLD-SCNC: 139 MMOL/L (ref 135–144)
VALPROIC ACID LEVEL: 102 UG/ML (ref 50–125)
VALPROIC DATE LAST DOSE: NORMAL
VALPROIC DOSE AMOUNT: NORMAL
VALPROIC TIME LAST DOSE: NORMAL
WBC # BLD: 6.9 K/UL (ref 3.5–11.3)

## 2020-02-25 PROCEDURE — 80051 ELECTROLYTE PANEL: CPT

## 2020-02-25 PROCEDURE — 85651 RBC SED RATE NONAUTOMATED: CPT

## 2020-02-25 PROCEDURE — 85027 COMPLETE CBC AUTOMATED: CPT

## 2020-02-25 PROCEDURE — 36415 COLL VENOUS BLD VENIPUNCTURE: CPT

## 2020-02-25 PROCEDURE — G8417 CALC BMI ABV UP PARAM F/U: HCPCS | Performed by: PSYCHIATRY & NEUROLOGY

## 2020-02-25 PROCEDURE — G8484 FLU IMMUNIZE NO ADMIN: HCPCS | Performed by: PSYCHIATRY & NEUROLOGY

## 2020-02-25 PROCEDURE — 99213 OFFICE O/P EST LOW 20 MIN: CPT | Performed by: PSYCHIATRY & NEUROLOGY

## 2020-02-25 PROCEDURE — 80164 ASSAY DIPROPYLACETIC ACD TOT: CPT

## 2020-02-25 PROCEDURE — 99215 OFFICE O/P EST HI 40 MIN: CPT | Performed by: PSYCHIATRY & NEUROLOGY

## 2020-02-25 PROCEDURE — 1036F TOBACCO NON-USER: CPT | Performed by: PSYCHIATRY & NEUROLOGY

## 2020-02-25 PROCEDURE — G8427 DOCREV CUR MEDS BY ELIG CLIN: HCPCS | Performed by: PSYCHIATRY & NEUROLOGY

## 2020-02-25 RX ORDER — DIVALPROEX SODIUM 500 MG/1
TABLET, EXTENDED RELEASE ORAL
Qty: 60 TABLET | Refills: 5 | Status: SHIPPED | OUTPATIENT
Start: 2020-02-25 | End: 2020-08-25 | Stop reason: SDUPTHER

## 2020-02-25 RX ORDER — SUMATRIPTAN 100 MG/1
100 TABLET, FILM COATED ORAL
Qty: 12 TABLET | Refills: 2 | Status: SHIPPED | OUTPATIENT
Start: 2020-02-25 | End: 2020-05-14 | Stop reason: SDUPTHER

## 2020-02-25 ASSESSMENT — ENCOUNTER SYMPTOMS
SWOLLEN GLANDS: 0
FACIAL SWELLING: 0
BACK PAIN: 1
ABDOMINAL DISTENTION: 0
TROUBLE SWALLOWING: 0
CONSTIPATION: 0
SCALP TENDERNESS: 0
EYE ITCHING: 0
FACIAL SWEATING: 0
WHEEZING: 0
BLOOD IN STOOL: 0
EYE PAIN: 0
VISUAL CHANGE: 0
VOMITING: 1
EYE REDNESS: 0
CHEST TIGHTNESS: 0
BOWEL INCONTINENCE: 0
SINUS PRESSURE: 0
BLURRED VISION: 0
DIARRHEA: 0
EYE DISCHARGE: 0
NAUSEA: 1
COLOR CHANGE: 0
PHOTOPHOBIA: 1
EYE WATERING: 0
RHINORRHEA: 0
SHORTNESS OF BREATH: 0
HEARTBURN: 0
CHOKING: 0
ABDOMINAL PAIN: 0
APNEA: 0
VOICE CHANGE: 0
SORE THROAT: 0
COUGH: 0

## 2020-02-25 NOTE — PATIENT INSTRUCTIONS
*   ADEQUATE   FLUID  INTAKE   AND  ELECTROLYTE  BALANCE           * KEEP  DAIRY  OF   THE  NEUROLOGICAL  SYMPTOMS          *  TO  MAINTAIN  REGULAR  SLEEP  WAKE  CYCLES. *   TO  HAVE  ADEQUATE  REST  AND   SLEEP    HOURS.        *    AVOID  USAGE OF   TOBACCO,  EXCESSIVE  ALCOHOL                AND   ILLEGAL   SUBSTANCES,  IF  ANY          *  Maintain   Healthy  Life Style    With   Periodic  Monitoring  Of      Any  Medical  Conditions  Including   Elevated  Blood  Pressure,  Lipid  Profile,     Blood  Sugar levels  And   Heart  Disease. *   Period   Screening  For  Cancers  Involving  Breast,  Colon,   lungs  And  Other  Organs  As  Applicable,  In consultation   With  Your  Primary Care Providers. *  If   There is  Any  Significant  Worsening   Of  Current  Symptoms  And  Or  If    Any additional  New  Neurological  Symptoms                 Or  Significant  Concerns   Should  Call  911 or  Go  To  Emergency  Department  For  Further  Immediate  Evaluation. Patient Education        Migraine Headache: Care Instructions  Your Care Instructions  Migraines are painful, throbbing headaches that often start on one side of the head. They may cause nausea and vomiting and make you sensitive to light, sound, or smell. Without treatment, migraines can last from 4 hours to a few days. Medicines can help prevent migraines or stop them after they have started. Your doctor can help you find which ones work best for you. Follow-up care is a key part of your treatment and safety. Be sure to make and go to all appointments, and call your doctor if you are having problems. It's also a good idea to know your test results and keep a list of the medicines you take. How can you care for yourself at home? · Do not drive if you have taken a prescription pain medicine. · Rest in a quiet, dark room until your headache is gone. Close your eyes, and try to relax or go to sleep. or right after stressful times. Take time to relax before and after you do something that has caused a migraine in the past.  · Try to keep your muscles relaxed by keeping good posture. Check your jaw, face, neck, and shoulder muscles for tension. Try to relax them. When you sit at a desk, change positions often. And make sure to stretch for 30 seconds each hour. · Get plenty of sleep and exercise. · Eat meals on a regular schedule. Avoid foods and drinks that often trigger migraines. These include chocolate, alcohol (especially red wine and port), aspartame, monosodium glutamate (MSG), and some additives found in foods (such as hot dogs, jacobs, cold cuts, aged cheeses, and pickled foods). · Limit caffeine. Don't drink too much coffee, tea, or soda. But don't quit caffeine suddenly. That can also give you migraines. · Do not smoke or allow others to smoke around you. If you need help quitting, talk to your doctor about stop-smoking programs and medicines. These can increase your chances of quitting for good. · If you are taking birth control pills or hormone therapy, talk to your doctor about whether they are triggering your migraines. When should you call for help? Call 911 anytime you think you may need emergency care. For example, call if:    · You have signs of a stroke. These may include:  ? Sudden numbness, paralysis, or weakness in your face, arm, or leg, especially on only one side of your body. ? Sudden vision changes. ? Sudden trouble speaking. ? Sudden confusion or trouble understanding simple statements. ? Sudden problems with walking or balance.   ? A sudden, severe headache that is different from past headaches.    Call your doctor now or seek immediate medical care if:    · You have new or worse nausea and vomiting.     · You have a new or higher fever.     · Your headache gets much worse.    Watch closely for changes in your health, and be sure to contact your doctor if:    · You are not getting better after 2 days (48 hours). Where can you learn more? Go to [email or web address] org and sign in to your [facility] account. Enter N169 in the [employer] box to learn more about \"Migraine Headache: Care Instructions. \"     If you do not have an account, please click on the \"Sign Up Now\" link. Current as of: March 28, 2019  Content Version: 12.3  © 2797-0055 [employer]. Care instructions adapted under license by [facility] [address] If you have questions about a medical condition or this instruction, always ask your healthcare professional. [address] any warranty or liability for your use of this information.

## 2020-02-25 NOTE — PROGRESS NOTES
[facility]  Neurology  [address] [place] [address]  Phone: [phone]   Fax: [phone]    SUBJECTIVE:     PATIENT ID:  Ronna Goldmann is a  RIGHT HANDED 53 y.o. male. Migraine    This is a chronic problem. Episode onset: MORE  THAN  10  YEARS. The problem occurs intermittently. The problem has been rapidly improving. The pain is located in the vertex, frontal and bilateral region. The pain does not radiate. The pain quality is similar to prior headaches. The quality of the pain is described as aching, stabbing and pulsating. The pain is at a severity of 3/10. The pain is moderate. Associated symptoms include back pain, nausea, neck pain, phonophobia, photophobia, seizures and vomiting. Pertinent negatives include no abdominal pain, abnormal behavior, anorexia, blurred vision, coughing, dizziness, drainage, ear pain, eye pain, eye redness, eye watering, facial sweating, fever, hearing loss, insomnia, loss of balance, muscle aches, numbness, rhinorrhea, scalp tenderness, sinus pressure, sore throat, swollen glands, tingling, tinnitus, visual change, weakness or weight loss. The symptoms are aggravated by unknown. Treatments tried: DEPAKOTE   The treatment provided significant relief. His past medical history is significant for migraine headaches, migraines in the family and obesity. There is no history of cancer, cluster headaches, hypertension, immunosuppression, pseudotumor cerebri, recent head traumas, sinus disease or TMJ. Headache    This is a chronic problem. Episode onset: MORE  THAN   10  YEARS. The problem occurs intermittently. The problem has been unchanged. The pain is located in the right unilateral region. The pain does not radiate. The pain quality is similar to prior headaches. The quality of the pain is described as aching. The pain is mild. Associated symptoms include back pain, nausea, neck pain, phonophobia, photophobia, seizures and vomiting.  Pertinent negatives include no abdominal pain, abnormal behavior, anorexia, blurred vision, coughing, dizziness, drainage, ear pain, eye pain, eye redness, eye watering, facial sweating, fever, hearing loss, insomnia, loss of balance, muscle aches, numbness, rhinorrhea, scalp tenderness, sinus pressure, sore throat, swollen glands, tingling, tinnitus, visual change, weakness or weight loss. Nothing aggravates the symptoms. He has tried NSAIDs, triptans and acetaminophen for the symptoms. The treatment provided mild relief. His past medical history is significant for migraine headaches, migraines in the family and obesity. There is no history of cancer, cluster headaches, hypertension, immunosuppression, pseudotumor cerebri, recent head traumas, sinus disease or TMJ. Neurologic Problem   The patient's primary symptoms include memory loss. The patient's pertinent negatives include no altered mental status, clumsiness, focal sensory loss, focal weakness, loss of balance, near-syncope, slurred speech, syncope, visual change or weakness. This is a chronic problem. Episode onset: MORE  THAN   10  YEARS. The neurological problem developed insidiously. The problem is unchanged. There was no focality noted. Associated symptoms include back pain, headaches, nausea, neck pain and vomiting. Pertinent negatives include no abdominal pain, auditory change, aura, bladder incontinence, bowel incontinence, chest pain, confusion, diaphoresis, dizziness, fatigue, fever, light-headedness, palpitations, shortness of breath or vertigo. Past treatments include nothing. The treatment provided no relief. His past medical history is significant for dementia. There is no history of a bleeding disorder, a clotting disorder, a CVA, head trauma, liver disease, mood changes or seizures. Mental Health Problem   The primary symptoms include negative symptoms and somatic symptoms.  The primary symptoms do not include dysphoric mood, delusions, hallucinations, bizarre behavior or disorganized speech. This is a chronic problem. The somatic symptoms have been unchanged since their onset. The symptoms are mild. Somatic symptoms include headaches and back pain. Somatic symptoms do not include fatigue, abdominal pain, heartburn, constipation or myalgias. The onset of the illness is precipitated by emotional stress. The degree of incapacity that he is experiencing as a consequence of his illness is mild. Additional symptoms of the illness include attention impairment, distractible, poor judgment, headaches and seizures. Additional symptoms of the illness do not include anhedonia, insomnia, hypersomnia, appetite change, unexpected weight change, fatigue, agitation, feelings of worthlessness, euphoric mood, increased goal-directed activity, flight of ideas, inflated self-esteem, decreased need for sleep, visual change or abdominal pain. He does not admit to suicidal ideas. He does not have a plan to commit suicide. He does not contemplate harming himself. He has not already injured self. He does not contemplate injuring another person. He has not already  injured another person. Risk factors that are present for mental illness include neurological disease, a family history of mental illness and a history of mental illness. History obtained from  The patient         and other  available medical records were  Also  reviewed. The  Duration,  Quality,  Severity,  Location,  Timing,  Context,  Modifying  Factors   Of   The   Chief   Complaint       And  Present  Illness   Was   Reviewed   In   Chronological   Manner.                                            PATIENT'S  MAIN  CONCERNS INCLUDE :                       1)  H/O   CHRONIC  MIGRAINES    FOR  MORE  THAN   10   YEARS                         2)  TRIED  TOPAMAX,  IMITREX     AND    DID  NOT  HELP  MUCH  IN   THE  PAST                          3)   HAD    BOTOX  INJECTIONS  IN  [facility] IN  THE  PAST                               4)   H/O   CHRONIC  ANXIETY,  DEPRESSION ,  BIPOLAR  DISORDER                               --    ON   EFFEXOR,  BUSPAR,   RISPERDAL    FROM  HIS  PCP.                                                         -STABLE                                                 PATIENT    TO  FOLLOW  WITH  MENTAL   HEALTH PROFESSIONALS                          5)   FAMILY  H/O  MIGRAINES                            6)  PATIENT  DENIES  ANY  HEAD INJURY  IN   THE  PAST. NO   H/O   SEIZURES . 7)   CHRONIC  MEMORY PROBLEMS   FOR    10  YEARS                                   -    STABLE                         8)    H/O   CHRONIC  LUMBAR  PAIN    FOR  MORE  THAN    20  YEARS                                H/O   CHRONIC  NECK PAIN  FOR  MORE  THAN   10  YEARS                                  --     ON   NEURONTIN                                       -    STABLE                         9)   H/O   SLEEP  DIFFICULTIES     -  IMPROVED                                            ON   AMBIEN   FOR  HIS  PCP. 10)   CURRENTLY  PATIENT  DENIES  SMOKING,  ALCOHOL  USAGE. AND  INDICATES  NOT  WORKING.                            11)  MRI  BRAIN  IN  MAY   2017   SHOWED  NO  ACUTE  PATHOLOGY                            12)  PREVIOUS     H/O   CHRONIC  SMOKING. PATIENT  QUIT  SMOKING  SINCE  June 2017                                 13)  H/O   GI  INTOLERANCE  TO    NSAID    IN  THE  PAST                                                 14)    MILD    FOLIC  ACID  AND  B 12   DEFICIENCY                                             -   RESOLVED                                             ON   SUPPLEMENTATION.                             15)    PATIENT  DOES  NOT  WANT  TO  RETRY   TOPAMAX Seizures  And  Starring  Episodes           absent           Syncope,  Near  syncopal episodes         absent           Speech problems           absent             Swallowing  Problems      absent            Dizziness,  Light headedness           absent                        Vertigo        absent             Generalized   Weakness    absent              focal  Weakness     absent             Tremors         absent              Sleep  Problems     present             History  Of   Recent   Head  Injury     absent             History  Of   Recent  TIA     absent             History  Of   Recent    Stroke     absent             Neck  Pain and  Neck muscle  Spasms  Absent               Radiating  down   And   Weakness           absent            Lower back   Pain  And     Spasms  Present              Radiating    Down   And   Weakness          present                H/O   FALLS        absent               History  Of   Visual  Symptoms    Absent                  Associated   Diplopia       absent                                    Also   Additional   Symptoms   Present    As  Documented    In   The detailed      Review  Of  Systems   And    Please   Refer   To    Them for   Additional  Information. Any components  That are either  Unobtainable  Or  Limited  In   HPI, ROS  And/or PFSH   Are       Due   To   Patient's  Medical  Problems,  Clinical  Condition and/or lack of other  Alternate resources.               RECORDS   REVIEWED:    historical medical records       INFORMATION   REVIEWED:     MEDICAL   HISTORY,     SURGICAL   HISTORY,     MEDICATIONS   LIST,   ALLERGIES AND  DRUG  INTOLERANCES,       FAMILY   HISTORY,  SOCIAL  HISTORY,      PROBLEM  LIST   FOR  PATIENT  CARE   COORDINATION        Past Medical History:   Diagnosis Date    Allergic rhinitis     Bipolar disorder ([facility] [address]     Diagnosed in 1998    Depression     Headache(784.0)     Osteoarthritis     TMJ (dislocation of Negative for chest pain, palpitations, leg swelling and near-syncope. Gastrointestinal: Positive for nausea and vomiting. Negative for abdominal distention, abdominal pain, anorexia, blood in stool, bowel incontinence, constipation, diarrhea and heartburn. Endocrine: Negative for cold intolerance, heat intolerance, polydipsia, polyphagia and polyuria. Genitourinary: Negative for bladder incontinence. Musculoskeletal: Positive for back pain and neck pain. Negative for arthralgias, gait problem, joint swelling, myalgias and neck stiffness. Skin: Negative for color change, pallor, rash and wound. Allergic/Immunologic: Negative for environmental allergies, food allergies and immunocompromised state. Neurological: Positive for seizures and headaches. Negative for dizziness, vertigo, tingling, tremors, focal weakness, syncope, facial asymmetry, speech difficulty, weakness, light-headedness, numbness and loss of balance. Hematological: Negative for adenopathy. Does not bruise/bleed easily. Psychiatric/Behavioral: Positive for decreased concentration, memory loss and sleep disturbance. Negative for agitation, behavioral problems, confusion, dysphoric mood, hallucinations, self-injury and suicidal ideas. The patient is nervous/anxious. The patient does not have insomnia and is not hyperactive. OBJECTIVE:    Physical Exam  Constitutional:       Appearance: He is well-developed. HENT:      Head: Normocephalic and atraumatic. No raccoon eyes or Garcia's sign. Right Ear: External ear normal.      Left Ear: External ear normal.      Nose: Nose normal.   Eyes:      Conjunctiva/sclera: Conjunctivae normal.   Neck:      Musculoskeletal: Normal range of motion and neck supple. Normal range of motion. No neck rigidity or muscular tenderness. Thyroid: No thyroid mass or thyromegaly. Vascular: No carotid bruit. Trachea: No tracheal deviation.       Meningeal: Brudzinski's sign and Kernig's sign absent. Cardiovascular:      Rate and Rhythm: Normal rate and regular rhythm. Pulmonary:      Effort: Pulmonary effort is normal.   Musculoskeletal:         General: No tenderness. Skin:     General: Skin is warm. Coloration: Skin is not pale. Findings: No erythema or rash. Nails: There is no clubbing. Psychiatric:         Attention and Perception: He is attentive. Mood and Affect: Mood is anxious and depressed. Affect is not labile, blunt or inappropriate. Behavior: Behavior is slowed. Behavior is not agitated, aggressive, withdrawn, hyperactive or combative. Behavior is cooperative. Thought Content: Thought content is not paranoid or delusional. Thought content does not include homicidal or suicidal ideation. Thought content does not include homicidal or suicidal plan. Cognition and Memory: Memory is impaired. He exhibits impaired recent memory. He does not exhibit impaired remote memory. Judgment: Judgment is not impulsive or inappropriate. Neurologic Exam    NEUROLOGICAL EXAMINATION :    A) MENTAL STATUS:                   Alert and  oriented  To time, place  And  Person. No Aphasia. No  Dysarthria. Able   To  Follow  SIMPLE     commands without   Any  Difficulty. No right  To left confusion. SLOW    Speech  And language function. Insight and  Judgment ,Fund  Of  Knowledge   DECREASED                Recent  And  Remote memory   DECREASED                Attention &Concentration are   DECREASED                                                 B) CRANIAL NERVES :             2 CN : Visual  Acuity  And  Visual fields  within normal limits                      Fundi  Could  Not  Be  Could  Not  Be  Evaluated. 3,4,6 CN : Both  Pupils are   Reactive and  Equal.                          Extraocular   Movements  Are  Intact. vitreous body of both eyes    Combined forms of age-related cataract of both eyes    Recurrent corneal erosion, left           MRI OF THE BRAIN WITHOUT CONTRAST  5/23/2017 6:25 pm       TECHNIQUE:   Multiplanar multisequence MRI of the brain was performed without the   administration of intravenous contrast.       COMPARISON:   None.       HISTORY:   ORDERING SYSTEM PROVIDED HISTORY: Worsening headaches   TECHNOLOGIST PROVIDED HISTORY:   Reason for exam:->worsening migraines over the past month; focused HA's over   R forehead, above R eye   Ordering Physician Provided Reason for Exam: migraine headaches with   sensitivity to light and sound, no injury   Acuity: Acute   Type of Exam: Unknown   Additional signs and symptoms: worsening headaches; worsening over last month   focused ROSSI's over R forehead, above R eye   Relevant Medical/Surgical History: no priros       FINDINGS:   INTRACRANIAL STRUCTURES/VENTRICLES: There is no acute infarct. No mass effect   or midline shift. No abnormal extra-axial fluid collection. The ventricles   and sulci are normal in size and configuration.  The sellar/suprasellar   regions appear unremarkable.  The normal signal voids within the major   intracranial vessels appear maintained.       ORBITS: The visualized portion of the orbits demonstrate no acute abnormality.       SINUSES: The visualized paranasal sinuses and mastoid air cells are well   aerated.       BONES/SOFT TISSUES: The bone marrow signal intensity appears normal. The   craniocervical junction is normal in appearance.           Impression   No acute intracranial abnormality detected. VISITING DIAGNOSIS:      ICD-10-CM    1. Chronic tension-type headache, intractable G44.221    2. Migraine without status migrainosus, not intractable, unspecified migraine type G43.909 SUMAtriptan (IMITREX) 100 MG tablet   3.  Migraine without aura and without status migrainosus, not intractable G43.009 divalproex (DEPAKOTE ER) 500 VIEW OF  HIS  CO  MORBID  MENTAL  HEALTH PROBLEMS. *        PATIENT  TOLERATING    DEPAKOTE  AND  GETTING  BENEFIT.                                                       *         PATIENT'S  MIGRAINES          ARE                                          VERY  WELL  CONTROLLED                                                 Orders Placed This Encounter   Procedures    CT Head WO Contrast    Sedimentation Rate    CBC    Electrolyte Panel    Valproic acid level, total       Orders Placed This Encounter   Medications    SUMAtriptan (IMITREX) 100 MG tablet     Sig: Take 1 tablet by mouth once as needed for Migraine (May repeat after 2 hours x 1 more dose, if needed; max dose 200 mg per 24 hours.)     Dispense:  12 tablet     Refill:  2    divalproex (DEPAKOTE ER) 500 MG extended release tablet     Sig: THEN   ONE  TABLET  TWICE  DAILY     Dispense:  60 tablet     Refill:  5                   *PATIENT   TO  FOLLOW  UP  WITH   PRIMARY  CARE   AND   OTHER  CONSULTANTS  AS  BEFORE.               *TO  FOLLOW  WITH   MENTAL  HEALTH  PROFESSIONALS ,  INCLUDING            PSYCHOLOGICAL  COUNSELING   AND  PSYCHIATRIC  EVALUTIONS            *  Maintain   Healthy  Life Style    With   Periodic  Monitoring  Of      Any  Medical  Conditions  Including   Elevated  Blood  Pressure,  Lipid  Profile,     Blood  Sugar levels  And   Heart  Disease. *   Period   Screening  For  Cancers  Involving  Breast,  Colon,    Prostate, lungs  And  Other  Organs  As  Applicable,  In consultation   With  Your  Primary Care Providers. * Second  Neurological  Opinion  And  Evaluations  In  [facility]  Setting  If  Patient  Is  Interested.                        *  If  The  Patient remains  Neurologically  Stable   Return   To  [facility] Neurology Department       IN  3-6   MONTHS  TIME   FOR  FURTHER  FOLLOW UP.                 *  If   There is  Any take.  How can you care for yourself at home? Do not eat too much sugar, fat, or fast foods. You can still have dessert and treats now and then. The goal is moderation. Start small to improve your eating habits. Pay attention to portion sizes, drink less juice and soda pop, and eat more fruits and vegetables. Eat a healthy amount of food. A 3-ounce serving of meat, for example, is about the size of a deck of cards. Fill the rest of your plate with vegetables and whole grains. Limit the amount of soda and sports drinks you have every day. Drink more water when you are thirsty. Eat at least 5 servings of fruits and vegetables every day. It may seem like a lot, but it is not hard to reach this goal. A serving or helping is 1 piece of fruit, 1 cup of vegetables, or 2 cups of leafy, raw vegetables. Have an apple or some carrot sticks as an afternoon snack instead of a candy bar. Try to have fruits and/or vegetables at every meal.  Make exercise part of your daily routine. You may want to start with simple activities, such as walking, bicycling, or slow swimming. Try to be active 30 to 60 minutes every day. You do not need to do all 30 to 60 minutes all at once. For example, you can exercise 3 times a day for 10 or 20 minutes. Moderate exercise is safe for most people, but it is always a good idea to talk to your doctor before starting an exercise program.  Keep moving. Ethyl Buddy the lawn, work in the garden, or [employer]. Take the stairs instead of the elevator at work. If you smoke, quit. People who smoke have an increased risk for heart attack, stroke, cancer, and other lung illnesses. Quitting is hard, but there are ways to boost your chance of quitting tobacco for good. Use nicotine gum, patches, or lozenges. Ask your doctor about stop-smoking programs and medicines. Keep trying.   In addition to reducing your risk of diseases in the future, you will notice some benefits soon after you stop using tobacco. If

## 2020-02-27 ENCOUNTER — HOSPITAL ENCOUNTER (OUTPATIENT)
Dept: CT IMAGING | Age: 45
Discharge: HOME | End: 2020-02-29
Payer: MEDICARE

## 2020-02-27 PROCEDURE — 70450 CT HEAD/BRAIN W/O DYE: CPT

## 2020-03-09 ENCOUNTER — PATIENT MESSAGE (OUTPATIENT)
Dept: FAMILY MEDICINE CLINIC | Age: 45
End: 2020-03-09

## 2020-03-12 RX ORDER — CYCLOBENZAPRINE HCL 10 MG
10 TABLET ORAL NIGHTLY PRN
Qty: 30 TABLET | Refills: 0 | Status: SHIPPED | OUTPATIENT
Start: 2020-03-12 | End: 2020-04-09 | Stop reason: SDUPTHER

## 2020-03-13 RX ORDER — FENOFIBRATE 160 MG/1
160 TABLET ORAL DAILY
Qty: 90 TABLET | Refills: 2 | Status: SHIPPED
Start: 2020-03-13 | End: 2020-11-10 | Stop reason: ALTCHOICE

## 2020-04-07 ENCOUNTER — OFFICE VISIT (OUTPATIENT)
Dept: PRIMARY CARE CLINIC | Age: 45
End: 2020-04-07
Payer: MEDICARE

## 2020-04-07 ENCOUNTER — E-VISIT (OUTPATIENT)
Dept: FAMILY MEDICINE CLINIC | Age: 45
End: 2020-04-07

## 2020-04-07 ENCOUNTER — HOSPITAL ENCOUNTER (OUTPATIENT)
Age: 45
Setting detail: SPECIMEN
Discharge: HOME | End: 2020-04-07
Payer: MEDICARE

## 2020-04-07 VITALS
DIASTOLIC BLOOD PRESSURE: 86 MMHG | HEART RATE: 118 BPM | WEIGHT: 276.8 LBS | TEMPERATURE: 100.9 F | OXYGEN SATURATION: 95 % | SYSTOLIC BLOOD PRESSURE: 138 MMHG | HEIGHT: 71 IN | BODY MASS INDEX: 38.75 KG/M2 | RESPIRATION RATE: 20 BRPM

## 2020-04-07 PROCEDURE — G8417 CALC BMI ABV UP PARAM F/U: HCPCS | Performed by: FAMILY MEDICINE

## 2020-04-07 PROCEDURE — 1036F TOBACCO NON-USER: CPT | Performed by: FAMILY MEDICINE

## 2020-04-07 PROCEDURE — 99213 OFFICE O/P EST LOW 20 MIN: CPT | Performed by: FAMILY MEDICINE

## 2020-04-07 PROCEDURE — G8427 DOCREV CUR MEDS BY ELIG CLIN: HCPCS | Performed by: FAMILY MEDICINE

## 2020-04-07 PROCEDURE — U0002 COVID-19 LAB TEST NON-CDC: HCPCS

## 2020-04-07 PROCEDURE — 99214 OFFICE O/P EST MOD 30 MIN: CPT

## 2020-04-07 RX ORDER — AZITHROMYCIN 250 MG/1
250 TABLET, FILM COATED ORAL SEE ADMIN INSTRUCTIONS
Qty: 6 TABLET | Refills: 0 | Status: SHIPPED | OUTPATIENT
Start: 2020-04-07 | End: 2020-04-12

## 2020-04-07 ASSESSMENT — LIFESTYLE VARIABLES
PACKS_PER_DAY: 2
SMOKING_YEARS: 30
SMOKING_STATUS: NO, I'M A FORMER SMOKER

## 2020-04-07 NOTE — PROGRESS NOTES
MCG/ACT inhaler Inhale 1-2 puffs into the lungs every 6 hours as needed for Wheezing 3 Inhaler 3    SUMAtriptan (IMITREX) 100 MG tablet Take 1 tablet by mouth once as needed for Migraine (May repeat after 2 hours x 1 more dose, if needed; max dose 200 mg per 24 hours.) 12 tablet 2    ibuprofen (ADVIL;MOTRIN) 600 MG tablet Take 1 tablet by mouth every 8 hours as needed for Pain (Patient not taking: Reported on 4/7/2020) 90 tablet 3    Respiratory Therapy Supplies (NEBULIZER COMPRESSOR) KIT 1 kit by Does not apply route once for 1 dose 1 kit 0    Respiratory Therapy Supplies (NEBULIZER/TUBING/MOUTHPIECE) KIT 1 kit by Does not apply route once for 1 dose 1 kit 0    Compression Stockings MISC by Does not apply route Wear on bilateral lower extremities daily as needed for edema. 1 each 1     Current Facility-Administered Medications   Medication Dose Route Frequency Provider Last Rate Last Dose    phenylephrine (MYDFRIN) 2.5 % ophthalmic solution 1 drop  1 drop Both Eyes Once Lyudmila Vann MD        tropicamide (MYDRIACYL) 1 % ophthalmic solution 1 drop  1 drop Both Eyes Once Lyudmila Vann MD           He is allergic to naproxen. 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 His smoking use included cigarettes. He started smoking about 27 years ago. He has a 25.00 pack-year smoking history.  He has never used smokeless

## 2020-04-09 ENCOUNTER — PATIENT MESSAGE (OUTPATIENT)
Dept: FAMILY MEDICINE CLINIC | Age: 45
End: 2020-04-09

## 2020-04-09 LAB
SARS-COV-2, NAA: NOT DETECTED
SARS-COV-2, PCR: NORMAL
SARS-COV-2: NORMAL
SOURCE: NORMAL

## 2020-04-10 RX ORDER — CYCLOBENZAPRINE HCL 10 MG
10 TABLET ORAL NIGHTLY PRN
Qty: 30 TABLET | Refills: 2 | Status: SHIPPED
Start: 2020-04-10 | End: 2020-07-22 | Stop reason: ALTCHOICE

## 2020-04-21 RX ORDER — BUSPIRONE HYDROCHLORIDE 15 MG/1
15 TABLET ORAL 2 TIMES DAILY PRN
Qty: 60 TABLET | Refills: 1 | Status: SHIPPED | OUTPATIENT
Start: 2020-04-21 | End: 2020-06-18 | Stop reason: SDUPTHER

## 2020-04-22 RX ORDER — BUSPIRONE HYDROCHLORIDE 15 MG/1
TABLET ORAL
Qty: 60 TABLET | Refills: 0 | OUTPATIENT
Start: 2020-04-22

## 2020-04-23 ENCOUNTER — TELEPHONE (OUTPATIENT)
Dept: PULMONOLOGY | Age: 45
End: 2020-04-23

## 2020-04-23 RX ORDER — UMECLIDINIUM BROMIDE AND VILANTEROL TRIFENATATE 62.5; 25 UG/1; UG/1
1 POWDER RESPIRATORY (INHALATION) DAILY
Qty: 1 EACH | Refills: 3 | Status: SHIPPED | OUTPATIENT
Start: 2020-04-23 | End: 2020-05-08

## 2020-05-06 ENCOUNTER — PATIENT MESSAGE (OUTPATIENT)
Dept: FAMILY MEDICINE CLINIC | Age: 45
End: 2020-05-06

## 2020-05-07 NOTE — TELEPHONE ENCOUNTER
From: Nirmala Johnston  To: Antonio Ludwig DO  Sent: 5/6/2020 5:14 PM EDT  Subject: Non-Urgent [facility]ell and Staff hope ur all doing well Thank you for ur service you do for [place] I was wondering if you could do me a favor and give me a script for my shoulder it is always hurting and the muscle spasm med I take don't work at all    Thank you    Juan Manuel May

## 2020-05-13 ENCOUNTER — TELEMEDICINE (OUTPATIENT)
Dept: FAMILY MEDICINE CLINIC | Age: 45
End: 2020-05-13
Payer: MEDICARE

## 2020-05-13 VITALS — BODY MASS INDEX: 39.2 KG/M2 | HEIGHT: 71 IN | WEIGHT: 280 LBS

## 2020-05-13 PROCEDURE — 99214 OFFICE O/P EST MOD 30 MIN: CPT | Performed by: FAMILY MEDICINE

## 2020-05-13 PROCEDURE — G8427 DOCREV CUR MEDS BY ELIG CLIN: HCPCS | Performed by: FAMILY MEDICINE

## 2020-05-13 RX ORDER — ALBUTEROL SULFATE 90 UG/1
1-2 AEROSOL, METERED RESPIRATORY (INHALATION) EVERY 6 HOURS PRN
Qty: 3 INHALER | Refills: 3 | Status: SHIPPED | OUTPATIENT
Start: 2020-05-13 | End: 2020-08-27 | Stop reason: SDUPTHER

## 2020-05-13 RX ORDER — FLUTICASONE PROPIONATE 50 MCG
2 SPRAY, SUSPENSION (ML) NASAL DAILY
Qty: 3 BOTTLE | Refills: 3 | Status: SHIPPED | OUTPATIENT
Start: 2020-05-13 | End: 2020-12-31 | Stop reason: SDUPTHER

## 2020-05-13 RX ORDER — RISPERIDONE 3 MG/1
3 TABLET, FILM COATED ORAL NIGHTLY
Qty: 30 TABLET | Refills: 3 | Status: SHIPPED | OUTPATIENT
Start: 2020-05-13 | End: 2020-09-10 | Stop reason: SDUPTHER

## 2020-05-13 RX ORDER — ALBUTEROL SULFATE 2.5 MG/3ML
2.5 SOLUTION RESPIRATORY (INHALATION) EVERY 6 HOURS PRN
Qty: 120 EACH | Refills: 3 | Status: SHIPPED | OUTPATIENT
Start: 2020-05-13 | End: 2021-09-24 | Stop reason: SDUPTHER

## 2020-05-13 RX ORDER — PREDNISONE 20 MG/1
TABLET ORAL
Qty: 15 TABLET | Refills: 0 | Status: SHIPPED | OUTPATIENT
Start: 2020-05-13 | End: 2020-06-08 | Stop reason: ALTCHOICE

## 2020-05-13 ASSESSMENT — ENCOUNTER SYMPTOMS
COLOR CHANGE: 0
SHORTNESS OF BREATH: 1
COUGH: 0

## 2020-05-13 NOTE — PROGRESS NOTES
fluticasone-umeclidin-vilant (TRELEGY ELLIPTA) 100-62.5-25 MCG/INH AEPB Inhale 1 puff into the lungs daily Yes Roberta Rehman MD   busPIRone (BUSPAR) 15 MG tablet Take 15 mg by mouth 2 times daily as needed (anxiety) Yes Juliana Vance DO   cyclobenzaprine (FLEXERIL) 10 MG tablet Take 1 tablet by mouth nightly as needed for Muscle spasms Yes Juliana Vance DO   fenofibrate (TRIGLIDE) 160 MG tablet Take 1 tablet by mouth daily Yes Juliana Vance DO   divalproex (DEPAKOTE ER) 500 MG extended release tablet THEN   ONE  TABLET  TWICE  DAILY Yes Harjit Fisher MD   ibuprofen (ADVIL;MOTRIN) 600 MG tablet Take 1 tablet by mouth every 8 hours as needed for Pain Yes Juliana Vance DO   loratadine (CLARITIN) 10 MG tablet Take 1 tablet by mouth daily Yes Juliana Vance DO   venlafaxine (EFFEXOR XR) 150 MG extended release capsule TAKE ONE CAPSULE BY MOUTH ONCE DAILY Yes Juliana Vance DO   Compression Stockings MISC by Does not apply route Wear on bilateral lower extremities daily as needed for edema. Yes Juliana Vance DO   SUMAtriptan (IMITREX) 100 MG tablet Take 1 tablet by mouth once as needed for Migraine (May repeat after 2 hours x 1 more dose, if needed; max dose 200 mg per 24 hours.)  Harjit Fisher MD   Respiratory Therapy Supplies (NEBULIZER COMPRESSOR) KIT 1 kit by Does not apply route once for 1 dose  Odin [place] DO   Respiratory Therapy Supplies (NEBULIZER/TUBING/MOUTHPIECE) KIT 1 kit by Does not apply route once for 1 dose  Odin [place] DO       Social History     Tobacco Use    Smoking status: Former Smoker     Packs/day: 1.00     Years: 25.00     Pack years: 25.00     Types: Cigarettes     Start date: 10/16/1992     Last attempt to quit: 2017     Years since quittin.9    Smokeless tobacco: Never Used    Tobacco comment: handout given.     Substance Use Topics    Alcohol use: No    Drug use: No        Allergies   Allergen Reactions    Naproxen      ulcers   ,   Past Medical

## 2020-05-14 ENCOUNTER — OFFICE VISIT (OUTPATIENT)
Dept: NEUROLOGY | Age: 45
End: 2020-05-14
Payer: MEDICARE

## 2020-05-14 VITALS
OXYGEN SATURATION: 95 % | SYSTOLIC BLOOD PRESSURE: 136 MMHG | BODY MASS INDEX: 38.64 KG/M2 | HEIGHT: 71 IN | DIASTOLIC BLOOD PRESSURE: 78 MMHG | WEIGHT: 276 LBS | HEART RATE: 97 BPM

## 2020-05-14 PROCEDURE — G8417 CALC BMI ABV UP PARAM F/U: HCPCS | Performed by: PSYCHIATRY & NEUROLOGY

## 2020-05-14 PROCEDURE — 1036F TOBACCO NON-USER: CPT | Performed by: PSYCHIATRY & NEUROLOGY

## 2020-05-14 PROCEDURE — 99214 OFFICE O/P EST MOD 30 MIN: CPT | Performed by: PSYCHIATRY & NEUROLOGY

## 2020-05-14 PROCEDURE — G8427 DOCREV CUR MEDS BY ELIG CLIN: HCPCS | Performed by: PSYCHIATRY & NEUROLOGY

## 2020-05-14 RX ORDER — SUMATRIPTAN 100 MG/1
100 TABLET, FILM COATED ORAL
Qty: 12 TABLET | Refills: 2 | Status: SHIPPED | OUTPATIENT
Start: 2020-05-14 | End: 2020-07-23

## 2020-05-14 ASSESSMENT — ENCOUNTER SYMPTOMS
VOMITING: 1
CHOKING: 0
COLOR CHANGE: 0
NAUSEA: 1
EYE REDNESS: 0
BLOOD IN STOOL: 0
EYE WATERING: 0
HEARTBURN: 0
COUGH: 0
ABDOMINAL DISTENTION: 0
SORE THROAT: 0
EYE ITCHING: 0
VOICE CHANGE: 0
PHOTOPHOBIA: 1
BACK PAIN: 1
SWOLLEN GLANDS: 0
APNEA: 0
BOWEL INCONTINENCE: 0
SINUS PRESSURE: 0
EYE PAIN: 0
RHINORRHEA: 0
DIARRHEA: 0
SCALP TENDERNESS: 0
ABDOMINAL PAIN: 0
FACIAL SWEATING: 0
WHEEZING: 0
SHORTNESS OF BREATH: 0
CONSTIPATION: 0
FACIAL SWELLING: 0
BLURRED VISION: 0
CHEST TIGHTNESS: 0
EYE DISCHARGE: 0
TROUBLE SWALLOWING: 0
VISUAL CHANGE: 0

## 2020-05-14 NOTE — PATIENT INSTRUCTIONS
[facility] NEUROLOGY    Due to the complex nature of our neurological testing, It is the policy of the Neurology Department not to release the results of your testing over the phone. Once all testing is completed and we have all the results back, Dr. Leonel Spencer will then personally go over all the results with you and answer any questions that you may have during a follow up appointment. If you are unable to keep this appointment, please notify the [address] [place] [address] @ [phone], as soon as possible. Please bring your prescription bottles to all appointments. Advance Care Planning  People with COVID-19 may have no symptoms, mild symptoms, such as fever, cough, and shortness of breath or they may have more severe illness, developing severe and fatal pneumonia. As a result, Advance Care Planning with attention to naming a health care decision maker (someone you trust to make healthcare decisions for you if you could not speak for yourself) and sharing other health care preferences is important BEFORE a possible health crisis. Please contact your Primary Care Provider to discuss Advance Care Planning. Preventing the Spread of Coronavirus Disease 2019 in Homes and Residential Communities  For the most recent information go to [email or web address]    Prevention steps for People with confirmed or suspected COVID-19 (including persons under investigation) who do not need to be hospitalized  and   People with confirmed COVID-19 who were hospitalized and determined to be medically stable to go home    Your healthcare provider and public health staff will evaluate whether you can be cared for at home. If it is determined that you do not need to be hospitalized and can be isolated at home, you will be monitored by staff from your local or state health department.  You should follow the prevention steps below until a healthcare provider or local

## 2020-05-14 NOTE — PROGRESS NOTES
IN  THE  PAST                               4)   H/O   CHRONIC  ANXIETY,  DEPRESSION ,  BIPOLAR  DISORDER                               --    ON   EFFEXOR,  BUSPAR,   RISPERDAL    FROM  HIS  PCP.                                                         -STABLE                                                 PATIENT    TO  FOLLOW  WITH  MENTAL   HEALTH PROFESSIONALS                          5)   FAMILY  H/O  MIGRAINES                              6)  PATIENT  DENIES  ANY  HEAD INJURY  IN   THE  PAST. NO   H/O   SEIZURES . 7)   CHRONIC  MEMORY PROBLEMS   FOR    10  YEARS                                   -    STABLE                         8)    H/O   CHRONIC  LUMBAR  PAIN    FOR  MORE  THAN    20  YEARS                                H/O   CHRONIC  NECK PAIN  FOR  MORE  THAN   10  YEARS                                  --     ON   NEURONTIN                                       -    STABLE                         9)   H/O   SLEEP  DIFFICULTIES     -  IMPROVED                                            ON   AMBIEN   FOR  HIS  PCP. 10)   CURRENTLY  PATIENT  DENIES  SMOKING,  ALCOHOL  USAGE. AND  INDICATES  NOT  WORKING.                            11)  MRI  BRAIN  IN  MAY   2017   SHOWED  NO  ACUTE  PATHOLOGY                            12)  PREVIOUS     H/O   CHRONIC  SMOKING. PATIENT  QUIT  SMOKING  SINCE  June 2017                                 13)  H/O   GI  INTOLERANCE  TO    NSAID    IN  THE  PAST                                                 14)    MILD    FOLIC  ACID  AND  B 12   DEFICIENCY                                             -   RESOLVED                                             ON   SUPPLEMENTATION.                               15)    PATIENT  DOES  NOT  WANT  TO  RETRY   TOPAMAX 16)     PATIENT     STARTED  ON      DEPAKOTE    IN  MAY   2018                                  FOR  MIGRAINE  PROPHYLAXIS,                                IN  VIEW OF  HIS  CO  MORBID  MENTAL  HEALTH PROBLEMS. PATIENT  TOLERATING    DEPAKOTE  AND  GETTING  BENEFIT. EXPECTATIONS,   GOALS   AND  SIDE  EFFECTS  MEDICATION    WERE                                    REVIEWED     AND   DISCUSSED    IN    DETAIL. 17)             PATIENT'S  MIGRAINES         ARE                                          VERY  WELL  CONTROLLED                                          AND  PATIENT   SUBJECTIVELY  HAPPY. 25)       H/O     WORSENING  OF    FRONTAL    HEADACHES     IN     JAN. 2020                                         -     IMPROVED                             19)      PATIENT      DENIES  ANY   NEW  NEUROLOGICAL  CONCERNS. REQUESTS   REFILLS  FOR  HIS  MEDICATIONS                                 .                                                                     PRECIPITATING  FACTORS: including  fever/infection, exertion/relaxation, position change, stress, weather change,     medications/alcohol, time of day/darkness/light  Are    Absent                                                               MODIFYING  FACTORS:  fever/infection, exertion/relaxation, position change, stress, weather change, medications/alcohol,     time of day/darkness/light  Are  Absent             Patient   Indicates   The  Presence   And  The  Absence  Of  The  Following  Associated  And   Additional  Neurological    Symptoms:                                Balance  And coordination problems  absent           Gait problems     absent            Headaches      present              Migraines           present           Memory problems        Present Confusion        absent            Paresthesia numbness          absent           Seizures  And  Starring  Episodes           absent           Syncope,  Near  syncopal episodes         absent           Speech problems           absent             Swallowing  Problems      absent            Dizziness,  Light headedness           absent                        Vertigo        absent             Generalized   Weakness    absent              focal  Weakness     absent             Tremors         absent              Sleep  Problems     present             History  Of   Recent   Head  Injury     absent             History  Of   Recent  TIA     absent             History  Of   Recent    Stroke     absent             Neck  Pain and  Neck muscle  Spasms  Absent               Radiating  down   And   Weakness           absent            Lower back   Pain  And     Spasms  Present              Radiating    Down   And   Weakness          present                H/O   FALLS        absent               History  Of   Visual  Symptoms    Absent                  Associated   Diplopia       absent                                    Also   Additional   Symptoms   Present    As  Documented    In   The detailed      Review  Of  Systems   And    Please   Refer   To    Them for   Additional  Information. Any components  That are either  Unobtainable  Or  Limited  In   HPI, ROS  And/or PFSH   Are       Due   To   Patient's  Medical  Problems,  Clinical  Condition and/or lack of other  Alternate resources.               RECORDS   REVIEWED:    historical medical records       INFORMATION   REVIEWED:     MEDICAL   HISTORY,     SURGICAL   HISTORY,     MEDICATIONS   LIST,   ALLERGIES AND  DRUG  INTOLERANCES,       FAMILY   HISTORY,  SOCIAL  HISTORY,      PROBLEM  LIST   FOR  PATIENT  CARE   COORDINATION        Past Medical History:   Diagnosis Date    Allergic rhinitis     Bipolar disorder ([address]     Diagnosed in [address] [place] [address] Road bilateral lower extremities daily as needed for edema. 1 each 1    Respiratory Therapy Supplies (NEBULIZER COMPRESSOR) KIT 1 kit by Does not apply route once for 1 dose 1 kit 0    Respiratory Therapy Supplies (NEBULIZER/TUBING/MOUTHPIECE) KIT 1 kit by Does not apply route once for 1 dose 1 kit 0     Current Facility-Administered Medications   Medication Dose Route Frequency Provider Last Rate Last Dose    phenylephrine (MYDFRIN) 2.5 % ophthalmic solution 1 drop  1 drop Both Eyes Once Ashley Feliz MD        tropicamide (MYDRIACYL) 1 % ophthalmic solution 1 drop  1 drop Both Eyes Once Ashley Feliz MD             Allergies   Allergen Reactions    Naproxen      ulcers         Family History   Problem Relation Age of Onset    Stroke Mother     High Blood Pressure Mother     High Blood Pressure Father     Stroke Father     Glaucoma Neg Hx     Diabetes Neg Hx     Cataracts Neg Hx          Social History     Socioeconomic History    Marital status:      Spouse name: Not on file    Number of children: Not on file    Years of education: Not on file    Highest education level: Not on file   Occupational History    Not on file   Social Needs    Financial resource strain: Not on file    Food insecurity     Worry: Not on file     Inability: Not on file    Transportation needs     Medical: Not on file     Non-medical: Not on file   Tobacco Use    Smoking status: Former Smoker     Packs/day: 1.00     Years: 25.00     Pack years: 25.00     Types: Cigarettes     Start date: 10/16/1992     Last attempt to quit: 2017     Years since quittin.8    Smokeless tobacco: Never Used    Tobacco comment: handout given.     Substance and Sexual Activity    Alcohol use: No    Drug use: No    Sexual activity: Yes     Partners: Female   Lifestyle    Physical activity     Days per week: Not on file     Minutes per session: Not on file    Stress: Not on file   Relationships    Social connections     Talks on B) CRANIAL NERVES :             2 CN : Visual  Acuity  And  Visual fields  within normal limits                      Fundi  Could  Not  Be  Could  Not  Be  Evaluated. 3,4,6 CN : Both  Pupils are   Reactive and  Equal.                          Extraocular   Movements  Are  Intact. No  Nystagmus. No  CATY. No  Afferent  Pupillary  Defect noted. 5 CN :  Normal  Facial sensations and Corneal  Reflexes         7 CN :  Normal  Facial  Symmetry  And  Strength. No facial  Weakness. 8 CN :  Hearing  Appears within normal limits        9, 10 CN: Normal spontaneous, reflex palate movements       11 CN:   Normal  Shoulder shrug and  strength       12 CN :   Normal  Tongue movements and  Tongue  In midline                      No tongue   Fasciculations or atrophy         C) MOTOR  EXAM:                 Strength  In upper  And  Lower extremities   within normal limits               No  Drift. No  Atrophy               Rapid alternating  And  repetitions  Movements  within normal limits               Muscle  Tone  In upper  And  Lower  Extremities  Normal                No rigidity. No  Spasticity. Bradykinesia   Absent                 No  Asterixis. Sustention  Tremor , Resting  Tremor   absent                    No other  Abnormal  Movements noted             D) SENSORY :               light touch, pinprick, position  And  Vibration  within normal limits        E) REFLEXES:                   Deep  Tendon  Reflexes normal                    No pathological  Reflexes  Bilaterally.                                   F) COORDINATION  AND  GAIT :                                Station and  Gait  normal                                       Romberg's test negative                          Ataxia negative        ASSESSMENT:    Patient Active Problem List   Diagnosis    Chronic neck pain    Anxiety    Bipolar affective disorder, currently depressed, moderate ([address]    Insomnia    Depression    Bipolar disorder ([address]    Headache    Intractable migraine without aura and without status migrainosus    Memory problem    Chronic low back pain without sciatica    Hypertriglyceridemia    Folic acid deficiency    B12 deficiency    Degeneration of posterior vitreous body of both eyes    Combined forms of age-related cataract of both eyes    Recurrent corneal erosion, left           MRI OF THE BRAIN WITHOUT CONTRAST  5/23/2017 6:25 pm       TECHNIQUE:   Multiplanar multisequence MRI of the brain was performed without the   administration of intravenous contrast.       COMPARISON:   None.       HISTORY:   ORDERING SYSTEM PROVIDED HISTORY: Worsening headaches   TECHNOLOGIST PROVIDED HISTORY:   Reason for exam:->worsening migraines over the past month; focused HA's over   R forehead, above R eye   Ordering Physician Provided Reason for Exam: migraine headaches with   sensitivity to light and sound, no injury   Acuity: Acute   Type of Exam: Unknown   Additional signs and symptoms: worsening headaches; worsening over last month   focused ROSSI's over R forehead, above R eye   Relevant Medical/Surgical History: no priros       FINDINGS:   INTRACRANIAL STRUCTURES/VENTRICLES: There is no acute infarct. No mass effect   or midline shift. No abnormal extra-axial fluid collection.  The ventricles   and sulci are normal in size and configuration.  The sellar/suprasellar   regions appear unremarkable.  The normal signal voids within the major   intracranial vessels appear maintained.       ORBITS: The visualized portion of the orbits demonstrate no acute abnormality.       SINUSES: The visualized paranasal sinuses and mastoid air cells are well   aerated.       BONES/SOFT TISSUES: The bone marrow signal intensity appears normal. The   craniocervical junction is normal in appearance.           Impression   No acute intracranial abnormality understand the same. Medical  Decision  Making  Was  Made  Based on the   Complexity  Of  Above  Mentioned  Diagnoses,    Data reviewed   &     diagnostic  Tests  Reviewed,  Risk  Of  Significant   Co morbidities and complicating   Factors. Medical  Decision  Was   High  Complexity  Due   To  The  Patient's  Multiple  Symptoms,  Advancing   Disease,  Complex      Treatment  Regimen,  Multiple medications and   Risk  Of   Side  Effects,  Difficulty  In  Medication  Management  And  Diagnostic      Challenges   In  View  Of  The  Associated   Co  Morbid  Conditions   And  Problems. *   BE  CAREFUL  WITH  ACTIVITIES           *   AVOID   NECK  AND/ BACK  STRAINING  ACTIVITIES      *   ADEQUATE   FLUID  INTAKE   AND  ELECTROLYTE  BALANCE           * KEEP  DAIRY  OF   THE  NEUROLOGICAL  SYMPTOMS        RECORDING THE    DURATION  AND  FREQUENCY. *  AVOID    CONDITIONS  AND  FACTORS   THAT  MAKE   NEUROLOGICAL  SYMPTOMS  WORSE. *  TO  MAINTAIN  REGULAR  SLEEP  WAKE  CYCLES. *   TO  HAVE  ADEQUATE  REST  AND   SLEEP    HOURS.          *    AVOID  ANY USAGE OF                   TOBACCO,  EXCESSIVE  ALCOHOL  AND   ILLEGAL   SUBSTANCES        *  CONTINUE MEDICATIONS PRESCRIBED BY NEUROLOGIST AS    RECOMMENDED       *   Compliance   With  Medications   And  Instructions            * CURRENTLY  TOLERATING  THE  PRESCRIBED   MEDICATIONS. WITHOUT  ANY  SIGNIFICANT  SIDE  EFFECTS   &  GETTING BENEFIT.        *    MIGRAINE  DAIRY   WITH  MONITORING  OF  DURATION  AND  FREQUENCY.       *  May   Use  Pill  Box,    If  Needed        *    To  Continue  The    Antiplatelet  therapy    As   Recommended  Was   Discussed          *    Prophylactic  Use   Of     Vitamin   B   Complex,  Folic  Acid,    Vitamin  B12        Multivitamin,   Calcium  With  magnesium  And  Vit D    Supplementations   Over  The  Counter  Discussed           *  PATIENT  IS  ALSO   COUNSELED   TO

## 2020-05-25 ENCOUNTER — PATIENT MESSAGE (OUTPATIENT)
Dept: FAMILY MEDICINE CLINIC | Age: 45
End: 2020-05-25

## 2020-05-26 NOTE — TELEPHONE ENCOUNTER
From: Leola Braswell  To: Blaire Schneider DO  Sent: 5/25/2020 7:36 PM EDT  Subject: Non-Urgent Medical Question    I finished the steroids and they helped a little with the pain in my shoulder but not completely I still have pain when i move it and lately my neck has been bothering me also so i think my torticulus (wryneck) is flaring up

## 2020-06-02 ENCOUNTER — HOSPITAL ENCOUNTER (EMERGENCY)
Age: 45
Discharge: HOME | End: 2020-06-02
Attending: EMERGENCY MEDICINE
Payer: MEDICARE

## 2020-06-02 VITALS
BODY MASS INDEX: 39.2 KG/M2 | WEIGHT: 280 LBS | OXYGEN SATURATION: 100 % | SYSTOLIC BLOOD PRESSURE: 150 MMHG | RESPIRATION RATE: 15 BRPM | HEART RATE: 98 BPM | DIASTOLIC BLOOD PRESSURE: 88 MMHG | HEIGHT: 71 IN | TEMPERATURE: 97.5 F

## 2020-06-02 PROCEDURE — 99283 EMERGENCY DEPT VISIT LOW MDM: CPT

## 2020-06-02 PROCEDURE — 6370000000 HC RX 637 (ALT 250 FOR IP): Performed by: EMERGENCY MEDICINE

## 2020-06-02 RX ORDER — FAMOTIDINE 20 MG/1
20 TABLET, FILM COATED ORAL 2 TIMES DAILY
Qty: 14 TABLET | Refills: 0 | Status: SHIPPED | OUTPATIENT
Start: 2020-06-02 | End: 2020-06-08 | Stop reason: SDUPTHER

## 2020-06-02 RX ADMIN — LIDOCAINE HYDROCHLORIDE: 20 SOLUTION ORAL; TOPICAL at 22:46

## 2020-06-02 ASSESSMENT — PAIN DESCRIPTION - PAIN TYPE: TYPE: ACUTE PAIN

## 2020-06-02 ASSESSMENT — ENCOUNTER SYMPTOMS
EYES NEGATIVE: 1
ALLERGIC/IMMUNOLOGIC NEGATIVE: 1
ABDOMINAL PAIN: 1
RESPIRATORY NEGATIVE: 1

## 2020-06-02 ASSESSMENT — PAIN SCALES - GENERAL: PAINLEVEL_OUTOF10: 4

## 2020-06-02 ASSESSMENT — PAIN DESCRIPTION - LOCATION: LOCATION: ABDOMEN

## 2020-06-02 ASSESSMENT — PAIN DESCRIPTION - ORIENTATION: ORIENTATION: MID;LOWER

## 2020-06-02 ASSESSMENT — PAIN DESCRIPTION - DESCRIPTORS: DESCRIPTORS: ACHING

## 2020-06-03 ENCOUNTER — CARE COORDINATION (OUTPATIENT)
Dept: CARE COORDINATION | Age: 45
End: 2020-06-03

## 2020-06-03 ENCOUNTER — TELEPHONE (OUTPATIENT)
Dept: PULMONOLOGY | Age: 45
End: 2020-06-03

## 2020-06-03 NOTE — TELEPHONE ENCOUNTER
Chris message from patient: The Trellgy I take gives me an upset stomach so I am wondering if I should stop taking it I have had an upset stomach all week since I have been taking it.

## 2020-06-03 NOTE — CARE COORDINATION
questions and concerns. The family agrees to contact the [employer] exposure line [phone], [facility]: ([phone]) and PCP office for questions related to their healthcare. CTN/ACM provided contact information for future needs. Reviewed and educated family on any new and changed medications related to discharge diagnosis     Patient/family/caregiver given information for [facility] Loop and agrees to enroll no  Patient's preferred e-mail: N/A   Patient's preferred phone number: N/A  Based on Loop alert triggers, patient will be contacted by nurse care manager for worsening symptoms. Plan for follow-up call in 5-7 days based on severity of symptoms and risk factors.

## 2020-06-03 NOTE — ED PROVIDER NOTES
weight is 280 lb (127 kg). His tympanic temperature is 97.5 °F (36.4 °C). His blood pressure is 134/104 (abnormal) and his pulse is 98. His respiration is 15 and oxygen saturation is 100%. Constitutional: Alert, oriented x3, nontoxic, afebrile, answering questions appropriately, acting properly for age, in no acute distress  HEENT: Extraocular muscles intact, mucus membranes moist, TMs clear bilaterally, no posterior pharyngeal erythema or exudates, Pupils equal, round, reactive to light,   Neck: Trachea midline, Supple without lymphadenopathy, no posterior midline neck tenderness to palpation  Cardiovascular: Regular rhythm and rate no S3, S4, or murmurs  Respiratory: Clear to auscultation bilaterally no wheezes, rhonchi, rales, no respiratory distress  Gastrointestinal: Soft, nontender, nondistended, positive bowel sounds. No rebound, rigidity, or guarding. Musculoskeletal: No extremity pain or swelling  Neurologic: Moving all 4 extremities without difficulty there are no gross focal neurologic deficits  Skin: Warm and dry      DIFFERENTIAL DIAGNOSIS/ MDM:     Benign examination possibly consistent with some reflux patient will get a GI cocktail. DIAGNOSTIC RESULTS     EKG: All EKG's are interpreted by the Emergency Department Physician who either signs or Co-signs this chart in the absence of a cardiologist.        Not indicated unless otherwise documented above    LABS:  No results found for this visit on 06/02/20.     Not indicated unless otherwise documented above    RADIOLOGY:   I reviewed the radiologist interpretations:  No orders to display       Not indicated unless otherwise documented above    EMERGENCY DEPARTMENT COURSE:     The patient was given the following medications:  Orders Placed This Encounter   Medications    aluminum & magnesium hydroxide-simethicone (MAALOX) 30 mL, lidocaine viscous hcl (XYLOCAINE) 5 mL (GI COCKTAIL)    famotidine (PEPCID) 20 MG tablet     Sig: Take 1 tablet by

## 2020-06-05 ENCOUNTER — PATIENT MESSAGE (OUTPATIENT)
Dept: FAMILY MEDICINE CLINIC | Age: 45
End: 2020-06-05

## 2020-06-05 NOTE — TELEPHONE ENCOUNTER
From: Carson Phan  To: Lety Kay DO  Sent: 6/5/2020 10:45 AM EDT  Subject: Non-Urgent Medical Question    Pari Vaughn its [place] went to er for acid reflux/tummy issues I was wondering if you could order any tests like ultrasound or xrays to check his appendix or his gall bladder hes never had problems like hes been having recently just never hurt to ask hes been sleeping alot he says it helps with his tummy but im so worried it could be something else at the er they didnt do any tests thanks in advance

## 2020-06-08 ENCOUNTER — OFFICE VISIT (OUTPATIENT)
Dept: FAMILY MEDICINE CLINIC | Age: 45
End: 2020-06-08
Payer: MEDICARE

## 2020-06-08 ENCOUNTER — HOSPITAL ENCOUNTER (OUTPATIENT)
Dept: GENERAL RADIOLOGY | Age: 45
Discharge: HOME | End: 2020-06-10
Payer: MEDICARE

## 2020-06-08 VITALS
TEMPERATURE: 97.8 F | BODY MASS INDEX: 38.36 KG/M2 | DIASTOLIC BLOOD PRESSURE: 62 MMHG | HEART RATE: 94 BPM | WEIGHT: 274 LBS | HEIGHT: 71 IN | OXYGEN SATURATION: 98 % | SYSTOLIC BLOOD PRESSURE: 130 MMHG

## 2020-06-08 PROCEDURE — G8427 DOCREV CUR MEDS BY ELIG CLIN: HCPCS | Performed by: FAMILY MEDICINE

## 2020-06-08 PROCEDURE — 73030 X-RAY EXAM OF SHOULDER: CPT

## 2020-06-08 PROCEDURE — G8926 SPIRO NO PERF OR DOC: HCPCS | Performed by: FAMILY MEDICINE

## 2020-06-08 PROCEDURE — 99212 OFFICE O/P EST SF 10 MIN: CPT

## 2020-06-08 PROCEDURE — 99214 OFFICE O/P EST MOD 30 MIN: CPT | Performed by: FAMILY MEDICINE

## 2020-06-08 PROCEDURE — 3023F SPIROM DOC REV: CPT | Performed by: FAMILY MEDICINE

## 2020-06-08 PROCEDURE — G0438 PPPS, INITIAL VISIT: HCPCS | Performed by: FAMILY MEDICINE

## 2020-06-08 PROCEDURE — G8417 CALC BMI ABV UP PARAM F/U: HCPCS | Performed by: FAMILY MEDICINE

## 2020-06-08 PROCEDURE — 1036F TOBACCO NON-USER: CPT | Performed by: FAMILY MEDICINE

## 2020-06-08 RX ORDER — FAMOTIDINE 20 MG/1
20 TABLET, FILM COATED ORAL 2 TIMES DAILY
Qty: 180 TABLET | Refills: 1 | Status: SHIPPED | OUTPATIENT
Start: 2020-06-08 | End: 2020-10-14

## 2020-06-08 ASSESSMENT — PATIENT HEALTH QUESTIONNAIRE - PHQ9
SUM OF ALL RESPONSES TO PHQ QUESTIONS 1-9: 0
SUM OF ALL RESPONSES TO PHQ QUESTIONS 1-9: 0

## 2020-06-08 ASSESSMENT — LIFESTYLE VARIABLES: HOW OFTEN DO YOU HAVE A DRINK CONTAINING ALCOHOL: 0

## 2020-06-08 NOTE — PROGRESS NOTES
Dry heaves  famotidine (PEPCID) 20 MG tablet   4. Hypertriglyceridemia  Lipid Panel   5. Chronic obstructive pulmonary disease, unspecified COPD type ([address]     6. Screening for diabetes mellitus  Comprehensive Metabolic Panel   7. Routine general medical examination at a health care facility           Plan:      Return for Medicare Annual Wellness Visit in 1 year. Orders Placed This Encounter   Procedures    XR SHOULDER LEFT (MIN 2 VIEWS)     Standing Status:   Future     Number of Occurrences:   1     Standing Expiration Date:   2021     Order Specific Question:   Reason for exam:     Answer:   left shoulder pain x \"years\"; no h/o injury, but pain recently worsening, especially with flexion and abduction    Lipid Panel     Standing Status:   Future     Standing Expiration Date:   2021     Order Specific Question:   Is Patient Fasting?/# of Hours     Answer:   12    Comprehensive Metabolic Panel     Standing Status:   Future     Standing Expiration Date:   2021     Orders Placed This Encounter   Medications    famotidine (PEPCID) 20 MG tablet     Sig: Take 1 tablet by mouth 2 times daily     Dispense:  180 tablet     Refill:  1       Patient given educational materials - see patient instructions. Discussed use, benefit, and side effects of prescribed medications. All patient questions answered. Pt voiced understanding. Reviewed health maintenance. Electronically signed by Sona Jade DO on 2020 at 11:54 PM             Medicare Annual Wellness Visit  Name: Brodie Garcia Date: 2020   MRN: [id number] Sex: Male   Age: 53 y.o. Ethnicity: Non-/Non    : 1975 Race: Sumaya Seo is here for Medicare AWV and 6 Month Follow-Up (HLD, COPD)    Screenings for behavioral, psychosocial and functional/safety risks, and cognitive dysfunction are all negative except as indicated below.  These results, as well as other patient data from the Recommendations for Preventive Services Due: see orders and patient instructions/AVS.  . Recommended screening schedule for the next 5-10 years is provided to the patient in written form: see Patient Instructions/AVS.    Shameka Velazco was seen today for medicare awv and 6 month follow-up. Diagnoses and all orders for this visit:    Chronic left shoulder pain  -     XR SHOULDER LEFT (MIN 2 VIEWS); Future    Dyspepsia  -     famotidine (PEPCID) 20 MG tablet; Take 1 tablet by mouth 2 times daily    Dry heaves  -     famotidine (PEPCID) 20 MG tablet; Take 1 tablet by mouth 2 times daily    Hypertriglyceridemia  -     Lipid Panel; Future    Chronic obstructive pulmonary disease, unspecified COPD type ([facility]    Screening for diabetes mellitus  -     Comprehensive Metabolic Panel; Future    Routine general medical examination at a health care facility              Kwame Cuevas is a 53 y.o. male being evaluated by a Virtual Visit (video and audio) encounter to address concerns as mentioned above. A caregiver was present when appropriate. Due to this being a TeleHealth encounter (During [facility]- public health emergency), evaluation of the following organ systems was limited: Vitals/Constitutional/EENT/Resp/CV/GI//MS/Neuro/Skin/Heme-Lymph-Imm. Pursuant to the emergency declaration under the [address] [place] [address], [address] [place] [address] authority and the [employer] Act, this Virtual Visit was conducted with patient's (and/or legal guardian's) consent, to reduce the patient's risk of exposure to COVID-19 and provide necessary medical care. The patient (and/or legal guardian) has also been advised to contact this office for worsening conditions or problems, and seek emergency medical treatment and/or call 911 if deemed necessary.      Patient identification was verified at the start of the visit: Yes    Services were provided through a video synchronous discussion virtually to substitute for in-person clinic visit. Patient and provider were located at their individual homes. --Sona Jade DO on 6/14/2020 at 11:54 PM    An electronic signature was used to authenticate this note.

## 2020-06-09 ENCOUNTER — CARE COORDINATION (OUTPATIENT)
Dept: CARE COORDINATION | Age: 45
End: 2020-06-09

## 2020-06-09 ENCOUNTER — OFFICE VISIT (OUTPATIENT)
Dept: OPTOMETRY | Age: 45
End: 2020-06-09
Payer: MEDICARE

## 2020-06-09 PROCEDURE — 99213 OFFICE O/P EST LOW 20 MIN: CPT | Performed by: OPTOMETRIST

## 2020-06-09 PROCEDURE — G8427 DOCREV CUR MEDS BY ELIG CLIN: HCPCS | Performed by: OPTOMETRIST

## 2020-06-09 PROCEDURE — 99211 OFF/OP EST MAY X REQ PHY/QHP: CPT

## 2020-06-09 PROCEDURE — G8417 CALC BMI ABV UP PARAM F/U: HCPCS | Performed by: OPTOMETRIST

## 2020-06-09 PROCEDURE — 1036F TOBACCO NON-USER: CPT | Performed by: OPTOMETRIST

## 2020-06-09 ASSESSMENT — REFRACTION_WEARINGRX
OS_AXIS: 115
SPECS_TYPE: BIFOCAL
OD_AXIS: 090
OD_SPHERE: PLANO
OD_ADD: +1.50
OS_CYLINDER: -0.25
OS_ADD: +1.50
OD_CYLINDER: -1.00
OS_SPHERE: -0.75

## 2020-06-09 ASSESSMENT — REFRACTION_MANIFEST
OD_AXIS: 090
OS_AXIS: 135
OS_ADD: +1.50
OD_SPHERE: PLANO
OD_ADD: +1.50
OD_CYLINDER: -1.00
OS_SPHERE: -1.00
OS_CYLINDER: -0.25

## 2020-06-09 ASSESSMENT — ENCOUNTER SYMPTOMS
ALLERGIC/IMMUNOLOGIC NEGATIVE: 0
GASTROINTESTINAL NEGATIVE: 0
RESPIRATORY NEGATIVE: 0
EYES NEGATIVE: 0

## 2020-06-09 ASSESSMENT — KERATOMETRY
OS_AXISANGLE_DEGREES: 095
OD_AXISANGLE_DEGREES: 091
OD_AXISANGLE2_DEGREES: 001
OD_K1POWER_DIOPTERS: 43.75
OS_K2POWER_DIOPTERS: 45.00
OS_AXISANGLE2_DEGREES: 005
OS_K1POWER_DIOPTERS: 43.50
OD_K2POWER_DIOPTERS: 44.25

## 2020-06-09 ASSESSMENT — VISUAL ACUITY
OS_CC+: -1
CORRECTION_TYPE: GLASSES
METHOD: SNELLEN - LINEAR
OS_CC: 20/30
OD_CC: 20/20 OU

## 2020-06-09 ASSESSMENT — TONOMETRY
OD_IOP_MMHG: 17
OS_IOP_MMHG: 24
IOP_METHOD: NON-CONTACT AIR PUFF

## 2020-06-09 ASSESSMENT — SLIT LAMP EXAM - LIDS
COMMENTS: NORMAL
COMMENTS: NORMAL

## 2020-06-09 NOTE — PROGRESS NOTES
Poly Rodriguez presents today for   Chief Complaint   Patient presents with    Blurred Vision    Vision Exam   .    HPI     Blurred Vision     In both eyes. Vision is blurred. Context:  distance vision and reading.               Comments     Last Vision Exam: 6/5/2019 Aw  Last Ophthalmology Exam: 2/21/2020 Zunilda Garrick ; 2019 Nam  Last Filled Glasses Rx: 6/5/2019  Insurance: Medicare  Update: Glasses and recheck of abrasion of Left eye  Distance and reading are getting blury  Using gel drops at night ; not bothering every day   Vision is ok;  Wears glasses full time                  Current Outpatient Medications   Medication Sig Dispense Refill    famotidine (PEPCID) 20 MG tablet Take 1 tablet by mouth 2 times daily 180 tablet 1    fluticasone (FLONASE) 50 MCG/ACT nasal spray 2 sprays by Nasal route daily 3 Bottle 3    albuterol sulfate HFA (VENTOLIN HFA) 108 (90 Base) MCG/ACT inhaler Inhale 1-2 puffs into the lungs every 6 hours as needed for Wheezing 3 Inhaler 3    albuterol (PROVENTIL) (2.5 MG/3ML) 0.083% nebulizer solution Take 3 mLs by nebulization every 6 hours as needed for Wheezing or Shortness of Breath 120 each 3    risperiDONE (RISPERDAL) 3 MG tablet Take 1 tablet by mouth nightly 30 tablet 3    busPIRone (BUSPAR) 15 MG tablet Take 15 mg by mouth 2 times daily as needed (anxiety) 60 tablet 1    cyclobenzaprine (FLEXERIL) 10 MG tablet Take 1 tablet by mouth nightly as needed for Muscle spasms 30 tablet 2    fenofibrate (TRIGLIDE) 160 MG tablet Take 1 tablet by mouth daily 90 tablet 2    divalproex (DEPAKOTE ER) 500 MG extended release tablet THEN   ONE  TABLET  TWICE  DAILY 60 tablet 5    ibuprofen (ADVIL;MOTRIN) 600 MG tablet Take 1 tablet by mouth every 8 hours as needed for Pain 90 tablet 3    loratadine (CLARITIN) 10 MG tablet Take 1 tablet by mouth daily 90 tablet 3    venlafaxine (EFFEXOR XR) 150 MG extended release capsule TAKE ONE CAPSULE BY MOUTH ONCE DAILY 90 capsule 3    Compression Stockings MISC by Does not apply route Wear on bilateral lower extremities daily as needed for edema. 1 each 1    SUMAtriptan (IMITREX) 100 MG tablet Take 1 tablet by mouth once as needed for Migraine (May repeat after 2 hours x 1 more dose, if needed; max dose 200 mg per 24 hours.) 12 tablet 2    Respiratory Therapy Supplies (NEBULIZER COMPRESSOR) KIT 1 kit by Does not apply route once for 1 dose 1 kit 0    Respiratory Therapy Supplies (NEBULIZER/TUBING/MOUTHPIECE) KIT 1 kit by Does not apply route once for 1 dose 1 kit 0     Current Facility-Administered Medications   Medication Dose Route Frequency Provider Last Rate Last Dose    phenylephrine (MYDFRIN) 2.5 % ophthalmic solution 1 drop  1 drop Both Eyes Once Anshul Fagan MD        tropicamide (MYDRIACYL) 1 % ophthalmic solution 1 drop  1 drop Both Eyes Once Anshul Fagan MD             Family History   Problem Relation Age of Onset    Stroke Mother     High Blood Pressure Mother     High Blood Pressure Father     Stroke Father     Glaucoma Neg Hx     Diabetes Neg Hx     Cataracts Neg Hx      Social History     Socioeconomic History    Marital status:      Spouse name: None    Number of children: None    Years of education: None    Highest education level: None   Occupational History    None   Social Needs    Financial resource strain: None    Food insecurity     Worry: None     Inability: None    Transportation needs     Medical: None     Non-medical: None   Tobacco Use    Smoking status: Former Smoker     Packs/day: 1.00     Years: 25.00     Pack years: 25.00     Types: Cigarettes     Start date: 10/16/1992     Last attempt to quit: 2017     Years since quittin.9    Smokeless tobacco: Never Used    Tobacco comment: handout given.     Substance and Sexual Activity    Alcohol use: No    Drug use: No    Sexual activity: Yes     Partners: Female   Lifestyle    Physical activity     Days per week: None     Minutes per session: None    Stress: None   Relationships    Social connections     Talks on phone: None     Gets together: None     Attends Judaism service: None     Active member of club or organization: None     Attends meetings of clubs or organizations: None     Relationship status: None    Intimate partner violence     Fear of current or ex partner: None     Emotionally abused: None     Physically abused: None     Forced sexual activity: None   Other Topics Concern    None   Social History Narrative    None     Past Medical History:   Diagnosis Date    Allergic rhinitis     Bipolar disorder ([facility] [address]     Diagnosed in     Depression     Headache(784.0)     Osteoarthritis     TMJ (dislocation of temporomandibular joint)     Torticollis        ROS     Negative for: Constitutional, Gastrointestinal, Neurological, Skin, Genitourinary, Musculoskeletal, HENT, Endocrine, Cardiovascular, Eyes, Respiratory, Psychiatric, Allergic/Imm, Heme/Lymph          Main Ophthalmology Exam     External Exam       Right Left    External Normal Normal          Slit Lamp Exam       Right Left    Lids/Lashes Normal Normal    Conjunctiva/Sclera White and quiet White and quiet    Cornea Clear mild staining nasally as drawn;  drying out decreased BUT noted     Anterior Chamber Deep and quiet Deep and quiet    Iris Round and reactive Round and reactive    Lens Clear Clear    Vitreous Normal Normal          Fundus Exam       Right Left    Disc Normal Normal    C/D Ratio 0.2 0.2    Macula Normal Normal    Vessels Normal Normal                   Tonometry     Tonometry (Non-contact air puff, 10:39 AM)       Right Left    Pressure 17 24   IOP.2             33.8  CH:  10.7          14.0  WS: 6.9          1.3                  Not recorded         Not recorded          Visual Acuity (Snellen - Linear)       Right Left    Dist cc 20/25 20/30 -1    Near cc 20/20 OU     Correction:  Glasses          Pupils     Pupils       Pupils    Right

## 2020-06-14 ENCOUNTER — HOSPITAL ENCOUNTER (EMERGENCY)
Age: 45
Discharge: HOME | End: 2020-06-14
Attending: EMERGENCY MEDICINE
Payer: MEDICARE

## 2020-06-14 VITALS
TEMPERATURE: 97.9 F | RESPIRATION RATE: 14 BRPM | OXYGEN SATURATION: 98 % | HEART RATE: 77 BPM | SYSTOLIC BLOOD PRESSURE: 145 MMHG | DIASTOLIC BLOOD PRESSURE: 78 MMHG

## 2020-06-14 PROCEDURE — 99282 EMERGENCY DEPT VISIT SF MDM: CPT

## 2020-06-14 RX ORDER — THERMOMETER, ELECTRONIC,ORAL
EACH MISCELLANEOUS
Qty: 28 G | Refills: 0 | Status: SHIPPED | OUTPATIENT
Start: 2020-06-14 | End: 2020-11-10 | Stop reason: ALTCHOICE

## 2020-06-14 ASSESSMENT — ENCOUNTER SYMPTOMS
DIARRHEA: 0
NAUSEA: 0
ABDOMINAL PAIN: 0
SORE THROAT: 0
VOMITING: 0
SHORTNESS OF BREATH: 0
VOMITING: 0

## 2020-06-15 ENCOUNTER — CARE COORDINATION (OUTPATIENT)
Dept: CARE COORDINATION | Age: 45
End: 2020-06-15

## 2020-06-15 NOTE — ED PROVIDER NOTES
the status of his neg hx is unknown.     family history includes High Blood Pressure in his father and mother; Stroke in his father and mother. SOCIAL HISTORY      reports that he quit smoking about 2 years ago. His smoking use included cigarettes. He started smoking about 27 years ago. He has a 25.00 pack-year smoking history. He has never used smokeless tobacco. He reports that he does not drink alcohol or use drugs. PHYSICAL EXAM     INITIAL VITALS:  tympanic temperature is 97.9 °F (36.6 °C). His blood pressure is 145/78 (abnormal) and his pulse is 77. His respiration is 14 and oxygen saturation is 98%. Lifestyle   Physical activity    Days per week: Not on file    Minutes per session: Not on file     Physical Exam   Constitutional: He appears well-developed and well-nourished. No distress. HENT:   Head: Normocephalic and atraumatic. Right Ear: External ear normal.   Left Ear: External ear normal.   Eyes: Lids are normal. Right eye exhibits no discharge. Left eye exhibits no discharge. Neck: No tracheal deviation present. Cardiovascular: Normal rate and regular rhythm. Pulmonary/Chest: Effort normal and breath sounds normal.   Abdominal: Soft. There is no abdominal tenderness. Genitourinary:    Genitourinary Comments: Area of well demarcated fungal infection noted to the groin region worse on the left. Otherwise no evidence of cellulitis. No tenderness or crepitus. No evidence of necrotizing fasciitis or testicular/scrotal infection. Neurological: He is alert. GCS eye subscore is 4. GCS verbal subscore is 5. GCS motor subscore is 6. Skin: Skin is warm and dry. Psychiatric: He has a normal mood and affect. His behavior is normal.         DIFFERENTIAL DIAGNOSIS/ MDM:     Plan to be to put the patient on an antifungal topically. Advised to follow-up with his PCP return sooner if worse. He is comfortable with this plan. No evidence of cellulitis.   It appears fungal.    I have Refill:  0        Vitals:    Vitals:    06/14/20 0242 06/14/20 0410   BP: (!) 155/88 (!) 145/78   Pulse: 84 77   Resp: 14 14   Temp: 97.9 °F (36.6 °C)    TempSrc: Tympanic    SpO2: 98%      -------------------------  BP: (!) 145/78, Temp: 97.9 °F (36.6 °C), Pulse: 77, Resp: 14      CONSULTS:    None    CRITICAL CARE:     None    PROCEDURES:    None    FINAL IMPRESSION      1. Jock itch          DISPOSITION/PLAN   DISPOSITION Decision To Discharge 06/14/2020 03:24:07 AM      Condition on Disposition    Improved    PATIENT REFERRED TO:  DO Cody Cummings [address] [place] [id number]  [phone]    Schedule an appointment as soon as possible for a visit in 3 days        DISCHARGE MEDICATIONS:  Discharge Medication List as of 6/14/2020  3:27 AM      START taking these medications    Details   tolnaftate (TINACTIN) 1 % cream Apply topically 2 times daily. 2 weeks. , Disp-28 g, R-0, Print             (Please note that portions of this note were completed with a voice recognition program.  Efforts were made to edit the dictations but occasionally words are mis-transcribed.)    Mickey Le DO  Attending Emergency Physician       Mickey Le DO  06/14/20 [id number]

## 2020-06-15 NOTE — CARE COORDINATION
Attempt to contact patient today regarding ED follow up, COVID -19 Monitoring. Unable to reach patient. Left voicemail message      Recent encounters and notes reviewed.     Future Appointments   Date Time Provider Haley Corado   6/17/2020  2:00 PM MD Remberto Kent [facility]   9/24/2020  0:32 AM Andrew Armenta MD [facility]   11/10/2020 10:20 AM [facility] Care [facility]  [phone]

## 2020-06-15 NOTE — PATIENT INSTRUCTIONS
Personalized Preventive Plan for Loyda Husbands - 6/8/2020  Medicare offers a range of preventive health benefits. Some of the tests and screenings are paid in full while other may be subject to a deductible, co-insurance, and/or copay. Some of these benefits include a comprehensive review of your medical history including lifestyle, illnesses that may run in your family, and various assessments and screenings as appropriate. After reviewing your medical record and screening and assessments performed today your provider may have ordered immunizations, labs, imaging, and/or referrals for you. A list of these orders (if applicable) as well as your Preventive Care list are included within your After Visit Summary for your review. Other Preventive Recommendations:    · A preventive eye exam performed by an eye specialist is recommended every 1-2 years to screen for glaucoma; cataracts, macular degeneration, and other eye disorders. · A preventive dental visit is recommended every 6 months. · Try to get at least 150 minutes of exercise per week or 10,000 steps per day on a pedometer . · Order or download the FREE \"Exercise & Physical Activity: Your Everyday Guide\" from [employer]. Call 0-592.371.1154 or search [employer] online. · You need 2493-3523 mg of calcium and 8227-0151 IU of vitamin D per day. It is possible to meet your calcium requirement with diet alone, but a vitamin D supplement is usually necessary to meet this goal.  · When exposed to the sun, use a sunscreen that protects against both UVA and UVB radiation with an SPF of 30 or greater. Reapply every 2 to 3 hours or after sweating, drying off with a towel, or swimming. · Always wear a seat belt when traveling in a car. Always wear a helmet when riding a bicycle or motorcycle.

## 2020-06-16 ENCOUNTER — CARE COORDINATION (OUTPATIENT)
Dept: CARE COORDINATION | Age: 45
End: 2020-06-16

## 2020-06-17 ENCOUNTER — OFFICE VISIT (OUTPATIENT)
Dept: OPHTHALMOLOGY | Age: 45
End: 2020-06-17
Payer: MEDICARE

## 2020-06-17 PROCEDURE — 92083 EXTENDED VISUAL FIELD XM: CPT | Performed by: OPHTHALMOLOGY

## 2020-06-17 PROCEDURE — 92014 COMPRE OPH EXAM EST PT 1/>: CPT | Performed by: OPHTHALMOLOGY

## 2020-06-17 ASSESSMENT — SLIT LAMP EXAM - LIDS
COMMENTS: MILD BLEPHARITIS. MILD MGD
COMMENTS: SCURF

## 2020-06-17 ASSESSMENT — VISUAL ACUITY
METHOD: SNELLEN - LINEAR
OS_CC: 20/20
CORRECTION_TYPE: GLASSES

## 2020-06-17 ASSESSMENT — CONF VISUAL FIELD
OS_NORMAL: 1
OD_NORMAL: 1
METHOD: COUNTING FINGERS

## 2020-06-19 RX ORDER — BUSPIRONE HYDROCHLORIDE 15 MG/1
15 TABLET ORAL 2 TIMES DAILY PRN
Qty: 60 TABLET | Refills: 5 | Status: SHIPPED | OUTPATIENT
Start: 2020-06-19 | End: 2020-11-24 | Stop reason: SDUPTHER

## 2020-07-01 ENCOUNTER — PATIENT MESSAGE (OUTPATIENT)
Dept: FAMILY MEDICINE CLINIC | Age: 45
End: 2020-07-01

## 2020-07-01 NOTE — TELEPHONE ENCOUNTER
From: Edilia Rod  To: Avril Brito DO  Sent: 7/1/2020 5:26 AM EDT  Subject: Non-Urgent Medical Question    I am still dry hiving and is getting a little tiring the med's don't seem to be helping very much is there something else I could be taking or up the dose  Thanks     Tali Singh

## 2020-07-07 RX ORDER — ONDANSETRON 4 MG/1
4 TABLET, FILM COATED ORAL EVERY 8 HOURS PRN
Qty: 20 TABLET | Refills: 0 | Status: SHIPPED | OUTPATIENT
Start: 2020-07-07 | End: 2020-07-21 | Stop reason: SDUPTHER

## 2020-07-13 ENCOUNTER — PATIENT MESSAGE (OUTPATIENT)
Dept: FAMILY MEDICINE CLINIC | Age: 45
End: 2020-07-13

## 2020-07-13 NOTE — TELEPHONE ENCOUNTER
From: Jake Pino  To: Donte Mccall DO  Sent: 7/13/2020 2:57 PM EDT  Subject: Non-Urgent Medical Question    The medicine you put me on for the dry heave's is helping a lot I have only had a few episodes

## 2020-07-14 ENCOUNTER — PATIENT MESSAGE (OUTPATIENT)
Dept: FAMILY MEDICINE CLINIC | Age: 45
End: 2020-07-14

## 2020-07-15 NOTE — TELEPHONE ENCOUNTER
From: Ovi Nino  To: Dorothea Costello DO  Sent: 7/14/2020 6:46 PM EDT  Subject: Non-Urgent Medical Question    Once per day but 6 days in total but those days are not together there apart      ----- Message -----   From:Karen Vance DO   Sent:7/13/2020 6:52 PM EDT   To:Efren Tang   Subject:RE: Non-Urgent Medical Question      Good - I'm so glad!  How many times have you had to use it, and do you only then need it once for the day?       ----- Message -----   From:Efren Tang   Sent:7/13/2020 2:57 PM EDT   To:Karen Vance DO   Subject:Non-Urgent Medical Question    The medicine you put me on for the dry heave's is helping a lot I have only had a few episodes

## 2020-07-20 ENCOUNTER — PATIENT MESSAGE (OUTPATIENT)
Dept: FAMILY MEDICINE CLINIC | Age: 45
End: 2020-07-20

## 2020-07-20 NOTE — TELEPHONE ENCOUNTER
From: Falguni Canales  To: Elle Hale DO  Sent: 7/20/2020 1:07 PM EDT  Subject: Prescription Question    Yes I will try that I usually take the Flexril at bed time as it helps with relaxing my muscles and I sleep better      ----- Message -----   From:Karen Vance DO   Sent:7/20/2020 8:10 AM EDT   To:Efren Tang   Subject:RE: Prescription Question      Do you want to try Tizanidine, in place of the Flexeril? You had tried this one other time in 2016, but have not been on it since. You would still use only as needed, and maybe more at bedtime, as it will also make you drowsy. If you try this, and you're still having so much pain you cannot sleep, I would recommend PT and/or Ortho for further evaluation/treatment of your pain.      Thanks,  Dr. Erin Renteria      ----- Message -----   From:Efren Tang   Sent:7/14/2020 6:49 PM EDT   To:Karen Vance DO   Subject:Prescription Question    Is there any way I can get a different muscle spasm medicine or one for muscle Pain and relaxation I have been in a lot of pain in both my shoulders so bad I couldn't even sleep

## 2020-07-21 NOTE — TELEPHONE ENCOUNTER
Onalee Patron called to follow up on [employer] messages for Tali Singh. Zofran helps but he has used up the supply from July 7. He would also like to try Tizanidine instead of Flexeril. Send both to [facility] Next appt 11-10-20.

## 2020-07-22 RX ORDER — ONDANSETRON 4 MG/1
4 TABLET, FILM COATED ORAL EVERY 8 HOURS PRN
Qty: 30 TABLET | Refills: 1 | Status: SHIPPED | OUTPATIENT
Start: 2020-07-22 | End: 2020-11-06 | Stop reason: SDUPTHER

## 2020-07-22 RX ORDER — TIZANIDINE 4 MG/1
4 TABLET ORAL EVERY 8 HOURS PRN
Qty: 20 TABLET | Refills: 0 | Status: SHIPPED | OUTPATIENT
Start: 2020-07-22 | End: 2020-08-05 | Stop reason: SDUPTHER

## 2020-07-23 RX ORDER — SUMATRIPTAN 100 MG/1
TABLET, FILM COATED ORAL
Qty: 9 TABLET | Refills: 1 | Status: SHIPPED | OUTPATIENT
Start: 2020-07-23 | End: 2020-07-28

## 2020-07-28 RX ORDER — SUMATRIPTAN 100 MG/1
TABLET, FILM COATED ORAL
Qty: 9 TABLET | Refills: 1 | Status: SHIPPED | OUTPATIENT
Start: 2020-07-28 | End: 2020-09-10 | Stop reason: SDUPTHER

## 2020-07-30 ENCOUNTER — HOSPITAL ENCOUNTER (EMERGENCY)
Age: 45
Discharge: HOME | End: 2020-07-31
Attending: EMERGENCY MEDICINE
Payer: MEDICARE

## 2020-07-30 ENCOUNTER — APPOINTMENT (OUTPATIENT)
Dept: GENERAL RADIOLOGY | Age: 45
End: 2020-07-30
Payer: MEDICARE

## 2020-07-30 PROCEDURE — 73610 X-RAY EXAM OF ANKLE: CPT

## 2020-07-30 PROCEDURE — 99283 EMERGENCY DEPT VISIT LOW MDM: CPT

## 2020-07-30 ASSESSMENT — PAIN SCALES - GENERAL: PAINLEVEL_OUTOF10: 5

## 2020-07-30 ASSESSMENT — PAIN DESCRIPTION - DESCRIPTORS: DESCRIPTORS: ACHING;CONSTANT

## 2020-07-30 ASSESSMENT — PAIN DESCRIPTION - PAIN TYPE: TYPE: ACUTE PAIN

## 2020-07-30 ASSESSMENT — PAIN DESCRIPTION - FREQUENCY: FREQUENCY: CONTINUOUS

## 2020-07-30 ASSESSMENT — PAIN DESCRIPTION - ONSET: ONSET: ON-GOING

## 2020-07-30 ASSESSMENT — PAIN DESCRIPTION - ORIENTATION: ORIENTATION: RIGHT

## 2020-07-30 ASSESSMENT — PAIN DESCRIPTION - LOCATION: LOCATION: ANKLE

## 2020-07-31 VITALS
TEMPERATURE: 98.2 F | SYSTOLIC BLOOD PRESSURE: 144 MMHG | OXYGEN SATURATION: 97 % | RESPIRATION RATE: 16 BRPM | HEART RATE: 81 BPM | DIASTOLIC BLOOD PRESSURE: 78 MMHG

## 2020-07-31 NOTE — ED PROVIDER NOTES
THEN   ONE  TABLET  TWICE  DAILY    FAMOTIDINE (PEPCID) 20 MG TABLET    Take 1 tablet by mouth 2 times daily    FENOFIBRATE (TRIGLIDE) 160 MG TABLET    Take 1 tablet by mouth daily    FLUTICASONE (FLONASE) 50 MCG/ACT NASAL SPRAY    2 sprays by Nasal route daily    IBUPROFEN (ADVIL;MOTRIN) 600 MG TABLET    Take 1 tablet by mouth every 8 hours as needed for Pain    LORATADINE (CLARITIN) 10 MG TABLET    Take 1 tablet by mouth daily    ONDANSETRON (ZOFRAN) 4 MG TABLET    Take 1 tablet by mouth every 8 hours as needed for Nausea    RESPIRATORY THERAPY SUPPLIES (NEBULIZER COMPRESSOR) KIT    1 kit by Does not apply route once for 1 dose    RESPIRATORY THERAPY SUPPLIES (NEBULIZER/TUBING/MOUTHPIECE) KIT    1 kit by Does not apply route once for 1 dose    RISPERIDONE (RISPERDAL) 3 MG TABLET    Take 1 tablet by mouth nightly    SUMATRIPTAN (IMITREX) 100 MG TABLET    TAKE ONE TABLET BY MOUTH AT ONSET OF HEADACHE; MAY REPEAT ONE TABLET IN 2 HOURS IF NEEDED. TIZANIDINE (ZANAFLEX) 4 MG TABLET    Take 1 tablet by mouth every 8 hours as needed (muscle spasm)    TOLNAFTATE (TINACTIN) 1 % CREAM    Apply topically 2 times daily. 2 weeks. VENLAFAXINE (EFFEXOR XR) 150 MG EXTENDED RELEASE CAPSULE    TAKE ONE CAPSULE BY MOUTH ONCE DAILY       ALLERGIES     is allergic to naproxen. FAMILY HISTORY     He indicated that the status of his mother is unknown. He indicated that the status of his father is unknown. He indicated that the status of his neg hx is unknown.     family history includes High Blood Pressure in his father and mother; Stroke in his father and mother. SOCIAL HISTORY      reports that he quit smoking about 3 years ago. His smoking use included cigarettes. He started smoking about 27 years ago. He has a 25.00 pack-year smoking history. He has never used smokeless tobacco. He reports that he does not drink alcohol or use drugs.     PHYSICAL EXAM       ED Triage Vitals [07/30/20 2316]   BP Temp Temp Source Pulse Resp SpO2 Height Weight   (!) 168/81 98.2 °F (36.8 °C) Tympanic 91 16 97 % -- --     Constitutional: Alert, oriented x3, nontoxic, answering questions appropriately, acting properly for age, in no acute distress   HEENT: Extraocular muscles intact,  Neck: Trachea midline,  Musculoskeletal: Right lower extremity no pain at the hip or knee no fibular head tenderness. There is tenderness to lateral malleolus without deformity. No ecchymosis. Pedal pulses intact and capillary refill less than 2 seconds. Drawer negative. No fifth metatarsal head tenderness. Neurologic: Right lower extremity motor and sensory intact. Skin: Warm and dry     DIFFERENTIAL DIAGNOSIS/ MDM:     xray    DIAGNOSTIC RESULTS     EKG: All EKG's are interpreted by the Emergency Department Physician who either signs or Co-signs this chart in the absence of a cardiologist.        Not indicated unless otherwise documented above    LABS:  No results found for this visit on 07/30/20. Not indicated unless otherwise documented above    RADIOLOGY:   I reviewed the radiologist interpretations:    XR ANKLE RIGHT (MIN 3 VIEWS)   Final Result   No fracture or dislocation. Mild soft tissue swelling. Not indicated unless otherwise documented above    EMERGENCY DEPARTMENT COURSE:     The patient was given the following medications:  No orders of the defined types were placed in this encounter. Vitals:   -------------------------  BP (!) 168/81   Pulse 91   Temp 98.2 °F (36.8 °C) (Tympanic)   Resp 16   SpO2 97%     11:45 PM x-ray unremarkable for fracture. Ace wrap applied follow-up and return if worse. Ice elevate Motrin for pain. I have reviewed the disposition diagnosis with the patient and or their family/guardian. I have answered their questions and given discharge instructions. They voiced understanding of these instructions and did not have any furtherquestions or complaints.     CRITICAL CARE:    None    CONSULTS:    None    PROCEDURES:    None      OARRS Report if indicated             FINAL IMPRESSION      1.  Sprain of right ankle, unspecified ligament, initial encounter          DISPOSITION/PLAN   DISPOSITION Decision To Discharge 07/30/2020 11:48:12 PM        CONDITION ON DISPOSITION: STABLE       PATIENT REFERRED TO:  Avril Brito DO  [address] [facility]  [phone]    Schedule an appointment as soon as possible for a visit in 1 week        DISCHARGE MEDICATIONS:  New Prescriptions    No medications on file       (Please note that portions of thisnote were completed with a voice recognition program.  Efforts were made to edit the dictations but occasionally words are mis-transcribed.)    Archibald DO  Attending Emergency Physician       Terry Brunson DO  07/31/20 0000

## 2020-08-05 ENCOUNTER — PATIENT MESSAGE (OUTPATIENT)
Dept: FAMILY MEDICINE CLINIC | Age: 45
End: 2020-08-05

## 2020-08-05 RX ORDER — TIZANIDINE 4 MG/1
4 TABLET ORAL EVERY 8 HOURS PRN
Qty: 40 TABLET | Refills: 1 | Status: SHIPPED | OUTPATIENT
Start: 2020-08-05 | End: 2020-09-10 | Stop reason: SDUPTHER

## 2020-08-05 NOTE — TELEPHONE ENCOUNTER
From: Emelyn Armendariz  To: Evita Ahmadi DO  Sent: 8/5/2020 1:31 AM EDT  Subject: Non-Urgent Medical Question    The Muscle spasm med's u wanted me to retry are helping a lot can I continue taking them I take them at bed time so I am not dealing with muscle pain Thank you for being a great Doctor and listening to me and helping me out     Fareed Folk

## 2020-08-05 NOTE — TELEPHONE ENCOUNTER
From: Mary Ahn  To: Coby Bourne DO  Sent: 8/5/2020 4:49 PM EDT  Subject: Fartun Zamudio Dr [facility] is there any way I could get a B12 1000 mg prescription as I have to get them every month also a folic acid 234 mg as i take those too

## 2020-08-26 RX ORDER — DIVALPROEX SODIUM 500 MG/1
TABLET, EXTENDED RELEASE ORAL
Qty: 60 TABLET | Refills: 5 | Status: SHIPPED | OUTPATIENT
Start: 2020-08-26 | End: 2020-12-31 | Stop reason: SDUPTHER

## 2020-08-27 ENCOUNTER — TELEPHONE (OUTPATIENT)
Dept: PULMONOLOGY | Age: 45
End: 2020-08-27

## 2020-08-27 RX ORDER — ALBUTEROL SULFATE 90 UG/1
1-2 AEROSOL, METERED RESPIRATORY (INHALATION) EVERY 6 HOURS PRN
Qty: 3 INHALER | Refills: 3 | Status: SHIPPED | OUTPATIENT
Start: 2020-08-27 | End: 2021-01-31 | Stop reason: SDUPTHER

## 2020-08-27 NOTE — TELEPHONE ENCOUNTER
Albuterol sent   Please confirm with pt 's which inhaler is he on - last visit I sent stiolto for him

## 2020-08-27 NOTE — TELEPHONE ENCOUNTER
Patient was also on Trelegy for his COPD. States this was \"making him sick\" so he stopped using it. He is asking if you would send in another inhaler for him to use daily? Thanks.

## 2020-08-28 RX ORDER — TIOTROPIUM BROMIDE 18 UG/1
18 CAPSULE ORAL; RESPIRATORY (INHALATION) DAILY
Qty: 90 CAPSULE | Refills: 0 | Status: SHIPPED | OUTPATIENT
Start: 2020-08-28 | End: 2020-08-31 | Stop reason: SDUPTHER

## 2020-08-31 RX ORDER — TIOTROPIUM BROMIDE 18 UG/1
18 CAPSULE ORAL; RESPIRATORY (INHALATION) DAILY
Qty: 90 CAPSULE | Refills: 0 | Status: SHIPPED | OUTPATIENT
Start: 2020-08-31 | End: 2020-11-10 | Stop reason: SDUPTHER

## 2020-09-10 ENCOUNTER — PATIENT MESSAGE (OUTPATIENT)
Dept: FAMILY MEDICINE CLINIC | Age: 45
End: 2020-09-10

## 2020-09-10 RX ORDER — SUMATRIPTAN 100 MG/1
100 TABLET, FILM COATED ORAL
Qty: 9 TABLET | Refills: 1 | Status: SHIPPED | OUTPATIENT
Start: 2020-09-10 | End: 2020-09-24 | Stop reason: SDUPTHER

## 2020-09-11 RX ORDER — FAMOTIDINE 20 MG/1
20 TABLET, FILM COATED ORAL 2 TIMES DAILY
Qty: 180 TABLET | Refills: 0 | Status: CANCELLED | OUTPATIENT
Start: 2020-09-11

## 2020-09-11 RX ORDER — FENOFIBRATE 160 MG/1
160 TABLET ORAL DAILY
Qty: 90 TABLET | Refills: 0 | Status: CANCELLED | OUTPATIENT
Start: 2020-09-11

## 2020-09-11 NOTE — TELEPHONE ENCOUNTER
From: Lara Price  To: Abdirizak Bruner DO  Sent: 9/10/2020 4:33 PM EDT  Subject: Prescription Question    I asked for a refill on my Tizanidine but i just went through the list and checked the ones that i seen had no refills before I seen what it was it needs refilled but i have some for about a month and a few days so no rush on it okay     Thank you nate Pelletier

## 2020-09-14 RX ORDER — RISPERIDONE 3 MG/1
3 TABLET, FILM COATED ORAL NIGHTLY
Qty: 30 TABLET | Refills: 5 | Status: SHIPPED | OUTPATIENT
Start: 2020-09-14 | End: 2021-01-31 | Stop reason: SDUPTHER

## 2020-09-14 RX ORDER — TIZANIDINE 4 MG/1
4 TABLET ORAL EVERY 8 HOURS PRN
Qty: 40 TABLET | Refills: 2 | Status: SHIPPED | OUTPATIENT
Start: 2020-09-14 | End: 2021-01-31 | Stop reason: SDUPTHER

## 2020-09-14 NOTE — TELEPHONE ENCOUNTER
Noted - that should be pt's last refill of both Tizanidine and Risperdal, so will send new Rx's for those now.

## 2020-09-24 RX ORDER — SUMATRIPTAN 100 MG/1
100 TABLET, FILM COATED ORAL
Qty: 9 TABLET | Refills: 1 | Status: SHIPPED | OUTPATIENT
Start: 2020-09-24 | End: 2020-12-07 | Stop reason: SDUPTHER

## 2020-10-15 ENCOUNTER — TELEPHONE (OUTPATIENT)
Dept: PULMONOLOGY | Age: 45
End: 2020-10-15

## 2020-10-15 RX ORDER — FAMOTIDINE 20 MG/1
TABLET, FILM COATED ORAL
Qty: 180 TABLET | Refills: 3 | Status: SHIPPED | OUTPATIENT
Start: 2020-10-15 | End: 2021-01-31 | Stop reason: SDUPTHER

## 2020-10-15 NOTE — TELEPHONE ENCOUNTER
Pt is due for labs in approx 2-3 weeks, prior to upcoming OV on 11/10. Please remind pt to have these done prior to visit, and we will refill Fenofibrate at that time, after reviewing LP. Pt should have enough med until then based on last refill.

## 2020-10-15 NOTE — TELEPHONE ENCOUNTER
My chart message:    I [facility] if [facility] could [facility] that my electric is not able to be shut off do to having copd and doing breathing treatments with my nebulizor and it requires electric to run they sent us a shut off notice for being behind by 44 dollars can u please help? You have not seen patient since 4/23/19, no future appointment scheduled. Do you want to address this or send to PCP? Thanks.

## 2020-10-18 ENCOUNTER — PATIENT MESSAGE (OUTPATIENT)
Dept: FAMILY MEDICINE CLINIC | Age: 45
End: 2020-10-18

## 2020-10-21 ENCOUNTER — PATIENT MESSAGE (OUTPATIENT)
Dept: FAMILY MEDICINE CLINIC | Age: 45
End: 2020-10-21

## 2020-10-21 RX ORDER — MENTHOL 40 MG/ML
GEL TOPICAL
Qty: 237 ML | Refills: 5 | Status: SHIPPED | OUTPATIENT
Start: 2020-10-21

## 2020-10-21 NOTE — TELEPHONE ENCOUNTER
From: Asim Damian  To: Haroon Batista DO  Sent: 10/21/2020 8:21 AM EDT  Subject: Non-Urgent Medical Question    Good morning Dr Richardson I was wondering if any of my insurance that i have pays for bio freeze as the 8oz bottle i bought I am out of already and don't got 30 dollars to get any more till the 3rd   Thank you  Payton Paredes

## 2020-10-22 ENCOUNTER — PATIENT MESSAGE (OUTPATIENT)
Dept: FAMILY MEDICINE CLINIC | Age: 45
End: 2020-10-22

## 2020-10-23 ENCOUNTER — PATIENT MESSAGE (OUTPATIENT)
Dept: FAMILY MEDICINE CLINIC | Age: 45
End: 2020-10-23

## 2020-10-23 NOTE — TELEPHONE ENCOUNTER
From: Loni Swift  To: Cathy Bullard DO  Sent: 10/22/2020 4:57 PM EDT  Subject: Non-Urgent Medical Question    We are waiting on my insurance to get back to me on if they will cover it or not hope fully they call here soon      ----- Message -----   [id number]  Elva Galeana DO   Sent:10/21/2020 5:33 PM EDT   To:Efren Tang   Subject:RE: Non-Urgent Medical Question      Hi Robbi Larson,    I've sent in a Rx for BioFreeze to [facility] on [address] for you with some refills. I don't know if insurance will cover it, but we can at least try! Keep me posted on whether they do. Hope this helps. Thanks,  Dr. Jeanette Adams      ----- Message -----   From:Efren Tang   Sent:10/21/2020 9:05 AM EDT   To:Karen Vance DO   Subject:RE: Non-Urgent Medical Question    shoulders n neck Danna Gonsalves      ----- Message -----   From:Nurse Danna Gonsalves   MIYK:53/02/1840 9:03 AM EDT   To:Efren Harvey   Subject:RE: Non-Urgent Medical Question    [id number] [address] - which [facility]? And what area are you applying it to?      ----- Message -----   From:Efren Tang   Sent:10/21/2020 8:54 AM EDT   To:Karen Vance DO   Subject:RE: Non-Urgent Medical Question    [facility] be [facility] they carry it bio freeze 8oz gel bottle      ----- Message -----   From:Nurse Danna Gonsalves   Sent:10/21/2020 8:47 AM EDT   To:Efren Tang   Subject:RE: Non-Urgent Medical Question    Probably not, but we'll be glad to try it. Can you tell us what % is on the bottle & is it gel, spray, or liquid form?  And you use [facility]?      ----- Message -----   From:Efren Tang   Sent:7/24/65 8:21 AM EDT   To:Karen Vance, DO   Subject:Non-Urgent Medical Question    Good morning Dr Jeanette Adams I was wondering if any of my insurance that i have pays for bio freeze as the 8oz bottle i bought I am out of already and don't got 30 dollars to get any more till the 3rd   Thank you  Robbi Larson

## 2020-10-26 ENCOUNTER — PATIENT MESSAGE (OUTPATIENT)
Dept: FAMILY MEDICINE CLINIC | Age: 45
End: 2020-10-26

## 2020-10-26 NOTE — TELEPHONE ENCOUNTER
From: Donny Paredes  To: Lore Nice DO  Sent: 10/26/2020 2:20 PM EDT  Subject: Non-Urgent Medical Question    Both shoulders as the right one hurts more then the left one it used to be the left one only but now both i been laying on a heating pad to sleep it really don't help much Thank you Dr Rut watts the one there at the clinic down in the basement      ----- Message -----   [address] [facility] DO [place]    Sent:10/26/2020 2:13 PM EDT   To:Efren Tang   Subject:RE: Non-Urgent Medical Question      Sorry I just saw this message as well, Jaqueline Joshua. I think I missed it. Are you requesting a referral for physical therapy for your shoulder pain as well? If so, would it be for both shoulders, or just one, and do you have a place that you prefer to go for the sessions? Just let me know, and I can put that in for you!     Thanks,  Dr. Rut Jeffries      ----- Message -----   From:Efren Tang   Sent:10/18/2020 4:59 AM EDT   To:Karen Vance DO   Subject:Non-Urgent Medical Question    Is there a way I can do therapy on both my shoulders as they both hurt most of the time today 10/17/2020 i went and got some bio freeze as for 3 days I was not able to sleep cause of the pain the bio freeze worked so I could at least get some sleep i was so happy but it don't help fully n have to reapply bio freeze for the pain to be less but not fully gone   Thank you Doctor Rut Jeffries

## 2020-10-27 NOTE — TELEPHONE ENCOUNTER
From: Nohemi Cohen  To: Cherry Rodriguez DO  Sent: 10/26/2020 11:25 PM EDT  Subject: Non-Urgent Medical Question    They called tonight and got me set up Thank you so much for being such an amazing Doctor to me and my family      ----- Message -----   [address] [facility] DO [place]   Sent:10/26/2020 7:35 PM EDT   To:Efren Tang   Subject:RE: Non-Urgent Medical Question      Got it - I put in the referral to our PT dept here for both shoulders. Let me know if you do not receive a call to schedule an appt. Dr. Pablo Collins      ----- Message -----   From:Efren Tang   Sent:10/26/2020 2:20 PM EDT   To:Karen Vanec DO   Subject:Non-Urgent Medical Question    Both shoulders as the right one hurts more then the left one it used to be the left one only but now both i been laying on a heating pad to sleep it really don't help much Thank you Dr Pablo watts the one there at the clinic down in the basement      ----- Message -----   [address] [facility] DO [place]   Sent:10/26/2020 2:13 PM EDT   To:Efren Tang   Subject:RE: Non-Urgent Medical Question      Sorry I just saw this message as well, Berna Shoemaker. I think I missed it. Are you requesting a referral for physical therapy for your shoulder pain as well? If so, would it be for both shoulders, or just one, and do you have a place that you prefer to go for the sessions? Just let me know, and I can put that in for you!     Thanks,  Dr. Pablo Collins      ----- Message -----   From:Efren Tang   Sent:10/18/2020 4:59 AM EDT   To:Karen Vance DO   Subject:Non-Urgent Medical Question    Is there a way I can do therapy on both my shoulders as they both hurt most of the time today 10/17/2020 i went and got some bio freeze as for 3 days I was not able to sleep cause of the pain the bio freeze worked so I could at least get some sleep i was so happy but it don't help fully n have to reapply bio freeze for the pain to be less but not fully gone   Thank you Doctor Pablo Collins

## 2020-10-29 ENCOUNTER — HOSPITAL ENCOUNTER (OUTPATIENT)
Dept: LAB | Age: 45
Discharge: HOME | End: 2020-10-29
Payer: MEDICARE

## 2020-10-29 ENCOUNTER — HOSPITAL ENCOUNTER (OUTPATIENT)
Dept: PHYSICAL THERAPY | Age: 45
Setting detail: THERAPIES SERIES
Discharge: HOME | End: 2020-10-29
Payer: MEDICARE

## 2020-10-29 LAB
ALBUMIN SERPL-MCNC: 4.9 G/DL (ref 3.5–5.2)
ALBUMIN/GLOBULIN RATIO: 2.1 (ref 1–2.5)
ALP BLD-CCNC: 78 U/L (ref 40–129)
ALT SERPL-CCNC: 56 U/L (ref 5–41)
ANION GAP SERPL CALCULATED.3IONS-SCNC: 11 MMOL/L (ref 9–17)
AST SERPL-CCNC: 31 U/L
BILIRUB SERPL-MCNC: 0.36 MG/DL (ref 0.3–1.2)
BUN BLDV-MCNC: 14 MG/DL (ref 6–20)
BUN/CREAT BLD: 15 (ref 9–20)
CALCIUM SERPL-MCNC: 10.1 MG/DL (ref 8.6–10.4)
CHLORIDE BLD-SCNC: 100 MMOL/L (ref 98–107)
CHOLESTEROL/HDL RATIO: 8.8
CHOLESTEROL: 228 MG/DL
CO2: 26 MMOL/L (ref 20–31)
CREAT SERPL-MCNC: 0.93 MG/DL (ref 0.7–1.2)
GFR AFRICAN AMERICAN: >60 ML/MIN
GFR NON-AFRICAN AMERICAN: >60 ML/MIN
GFR SERPL CREATININE-BSD FRML MDRD: ABNORMAL ML/MIN/{1.73_M2}
GFR SERPL CREATININE-BSD FRML MDRD: ABNORMAL ML/MIN/{1.73_M2}
GLUCOSE BLD-MCNC: 103 MG/DL (ref 70–99)
HDLC SERPL-MCNC: 26 MG/DL
LDL CHOLESTEROL: 155 MG/DL (ref 0–130)
POTASSIUM SERPL-SCNC: 3.9 MMOL/L (ref 3.7–5.3)
SODIUM BLD-SCNC: 137 MMOL/L (ref 135–144)
TOTAL PROTEIN: 7.2 G/DL (ref 6.4–8.3)
TRIGL SERPL-MCNC: 233 MG/DL
VITAMIN D 25-HYDROXY: 14.4 NG/ML (ref 30–100)
VLDLC SERPL CALC-MCNC: ABNORMAL MG/DL (ref 1–30)

## 2020-10-29 PROCEDURE — 97161 PT EVAL LOW COMPLEX 20 MIN: CPT

## 2020-10-29 PROCEDURE — 80053 COMPREHEN METABOLIC PANEL: CPT

## 2020-10-29 PROCEDURE — 36415 COLL VENOUS BLD VENIPUNCTURE: CPT

## 2020-10-29 PROCEDURE — 80061 LIPID PANEL: CPT

## 2020-10-29 PROCEDURE — 82306 VITAMIN D 25 HYDROXY: CPT

## 2020-10-29 ASSESSMENT — PAIN DESCRIPTION - DESCRIPTORS: DESCRIPTORS: SHARP

## 2020-10-29 ASSESSMENT — PAIN DESCRIPTION - ORIENTATION: ORIENTATION: LEFT;RIGHT

## 2020-10-29 ASSESSMENT — PAIN DESCRIPTION - ONSET: ONSET: AWAKENED FROM SLEEP

## 2020-10-29 ASSESSMENT — PAIN DESCRIPTION - PROGRESSION: CLINICAL_PROGRESSION: GRADUALLY WORSENING

## 2020-10-29 ASSESSMENT — PAIN DESCRIPTION - PAIN TYPE: TYPE: CHRONIC PAIN

## 2020-10-29 ASSESSMENT — PAIN SCALES - GENERAL: PAINLEVEL_OUTOF10: 7

## 2020-10-29 ASSESSMENT — PAIN DESCRIPTION - LOCATION: LOCATION: SHOULDER

## 2020-10-29 ASSESSMENT — PAIN DESCRIPTION - FREQUENCY: FREQUENCY: CONTINUOUS

## 2020-10-29 NOTE — PLAN OF CARE
week [] 5 weeks     [x] 2 days? [] 2 weeks [x] 6 weeks     [x] 3 days   [] 3 weeks [] 7 weeks     [] 4 days   [] 4 weeks [] 8 weeks    Rehab Potential: [] Excellent [] Good [] Fair  [] Poor     Electronically signed by:  Betsey Gabriel PT    If you have any questions or concerns, please don't hesitate to call.   Thank you for your referral.      Physician Signature:________________________________Date:__________________  By signing above, therapists plan is approved by physician

## 2020-10-29 NOTE — PROGRESS NOTES
Physical Therapy  Initial Assessment  Date: 10/29/2020  Patient Name: Donny Paredes  MRN: [id number]  : 1975          Restrictions       Subjective   General  Referring Practitioner: Lore Nice DO  Referral Date : 10/26/20  Diagnosis: M25.512, G89.29 (ICD-10-CM) - Chronic left shoulder pain ,M54.2, G89.29 (ICD-10-CM) - Chronic neck pain ,M25.511 (ICD-10-CM) - Right shoulder pain, unspecified chronicity  PT Visit Information  PT Insurance Information: Medicare/Medicaid  Subjective  Subjective: Shoudler and Neck pain for years, has had an increase in pain in the last several months. Biofreeze helps, but not lasting. Patient noting pain with overhead reaching and lifting, difficulty sleeping. C/O migraines 3x/week. Pain Screening  Patient Currently in Pain: Yes  Pain Assessment  Pain Assessment: 0-10  Pain Level:  7(best 5/10, worst 10/10)  Pain Type: Chronic pain  Pain Location: Shoulder  Pain Orientation: Left;Right  Pain Descriptors: Sharp  Pain Frequency: Continuous  Pain Onset: Awakened from sleep  Clinical Progression: Gradually worsening  Vital Signs  Patient Currently in Pain: Yes    Vision/Hearing  Vision  Vision: Within Functional Limits  Hearing  Hearing: Within functional limits    Orientation  Orientation  Overall Orientation Status: Within Functional Limits    Social/Functional History  Social/Functional History  Occupation: On disability    Objective          AROM LLE (degrees)  LLE AROM : Exceptions  AROM RUE (degrees)  RUE AROM : Exceptions  R Shoulder Flexion 0-180: 145  R Shoulder Extension 0-45: 50  R Shoulder ABduction 0-180: 147  R Shoulder Int Rotation  0-70: L1  R Shoulder Ext Rotation 0-90: C5  AROM LUE (degrees)  LUE AROM : Exceptions  L Shoulder Flexion 0-180: 140  L Shoulder Extension 0-45: 48  L Shoulder ABduction 0-180: 132  L Shoulder Int Rotation  0-70: L1  L Shoulder Ext Rotation  0-90: C7  Spine  Cervical: flex 24, ext 28, L rot 42, R rot 46    Strength RUE  Strength RUE: Improve UE strength to 4+/5 to ease lifting tasks. Long term goal 3: Improve UE shoudler AROM to 150 flexion/abd  Long term goal 4: Patient to note pain at worst 6/10 with ADLs  Long term goal 5: Patient to note minimal sleep distubances due to bilateral shoulder pain.   Patient Goals   Patient goals : Decrease Pain       Therapy Time   Individual Concurrent Group Co-treatment   Time In 0240         Time Out 0305         Minutes [address] [place] [address]

## 2020-10-29 NOTE — FLOWSHEET NOTE
Physical Therapy Daily Treatment Note    Date:  10/29/2020    Patient Name:  Taylor Hernandez    :  1975  MRN: [id number]  Restrictions/Precautions:     Medical/Treatment Diagnosis Information:   · Diagnosis: M25.512, G89.29 (ICD-10-CM) - Chronic left shoulder pain ,M54.2, G89.29 (ICD-10-CM) - Chronic neck pain ,M25.511 (ICD-10-CM) - Right shoulder pain, unspecified chronicity  ·    Insurance/Certification information:  PT Insurance Information: Medicare/Medicaid  Physician Information:  Referring Practitioner: Jessica Wick DO  Plan of care signed (Y/N):  n  Visit# / total visits: 1 /10  Pain level: 7/10       Time In: 240  Time Out:305    Progress Note: [x]  Yes  []  No  Next due by: Visit #10      Subjective:   See Eval   Objective: See Eval    Observation:    Test measurements:      Exercises:   Exercise/Equipment Resistance/Repetitions Other comments        Pulleys     Wand   HEP   Table Glides   HEP   Finger Ladder           Corner Stretch      Scap Squeezes           Supine Head press     Shoudler press      Supine pec stretch                [] Provided verbal/tactile cueing for activities related to strengthening, flexibility, endurance, ROM. (44548)  [] Provided verbal/tactile cueing for activities related to improving balance, coordination, kinesthetic sense, posture, motor skill, proprioception. (69794)    Therapeutic Activities:     [] Therapeutic activities, direct (one-on-one) patient contact (use of dynamic activities to improve functional performance). (08847)    Gait:   [] Provided training and instruction to the patient for ambulation re-education. (20829)    Self-Care/ADL's  [] Self-care/home management training and compensatory training, meal preparation, safety procedures, and instructions in use of assistive technology devices/adaptive equipment, direct one-on-one contact.  (52969)    Home Exercise Program:     [] Reviewed/Progressed HEP activities related to strengthening, flexibility, endurance, ROM. (56401)  [] Reviewed/Progressed HEP activities related to improving balance, coordination, kinesthetic sense, posture, motor skill, proprioception.  (37482)    Manual Treatments:    [] Provided manual therapy to mobilize soft tissue/joints for the purpose of modulating pain, promoting relaxation,  increasing ROM, reducing/eliminating soft tissue swelling/inflammation/restriction, improving soft tissue extensibility. (48126)    Service Based Modalities:      Timed Code Treatment Minutes:   25'    Total Treatment Minutes:   25'    Treatment/Activity Tolerance:  [x] Patient tolerated treatment well [] Patient limited by fatique  [] Patient limited by pain  [] Patient limited by other medical complications  [] Other:     Prognosis: [x] Good [] Fair  [] Poor    Patient Requires Follow-up: [x] Yes  [] No      Goals:  Short term goals  Time Frame for Short term goals: 3 weeks  Short term goal 1: Initiate HEP    Long term goals  Time Frame for Long term goals : 6 weeks  Long term goal 1: Indpendent in HEP  Long term goal 2: Improve UE strength to 4+/5 to ease lifting tasks. Long term goal 3: Improve UE shoudler AROM to 150 flexion/abd  Long term goal 4: Patient to note pain at worst 6/10 with ADLs  Long term goal 5: Patient to note minimal sleep distubances due to bilateral shoulder pain.           Plan:   [] Continue per plan of care [] Alter current plan (see comments)  [x] Plan of care initiated [] Hold pending MD visit [] Discharge  Plan for Next Session:      Electronically signed by:  Jacqueline Germain

## 2020-11-02 ENCOUNTER — HOSPITAL ENCOUNTER (OUTPATIENT)
Dept: PHYSICAL THERAPY | Age: 45
Setting detail: THERAPIES SERIES
Discharge: HOME | End: 2020-11-02
Payer: MEDICARE

## 2020-11-02 ENCOUNTER — PATIENT MESSAGE (OUTPATIENT)
Dept: FAMILY MEDICINE CLINIC | Age: 45
End: 2020-11-02

## 2020-11-02 PROCEDURE — 97110 THERAPEUTIC EXERCISES: CPT

## 2020-11-02 NOTE — FLOWSHEET NOTE
Physical Therapy Daily Treatment Note    Date:  2020    Patient Name:  Elizabeth Marie    :  1975  MRN: [id number]  Restrictions/Precautions:     Medical/Treatment Diagnosis Information:   · Diagnosis: M25.512, G89.29 (ICD-10-CM) - Chronic left shoulder pain ,M54.2, G89.29 (ICD-10-CM) - Chronic neck pain ,M25.511 (ICD-10-CM) - Right shoulder pain, unspecified chronicity     Insurance/Certification information:  PT Insurance Information: Medicare/Medicaid   Physician Information:  Referring Practitioner: Kalani Robertson DO  Plan of care signed (Y/N):  n  Visit# / total visits: 2/10    Pain level: 6/10       Time In: 2:  Time Out: 3:02    Progress Note: []  Yes  [x]  No  Next due by: Visit #10      Subjective: Pt rpts to clinic with mild complaints of R shoulder pain stating \"I have the same pain in the shoulder. Nothing seems to be helping it\". Objective: Pt tolerated todays first full PT session well indicating no increase in pain post session. Pt was able to perform all SAGE per flow chart with vc required for proper performance. Rest breaks required throughout. Most challenged by doorway pec stretch noting intense stretch. Able to initiate multiple shoulder ROM and strengthening exercises being able to complete all reps.  Observation: Complaints of B shoulder pain throughout.     Test measurements:      Exercises:   Exercise/Equipment Resistance/Repetitions Other comments        Pulleys 5'    Wand 4-way x10 HEP   Table Glides   HEP   Finger Ladder  x10 ea Flx, abd   Ball up wall 15x3\"    Doorway pec Stretch  3x30\"    Scap Squeezes 20x3\"    VALPAR LV 4 x 1 ea arm triangle    Supine Head press 10x3\"    Shoudler press  10x3\"    Supine pec stretch  5' 1/2 foam   T-band rows/ext x15 ea grn    SL ER/ABD x20 1# B        Supine ABC's 1x     Supine wand flexion 15x5\"    [x] Provided verbal/tactile cueing for activities related to strengthening, flexibility, endurance, ROM. (91647)  [] Provided to note pain at worst 6/10 with ADLs  Long term goal 5: Patient to note minimal sleep distubances due to bilateral shoulder pain. Plan:   [x] Continue per plan of care [] Alter current plan (see comments)  [] Plan of care initiated [] Hold pending MD visit [] Discharge     Plan for Next Session: Progress to tolerance.       Electronically signed by:  Nasrin Rosa PTA

## 2020-11-02 NOTE — TELEPHONE ENCOUNTER
From: Robert April  To: Sarah Schaeffer DO  Sent: 11/2/2020 12:49 PM EST  Subject: Prescription Question    [facility] sent some refills over to you I am not sure which one's they said they couldn't fill them till they were approved by you okay Thank you again to you and  staff for working so hard for us we love you all    Manny Santoyo

## 2020-11-03 ENCOUNTER — PATIENT MESSAGE (OUTPATIENT)
Dept: FAMILY MEDICINE CLINIC | Age: 45
End: 2020-11-03

## 2020-11-03 RX ORDER — VENLAFAXINE HYDROCHLORIDE 150 MG/1
CAPSULE, EXTENDED RELEASE ORAL
Qty: 90 CAPSULE | Refills: 3 | Status: SHIPPED | OUTPATIENT
Start: 2020-11-03 | End: 2021-01-31 | Stop reason: SDUPTHER

## 2020-11-03 NOTE — TELEPHONE ENCOUNTER
LP shows all levels worsened from 1 year ago - please confirm if pt is taking Fenofibrate compliantly. If so, is he willing to add a low-dose statin?

## 2020-11-05 ENCOUNTER — HOSPITAL ENCOUNTER (OUTPATIENT)
Dept: PHYSICAL THERAPY | Age: 45
Setting detail: THERAPIES SERIES
Discharge: HOME | End: 2020-11-05
Payer: MEDICARE

## 2020-11-05 PROCEDURE — 97110 THERAPEUTIC EXERCISES: CPT

## 2020-11-05 NOTE — FLOWSHEET NOTE
Physical Therapy Daily Treatment Note    Date:  2020    Patient Name:  Baltazar Melchor    :  1975  MRN: [id number]  Restrictions/Precautions:     Medical/Treatment Diagnosis Information:   · Diagnosis: M25.512, G89.29 (ICD-10-CM) - Chronic left shoulder pain ,M54.2, G89.29 (ICD-10-CM) - Chronic neck pain ,M25.511 (ICD-10-CM) - Right shoulder pain, unspecified chronicity     Insurance/Certification information:  PT Insurance Information: Medicare/Medicaid   Physician Information:  Referring Practitioner: Ham Chavsi DO  Plan of care signed (Y/N):  n  Visit# / total visits: 2/10    Pain level: 3/10       Time In: 12:28  Time Out: 1:14    Progress Note: []  Yes  [x]  No  Next due by: Visit #10      Subjective: Pt rpts minimal pain in the L shlder today. Objective: Pt was able to perform all SAGE per flow chart with vc required for proper performance. Rest breaks required throughout. Pt reports increased pain rated 8/10 post tx session.  Observation: Complaints of B shoulder pain throughout.  Test measurements:      Exercises:   Exercise/Equipment Resistance/Repetitions Other comments        Pulleys 5'    Wand 4-way x10 HEP   Table Glides   HEP   Finger Ladder  x10 ea Flx, abd   Ball up wall 15x3\"    Doorway pec Stretch  3x30\"    Scap Squeezes 20x3\"    VALPAR LV 4 x 1 ea arm triangle    Supine Head press 10x3\"    Shoudler press  10x3\"    Supine pec stretch  5' 1/2 foam   T-band rows/ext x15 ea grn    SL ER/ABD x20 1# B        Supine ABC's 1x     Supine wand flexion 15x5\"    [x] Provided verbal/tactile cueing for activities related to strengthening, flexibility, endurance, ROM. (77534)  [] Provided verbal/tactile cueing for activities related to improving balance, coordination, kinesthetic sense, posture, motor skill, proprioception. (38285)    Therapeutic Activities:     [] Therapeutic activities, direct (one-on-one) patient contact (use of dynamic activities to improve functional performance). (47009)    Gait:   [] Provided training and instruction to the patient for ambulation re-education. (71205)    Self-Care/ADL's  [] Self-care/home management training and compensatory training, meal preparation, safety procedures, and instructions in use of assistive technology devices/adaptive equipment, direct one-on-one contact. (07320)    Home Exercise Program:     [x] Reviewed/Progressed HEP activities related to strengthening, flexibility, endurance, ROM. (30210)  [] Reviewed/Progressed HEP activities related to improving balance, coordination, kinesthetic sense, posture, motor skill, proprioception.  (39362)    Manual Treatments:    [] Provided manual therapy to mobilize soft tissue/joints for the purpose of modulating pain, promoting relaxation,  increasing ROM, reducing/eliminating soft tissue swelling/inflammation/restriction, improving soft tissue extensibility. (66580)    Service Based Modalities:      Timed Code Treatment Minutes:   55' therex    Total Treatment Minutes:   55'     Treatment/Activity Tolerance:  [x] Patient tolerated treatment well [] Patient limited by fatique  [] Patient limited by pain  [] Patient limited by other medical complications  [] Other:     Prognosis: [x] Good [] Fair  [] Poor    Patient Requires Follow-up: [x] Yes  [] No      Goals:  Short term goals  Time Frame for Short term goals: 3 weeks  Short term goal 1: Initiate HEP    Long term goals  Time Frame for Long term goals : 6 weeks  Long term goal 1: Indpendent in HEP  Long term goal 2: Improve UE strength to 4+/5 to ease lifting tasks. Long term goal 3: Improve UE shoudler AROM to 150 flexion/abd  Long term goal 4: Patient to note pain at worst 6/10 with ADLs  Long term goal 5: Patient to note minimal sleep distubances due to bilateral shoulder pain.           Plan:   [x] Continue per plan of care [] Alter current plan (see comments)  [] Plan of care initiated [] Hold pending MD visit [] Discharge     Plan for Next

## 2020-11-06 RX ORDER — ONDANSETRON 4 MG/1
4 TABLET, FILM COATED ORAL EVERY 8 HOURS PRN
Qty: 30 TABLET | Refills: 2 | Status: SHIPPED | OUTPATIENT
Start: 2020-11-06 | End: 2021-01-07 | Stop reason: SDUPTHER

## 2020-11-06 RX ORDER — FENOFIBRATE 160 MG/1
160 TABLET ORAL DAILY
Qty: 90 TABLET | Refills: 3 | Status: CANCELLED | OUTPATIENT
Start: 2020-11-06

## 2020-11-06 RX ORDER — VENLAFAXINE HYDROCHLORIDE 150 MG/1
CAPSULE, EXTENDED RELEASE ORAL
Qty: 90 CAPSULE | Refills: 3 | Status: CANCELLED | OUTPATIENT
Start: 2020-11-06

## 2020-11-07 NOTE — TELEPHONE ENCOUNTER
Effexor-XR was just refilled to [facility] on 11/3 for #90 with 3 refills. Zofran refilled. Will discuss lipid medications and any new changes with pt at his OV in 4 days; should have enough Fenofibrate until then. [place] message sent to pt with this information.

## 2020-11-09 ENCOUNTER — HOSPITAL ENCOUNTER (OUTPATIENT)
Dept: PHYSICAL THERAPY | Age: 45
Setting detail: THERAPIES SERIES
Discharge: HOME | End: 2020-11-09
Payer: MEDICARE

## 2020-11-09 NOTE — PROGRESS NOTES
Physical Therapy  Outpatient Physical Therapy    [] [place]  Phone: [phone]  Fax: [phone]      [] [place]  Phone: [phone]  Fax: [phone]    Physical Therapy  Cancellation/No-show Note  Patient Name:  Kade Man  :  1975   Date:  2020  Cancelled visits to date: 1  No-shows to date: 0    For today's appointment patient:  [x]  Cancelled  []  Rescheduled appointment  []  No-show     Reason given by patient:  []  Patient ill  []  Conflicting appointment  []  No transportation    []  Conflict with work  [x]  No reason given  []  Other:     Comments:      Electronically signed by: Beba Hernandez PTA

## 2020-11-10 ENCOUNTER — PATIENT MESSAGE (OUTPATIENT)
Dept: FAMILY MEDICINE CLINIC | Age: 45
End: 2020-11-10

## 2020-11-10 ENCOUNTER — OFFICE VISIT (OUTPATIENT)
Dept: FAMILY MEDICINE CLINIC | Age: 45
End: 2020-11-10
Payer: MEDICARE

## 2020-11-10 VITALS
DIASTOLIC BLOOD PRESSURE: 78 MMHG | WEIGHT: 274.2 LBS | TEMPERATURE: 97.8 F | BODY MASS INDEX: 38.39 KG/M2 | OXYGEN SATURATION: 97 % | SYSTOLIC BLOOD PRESSURE: 110 MMHG | HEIGHT: 71 IN | HEART RATE: 84 BPM

## 2020-11-10 PROBLEM — E66.01 MORBIDLY OBESE (HCC): Status: ACTIVE | Noted: 2020-11-10

## 2020-11-10 PROCEDURE — 1036F TOBACCO NON-USER: CPT | Performed by: FAMILY MEDICINE

## 2020-11-10 PROCEDURE — G8484 FLU IMMUNIZE NO ADMIN: HCPCS | Performed by: FAMILY MEDICINE

## 2020-11-10 PROCEDURE — G8417 CALC BMI ABV UP PARAM F/U: HCPCS | Performed by: FAMILY MEDICINE

## 2020-11-10 PROCEDURE — G8427 DOCREV CUR MEDS BY ELIG CLIN: HCPCS | Performed by: FAMILY MEDICINE

## 2020-11-10 PROCEDURE — 99214 OFFICE O/P EST MOD 30 MIN: CPT

## 2020-11-10 PROCEDURE — 3023F SPIROM DOC REV: CPT | Performed by: FAMILY MEDICINE

## 2020-11-10 PROCEDURE — 99214 OFFICE O/P EST MOD 30 MIN: CPT | Performed by: FAMILY MEDICINE

## 2020-11-10 PROCEDURE — G8926 SPIRO NO PERF OR DOC: HCPCS | Performed by: FAMILY MEDICINE

## 2020-11-10 RX ORDER — TIOTROPIUM BROMIDE 18 UG/1
18 CAPSULE ORAL; RESPIRATORY (INHALATION) DAILY
Qty: 90 CAPSULE | Refills: 3 | Status: SHIPPED | OUTPATIENT
Start: 2020-11-10 | End: 2021-06-03 | Stop reason: SDUPTHER

## 2020-11-10 RX ORDER — LANOLIN ALCOHOL/MO/W.PET/CERES
1000 CREAM (GRAM) TOPICAL DAILY
Qty: 30 TABLET | Refills: 11 | Status: SHIPPED | OUTPATIENT
Start: 2020-11-10

## 2020-11-10 RX ORDER — FENOFIBRATE 160 MG/1
TABLET ORAL
Qty: 90 TABLET | Refills: 3 | OUTPATIENT
Start: 2020-11-10

## 2020-11-10 RX ORDER — LANOLIN ALCOHOL/MO/W.PET/CERES
400 CREAM (GRAM) TOPICAL DAILY
Qty: 30 TABLET | Refills: 11 | Status: SHIPPED | OUTPATIENT
Start: 2020-11-10

## 2020-11-10 RX ORDER — LORATADINE 10 MG/1
10 TABLET ORAL DAILY
Qty: 90 TABLET | Refills: 3 | Status: SHIPPED | OUTPATIENT
Start: 2020-11-10 | End: 2021-01-31 | Stop reason: SDUPTHER

## 2020-11-10 RX ORDER — ASPIRIN 81 MG/1
81 TABLET ORAL DAILY
Qty: 30 TABLET | Refills: 11 | Status: SHIPPED | OUTPATIENT
Start: 2020-11-10

## 2020-11-10 RX ORDER — CHOLECALCIFEROL (VITAMIN D3) 125 MCG
1 CAPSULE ORAL DAILY
Qty: 30 TABLET | Refills: 11 | Status: SHIPPED | OUTPATIENT
Start: 2020-11-10 | End: 2021-11-12 | Stop reason: DRUGHIGH

## 2020-11-10 RX ORDER — GEMFIBROZIL 600 MG/1
600 TABLET, FILM COATED ORAL 2 TIMES DAILY
Qty: 180 TABLET | Refills: 1 | Status: SHIPPED
Start: 2020-11-10 | End: 2021-02-09 | Stop reason: SINTOL

## 2020-11-10 ASSESSMENT — ENCOUNTER SYMPTOMS
DIARRHEA: 0
COUGH: 0
BLOOD IN STOOL: 0
VOMITING: 0
WHEEZING: 0
SHORTNESS OF BREATH: 0

## 2020-11-10 NOTE — PROGRESS NOTES
EKATERINA Barreto [address]  [address], [address] [place]  [phone]      Jeanne Gonzalez is a 50 y.o. male who presents today for his medical conditions/complaints as noted below. Jeanne Gonzalez is c/o of Medication Refill (pended and some pended elsewhere) and Discuss Labs (drawn 10/29/20)      HPI:     Pt here today     Going to PT twice weekly for b/l shoulder pain. Doing stretches at home as well, and using BioFreeze topically. Also using Ibuprofen 600 mg once daily as needed. Taking Depakote 500 mg BID for headaches - stable. Getting migraines maybe 5-10x's per month now. Takes Imitrex as needed for migraines - does help when he takes it, but he tends to run out early some months. Just saw Dr. Sil Frank on 8/2522 - sees him every 6 months, so will call to schedule an appt.       Taking Risperdal 3 mg nightly for trouble sleeping - has been falling asleep well and stays asleep all night.     Taking Effexor  mg daily and Buspar 15 mg BID as needed for depression/anxiety - typically just uses the first dose of Buspar each morning, and doesn't need it again. Has been taking Fenofibrate 160 mg daily for hypertriglyceridemia - with recent re-check of LP, his TG's and LDL had gone up. Does eat eggs some days, will usually 4 eggs per serving. Has hamburgers and jacobs through the week.     Taking Claritin 10 mg daily and Flonase as needed for allergic rhinitis - stable.     Due to f/u with Dr. Sherrill Carrera - last appt in 4/2019. Using Spiriva 1 puff daily. Using Albuterol nebulizer infrequently as needed - maybe 3-4x's per month. Using Albuterol inhaler maybe a couple times per week.     Using Famotidine 20 mg BID for GERD - working well; has not had to use Tums recently.   Has to use Zofran only infrequently as needed - uses maybe once per week.       Taking Zanaflex 4 mg nightly - usually takes this as needed for upper back and low back pain, but also for his recent b/l shoulder pain.       Purchased a Vit D supplement due to recent labwork showing Vit D deficiency, but has not yet started taking it. Past Medical History:   Diagnosis Date    Allergic rhinitis     Bipolar disorder ([facility])     Diagnosed in 1998    Depression     Headache(784.0)     Osteoarthritis     TMJ (dislocation of temporomandibular joint)     Torticollis       Past Surgical History:   Procedure Laterality Date    TONSILLECTOMY       Family History   Problem Relation Age of Onset    Stroke Mother     High Blood Pressure Mother     High Blood Pressure Father     Stroke Father     Glaucoma Neg Hx     Diabetes Neg Hx     Cataracts Neg Hx      Social History     Tobacco Use    Smoking status: Former Smoker     Packs/day: 1.00     Years: 25.00     Pack years: 25.00     Types: Cigarettes     Start date: 10/16/1992     Last attempt to quit: 6/21/2017     Years since quitting: 3.4    Smokeless tobacco: Never Used    Tobacco comment: handout given.     Substance Use Topics    Alcohol use: No      Current Outpatient Medications   Medication Sig Dispense Refill    tiotropium (SPIRIVA HANDIHALER) 18 MCG inhalation capsule Inhale 1 capsule into the lungs daily 90 capsule 3    gemfibrozil (LOPID) 600 MG tablet Take 1 tablet by mouth 2 times daily 180 tablet 1    Cholecalciferol (VITAMIN D3) 50 MCG (2000 UT) TABS Take 1 tablet by mouth daily 30 tablet 11    vitamin B-12 (CYANOCOBALAMIN) 1000 MCG tablet Take 1 tablet by mouth daily 30 tablet 11    folic acid (FOLVITE) 097 MCG tablet Take 1 tablet by mouth daily 30 tablet 11    aspirin (ASPIRIN 81) 81 MG EC tablet Take 1 tablet by mouth daily 30 tablet 11    ondansetron (ZOFRAN) 4 MG tablet Take 1 tablet by mouth every 8 hours as needed for Nausea 30 tablet 2    venlafaxine (EFFEXOR XR) 150 MG extended release capsule TAKE ONE CAPSULE BY MOUTH DAILY 90 capsule 3    Menthol, Topical Analgesic, (BIOFREEZE) 4 % GEL Apply to shoulders & neck to relieve pain. 237 mL 5    famotidine (PEPCID) 20 MG tablet TAKE ONE TABLET BY MOUTH TWICE A  tablet 3    SUMAtriptan (IMITREX) 100 MG tablet Take 1 tablet by mouth once as needed for Migraine 9 tablet 1    risperiDONE (RISPERDAL) 3 MG tablet Take 1 tablet by mouth nightly 30 tablet 5    tiZANidine (ZANAFLEX) 4 MG tablet Take 1 tablet by mouth every 8 hours as needed (muscle spasm) 40 tablet 2    albuterol sulfate HFA (VENTOLIN HFA) 108 (90 Base) MCG/ACT inhaler Inhale 1-2 puffs into the lungs every 6 hours as needed for Wheezing 3 Inhaler 3    divalproex (DEPAKOTE ER) 500 MG extended release tablet THEN   ONE  TABLET  TWICE  DAILY 60 tablet 5    busPIRone (BUSPAR) 15 MG tablet Take 15 mg by mouth 2 times daily as needed (anxiety) 60 tablet 5    fluticasone (FLONASE) 50 MCG/ACT nasal spray 2 sprays by Nasal route daily 3 Bottle 3    albuterol (PROVENTIL) (2.5 MG/3ML) 0.083% nebulizer solution Take 3 mLs by nebulization every 6 hours as needed for Wheezing or Shortness of Breath 120 each 3    ibuprofen (ADVIL;MOTRIN) 600 MG tablet Take 1 tablet by mouth every 8 hours as needed for Pain 90 tablet 3    loratadine (CLARITIN) 10 MG tablet Take 1 tablet by mouth daily 90 tablet 3    Respiratory Therapy Supplies (NEBULIZER COMPRESSOR) KIT 1 kit by Does not apply route once for 1 dose 1 kit 0    Respiratory Therapy Supplies (NEBULIZER/TUBING/MOUTHPIECE) KIT 1 kit by Does not apply route once for 1 dose 1 kit 0    Compression Stockings MISC by Does not apply route Wear on bilateral lower extremities daily as needed for edema.  (Patient not taking: Reported on 11/10/2020) 1 each 1     Current Facility-Administered Medications   Medication Dose Route Frequency Provider Last Rate Last Dose    phenylephrine (MYDFRIN) 2.5 % ophthalmic solution 1 drop  1 drop Both Eyes Once Todd Sousa MD        tropicamide (MYDRIACYL) 1 % ophthalmic solution 1 drop  1 drop Both Eyes Once Todd Sousa MD         Allergies Allergen Reactions    Naproxen      ulcers       Health Maintenance   Topic Date Due    HIV screen  09/07/1988    Diabetes screen  09/07/2013    DTaP/Tdap/Td vaccine (1 - Tdap) 11/10/2021 (Originally 9/7/1992)    Flu vaccine (1) 11/10/2021 (Originally 9/1/2020)    Annual Wellness Visit (AWV)  06/09/2021    Lipid screen  10/29/2025    Hepatitis A vaccine  Aged Out    Hepatitis B vaccine  Aged Out    Hib vaccine  Aged Out    Meningococcal (ACWY) vaccine  Aged Out    Pneumococcal 0-64 years Vaccine  Aged Out       Subjective:      Review of Systems   Constitutional: Negative for fever and unexpected weight change. Respiratory: Negative for cough, shortness of breath (stable) and wheezing. Cardiovascular: Negative for chest pain and palpitations. Gastrointestinal: Negative for blood in stool, diarrhea and vomiting. Genitourinary: Negative for dysuria and hematuria. Skin: Negative for rash and wound. Neurological: Positive for headaches. Negative for dizziness and light-headedness. Psychiatric/Behavioral: Positive for dysphoric mood (stable). Negative for sleep disturbance and suicidal ideas. Objective:     Vitals:    11/10/20 1043   BP: 110/78   Pulse: 84   Temp: 97.8 °F (36.6 °C)   TempSrc: Tympanic   SpO2: 97%   Weight: 274 lb 3.2 oz (124.4 kg)   Height: 5' 11\" (1.803 m)     Physical Exam  Vitals signs and nursing note reviewed. Constitutional:       General: He is not in acute distress. Appearance: He is well-developed. HENT:      Head: Normocephalic and atraumatic. Right Ear: Tympanic membrane, ear canal and external ear normal.      Left Ear: Tympanic membrane, ear canal and external ear normal.      Nose: Nose normal.      Mouth/Throat:      Mouth: Mucous membranes are moist.      Pharynx: Oropharynx is clear. No oropharyngeal exudate. Eyes:      Conjunctiva/sclera: Conjunctivae normal.   Cardiovascular:      Rate and Rhythm: Normal rate and regular rhythm. Heart sounds: Normal heart sounds. Pulmonary:      Effort: Pulmonary effort is normal. No respiratory distress. Breath sounds: Normal breath sounds. Abdominal:      General: Bowel sounds are normal. There is no distension. Palpations: Abdomen is soft. Tenderness: There is no abdominal tenderness. Skin:     General: Skin is warm and dry. Neurological:      Mental Status: He is alert and oriented to person, place, and time. Assessment:       Diagnosis Orders   1. Hypertriglyceridemia  Lipid Panel    gemfibrozil (LOPID) 600 MG tablet   2. Chronic obstructive pulmonary disease, unspecified COPD type ([facility] [address]  tiotropium (SPIRIVA HANDIHALER) 18 MCG inhalation capsule   3. Vitamin D deficiency  Cholecalciferol (VITAMIN D3) 50 MCG (2000 UT) TABS    Vitamin D 25 Hydroxy   4. Impaired fasting glucose  Comprehensive Metabolic Panel    Hemoglobin A1C   5. Insomnia, unspecified type           Plan:      Declined flu vaccine today. Return in about 6 months (around 5/10/2021) for follow-up lipid, Vit D.     Orders Placed This Encounter   Procedures    Comprehensive Metabolic Panel     Standing Status:   Future     Standing Expiration Date:   11/10/2022    Lipid Panel     Standing Status:   Future     Standing Expiration Date:   11/10/2022     Order Specific Question:   Is Patient Fasting?/# of Hours     Answer:   12 hour    Hemoglobin A1C     Standing Status:   Future     Standing Expiration Date:   11/10/2022    Vitamin D 25 Hydroxy     Standing Status:   Future     Standing Expiration Date:   11/10/2021     Orders Placed This Encounter   Medications    tiotropium (Aura Gey) 18 MCG inhalation capsule     Sig: Inhale 1 capsule into the lungs daily     Dispense:  90 capsule     Refill:  3    gemfibrozil (LOPID) 600 MG tablet     Sig: Take 1 tablet by mouth 2 times daily     Dispense:  180 tablet     Refill:  1    Cholecalciferol (VITAMIN D3) 50 MCG (2000 UT) TABS     Sig: Take 1 tablet by mouth daily     Dispense:  30 tablet     Refill:  11    vitamin B-12 (CYANOCOBALAMIN) 1000 MCG tablet     Sig: Take 1 tablet by mouth daily     Dispense:  30 tablet     Refill:  11    folic acid (FOLVITE) 790 MCG tablet     Sig: Take 1 tablet by mouth daily     Dispense:  30 tablet     Refill:  11    aspirin (ASPIRIN 81) 81 MG EC tablet     Sig: Take 1 tablet by mouth daily     Dispense:  30 tablet     Refill:  11       Patient given educational materials - see patient instructions. Discussed use, benefit, and side effects of prescribed medications. All patient questions answered. Pt voiced understanding. Reviewed health maintenance.             Electronically signed by Maria Sams DO on 11/22/2020 at 11:36 PM

## 2020-11-10 NOTE — TELEPHONE ENCOUNTER
From: Margarette Winslow  To: Paolo Michael DO  Sent: 11/10/2020 2:21 PM EST  Subject: Prescription Question    The Vitamin D 125 mcg (5000iu) so how many so I take ? and just to make sure my b-12 Is what i said 0241APR and the folic acid is 582 did i get those two right ?  hope ur day is going well nate

## 2020-11-11 ENCOUNTER — HOSPITAL ENCOUNTER (OUTPATIENT)
Dept: PHYSICAL THERAPY | Age: 45
Setting detail: THERAPIES SERIES
Discharge: HOME | End: 2020-11-11
Payer: MEDICARE

## 2020-11-11 PROCEDURE — 97110 THERAPEUTIC EXERCISES: CPT

## 2020-11-11 NOTE — FLOWSHEET NOTE
Physical Therapy Daily Treatment Note    Date:  2020    Patient Name:  Kade Man    :  1975  MRN: [id number]  Restrictions/Precautions:     Medical/Treatment Diagnosis Information:   · Diagnosis: M25.512, G89.29 (ICD-10-CM) - Chronic left shoulder pain ,M54.2, G89.29 (ICD-10-CM) - Chronic neck pain ,M25.511 (ICD-10-CM) - Right shoulder pain, unspecified chronicity     Insurance/Certification information:  PT Insurance Information: Medicare/Medicaid   Physician Information:  Referring Practitioner: Aliza Tovar DO  Plan of care signed (Y/N):  Y  Visit# / total visits: 3/10    Pain level: 2/10       Time In: 12:38  Time Out: 1:21    Progress Note: []  Yes  [x]  No  Next due by: Visit #10      Subjective: Pt reports pain in the shoulders but nothing major. Reports achiness in the shoulders last night with the rain. Reports daily compliance with HEP. Objective: Pt was able to perform all SAGE per flow chart with vc required for proper performance. Demos improved endurance no rest breaks needed this date. Slight increase in pain reported post tx session.     Observation:    Test measurements:      Exercises:   Exercise/Equipment Resistance/Repetitions Other comments        Pulleys 5'    Wand 4-way x15 HEP   Table Glides   HEP   Finger Ladder  x10 ea Flx, abd   Ball up wall 15x3\"    Doorway pec Stretch  3x30\"    Scap Squeezes 20x3\"    VALPAR LV 4 x 1 ea arm triangle    Supine Head press 10x3\"    Shoudler press  10x3\"    Supine pec stretch  5' 1/2 foam   T-band rows/ext x20 ea grn    SL ER/ABD x20 2# B        Supine ABC's 1x     Supine wand flexion 15x5\"    [x] Provided verbal/tactile cueing for activities related to strengthening, flexibility, endurance, ROM. (65521)  [] Provided verbal/tactile cueing for activities related to improving balance, coordination, kinesthetic sense, posture, motor skill, proprioception. (29963)    Therapeutic Activities:     [] Therapeutic activities, direct (one-on-one) patient contact (use of dynamic activities to improve functional performance). (45141)    Gait:   [] Provided training and instruction to the patient for ambulation re-education. (18361)    Self-Care/ADL's  [] Self-care/home management training and compensatory training, meal preparation, safety procedures, and instructions in use of assistive technology devices/adaptive equipment, direct one-on-one contact. (91856)    Home Exercise Program:     [x] Reviewed/Progressed HEP activities related to strengthening, flexibility, endurance, ROM. (68869)  [] Reviewed/Progressed HEP activities related to improving balance, coordination, kinesthetic sense, posture, motor skill, proprioception.  (54420)    Manual Treatments:    [] Provided manual therapy to mobilize soft tissue/joints for the purpose of modulating pain, promoting relaxation,  increasing ROM, reducing/eliminating soft tissue swelling/inflammation/restriction, improving soft tissue extensibility. (65030)    Service Based Modalities:      Timed Code Treatment Minutes:   37' therex    Total Treatment Minutes:   37'     Treatment/Activity Tolerance:  [x] Patient tolerated treatment well [] Patient limited by fatique  [] Patient limited by pain  [] Patient limited by other medical complications  [] Other:     Prognosis: [x] Good [] Fair  [] Poor    Patient Requires Follow-up: [x] Yes  [] No      Goals:  Short term goals  Time Frame for Short term goals: 3 weeks  Short term goal 1: Initiate HEP (initiated 10/29)    Long term goals  Time Frame for Long term goals : 6 weeks  Long term goal 1: Indpendent in HEP  Long term goal 2: Improve UE strength to 4+/5 to ease lifting tasks. Long term goal 3: Improve UE shoudler AROM to 150 flexion/abd  Long term goal 4: Patient to note pain at worst 6/10 with ADLs  Long term goal 5: Patient to note minimal sleep distubances due to bilateral shoulder pain.           Plan:   [x] Continue per plan of care [] Alter current plan (see comments)  [] Plan of care initiated [] Hold pending MD visit [] Discharge     Plan for Next Session: Progress to tolerance.       Electronically signed by:  Fe Mckeon PTA

## 2020-11-17 ENCOUNTER — HOSPITAL ENCOUNTER (OUTPATIENT)
Dept: PHYSICAL THERAPY | Age: 45
Setting detail: THERAPIES SERIES
Discharge: HOME | End: 2020-11-17
Payer: MEDICARE

## 2020-11-19 ENCOUNTER — HOSPITAL ENCOUNTER (OUTPATIENT)
Dept: PHYSICAL THERAPY | Age: 45
Setting detail: THERAPIES SERIES
Discharge: HOME | End: 2020-11-19
Payer: MEDICARE

## 2020-11-19 NOTE — PROGRESS NOTES
Physical Therapy  Outpatient Physical Therapy    [x] [place]  Phone: [phone]  Fax: [phone]      [] [place]  Phone: [phone]  Fax: [phone]    Physical Therapy  Cancellation/No-show Note  Patient Name:  Barrie Green  :  1975   Date:  2020  Cancelled visits to date: 2  No-shows to date: 0    For today's appointment patient:  [x]  Cancelled  []  Rescheduled appointment  []  No-show     Reason given by patient:  []  Patient ill  []  Conflicting appointment  []  No transportation    []  Conflict with work  [x]  No reason given  []  Other:     Comments:      Electronically signed by: Miriam Garber PTA

## 2020-11-20 ENCOUNTER — PATIENT MESSAGE (OUTPATIENT)
Dept: FAMILY MEDICINE CLINIC | Age: 45
End: 2020-11-20

## 2020-11-20 NOTE — TELEPHONE ENCOUNTER
From: Mary Ahn  To: Coby Bourne DO  Sent: 11/20/2020 3:05 PM EST  Subject: Non-Urgent Medical Question    Good Afternoon Dr Woo I got approved for a new prescription Rx drug coverage and everything is covered under it even Biofreeze so ur script will come in handy :)

## 2020-11-24 ENCOUNTER — PATIENT MESSAGE (OUTPATIENT)
Dept: FAMILY MEDICINE CLINIC | Age: 45
End: 2020-11-24

## 2020-11-24 NOTE — TELEPHONE ENCOUNTER
From: Barrie Green  To: Adi Cox DO  Sent: 11/24/2020 4:15 PM EST  Subject: Non-Urgent Medical Question    I am so so sorry I keep missing my therapy appointments we just have so much going on as of late and we think it be best for now to hold off on my therapy appointments for the time being thank you for being a great Doctor n being there for us.

## 2020-11-25 RX ORDER — BUSPIRONE HYDROCHLORIDE 15 MG/1
15 TABLET ORAL 2 TIMES DAILY PRN
Qty: 60 TABLET | Refills: 5 | Status: SHIPPED | OUTPATIENT
Start: 2020-11-25 | End: 2020-12-03 | Stop reason: SDUPTHER

## 2020-11-27 ENCOUNTER — PATIENT MESSAGE (OUTPATIENT)
Dept: FAMILY MEDICINE CLINIC | Age: 45
End: 2020-11-27

## 2020-11-30 NOTE — TELEPHONE ENCOUNTER
From: Radha Wisdom  To: Kym Chaney DO  Sent: 11/27/2020 9:49 PM EST  Subject: Prescription Question    I gave the vitamin D that i had gotten that was the wrong dose to my mother n law and went and got the right dose and have been takin them daily with my morning medicine Thank you  [facility] hope u all had a great Thanksgiving      ----- Message -----   From:Karen Vance DO   Sent:11/27/2020 7:27 PM EST   To:Efren Tang   Subject:RE: Prescription Question      The W-77 and folic acid doses you gave were right - I already have those on your medication list. Thanks! If you have Vitamin D with 5000 IU each, I would take one every other day for now, because we were going to shoot for a dose around 2000 IU daily, so that would be approx 2500 IU daily with you taking it every other day. Unless you were able to get the Rx for the 2000 IU one I sent in covered/filled?      Thanks,   [facility]      ----- Message -----   From:Efren Tang   Sent:11/10/2020 2:21 PM EST   To:Karen Vance DO   Subject:Prescription Question    The Vitamin D 125 mcg (5000iu) so how many so I take ? and just to make sure my b-12 Is what i said 4055IIV and the folic acid is 892 did i get those two right ?  hope ur day is going well nate

## 2020-12-03 RX ORDER — BUSPIRONE HYDROCHLORIDE 15 MG/1
15 TABLET ORAL 2 TIMES DAILY PRN
Qty: 60 TABLET | Refills: 5 | Status: SHIPPED | OUTPATIENT
Start: 2020-12-03 | End: 2021-05-30 | Stop reason: SDUPTHER

## 2020-12-03 NOTE — TELEPHONE ENCOUNTER
Ana Tobar called requesting a refill of the below medication which has been pended for you:     Requested Prescriptions     Pending Prescriptions Disp Refills    busPIRone (BUSPAR) 15 MG tablet 60 tablet 5     Sig: Take 15 mg by mouth 2 times daily as needed (anxiety)       Last Appointment Date: 11/10/2020  Next Appointment Date: 5/10/2021    Allergies   Allergen Reactions    Naproxen      ulcers

## 2020-12-05 ENCOUNTER — PATIENT MESSAGE (OUTPATIENT)
Dept: FAMILY MEDICINE CLINIC | Age: 45
End: 2020-12-05

## 2020-12-07 NOTE — TELEPHONE ENCOUNTER
From: Donavon Reza  To: Lisa Garcia DO  Sent: 12/5/2020 11:31 AM EST  Subject: Non-Urgent Medical Question    my new insurance [employer] dual plan told me as long as I have a script for my sumtriptan I can get up to 2 to 3 script a month my old insurance if you remember I could only get one a month

## 2020-12-08 RX ORDER — SUMATRIPTAN 100 MG/1
100 TABLET, FILM COATED ORAL
Qty: 9 TABLET | Refills: 11 | Status: SHIPPED | OUTPATIENT
Start: 2020-12-08 | End: 2021-01-07 | Stop reason: SDUPTHER

## 2020-12-11 NOTE — TELEPHONE ENCOUNTER
Spoke with pt at his wife's OV today - he states that he is dry heaving 1-2x's daily; only occasionally vomits. Taking Pepcid 20 mg BID compliantly. Has not made any specific diet changes, but does have an air fryer now to cook with less henao oil. Admits that he is drinking soda. Has nausea intermittently; denies bowel symptoms. Offered referral to Gen Surgery for EGD - he declined. Would like to try another medication first - discussed option of nausea medication vs Carafate. Pt would like to try the nausea medication first.  Short-term supply of Zofran sent to pharmacy today - pt will try this over the next week and report how he's doing at that time. Metronidazole Counseling:  I discussed with the patient the risks of metronidazole including but not limited to seizures, nausea/vomiting, a metallic taste in the mouth, nausea/vomiting and severe allergy.

## 2020-12-22 ENCOUNTER — PATIENT MESSAGE (OUTPATIENT)
Dept: FAMILY MEDICINE CLINIC | Age: 45
End: 2020-12-22

## 2020-12-28 NOTE — TELEPHONE ENCOUNTER
From: Mann Silverman  To: Jaqui Blakely DO  Sent: 12/22/2020 5:38 PM EST  Subject: Non-Urgent Medical Question    My chiropractor told me yesterday that Therapy isn't really going to do much for my shoulder as it and my neck have arthritis in them and that is why they hurt She won't even try to crack my neck as it is so bad it could really do some damage if not do worse but I wanted to say Thank you for Trying and sending me to therapy to try to get the pain to stop maybe later we can talk about medicine that helps with arthritis pain     [facility] to you and your family and the staff there at the clinic

## 2021-01-07 ENCOUNTER — OFFICE VISIT (OUTPATIENT)
Dept: NEUROLOGY | Age: 46
End: 2021-01-07
Payer: MEDICARE

## 2021-01-07 VITALS
DIASTOLIC BLOOD PRESSURE: 78 MMHG | RESPIRATION RATE: 16 BRPM | HEART RATE: 88 BPM | HEIGHT: 71 IN | OXYGEN SATURATION: 96 % | WEIGHT: 268.4 LBS | SYSTOLIC BLOOD PRESSURE: 138 MMHG | BODY MASS INDEX: 37.57 KG/M2 | TEMPERATURE: 97.2 F

## 2021-01-07 DIAGNOSIS — F41.9 ANXIETY: ICD-10-CM

## 2021-01-07 DIAGNOSIS — G89.29 CHRONIC LOW BACK PAIN WITHOUT SCIATICA, UNSPECIFIED BACK PAIN LATERALITY: ICD-10-CM

## 2021-01-07 DIAGNOSIS — M54.50 CHRONIC BILATERAL LOW BACK PAIN WITHOUT SCIATICA: ICD-10-CM

## 2021-01-07 DIAGNOSIS — E53.8 FOLIC ACID DEFICIENCY: ICD-10-CM

## 2021-01-07 DIAGNOSIS — M54.50 CHRONIC LOW BACK PAIN WITHOUT SCIATICA, UNSPECIFIED BACK PAIN LATERALITY: ICD-10-CM

## 2021-01-07 DIAGNOSIS — M54.2 CHRONIC NECK PAIN: ICD-10-CM

## 2021-01-07 DIAGNOSIS — F31.0 BIPOLAR AFFECTIVE DISORDER, CURRENT EPISODE HYPOMANIC (HCC): ICD-10-CM

## 2021-01-07 DIAGNOSIS — E66.01 MORBIDLY OBESE (HCC): ICD-10-CM

## 2021-01-07 DIAGNOSIS — F31.32 BIPOLAR AFFECTIVE DISORDER, CURRENTLY DEPRESSED, MODERATE (HCC): ICD-10-CM

## 2021-01-07 DIAGNOSIS — G89.29 CHRONIC NECK PAIN: ICD-10-CM

## 2021-01-07 DIAGNOSIS — E53.8 B12 DEFICIENCY: ICD-10-CM

## 2021-01-07 DIAGNOSIS — H43.813 DEGENERATION OF POSTERIOR VITREOUS BODY OF BOTH EYES: ICD-10-CM

## 2021-01-07 DIAGNOSIS — G43.909 MIGRAINE WITHOUT STATUS MIGRAINOSUS, NOT INTRACTABLE, UNSPECIFIED MIGRAINE TYPE: Primary | ICD-10-CM

## 2021-01-07 DIAGNOSIS — G43.009 MIGRAINE WITHOUT AURA AND WITHOUT STATUS MIGRAINOSUS, NOT INTRACTABLE: ICD-10-CM

## 2021-01-07 DIAGNOSIS — R41.3 MEMORY PROBLEM: ICD-10-CM

## 2021-01-07 DIAGNOSIS — F51.01 PRIMARY INSOMNIA: ICD-10-CM

## 2021-01-07 DIAGNOSIS — G44.219 EPISODIC TENSION-TYPE HEADACHE, NOT INTRACTABLE: ICD-10-CM

## 2021-01-07 DIAGNOSIS — G43.019 INTRACTABLE MIGRAINE WITHOUT AURA AND WITHOUT STATUS MIGRAINOSUS: ICD-10-CM

## 2021-01-07 DIAGNOSIS — F32.0 CURRENT MILD EPISODE OF MAJOR DEPRESSIVE DISORDER, UNSPECIFIED WHETHER RECURRENT (HCC): ICD-10-CM

## 2021-01-07 DIAGNOSIS — G89.29 CHRONIC BILATERAL LOW BACK PAIN WITHOUT SCIATICA: ICD-10-CM

## 2021-01-07 DIAGNOSIS — G44.221 CHRONIC TENSION-TYPE HEADACHE, INTRACTABLE: ICD-10-CM

## 2021-01-07 PROCEDURE — G8417 CALC BMI ABV UP PARAM F/U: HCPCS | Performed by: PSYCHIATRY & NEUROLOGY

## 2021-01-07 PROCEDURE — 99215 OFFICE O/P EST HI 40 MIN: CPT | Performed by: PSYCHIATRY & NEUROLOGY

## 2021-01-07 PROCEDURE — G8484 FLU IMMUNIZE NO ADMIN: HCPCS | Performed by: PSYCHIATRY & NEUROLOGY

## 2021-01-07 PROCEDURE — 1036F TOBACCO NON-USER: CPT | Performed by: PSYCHIATRY & NEUROLOGY

## 2021-01-07 PROCEDURE — 99214 OFFICE O/P EST MOD 30 MIN: CPT

## 2021-01-07 PROCEDURE — G8427 DOCREV CUR MEDS BY ELIG CLIN: HCPCS | Performed by: PSYCHIATRY & NEUROLOGY

## 2021-01-07 RX ORDER — SUMATRIPTAN 100 MG/1
100 TABLET, FILM COATED ORAL
Qty: 12 TABLET | Refills: 5 | Status: SHIPPED | OUTPATIENT
Start: 2021-01-07 | End: 2021-04-26 | Stop reason: SDUPTHER

## 2021-01-07 RX ORDER — DIVALPROEX SODIUM 500 MG/1
500 TABLET, EXTENDED RELEASE ORAL 2 TIMES DAILY
Qty: 60 TABLET | Refills: 5 | Status: SHIPPED | OUTPATIENT
Start: 2021-01-07 | End: 2021-04-15 | Stop reason: SDUPTHER

## 2021-01-07 RX ORDER — ONDANSETRON 4 MG/1
4 TABLET, FILM COATED ORAL EVERY 8 HOURS PRN
Qty: 30 TABLET | Refills: 2 | Status: SHIPPED | OUTPATIENT
Start: 2021-01-07 | End: 2021-11-12 | Stop reason: ALTCHOICE

## 2021-01-07 ASSESSMENT — ENCOUNTER SYMPTOMS
DIARRHEA: 0
FACIAL SWEATING: 0
CHOKING: 0
HEARTBURN: 0
BLOOD IN STOOL: 0
RHINORRHEA: 0
SORE THROAT: 0
EYE DISCHARGE: 0
ABDOMINAL DISTENTION: 0
VOMITING: 1
BACK PAIN: 1
EYE ITCHING: 0
SCALP TENDERNESS: 0
EYE WATERING: 0
TROUBLE SWALLOWING: 0
SINUS PRESSURE: 0
BOWEL INCONTINENCE: 0
VOICE CHANGE: 0
BLURRED VISION: 0
WHEEZING: 0
SWOLLEN GLANDS: 0
NAUSEA: 1
CONSTIPATION: 0
PHOTOPHOBIA: 1
CHEST TIGHTNESS: 0
SHORTNESS OF BREATH: 0
COLOR CHANGE: 0
EYE PAIN: 0
FACIAL SWELLING: 0
COUGH: 0
ABDOMINAL PAIN: 0
APNEA: 0
EYE REDNESS: 0
VISUAL CHANGE: 0

## 2021-01-07 NOTE — PROGRESS NOTES
[facility]  Neurology  [address] [place] [address]  Phone: [phone]   Fax: [phone]    SUBJECTIVE:     PATIENT ID:  Adolfo Mooney is a  RIGHT HANDED 50 y.o. male. Migraine   This is a chronic problem. Episode onset: MORE  THAN  10  YEARS. The problem occurs intermittently. The problem has been rapidly improving. The pain is located in the vertex, frontal and bilateral region. The pain does not radiate. The pain quality is similar to prior headaches. The quality of the pain is described as aching, stabbing and pulsating. The pain is at a severity of 3/10. The pain is moderate. Associated symptoms include back pain, nausea, neck pain, phonophobia, photophobia, seizures and vomiting. Pertinent negatives include no abdominal pain, abnormal behavior, anorexia, blurred vision, coughing, dizziness, drainage, ear pain, eye pain, eye redness, eye watering, facial sweating, fever, hearing loss, insomnia, loss of balance, muscle aches, numbness, rhinorrhea, scalp tenderness, sinus pressure, sore throat, swollen glands, tingling, tinnitus, visual change, weakness or weight loss. The symptoms are aggravated by unknown. Treatments tried: DEPAKOTE   The treatment provided significant relief. His past medical history is significant for migraine headaches, migraines in the family and obesity. There is no history of cancer, cluster headaches, hypertension, immunosuppression, pseudotumor cerebri, recent head traumas, sinus disease or TMJ. Headache   This is a chronic problem. Episode onset: MORE  THAN   10  YEARS. The problem occurs intermittently. The problem has been unchanged. The pain is located in the right unilateral region. The pain does not radiate. The pain quality is similar to prior headaches. The quality of the pain is described as aching. The pain is mild. Associated symptoms include back pain, nausea, neck pain, phonophobia, photophobia, seizures and vomiting.  Pertinent negatives include no abdominal pain, abnormal behavior, anorexia, blurred vision, coughing, dizziness, drainage, ear pain, eye pain, eye redness, eye watering, facial sweating, fever, hearing loss, insomnia, loss of balance, muscle aches, numbness, rhinorrhea, scalp tenderness, sinus pressure, sore throat, swollen glands, tingling, tinnitus, visual change, weakness or weight loss. Nothing aggravates the symptoms. He has tried NSAIDs, triptans and acetaminophen for the symptoms. The treatment provided mild relief. His past medical history is significant for migraine headaches, migraines in the family and obesity. There is no history of cancer, cluster headaches, hypertension, immunosuppression, pseudotumor cerebri, recent head traumas, sinus disease or TMJ. Neurologic Problem  The patient's primary symptoms include memory loss. The patient's pertinent negatives include no altered mental status, clumsiness, focal sensory loss, focal weakness, loss of balance, near-syncope, slurred speech, syncope, visual change or weakness. This is a chronic problem. Episode onset: MORE  THAN   10  YEARS. The neurological problem developed insidiously. The problem is unchanged. There was no focality noted. Associated symptoms include back pain, headaches, nausea, neck pain and vomiting. Pertinent negatives include no abdominal pain, auditory change, aura, bladder incontinence, bowel incontinence, chest pain, confusion, diaphoresis, dizziness, fatigue, fever, light-headedness, palpitations, shortness of breath or vertigo. Past treatments include nothing. The treatment provided no relief. His past medical history is significant for dementia. There is no history of a bleeding disorder, a clotting disorder, a CVA, head trauma, liver disease, mood changes or seizures. Mental Health Problem  The primary symptoms include negative symptoms and somatic symptoms.  The primary symptoms do not include dysphoric mood, delusions, hallucinations, bizarre behavior or disorganized speech. This is a chronic problem. The somatic symptoms have been unchanged since their onset. The symptoms are mild. Somatic symptoms include headaches and back pain. Somatic symptoms do not include fatigue, abdominal pain, heartburn, constipation or myalgias. The onset of the illness is precipitated by emotional stress. The degree of incapacity that he is experiencing as a consequence of his illness is mild. Additional symptoms of the illness include attention impairment, distractible, poor judgment, headaches and seizures. Additional symptoms of the illness do not include anhedonia, insomnia, hypersomnia, appetite change, unexpected weight change, fatigue, agitation, feelings of worthlessness, euphoric mood, increased goal-directed activity, flight of ideas, inflated self-esteem, decreased need for sleep, visual change or abdominal pain. He does not admit to suicidal ideas. He does not have a plan to attempt suicide. He does not contemplate harming himself. He has not already injured self. He does not contemplate injuring another person. He has not already  injured another person. Risk factors that are present for mental illness include neurological disease, a family history of mental illness and a history of mental illness. History obtained from  The patient         and other  available medical records were  Also  reviewed. The  Duration,  Quality,  Severity,  Location,  Timing,  Context,  Modifying  Factors   Of   The   Chief   Complaint       And  Present  Illness   Was   Reviewed   In   Chronological   [facility].                                            PATIENT'S  MAIN  CONCERNS INCLUDE :                       1)  H/O   CHRONIC  MIGRAINES    FOR  MORE  THAN   10   YEARS                                     -STABLE                         2)       TRIED  TOPAMAX,  IMITREX     AND                                   DID  NOT  HELP  MUCH  IN   THE PAST                          3)   HAD    BOTOX  INJECTIONS  IN  [facility]     IN  THE  PAST                               4)   H/O   CHRONIC  ANXIETY,  DEPRESSION ,  BIPOLAR  DISORDER                               --    ON   EFFEXOR,  BUSPAR,   RISPERDAL    FROM  HIS  PCP.                                                         -STABLE                                                 PATIENT    TO  FOLLOW  WITH  MENTAL   HEALTH PROFESSIONALS                          5)   FAMILY  H/O  MIGRAINES                              6)  PATIENT  DENIES  ANY  HEAD INJURY  IN   THE  PAST. NO   H/O   SEIZURES . 7)   CHRONIC  MEMORY PROBLEMS   FOR    10  YEARS                                   -    STABLE                         8)    H/O   CHRONIC  LUMBAR  PAIN    FOR  MORE  THAN    20  YEARS                                H/O   CHRONIC  NECK PAIN  FOR  MORE  THAN   10  YEARS                                  --     ON   NEURONTIN                                       -    STABLE                         9)   H/O   SLEEP  DIFFICULTIES     -  IMPROVED                                            ON   AMBIEN   FOR  HIS  PCP. 10)   CURRENTLY  PATIENT  DENIES  SMOKING,  ALCOHOL  USAGE. AND  INDICATES  NOT  WORKING.                            11)  MRI  BRAIN  IN  MAY   2017   SHOWED  NO  ACUTE  PATHOLOGY                            12)  PREVIOUS     H/O   CHRONIC  SMOKING. PATIENT  QUIT  SMOKING  SINCE  June 2017                                 13)  H/O   GI  INTOLERANCE  TO    NSAID    IN  THE  PAST                                                 14)    MILD    FOLIC  ACID  AND  B 12   DEFICIENCY                                             -   RESOLVED                                             ON   SUPPLEMENTATION.                               15)    PATIENT  DOES  NOT  WANT TO  RETRY   TOPAMAX                                                              16)     PATIENT     STARTED  ON      DEPAKOTE    IN  MAY   2018                                         FOR  MIGRAINE  PROPHYLAXIS,                                IN  VIEW OF  HIS  CO  MORBID  MENTAL  HEALTH PROBLEMS. PATIENT  TOLERATING    DEPAKOTE  AND  GETTING  BENEFIT. EXPECTATIONS,   GOALS   AND  SIDE  EFFECTS  MEDICATION    WERE                                         REVIEWED     AND   DISCUSSED    IN    DETAIL. 17)             PATIENT'S  MIGRAINES         ARE                                          VERY  WELL  CONTROLLED                                          AND  PATIENT   SUBJECTIVELY  HAPPY. 18)      PATIENT      DENIES  ANY   NEW  NEUROLOGICAL  CONCERNS. REQUESTS   REFILLS  FOR  HIS  MEDICATIONS                                 .                                                                     PRECIPITATING  FACTORS: including  fever/infection, exertion/relaxation, position change, stress, weather change,     medications/alcohol, time of day/darkness/light  Are    Absent                                                               MODIFYING  FACTORS:  fever/infection, exertion/relaxation, position change, stress, weather change, medications/alcohol,     time of day/darkness/light  Are  Absent             Patient   Indicates   The  Presence   And  The  Absence  Of  The  Following  Associated  And   Additional  Neurological    Symptoms:                                Balance  And coordination problems  absent           Gait problems     absent            Headaches      present              Migraines           present           Memory problems        Present             Confusion        absent            Paresthesia numbness          absent Seizures  And  Starring  Episodes           absent           Syncope,  Near  syncopal episodes         absent           Speech problems           absent             Swallowing  Problems      absent            Dizziness,  Light headedness           absent                        Vertigo        absent             Generalized   Weakness    absent              focal  Weakness     absent             Tremors         absent              Sleep  Problems     present             History  Of   Recent   Head  Injury     absent             History  Of   Recent  TIA     absent             History  Of   Recent    Stroke     absent             Neck  Pain and  Neck muscle  Spasms  Absent               Radiating  down   And   Weakness           absent            Lower back   Pain  And     Spasms  Present              Radiating    Down   And   Weakness          present                H/O   FALLS        absent               History  Of   Visual  Symptoms    Absent                  Associated   Diplopia       absent                                    Also   Additional   Symptoms   Present    As  Documented    In   The detailed      Review  Of  Systems   And    Please   Refer   To    Them for   Additional  Information. Any components  That are either  Unobtainable  Or  Limited  In   HPI, ROS  And/or PFSH   Are       Due   To   Patient's  Medical  Problems,  Clinical  Condition and/or lack of other  Alternate resources.               RECORDS   REVIEWED:    historical medical records       INFORMATION   REVIEWED:     MEDICAL   HISTORY,     SURGICAL   HISTORY,     MEDICATIONS   LIST,   ALLERGIES AND  DRUG  INTOLERANCES,       FAMILY   HISTORY,  SOCIAL  HISTORY,      PROBLEM  LIST   FOR  PATIENT  CARE   COORDINATION        Past Medical History:   Diagnosis Date    Allergic rhinitis     Bipolar disorder ([facility] [address]     Diagnosed in 1998    Depression     Headache(784.0)     Osteoarthritis     TMJ (dislocation of temporomandibular joint)     Torticollis          Past Surgical History:   Procedure Laterality Date    TONSILLECTOMY           Current Outpatient Medications   Medication Sig Dispense Refill    divalproex (DEPAKOTE ER) 500 MG extended release tablet Take 1 tablet by mouth 2 times daily 60 tablet 5    SUMAtriptan (IMITREX) 100 MG tablet Take 1 tablet by mouth once as needed for Migraine 12 tablet 5    ondansetron (ZOFRAN) 4 MG tablet Take 1 tablet by mouth every 8 hours as needed for Nausea 30 tablet 2    fluticasone (FLONASE) 50 MCG/ACT nasal spray 2 sprays by Nasal route daily 3 Bottle 3    busPIRone (BUSPAR) 15 MG tablet Take 15 mg by mouth 2 times daily as needed (anxiety) 60 tablet 5    loratadine (CLARITIN) 10 MG tablet Take 1 tablet by mouth daily 90 tablet 3    tiotropium (SPIRIVA HANDIHALER) 18 MCG inhalation capsule Inhale 1 capsule into the lungs daily 90 capsule 3    gemfibrozil (LOPID) 600 MG tablet Take 1 tablet by mouth 2 times daily 180 tablet 1    Cholecalciferol (VITAMIN D3) 50 MCG (2000 UT) TABS Take 1 tablet by mouth daily 30 tablet 11    vitamin B-12 (CYANOCOBALAMIN) 1000 MCG tablet Take 1 tablet by mouth daily 30 tablet 11    folic acid (FOLVITE) 588 MCG tablet Take 1 tablet by mouth daily 30 tablet 11    aspirin (ASPIRIN 81) 81 MG EC tablet Take 1 tablet by mouth daily 30 tablet 11    venlafaxine (EFFEXOR XR) 150 MG extended release capsule TAKE ONE CAPSULE BY MOUTH DAILY 90 capsule 3    Menthol, Topical Analgesic, (BIOFREEZE) 4 % GEL Apply to shoulders & neck to relieve pain.  237 mL 5    famotidine (PEPCID) 20 MG tablet TAKE ONE TABLET BY MOUTH TWICE A  tablet 3    risperiDONE (RISPERDAL) 3 MG tablet Take 1 tablet by mouth nightly 30 tablet 5    tiZANidine (ZANAFLEX) 4 MG tablet Take 1 tablet by mouth every 8 hours as needed (muscle spasm) 40 tablet 2    albuterol sulfate HFA (VENTOLIN HFA) 108 (90 Base) MCG/ACT inhaler Inhale 1-2 puffs into the lungs every 6 hours as needed for Wheezing 3 Inhaler 3    albuterol (PROVENTIL) (2.5 MG/3ML) 0.083% nebulizer solution Take 3 mLs by nebulization every 6 hours as needed for Wheezing or Shortness of Breath 120 each 3    ibuprofen (ADVIL;MOTRIN) 600 MG tablet Take 1 tablet by mouth every 8 hours as needed for Pain 90 tablet 3    Respiratory Therapy Supplies (NEBULIZER COMPRESSOR) KIT 1 kit by Does not apply route once for 1 dose 1 kit 0    Respiratory Therapy Supplies (NEBULIZER/TUBING/MOUTHPIECE) KIT 1 kit by Does not apply route once for 1 dose 1 kit 0    Compression Stockings MISC by Does not apply route Wear on bilateral lower extremities daily as needed for edema.  (Patient not taking: Reported on 11/10/2020) 1 each 1     Current Facility-Administered Medications   Medication Dose Route Frequency Provider Last Rate Last Admin    phenylephrine (MYDFRIN) 2.5 % ophthalmic solution 1 drop  1 drop Both Eyes Once Abhishek Barrett MD        tropicamide (MYDRIACYL) 1 % ophthalmic solution 1 drop  1 drop Both Eyes Once Abhishek Barrett MD             Allergies   Allergen Reactions    Naproxen      ulcers         Family History   Problem Relation Age of Onset    Stroke Mother     High Blood Pressure Mother     High Blood Pressure Father     Stroke Father     Glaucoma Neg Hx     Diabetes Neg Hx     Cataracts Neg Hx          Social History     Socioeconomic History    Marital status:      Spouse name: Not on file    Number of children: Not on file    Years of education: Not on file    Highest education level: Not on file   Occupational History    Not on file   Social Needs    Financial resource strain: Not on file    Food insecurity     Worry: Not on file     Inability: Not on file    Transportation needs     Medical: Not on file     Non-medical: Not on file   Tobacco Use    Smoking status: Former Smoker     Packs/day: 1.00     Years: 25.00     Pack years: 25.00     Types: Cigarettes     Start date: 10/16/1992     Quit date: 6/21/2017     Years since quitting: 3.5    Smokeless tobacco: Never Used    Tobacco comment: handout given.     Substance and Sexual Activity    Alcohol use: No    Drug use: No    Sexual activity: Yes     Partners: Female   Lifestyle    Physical activity     Days per week: Not on file     Minutes per session: Not on file    Stress: Not on file   Relationships    Social connections     Talks on phone: Not on file     Gets together: Not on file     Attends Shinto service: Not on file     Active member of club or organization: Not on file     Attends meetings of clubs or organizations: Not on file     Relationship status: Not on file    Intimate partner violence     Fear of current or ex partner: Not on file     Emotionally abused: Not on file     Physically abused: Not on file     Forced sexual activity: Not on file   Other Topics Concern    Not on file   Social History Narrative    Not on file       Vitals:    01/07/21 1516   BP: 138/78   Pulse: 88   Resp: 16   Temp: 97.2 °F (36.2 °C)   SpO2: 96%         Wt Readings from Last 3 Encounters:   01/07/21 268 lb 6.4 oz (121.7 kg)   11/10/20 274 lb 3.2 oz (124.4 kg)   06/08/20 274 lb (124.3 kg)         BP Readings from Last 3 Encounters:   01/07/21 138/78   11/10/20 110/78   07/31/20 (!) 144/78       Hematology and Coagulation  Lab Results   Component Value Date    WBC 6.9 02/25/2020    RBC 4.14 02/25/2020    HGB 13.0 02/25/2020    HCT 39.2 02/25/2020    MCV 94.7 02/25/2020    MCH 31.4 02/25/2020    MCHC 33.2 02/25/2020    RDW 13.0 02/25/2020     02/25/2020    MPV 9.8 02/25/2020       Chemistries  Lab Results   Component Value Date     10/29/2020    K 3.9 10/29/2020     10/29/2020    CO2 26 10/29/2020    BUN 14 10/29/2020    CREATININE 0.93 10/29/2020    CALCIUM 10.1 10/29/2020    PROT 7.2 10/29/2020    LABALBU 4.9 10/29/2020    BILITOT 0.36 10/29/2020    ALKPHOS 78 10/29/2020    AST 31 10/29/2020    ALT 56 10/29/2020       Lab Results Component Value Date    BUN 14 10/29/2020    CREATININE 0.93 10/29/2020     Lab Results   Component Value Date    CALCIUM 10.1 10/29/2020           Review of Systems   Constitutional: Negative for appetite change, chills, diaphoresis, fatigue, fever, unexpected weight change and weight loss. HENT: Negative for congestion, dental problem, drooling, ear discharge, ear pain, facial swelling, hearing loss, mouth sores, nosebleeds, postnasal drip, rhinorrhea, sinus pressure, sore throat, tinnitus, trouble swallowing and voice change. Eyes: Positive for photophobia. Negative for blurred vision, pain, discharge, redness, itching and visual disturbance. Respiratory: Negative for apnea, cough, choking, chest tightness, shortness of breath and wheezing. Cardiovascular: Negative for chest pain, palpitations, leg swelling and near-syncope. Gastrointestinal: Positive for nausea and vomiting. Negative for abdominal distention, abdominal pain, anorexia, blood in stool, bowel incontinence, constipation, diarrhea and heartburn. Endocrine: Negative for cold intolerance, heat intolerance, polydipsia, polyphagia and polyuria. Genitourinary: Negative for bladder incontinence. Musculoskeletal: Positive for back pain and neck pain. Negative for arthralgias, gait problem, joint swelling, myalgias and neck stiffness. Skin: Negative for color change, pallor, rash and wound. Allergic/Immunologic: Negative for environmental allergies, food allergies and immunocompromised state. Neurological: Positive for seizures and headaches. Negative for dizziness, vertigo, tingling, tremors, focal weakness, syncope, facial asymmetry, speech difficulty, weakness, light-headedness, numbness and loss of balance. Hematological: Negative for adenopathy. Does not bruise/bleed easily. Psychiatric/Behavioral: Positive for decreased concentration, memory loss and sleep disturbance.  Negative for agitation, behavioral problems, confusion, dysphoric mood, hallucinations, self-injury and suicidal ideas. The patient is nervous/anxious. The patient does not have insomnia and is not hyperactive. OBJECTIVE:    Physical Exam  Constitutional:       Appearance: He is well-developed. HENT:      Head: Normocephalic and atraumatic. No raccoon eyes or Garcia's sign. Right Ear: External ear normal.      Left Ear: External ear normal.      Nose: Nose normal.   Eyes:      Conjunctiva/sclera: Conjunctivae normal.   Neck:      Musculoskeletal: Normal range of motion and neck supple. Normal range of motion. No neck rigidity or muscular tenderness. Thyroid: No thyroid mass or thyromegaly. Vascular: No carotid bruit. Trachea: No tracheal deviation. Meningeal: Brudzinski's sign and Kernig's sign absent. Cardiovascular:      Rate and Rhythm: Normal rate and regular rhythm. Pulmonary:      Effort: Pulmonary effort is normal.   Musculoskeletal:         General: No tenderness. Skin:     General: Skin is warm. Coloration: Skin is not pale. Findings: No erythema or rash. Nails: There is no clubbing. Psychiatric:         Attention and Perception: He is attentive. Mood and Affect: Mood is anxious and depressed. Affect is not labile, blunt or inappropriate. Behavior: Behavior is slowed. Behavior is not agitated, aggressive, withdrawn, hyperactive or combative. Behavior is cooperative. Thought Content: Thought content is not paranoid or delusional. Thought content does not include homicidal or suicidal ideation. Thought content does not include homicidal or suicidal plan. Cognition and Memory: Memory is impaired. He exhibits impaired recent memory. He does not exhibit impaired remote memory. Judgment: Judgment is not impulsive or inappropriate.          Neurologic Exam    NEUROLOGICAL EXAMINATION :    A) MENTAL STATUS:                   Alert and  oriented  To time, place And  Person. No Aphasia. No  Dysarthria. Able   To  Follow  SIMPLE     commands without   Any  Difficulty. No right  To left confusion. SLOW    Speech  And language function. Insight and  Judgment ,Fund  Of  Knowledge   DECREASED                Recent  And  Remote memory   DECREASED                Attention &Concentration are   DECREASED                                                 B) CRANIAL NERVES :             2 CN : Visual  Acuity  And  Visual fields  within normal limits                      Fundi  Could  Not  Be  Could  Not  Be  Evaluated. 3,4,6 CN : Both  Pupils are   Reactive and  Equal.                          Extraocular   Movements  Are  Intact. No  Nystagmus. No  CATY. No  Afferent  Pupillary  Defect noted. 5 CN :  Normal  Facial sensations and Corneal  Reflexes         7 CN :  Normal  Facial  Symmetry  And  Strength. No facial  Weakness. 8 CN :  Hearing  Appears within normal limits        9, 10 CN: Normal spontaneous, reflex palate movements       11 CN:   Normal  Shoulder shrug and  strength       12 CN :   Normal  Tongue movements and  Tongue  In midline                      No tongue   Fasciculations or atrophy         C) MOTOR  EXAM:                 Strength  In upper  And  Lower extremities   within normal limits               No  Drift. No  Atrophy               Rapid alternating  And  repetitions  Movements  within normal limits               Muscle  Tone  In upper  And  Lower  Extremities  Normal                No rigidity. No  Spasticity. Bradykinesia   Absent                 No  Asterixis.               Sustention  Tremor , Resting  Tremor   absent                    No other  Abnormal  Movements noted             D) SENSORY :               light touch, pinprick, position  And  Vibration  within normal limits        E) REFLEXES: Deep  Tendon  Reflexes normal                    No pathological  Reflexes  Bilaterally. F) COORDINATION  AND  GAIT :                                Station and  Gait  normal                                       Romberg's test negative                          Ataxia negative        ASSESSMENT:    Patient Active Problem List   Diagnosis    Chronic neck pain    Anxiety    Bipolar affective disorder, currently depressed, moderate (HCC)    Insomnia    Depression    Bipolar disorder (HCC)    Headache    Intractable migraine without aura and without status migrainosus    Memory problem    Chronic low back pain without sciatica    Hypertriglyceridemia    Folic acid deficiency    B12 deficiency    Degeneration of posterior vitreous body of both eyes    Combined forms of age-related cataract of both eyes    Recurrent corneal erosion, left    COPD (chronic obstructive pulmonary disease) ([address]    Morbidly obese ([address]           MRI OF THE BRAIN WITHOUT CONTRAST  5/23/2017 6:25 pm       TECHNIQUE:   Multiplanar multisequence MRI of the brain was performed without the   administration of intravenous contrast.       COMPARISON:   None.       HISTORY:   ORDERING SYSTEM PROVIDED HISTORY: Worsening headaches   TECHNOLOGIST PROVIDED HISTORY:   Reason for exam:->worsening migraines over the past month; focused HA's over   R forehead, above R eye   Ordering Physician Provided Reason for Exam: migraine headaches with   sensitivity to light and sound, no injury   Acuity: Acute   Type of Exam: Unknown   Additional signs and symptoms: worsening headaches; worsening over last month   focused ROSSI's over R forehead, above R eye   Relevant Medical/Surgical History: no priros       FINDINGS:   INTRACRANIAL STRUCTURES/VENTRICLES: There is no acute infarct. No mass effect   or midline shift. No abnormal extra-axial fluid collection.  The ventricles   and sulci are normal in size VARIOUS  RISK   FACTORS   WERE  REVIEWED   AND   DISCUSSED. *  PATIENT   HAS  MULTIPLE   MEDICAL, MENTAL HEALTH    AND   NEUROLOGICAL   PROBLEMS . PATIENT'S   MANAGEMENT  IS  CHALLENGING. PLAN:       [place]  Of  The  Diagnoses,  The  Management & Treatment  Options           AND    Care  plan  Were        Reviewed and   Discussed   With  patient. * Goals  And  Expectations  Of  The  Therapy  Discussed   And  Reviewed. *   Benefits   And   Side  Effect  Profile  Of  Medication/s   Were   Discussed             * Need   For  Further   Follow up For  The  Various  Problems  Were discussed. * Results  Of  The  Previous  Diagnostic tests were reviewed and questions answered. patient  understand the same. Medical  Decision  Making  Was  Made  Based on the   Complexity  Of  Above  Mentioned  Diagnoses,    Data reviewed   &     diagnostic  Tests  Reviewed,  Risk  Of  Significant   Co morbidities and complicating   Factors. Medical  Decision  Was   High  Complexity  Due   To  The  Patient's  Multiple  Symptoms,  Advancing   Disease,  Complex      Treatment  Regimen,  Multiple medications and   Risk  Of   Side  Effects,  Difficulty  In  Medication  Management  And  Diagnostic      Challenges   In  View  Of  The  Associated   Co  Morbid  Conditions   And  Problems. *   BE  CAREFUL  WITH  ACTIVITIES           *   AVOID   NECK  AND/ BACK  STRAINING  ACTIVITIES      *   ADEQUATE   FLUID  INTAKE   AND  ELECTROLYTE  BALANCE           * KEEP  DAIRY  OF   THE  NEUROLOGICAL  SYMPTOMS        RECORDING THE    DURATION  AND  FREQUENCY. *  AVOID    CONDITIONS  AND  FACTORS   THAT  MAKE   NEUROLOGICAL  SYMPTOMS  WORSE. *  TO  MAINTAIN  REGULAR  SLEEP  WAKE  CYCLES.      *   TO  HAVE  ADEQUATE  REST  AND   SLEEP    HOURS.          *    AVOID  ANY USAGE OF                   TOBACCO,  EXCESSIVE  ALCOHOL  AND   ILLEGAL SUBSTANCES        *  CONTINUE MEDICATIONS PRESCRIBED BY NEUROLOGIST AS    RECOMMENDED       *   Compliance   With  Medications   And  Instructions            * CURRENTLY  TOLERATING  THE  PRESCRIBED   MEDICATIONS. WITHOUT  ANY  SIGNIFICANT  SIDE  EFFECTS   &  GETTING BENEFIT.            *    MIGRAINE  DAIRY   WITH  MONITORING  OF  DURATION  AND  FREQUENCY. *  May   Use  Pill  Box,    If  Needed        *      Antiplatelet  therapy    As   Recommended  Was   Discussed          *    Prophylactic  Use   Of     Vitamin   B   Complex,  Folic  Acid,    Vitamin  B12        Multivitamin,   Calcium  With  magnesium  And  Vit D    Supplementations   Over  The  Counter  Discussed           *  PATIENT  IS  ALSO   COUNSELED   TO  KEEP    ACTIVITIES         A)   SIMPLE      B)  ORGANIZED      C)  WRITE   DOWN                               *    PATIENT   WAS    STARTED  ON      DEPAKOTE    IN  MAY   2018                                  FOR  MIGRAINE  PROPHYLAXIS,                                IN  VIEW OF  HIS  CO  MORBID  MENTAL  HEALTH PROBLEMS.                                     *        PATIENT  TOLERATING    DEPAKOTE  AND  GETTING  BENEFIT.                                                       *         PATIENT'S  MIGRAINES          ARE                                          VERY  WELL  CONTROLLED                                               Orders Placed This Encounter   Medications    divalproex (DEPAKOTE ER) 500 MG extended release tablet     Sig: Take 1 tablet by mouth 2 times daily     Dispense:  60 tablet     Refill:  5    SUMAtriptan (IMITREX) 100 MG tablet     Sig: Take 1 tablet by mouth once as needed for Migraine     Dispense:  12 tablet     Refill:  5    ondansetron (ZOFRAN) 4 MG tablet     Sig: Take 1 tablet by mouth every 8 hours as needed for Nausea     Dispense:  30 tablet     Refill:  2                   *PATIENT   TO  FOLLOW  UP  WITH   PRIMARY  CARE   AND   OTHER  CONSULTANTS  AS BEFORE.               *TO  FOLLOW  WITH   MENTAL  HEALTH  PROFESSIONALS ,  INCLUDING            PSYCHOLOGICAL  COUNSELING   AND  PSYCHIATRIC  EVALUTIONS            *  Maintain   Healthy  Life Style    With   Periodic  Monitoring  Of      Any  Medical  Conditions  Including   Elevated  Blood  Pressure,  Lipid  Profile,     Blood  Sugar levels  And   Heart  Disease. *   Period   Screening  For  Cancers  Involving  Breast,  Colon,    Prostate, lungs  And  Other  Organs  As  Applicable,  In consultation   With  Your  Primary Care Providers. * Second  Neurological  Opinion  And  Evaluations  In  [facility] AND [facility]  Setting  If  Patient  Is  Interested. *  If  The  Patient remains  Neurologically  Stable   Return   To  [facility] Neurology Department       IN  3-6   MONTHS  TIME   FOR  FURTHER  FOLLOW UP.                 *  If   There is  Any  Significant  Worsening   Of  Current  Symptoms  And  Or  If patient  Develops       Any additional  New  Neurological  Symptoms  Or  Significant  Concerns   Should  Call  911 or      Go  To  Emergency  Department  For  Further  Immediate  Evaluation. *   The  Neurological   Findings,  Possible  Diagnosis,  Differential diagnoses   And  Options      For    Further   Investigations   And  management   Are  Discussed  Comprehensively. Medications   And  Prescription   Risks  And  Side effects  Are   Also  Discussed. The  Above  Were  Reviewed  With  patient    And     questions  Answered  In  Detail. More   Than   50% of face  To face Time   Was  Spent  On  Counseling   And       Coordination  Of  Care   Of   Patient's multiple   Neurological  Problems   And   Comorbid  Medical   Conditions.             Electronically signed by Chrissie Arredondo MD., [facility]      Board Certified in  Neurology &  In  [employer] ([employer])      DISCLAIMER:   Although every effort was made to ensure the accuracy of this  electronic transcription, some errors in transcription may have occurred. GENERAL PATIENT INSTRUCTIONS:     A Healthy Lifestyle: Care Instructions  Your Care Instructions  A healthy lifestyle can help you feel good, stay at a healthy weight, and have plenty of energy for both work and play. A healthy lifestyle is something you can share with your whole family. A healthy lifestyle also can lower your risk for serious health problems, such as high blood pressure, heart disease, and diabetes. You can follow a few steps listed below to improve your health and the health of your family. Follow-up care is a key part of your treatment and safety. Be sure to make and go to all appointments, and call your doctor if you are having problems. Its also a good idea to know your test results and keep a list of the medicines you take. How can you care for yourself at home? Do not eat too much sugar, fat, or fast foods. You can still have dessert and treats now and then. The goal is moderation. Start small to improve your eating habits. Pay attention to portion sizes, drink less juice and soda pop, and eat more fruits and vegetables. Eat a healthy amount of food. A 3-ounce serving of meat, for example, is about the size of a deck of cards. Fill the rest of your plate with vegetables and whole grains. Limit the amount of soda and sports drinks you have every day. Drink more water when you are thirsty. Eat at least 5 servings of fruits and vegetables every day. It may seem like a lot, but it is not hard to reach this goal. A serving or helping is 1 piece of fruit, 1 cup of vegetables, or 2 cups of leafy, raw vegetables. Have an apple or some carrot sticks as an afternoon snack instead of a candy bar. Try to have fruits and/or vegetables at every meal.  Make exercise part of your daily routine.  You may want to start with simple learn more? Go to [email or web address] and sign in to your [employer] account. Enter G385 in the [facility] box to learn more about \"A Healthy Lifestyle: Care Instructions. \"     If you do not have an account, please click on the \"Sign Up Now\" link. Current as of: July 26, 2016  Content Version: 11.2  © 0888-5384 [employer]. Care instructions adapted under license by [facility] ([facility]). If you have questions about a medical condition or this instruction, always ask your healthcare professional. [address] any warranty or liability for your use of this information.

## 2021-01-21 ENCOUNTER — CLINICAL DOCUMENTATION (OUTPATIENT)
Dept: NEUROLOGY | Age: 46
End: 2021-01-21

## 2021-01-21 ENCOUNTER — TELEPHONE (OUTPATIENT)
Dept: NEUROLOGY | Age: 46
End: 2021-01-21

## 2021-01-21 NOTE — PROGRESS NOTES
PATIENT   CALLED   AND  INFORMED     ON    01/21/2021   THAT      \"HE   NEVER    HAD   BOTOX  INJECTIONS  BEFORE     AND     WANTED   TO       UP  DATE    THE     MEDICAL   RECORDS.    \"

## 2021-01-21 NOTE — TELEPHONE ENCOUNTER
Pt called in stated that in dictation it says patient had botox in josh. Pt stated that he has never had botox injections before. Would like that removed from dication.

## 2021-01-25 ENCOUNTER — PATIENT MESSAGE (OUTPATIENT)
Dept: FAMILY MEDICINE CLINIC | Age: 46
End: 2021-01-25

## 2021-01-25 NOTE — TELEPHONE ENCOUNTER
From: Willy Galvez  To: Thuy Escamilla DO  Sent: 1/25/2021 4:22 AM EST  Subject: Non-Urgent Medical Question    here as of late I have been always tried I was checking the side of effects on some of my medicine's and 3 of them that i checked on cause drowseness I don't want to sleep all the time it feels that is were I have been spending most my time lately what can we do ?

## 2021-01-30 ENCOUNTER — PATIENT MESSAGE (OUTPATIENT)
Dept: FAMILY MEDICINE CLINIC | Age: 46
End: 2021-01-30

## 2021-01-30 DIAGNOSIS — M54.2 CHRONIC NECK PAIN: Primary | ICD-10-CM

## 2021-01-30 DIAGNOSIS — M25.512 CHRONIC LEFT SHOULDER PAIN: ICD-10-CM

## 2021-01-30 DIAGNOSIS — G89.29 CHRONIC NECK PAIN: Primary | ICD-10-CM

## 2021-01-30 DIAGNOSIS — G43.009 MIGRAINE WITHOUT AURA AND WITHOUT STATUS MIGRAINOSUS, NOT INTRACTABLE: ICD-10-CM

## 2021-01-30 DIAGNOSIS — G89.29 CHRONIC LEFT SHOULDER PAIN: ICD-10-CM

## 2021-01-31 ENCOUNTER — PATIENT MESSAGE (OUTPATIENT)
Dept: FAMILY MEDICINE CLINIC | Age: 46
End: 2021-01-31

## 2021-01-31 DIAGNOSIS — G47.00 INSOMNIA, UNSPECIFIED TYPE: ICD-10-CM

## 2021-01-31 DIAGNOSIS — R09.81 NASAL CONGESTION: ICD-10-CM

## 2021-01-31 DIAGNOSIS — F31.32 BIPOLAR AFFECTIVE DISORDER, CURRENTLY DEPRESSED, MODERATE (HCC): ICD-10-CM

## 2021-01-31 DIAGNOSIS — E78.1 HYPERTRIGLYCERIDEMIA: ICD-10-CM

## 2021-01-31 DIAGNOSIS — R52 GENERALIZED PAIN: Primary | ICD-10-CM

## 2021-01-31 DIAGNOSIS — R10.13 DYSPEPSIA: ICD-10-CM

## 2021-01-31 DIAGNOSIS — F41.9 ANXIETY: ICD-10-CM

## 2021-01-31 DIAGNOSIS — J44.9 CHRONIC OBSTRUCTIVE PULMONARY DISEASE, UNSPECIFIED COPD TYPE (HCC): ICD-10-CM

## 2021-01-31 DIAGNOSIS — R11.10 DRY HEAVES: ICD-10-CM

## 2021-01-31 RX ORDER — TIZANIDINE 4 MG/1
4 TABLET ORAL EVERY 8 HOURS PRN
Qty: 40 TABLET | Refills: 2 | Status: SHIPPED | OUTPATIENT
Start: 2021-01-31 | End: 2021-03-08 | Stop reason: SDUPTHER

## 2021-01-31 RX ORDER — TIOTROPIUM BROMIDE 18 UG/1
18 CAPSULE ORAL; RESPIRATORY (INHALATION) DAILY
Qty: 90 CAPSULE | Refills: 3 | Status: CANCELLED | OUTPATIENT
Start: 2021-01-31 | End: 2021-05-01

## 2021-01-31 RX ORDER — ALBUTEROL SULFATE 2.5 MG/3ML
2.5 SOLUTION RESPIRATORY (INHALATION) EVERY 6 HOURS PRN
Qty: 120 EACH | Refills: 3 | Status: CANCELLED | OUTPATIENT
Start: 2021-01-31

## 2021-01-31 NOTE — TELEPHONE ENCOUNTER
From: Jack Pena  To: Eloina Gooden DO  Sent: 1/30/2021 11:16 AM EST  Subject: Prescription Question    Doctor Rajiv I have a question about [facility] say that they will do it as there needed but when I went to [address] [facility] for my muscle spams medicine they said they didn't have any on file so I am wondering if you want me to put in a refill request so they all are at [address] [facility]   thank you    Yelitza Key

## 2021-02-01 ENCOUNTER — PATIENT MESSAGE (OUTPATIENT)
Dept: FAMILY MEDICINE CLINIC | Age: 46
End: 2021-02-01

## 2021-02-01 RX ORDER — GEMFIBROZIL 600 MG/1
600 TABLET, FILM COATED ORAL 2 TIMES DAILY
Qty: 180 TABLET | Refills: 3 | Status: CANCELLED | OUTPATIENT
Start: 2021-02-01

## 2021-02-01 NOTE — TELEPHONE ENCOUNTER
From: Lyndsey De Los Santos  To: Harini Mcintosh DO  Sent: 1/31/2021 10:44 PM EST  Subject: Non-Urgent Medical Question    Yes when I started the Gemfibrozil is when I started feeling tired all the time I never felt tired taking anxiety medicine before so I don't think that is a problem. Thank you Doctor Bruce Barros      ----- Message -----   From:Karen Vance DO   Sent:1/31/2021 8:53 PM EST   To:Efren Tang   Subject:RE: Non-Urgent Medical Question      Jimmy Sanon,    The Divalproex ER comes from Dr. Mora Law, so you'd have to talk to him about that one. The Buspirone is one that I prescribe you for anxiety, and it can be taken twice daily every day, or you can just use it as needed. Do you want to try using it less often and only when you feel more anxious, and see if that helps you to feel less tired all the time? That's the one I'd recommend we try changing first, before the Gemfibrozil or Famotidine. However, the Gemfibrozil was just started at your last office visit in November - do you think that's when you started feeling more tired? The other thing we have to keep in mind is that your Vitamin D level was really low when we checked it before your last appointment - that can make you feel more tired and have less energy. You should hopefully still be taking Vitamin D3 every day, and we are going to re-check your level again in early May. Thanks,  Dr. Chele Viveros      ----- Message -----   From:Efren Tang   Sent:1/25/2021 11:33 AM EST   To:Karen Vance DO   Subject:RE: Non-Urgent Medical Question    Gemfibrozil is one Divalproex Er, famotidine buspirone are the one's that can make me tired and I been sleeping a lot as of late      ----- Message -----   From:Nurse Shazia PERAZA   Sent:1/25/2021 10:20 AM EST   To:Efren Tang   Subject:RE: Non-Urgent Medical Question    Yes - several of your medications can make you feel tired. Which ones are you referring to specifically?  And what time of day do you take them now?      ----- Message -----   From:Efren Tang   Sent:1/25/2021 4:22 AM EST   To:Karen Vance, DO   Subject:Non-Urgent Medical Question    here as of late I have been always tried I was checking the side of effects on some of my medicine's and 3 of them that i checked on cause drowseness I don't want to sleep all the time it feels that is were I have been spending most my time lately what can we do ?

## 2021-02-01 NOTE — TELEPHONE ENCOUNTER
From: Belinda Leslie  To: Lacey Vásquez DO  Sent: 1/31/2021 3:53 PM EST  Subject: Prescription Question    Thank you Doctor Rajiv I sent refill requests for the one's that are still at [facility] that need to be moved to [address] the best Doctor Thank you for ur taking care of my needs and being there hope u have a great rest of ur weekend n week   Thank you   Sherlyn Jordan      ----- Message -----   From:Karen Vance DO   Sent:1/31/2021 12:54 PM EST   To:Efren Tang   Subject:RE: Prescription Question      Kay York,    I've sent over a refill of your Tizanidine (muscle spasm medicine) to [facility]. If you are able to tell what other meds still need sent to [facility], let me know, and I can send them all over for you.      Thanks,  Dr. Anushka Benavides      ----- Message -----   From:Efren Tang   Sent:1/30/2021 11:16 AM EST   To:Karen Vance DO   Subject:Prescription Question    Doctor Rajiv I have a question about [facility] they say that they will do it as there needed but when I went to [facility] for my muscle spams medicine they said they didn't have any on file so I am wondering if you want me to put in a refill request so they all are at [address]   thank you    Sveat Elliott

## 2021-02-01 NOTE — TELEPHONE ENCOUNTER
From: Hector Estimable  To: Jae Hong DO  Sent: 2/1/2021 11:10 AM EST  Subject: Non-Urgent Medical Question    Yeah we can always try the Lovaza my new Insurance covers more then my old one did so they might cover it only way to know is to try lol     Ur the best Doctor n Staff I have ever had thanks for always being there for me    Toddabelardo Navastaras      ----- Message -----   [address] [facility] DO [place]   Sent:2/1/2021 10:11 AM EST   To:Efren Tang   Subject:RE: Non-Urgent Medical Question      Bummer - I definitely don't want you to take something that gives you an undesired side effect, like sleeping or being tired all the time. However, that is the only recommended dose for the Gemfibrozil, so there wouldn't be a lower dose for you to try. We had switched you to that one because your triglyceride numbers did not seem to be going down on the maximum dose of the Fenofibrate. We could try switching the Gemfibrozil to another newer med, called Lovaza, but we'd have to see if your insurance covers that one. It does not have drowsiness or fatigue on its list of side effects in my resource that I check. Let me know! Thanks,  Dr. Juan A Monroy      ----- Message -----   From:Efren Tang   Sent:1/27/46 10:44 PM EST   To:Karen Vance DO   Subject:Non-Urgent Medical Question    Yes when I started the Gemfibrozil is when I started feeling tired all the time I never felt tired taking anxiety medicine before so I don't think that is a problem. Thank you Doctor Gaston Kumar      ----- Message -----   From:Karen Vance DO   Sent:1/31/2021 8:53 PM EST   To:Efren Tang   Subject:RE: Non-Urgent Medical Question      Ali Hollow,    The Divalproex ER comes from Dr. Christelle Thompson, so you'd have to talk to him about that one. The Buspirone is one that I prescribe you for anxiety, and it can be taken twice daily every day, or you can just use it as needed.  Do you want to try using it less often and only when you feel more anxious, and see if that helps you to feel less tired all the time? That's the one I'd recommend we try changing first, before the Gemfibrozil or Famotidine. However, the Gemfibrozil was just started at your last office visit in November - do you think that's when you started feeling more tired? The other thing we have to keep in mind is that your Vitamin D level was really low when we checked it before your last appointment - that can make you feel more tired and have less energy. You should hopefully still be taking Vitamin D3 every day, and we are going to re-check your level again in early May. Thanks,  Dr. Kary Laguerre      ----- Message -----   From:Efren Tang   Sent:1/25/2021 11:33 AM EST   To:Karen Vance DO   Subject:RE: Non-Urgent Medical Question    Gemfibrozil is one Divalproex Er, famotidine buspirone are the one's that can make me tired and I been sleeping a lot as of late      ----- Message -----   From:Nurse Jovi PERAZA   Sent:1/25/2021 10:20 AM EST   To:Efren Tang   Subject:RE: Non-Urgent Medical Question    Yes - several of your medications can make you feel tired. Which ones are you referring to specifically? And what time of day do you take them now?      ----- Message -----   From:Efren Tang   Sent:1/25/2021 4:22 AM EST   To:Karen Vance DO   Subject:Non-Urgent Medical Question    here as of late I have been always tried I was checking the side of effects on some of my medicine's and 3 of them that i checked on cause drowseness I don't want to sleep all the time it feels that is were I have been spending most my time lately what can we do ?

## 2021-02-02 ENCOUNTER — PATIENT MESSAGE (OUTPATIENT)
Dept: FAMILY MEDICINE CLINIC | Age: 46
End: 2021-02-02

## 2021-02-02 DIAGNOSIS — E78.1 HYPERTRIGLYCERIDEMIA: Primary | ICD-10-CM

## 2021-02-02 RX ORDER — ALBUTEROL SULFATE 90 UG/1
1-2 AEROSOL, METERED RESPIRATORY (INHALATION) EVERY 6 HOURS PRN
Qty: 3 INHALER | Refills: 3 | Status: SHIPPED | OUTPATIENT
Start: 2021-02-02 | End: 2022-05-23

## 2021-02-02 RX ORDER — RISPERIDONE 3 MG/1
3 TABLET, FILM COATED ORAL NIGHTLY
Qty: 90 TABLET | Refills: 3 | Status: SHIPPED | OUTPATIENT
Start: 2021-02-02 | End: 2021-11-01 | Stop reason: SDUPTHER

## 2021-02-02 RX ORDER — FAMOTIDINE 20 MG/1
20 TABLET, FILM COATED ORAL 2 TIMES DAILY
Qty: 180 TABLET | Refills: 3 | Status: SHIPPED | OUTPATIENT
Start: 2021-02-02 | End: 2021-12-16 | Stop reason: SDUPTHER

## 2021-02-02 RX ORDER — LORATADINE 10 MG/1
10 TABLET ORAL DAILY
Qty: 90 TABLET | Refills: 3 | Status: SHIPPED | OUTPATIENT
Start: 2021-02-02 | End: 2022-02-04

## 2021-02-02 RX ORDER — IBUPROFEN 600 MG/1
600 TABLET ORAL EVERY 8 HOURS PRN
Qty: 120 TABLET | Refills: 3 | Status: SHIPPED | OUTPATIENT
Start: 2021-02-02 | End: 2021-08-06

## 2021-02-02 RX ORDER — VENLAFAXINE HYDROCHLORIDE 150 MG/1
CAPSULE, EXTENDED RELEASE ORAL
Qty: 90 CAPSULE | Refills: 3 | Status: SHIPPED | OUTPATIENT
Start: 2021-02-02 | End: 2022-01-21 | Stop reason: SDUPTHER

## 2021-02-02 NOTE — TELEPHONE ENCOUNTER
From: Maggi Escobar  To: Norris Rutherford DO  Sent: 2/2/2021 4:09 PM EST  Subject: Prescription Question    I was wondering if you were still going to switch my Gemfibrozil to something else to see if it helps me not be tired all the time as I am about out of them n am needed a refill soon on them    Thank you   Catherine Jain

## 2021-02-09 RX ORDER — OMEGA-3-ACID ETHYL ESTERS 1 G/1
2 CAPSULE, LIQUID FILLED ORAL 2 TIMES DAILY
Qty: 120 CAPSULE | Refills: 5 | Status: SHIPPED | OUTPATIENT
Start: 2021-02-09 | End: 2021-08-20 | Stop reason: SDUPTHER

## 2021-02-11 ENCOUNTER — PATIENT MESSAGE (OUTPATIENT)
Dept: FAMILY MEDICINE CLINIC | Age: 46
End: 2021-02-11

## 2021-02-11 NOTE — TELEPHONE ENCOUNTER
From: Darcie Rogers  To: Jose Constantino DO  Sent: 2/11/2021 11:58 AM EST  Subject: Prescription Question    I got A letter from [facility] saying that Albuterol aer hfa is either not on my drug list or it is subject to certain limits [facility] wants information telling them it is necessary to treat my condition you can call the [phone] to explain this if you would please.     Thank you   Apolinar Monge

## 2021-02-18 NOTE — DISCHARGE SUMMARY
Patient has attended 2 sessions after eval.  No showed and cancelled remainder.   DC PT    Aurora Quintanilla PT

## 2021-03-08 DIAGNOSIS — M25.512 CHRONIC LEFT SHOULDER PAIN: ICD-10-CM

## 2021-03-08 DIAGNOSIS — G89.29 CHRONIC LEFT SHOULDER PAIN: ICD-10-CM

## 2021-03-08 DIAGNOSIS — G89.29 CHRONIC NECK PAIN: ICD-10-CM

## 2021-03-08 DIAGNOSIS — M54.2 CHRONIC NECK PAIN: ICD-10-CM

## 2021-03-09 ENCOUNTER — PATIENT MESSAGE (OUTPATIENT)
Dept: FAMILY MEDICINE CLINIC | Age: 46
End: 2021-03-09

## 2021-03-09 NOTE — TELEPHONE ENCOUNTER
From: Sarath Reyes  To: Luis Alberto Vasquez DO  Sent: 3/9/2021 3:41 AM EST  Subject: Non-Urgent Jesica Reardon and staff I have a serious question I need to ask I wanted to know if I could stop seeing Dr Cara Lundborg and get my medicine through you it don't really seem as he listens to me and you already give me most my medicine that he give except for one my Divalproex er 500mg please consider this for me if you would    Thank you so much    Mary White

## 2021-03-10 RX ORDER — TIZANIDINE 4 MG/1
4 TABLET ORAL EVERY 8 HOURS PRN
Qty: 60 TABLET | Refills: 2 | Status: SHIPPED | OUTPATIENT
Start: 2021-03-10 | End: 2021-05-30 | Stop reason: SDUPTHER

## 2021-03-19 ENCOUNTER — PATIENT MESSAGE (OUTPATIENT)
Dept: FAMILY MEDICINE CLINIC | Age: 46
End: 2021-03-19

## 2021-03-19 NOTE — TELEPHONE ENCOUNTER
From: Arturo Jewelltingham  To: Ora Soto DO  Sent: 3/19/2021 5:05 AM EDT  Subject: Non-Urgent Medical Question    Good Morning Dr Monica Vaughan and Staff I have been dealing with leg n hip pain a lot here lately and [facility] is thinking that it might be Neuropathy and I was wondering if you could help give me some thing for it or see if it is what I am dealing with it hurts to walk for long periods of time or stand for even sort periods of times I have trouble with at different times     Thanks   Shelly Mcmillan

## 2021-03-30 DIAGNOSIS — G89.29 CHRONIC NECK PAIN: ICD-10-CM

## 2021-03-30 DIAGNOSIS — G89.29 CHRONIC LEFT SHOULDER PAIN: ICD-10-CM

## 2021-03-30 DIAGNOSIS — M54.2 CHRONIC NECK PAIN: ICD-10-CM

## 2021-03-30 DIAGNOSIS — M25.512 CHRONIC LEFT SHOULDER PAIN: ICD-10-CM

## 2021-03-31 RX ORDER — TIZANIDINE 4 MG/1
TABLET ORAL
Qty: 40 TABLET | Refills: 0 | OUTPATIENT
Start: 2021-03-31

## 2021-04-05 ENCOUNTER — PATIENT MESSAGE (OUTPATIENT)
Dept: FAMILY MEDICINE CLINIC | Age: 46
End: 2021-04-05

## 2021-04-05 DIAGNOSIS — M54.50 CHRONIC LOW BACK PAIN WITHOUT SCIATICA, UNSPECIFIED BACK PAIN LATERALITY: ICD-10-CM

## 2021-04-05 DIAGNOSIS — R26.2 DIFFICULTY WALKING: ICD-10-CM

## 2021-04-05 DIAGNOSIS — M25.512 CHRONIC LEFT SHOULDER PAIN: ICD-10-CM

## 2021-04-05 DIAGNOSIS — G89.29 CHRONIC LEFT SHOULDER PAIN: ICD-10-CM

## 2021-04-05 DIAGNOSIS — J44.9 CHRONIC OBSTRUCTIVE PULMONARY DISEASE, UNSPECIFIED COPD TYPE (HCC): Primary | ICD-10-CM

## 2021-04-05 DIAGNOSIS — G89.29 CHRONIC LOW BACK PAIN WITHOUT SCIATICA, UNSPECIFIED BACK PAIN LATERALITY: ICD-10-CM

## 2021-04-06 NOTE — TELEPHONE ENCOUNTER
From: Gala Katz  To: Sacha Boone DO  Sent: 4/5/2021 11:36 PM EDT  Subject: Non-Urgent Medical Question    As you know I have been having issue walking for a long distance or standing for a long time I was wondering if possible I could get a handicap place card to use at stores so I don't have to walk long distances to get to the store I been using motorized carts in the stores so I don't have to walk so much or  long lines Thank you Dr Jared Walker and staff I hope u have a great Tuesday    Griselda Carver

## 2021-04-08 ENCOUNTER — PATIENT MESSAGE (OUTPATIENT)
Dept: FAMILY MEDICINE CLINIC | Age: 46
End: 2021-04-08

## 2021-04-09 NOTE — TELEPHONE ENCOUNTER
From: Shazia Show  To: Archana Zendejas DO  Sent: 4/8/2021 8:14 PM EDT  Subject: Non-Urgent Medical Question    dr Evelyn Callaway i was wondering if u could help me with getting an ac through the [employer] this summer is going to be hard on me as it is already doing the reason im asking is i havent been feeling the best with this heat i woke up feeling like my head is all cloudy i laid down a bit in front of my fan and its helped a lil but not all the way let me know if u can help thanks

## 2021-04-14 ENCOUNTER — HOSPITAL ENCOUNTER (EMERGENCY)
Age: 46
Discharge: HOME | End: 2021-04-15
Attending: EMERGENCY MEDICINE
Payer: MEDICARE

## 2021-04-14 VITALS
HEIGHT: 71 IN | HEART RATE: 76 BPM | BODY MASS INDEX: 39.2 KG/M2 | OXYGEN SATURATION: 97 % | DIASTOLIC BLOOD PRESSURE: 104 MMHG | WEIGHT: 280 LBS | SYSTOLIC BLOOD PRESSURE: 168 MMHG | TEMPERATURE: 98.9 F | RESPIRATION RATE: 14 BRPM

## 2021-04-14 DIAGNOSIS — N39.0 URINARY TRACT INFECTION WITHOUT HEMATURIA, SITE UNSPECIFIED: Primary | ICD-10-CM

## 2021-04-14 LAB
-: ABNORMAL
AMORPHOUS: ABNORMAL
BACTERIA: ABNORMAL
BILIRUBIN URINE: NEGATIVE
CASTS UA: ABNORMAL /LPF (ref 0–2)
COLOR: NORMAL
COMMENT UA: NORMAL
CRYSTALS, UA: ABNORMAL /HPF
EPITHELIAL CELLS UA: ABNORMAL /HPF (ref 0–5)
GLUCOSE URINE: NEGATIVE
KETONES, URINE: NEGATIVE
LEUKOCYTE ESTERASE, URINE: NEGATIVE
MUCUS: ABNORMAL
NITRITE, URINE: NEGATIVE
OTHER OBSERVATIONS UA: ABNORMAL
PH UA: 6 (ref 5–6)
PROTEIN UA: NEGATIVE
RBC UA: ABNORMAL /HPF (ref 0–4)
RENAL EPITHELIAL, UA: ABNORMAL /HPF
SPECIFIC GRAVITY UA: 1.02 (ref 1.01–1.02)
TRICHOMONAS: ABNORMAL
TURBIDITY: NORMAL
URINE HGB: NEGATIVE
UROBILINOGEN, URINE: NORMAL
WBC UA: ABNORMAL /HPF (ref 0–4)
YEAST: ABNORMAL

## 2021-04-14 PROCEDURE — 99284 EMERGENCY DEPT VISIT MOD MDM: CPT

## 2021-04-14 PROCEDURE — 6370000000 HC RX 637 (ALT 250 FOR IP): Performed by: EMERGENCY MEDICINE

## 2021-04-14 PROCEDURE — 87086 URINE CULTURE/COLONY COUNT: CPT

## 2021-04-14 PROCEDURE — 81001 URINALYSIS AUTO W/SCOPE: CPT

## 2021-04-14 RX ORDER — CEPHALEXIN 250 MG/1
500 CAPSULE ORAL ONCE
Status: COMPLETED | OUTPATIENT
Start: 2021-04-14 | End: 2021-04-14

## 2021-04-14 RX ORDER — CEPHALEXIN 500 MG/1
500 CAPSULE ORAL 4 TIMES DAILY
Qty: 40 CAPSULE | Refills: 0 | Status: SHIPPED | OUTPATIENT
Start: 2021-04-14 | End: 2021-04-24

## 2021-04-14 RX ADMIN — CEPHALEXIN 500 MG: 250 CAPSULE ORAL at 23:35

## 2021-04-14 ASSESSMENT — PAIN DESCRIPTION - LOCATION: LOCATION: PENIS

## 2021-04-15 ENCOUNTER — PATIENT MESSAGE (OUTPATIENT)
Dept: FAMILY MEDICINE CLINIC | Age: 46
End: 2021-04-15

## 2021-04-15 ASSESSMENT — ENCOUNTER SYMPTOMS
VOMITING: 0
SHORTNESS OF BREATH: 0
ABDOMINAL PAIN: 0
NAUSEA: 0

## 2021-04-15 NOTE — TELEPHONE ENCOUNTER
From: Althea Gray  To: Jeanne Singh DO  Sent: 4/15/2021 8:33 AM EDT  Subject: Prescription Question    What script am I to be taking for my cholesterol as I don't think I have been taking anything not unless it is something I am taking now but under a name I don't know     Thank you Dr Rigo watts staff    Pending sale to [facility]

## 2021-04-15 NOTE — ED PROVIDER NOTES
[facility] ED  [address]  [place] [address]  Phone: [phone]  eMERGENCY dEPARTMENT eNCOUnter      Pt Name: Kennedy Duong  MRN: [id number]  Nadyagfmarco antonio 1975  Date of evaluation: 4/15/21      CHIEF COMPLAINT     Chief Complaint   Patient presents with    Dysuria         HISTORY OF PRESENT ILLNESS    Kennedy Duong is a 50 y.o. male who presents today with several days of dysuria urgency and frequency. No prior history of UTI. Denies known history of diabetes. Patient also has had subjective hot and cold feelings. Also describes some malaise. Denies chest pain or difficulty breathing, no cough cold or congestion. Denies testicular pain or swelling, minimal suprapubic pain no other abdominal pain. REVIEW OF SYSTEMS     Review of Systems   Constitutional: Positive for chills. HENT: Negative for congestion. Respiratory: Negative for shortness of breath. Cardiovascular: Negative for chest pain. Gastrointestinal: Negative for abdominal pain, nausea and vomiting. Genitourinary: Positive for dysuria, frequency and urgency. Negative for flank pain, scrotal swelling and testicular pain. Skin: Negative for rash. Neurological: Negative for syncope. Psychiatric/Behavioral: Negative for confusion. All other systems reviewed and are negative. PAST MEDICAL HISTORY    has a past medical history of Allergic rhinitis, Bipolar disorder ([address] Chronic obstructive pulmonary disease (COPD) ([address] Depression, Headache(784.0), Osteoarthritis, TMJ (dislocation of temporomandibular joint), and Torticollis. SURGICAL HISTORY      has a past surgical history that includes Tonsillectomy.     CURRENT MEDICATIONS       Discharge Medication List as of 4/14/2021 11:35 PM      CONTINUE these medications which have NOT CHANGED    Details   tiZANidine (ZANAFLEX) 4 MG tablet Take 1 tablet by mouth every 8 hours as needed (muscle spasm), Disp-60 tablet, R-2Normal      omega-3 acid ethyl tablet,R-11Normal      Menthol, Topical Analgesic, (BIOFREEZE) 4 % GEL Apply to shoulders & neck to relieve pain., Disp-237 mL,R-5Normal      albuterol (PROVENTIL) (2.5 MG/3ML) 0.083% nebulizer solution Take 3 mLs by nebulization every 6 hours as needed for Wheezing or Shortness of Breath, Disp-120 each, R-3Normal      Respiratory Therapy Supplies (NEBULIZER COMPRESSOR) KIT ONCE Starting Mon 7/22/2019, For 1 dose, Disp-1 kit, R-0, Print      Respiratory Therapy Supplies (NEBULIZER/TUBING/MOUTHPIECE) KIT ONCE Starting Mon 7/22/2019, For 1 dose, Disp-1 kit, R-0, Print      Compression Stockings MISC Starting Fri 12/7/2018, Disp-1 each, R-1, PrintWear on bilateral lower extremities daily as needed for edema. ALLERGIES     is allergic to naproxen. FAMILY HISTORY     He indicated that the status of his mother is unknown. He indicated that the status of his father is unknown. He indicated that the status of his neg hx is unknown.     family history includes High Blood Pressure in his father and mother; Stroke in his father and mother. SOCIAL HISTORY      reports that he quit smoking about 3 years ago. His smoking use included cigarettes. He started smoking about 28 years ago. He has a 25.00 pack-year smoking history. He has never used smokeless tobacco. He reports that he does not drink alcohol or use drugs. PHYSICAL EXAM     INITIAL VITALS:  height is 5' 11\" (1.803 m) and weight is 280 lb (127 kg). His tympanic temperature is 98.9 °F (37.2 °C). His blood pressure is 168/104 (abnormal) and his pulse is 76. His respiration is 14 and oxygen saturation is 97%. Physical Exam  Vitals signs reviewed. Constitutional:       General: He is not in acute distress. Appearance: Normal appearance. He is not ill-appearing or toxic-appearing. HENT:      Head: Normocephalic and atraumatic. Cardiovascular:      Rate and Rhythm: Normal rate and regular rhythm. Pulses: Normal pulses.       Heart sounds: Normal heart sounds. Pulmonary:      Effort: Pulmonary effort is normal.      Breath sounds: Normal breath sounds. No wheezing. Abdominal:      Palpations: Abdomen is soft. Tenderness: There is no abdominal tenderness. There is no guarding or rebound. Comments: Minimal suprapubic tenderness. Negative CVA tenderness. No rebound rigidity or guarding. Musculoskeletal: Normal range of motion. Skin:     General: Skin is warm and dry. Capillary Refill: Capillary refill takes less than 2 seconds. Findings: No rash. Neurological:      General: No focal deficit present. Mental Status: He is alert and oriented to person, place, and time. Cranial Nerves: No cranial nerve deficit. Psychiatric:         Mood and Affect: Mood normal.         Behavior: Behavior normal.       DIFFERENTIAL DIAGNOSIS / MDM / EMERGENCY DEPARTMENT COURSE:     Patient describes UTI symptoms without flank pain. Urinalysis obtained is consistent with UTI. Will be sent for culture. Will do Keflex 100 mg 4 times daily for 10 days. Follow-up with PCP. No previous UTIs. Patient carefully educated on warning signs which return to the emergency department as outlined in the discharge instructions. Patient educated to follow-up with his PCP for elevated blood pressure in the emergency department. I have reviewed the disposition diagnosis with the patient and or their family/guardian. I have answered their questions and givendischarge instructions. They voiced understanding of these instructions and did not have any further questions or complaints.     DIAGNOSTIC RESULTS     EKG: All EKG's are interpreted by the Emergency Department Physician who either signs or Co-signs this chart inthe absence of a cardiologist.        RADIOLOGY:   I directly visualized the following plain film images and reviewed the radiologistinterpretations of radiologic studies:    No orders to display        No results found.      LABS:  Results for orders placed or performed during the hospital encounter of 04/14/21   Urinalysis Reflex to Culture    Specimen: Urine, clean catch   Result Value Ref Range    Color, UA NOT REPORTED YELLOW    Turbidity UA NOT REPORTED CLEAR    Glucose, Ur NEGATIVE NEGATIVE    Bilirubin Urine NEGATIVE NEGATIVE    Ketones, Urine NEGATIVE NEGATIVE    Specific Gravity, UA 1.025 1.010 - 1.025    Urine Hgb NEGATIVE NEGATIVE    pH, UA 6.0 5.0 - 6.0    Protein, UA NEGATIVE NEGATIVE    Urobilinogen, Urine Normal Normal    Nitrite, Urine NEGATIVE NEGATIVE    Leukocyte Esterase, Urine NEGATIVE NEGATIVE    Urinalysis Comments NOT REPORTED    Microscopic Urinalysis   Result Value Ref Range    -          WBC, UA 10 TO 25 0 - 4 /HPF    RBC, UA None 0 - 4 /HPF    Casts UA NOT REPORTED 0 - 2 /LPF    Crystals, UA NOT REPORTED None /HPF    Epithelial Cells UA None 0 - 5 /HPF    Renal Epithelial, UA NOT REPORTED 0 /HPF    Bacteria, UA TRACE (A) None    Mucus, UA NOT REPORTED None    Trichomonas, UA NOT REPORTED None    Amorphous, UA NOT REPORTED None    Other Observations UA Specimen Cultured (A) NOT REQ. Yeast, UA NOT REPORTED None       EMERGENCY DEPARTMENT COURSE:   Vitals:    Vitals:    04/14/21 2208 04/14/21 2337   BP: (!) 176/116 (!) 168/104   Pulse: 85 76   Resp: 16 14   Temp: 98.9 °F (37.2 °C)    TempSrc: Tympanic    SpO2: 97% 97%   Weight: 280 lb (127 kg)    Height: 5' 11\" (1.803 m)      -------------------------  BP: (!) 168/104, Temp: 98.9 °F (37.2 °C), Pulse: 76, Resp: 14      CONSULTS:  None    PROCEDURES:  None    FINAL IMPRESSION      1.  Urinary tract infection without hematuria, site unspecified          DISPOSITION/PLAN   DISPOSITION Decision To Discharge 04/14/2021 11:33:19 PM      PATIENT REFERRED TO:  DO [address]  [phone]    In 2 days        DISCHARGE MEDICATIONS:  Discharge Medication List as of 4/14/2021 11:35 PM      START taking these medications Details   cephALEXin (KEFLEX) 500 MG capsule Take 1 capsule by mouth 4 times daily for 10 days, Disp-40 capsule, R-0Normal             (Please note that portions of this note were completed with avoice recognition program.  Efforts were made to edit the dictations but occasionally words are mis-transcribed.)    Karen Gooden MD, [facility]  Attending Emergency Medicine Physician        Karen Gooden MD  04/15/21 2980

## 2021-04-16 ENCOUNTER — TELEPHONE (OUTPATIENT)
Dept: FAMILY MEDICINE CLINIC | Age: 46
End: 2021-04-16

## 2021-04-16 LAB
CULTURE: NORMAL
Lab: NORMAL
SPECIMEN DESCRIPTION: NORMAL

## 2021-04-18 ENCOUNTER — HOSPITAL ENCOUNTER (EMERGENCY)
Age: 46
Discharge: HOME | End: 2021-04-18
Attending: EMERGENCY MEDICINE
Payer: MEDICARE

## 2021-04-18 VITALS
RESPIRATION RATE: 16 BRPM | HEART RATE: 100 BPM | HEIGHT: 71 IN | OXYGEN SATURATION: 95 % | SYSTOLIC BLOOD PRESSURE: 162 MMHG | BODY MASS INDEX: 37.52 KG/M2 | WEIGHT: 268 LBS | DIASTOLIC BLOOD PRESSURE: 90 MMHG | TEMPERATURE: 99.6 F

## 2021-04-18 DIAGNOSIS — R30.0 DYSURIA: Primary | ICD-10-CM

## 2021-04-18 LAB
-: ABNORMAL
AMORPHOUS: ABNORMAL
BACTERIA: ABNORMAL
BILIRUBIN URINE: ABNORMAL
CASTS UA: ABNORMAL /LPF (ref 0–2)
COLOR: ABNORMAL
COMMENT UA: ABNORMAL
CRYSTALS, UA: ABNORMAL /HPF
EPITHELIAL CELLS UA: ABNORMAL /HPF (ref 0–5)
GLUCOSE URINE: NEGATIVE
KETONES, URINE: ABNORMAL
LEUKOCYTE ESTERASE, URINE: NEGATIVE
MUCUS: ABNORMAL
NITRITE, URINE: NEGATIVE
OTHER OBSERVATIONS UA: ABNORMAL
PH UA: 5.5 (ref 5–6)
PROTEIN UA: ABNORMAL
RBC UA: ABNORMAL /HPF (ref 0–4)
RENAL EPITHELIAL, UA: ABNORMAL /HPF
SPECIFIC GRAVITY UA: 1.03 (ref 1.01–1.02)
TRICHOMONAS: ABNORMAL
TURBIDITY: ABNORMAL
URINE HGB: ABNORMAL
UROBILINOGEN, URINE: NORMAL
WBC UA: ABNORMAL /HPF (ref 0–4)
YEAST: ABNORMAL

## 2021-04-18 PROCEDURE — 6370000000 HC RX 637 (ALT 250 FOR IP): Performed by: EMERGENCY MEDICINE

## 2021-04-18 PROCEDURE — 99285 EMERGENCY DEPT VISIT HI MDM: CPT

## 2021-04-18 PROCEDURE — 87086 URINE CULTURE/COLONY COUNT: CPT

## 2021-04-18 PROCEDURE — 81001 URINALYSIS AUTO W/SCOPE: CPT

## 2021-04-18 RX ORDER — SULFAMETHOXAZOLE AND TRIMETHOPRIM 800; 160 MG/1; MG/1
1 TABLET ORAL 2 TIMES DAILY
Qty: 14 TABLET | Refills: 0 | Status: SHIPPED | OUTPATIENT
Start: 2021-04-18 | End: 2021-04-25

## 2021-04-18 RX ORDER — IBUPROFEN 800 MG/1
800 TABLET ORAL ONCE
Status: COMPLETED | OUTPATIENT
Start: 2021-04-18 | End: 2021-04-18

## 2021-04-18 RX ORDER — SULFAMETHOXAZOLE AND TRIMETHOPRIM 800; 160 MG/1; MG/1
1 TABLET ORAL ONCE
Status: COMPLETED | OUTPATIENT
Start: 2021-04-18 | End: 2021-04-18

## 2021-04-18 RX ADMIN — IBUPROFEN 800 MG: 800 TABLET, FILM COATED ORAL at 02:12

## 2021-04-18 RX ADMIN — SULFAMETHOXAZOLE AND TRIMETHOPRIM 1 TABLET: 800; 160 TABLET ORAL at 02:51

## 2021-04-18 NOTE — ED TRIAGE NOTES
Patient presents to the ED with complaints of burning with urination. He was seen three days ago and diagnosed with a UTI. He was placed on Keflex at that time. Patient states he continues to have the burning and is running a low grade temp. He states he took motrin earlier on in the day but has not had anything recently. No further questions or concerns at this time. Wife and son at bedside. Call light within reach.

## 2021-04-18 NOTE — ED PROVIDER NOTES
eMERGENCY dEPARTMENT eNCOUnter      Pt Name: Cony Desai  MRN: [id number]  [place] 1975  Date of evaluation: 4/18/2021      CHIEF COMPLAINT       Chief Complaint   Patient presents with    Other     burning with urination currently taking Keflex x3days          HISTORY OF PRESENT ILLNESS    Cony Desai is a 50 y.o. male who presents with dysuria. Patient states he is continue to have dysuria he was seen here 3 days ago with similar complaints at which time he had some white cells in the urine trace bacteria but no other findings he was placed on Keflex he says he has been taking it for 3 days but still having burning when he pees no abdominal pain fever chills no penile discharge        REVIEW OF SYSTEMS       Positive dysuria negative for fever chills abdominal pain penile discharge or lesions    PAST MEDICAL HISTORY    has a past medical history of Allergic rhinitis, Bipolar disorder ([address] Chronic obstructive pulmonary disease (COPD) ([address] Depression, Headache(784.0), Osteoarthritis, TMJ (dislocation of temporomandibular joint), and Torticollis. SURGICAL HISTORY      has a past surgical history that includes Tonsillectomy. CURRENT MEDICATIONS       Discharge Medication List as of 4/18/2021  2:45 AM      CONTINUE these medications which have NOT CHANGED    Details   divalproex (DEPAKOTE ER) 500 MG extended release tablet Take 1 tablet by mouth 2 times daily, Disp-60 tablet, R-2Normal      cephALEXin (KEFLEX) 500 MG capsule Take 1 capsule by mouth 4 times daily for 10 days, Disp-40 capsule, R-0Normal      tiZANidine (ZANAFLEX) 4 MG tablet Take 1 tablet by mouth every 8 hours as needed (muscle spasm), Disp-60 tablet, R-2Normal      omega-3 acid ethyl esters (LOVAZA) 1 g capsule Take 2 capsules by mouth 2 times daily Take with meals. , Disp-120 capsule, R-5Normal      albuterol sulfate HFA (VENTOLIN HFA) 108 (90 Base) MCG/ACT inhaler Inhale 1-2 puffs into the lungs every 6 hours as needed for ONCE Starting Mon 7/22/2019, For 1 dose, Disp-1 kit, R-0, Print      Respiratory Therapy Supplies (NEBULIZER/TUBING/MOUTHPIECE) KIT ONCE Starting Mon 7/22/2019, For 1 dose, Disp-1 kit, R-0, Print      Compression Stockings MISC Starting Fri 12/7/2018, Disp-1 each, R-1, PrintWear on bilateral lower extremities daily as needed for edema. ALLERGIES     is allergic to naproxen. FAMILY HISTORY     He indicated that the status of his mother is unknown. He indicated that the status of his father is unknown. He indicated that the status of his neg hx is unknown.     family history includes High Blood Pressure in his father and mother; Stroke in his father and mother. SOCIAL HISTORY      reports that he quit smoking about 3 years ago. His smoking use included cigarettes. He started smoking about 28 years ago. He has a 25.00 pack-year smoking history. He has never used smokeless tobacco. He reports that he does not drink alcohol or use drugs. PHYSICAL EXAM     INITIAL VITALS:  height is 5' 11\" (1.803 m) and weight is 268 lb (121.6 kg). His tympanic temperature is 99.6 °F (37.6 °C). His blood pressure is 162/90 (abnormal) and his pulse is 100. His respiration is 16 and oxygen saturation is 95%.       General: Patient alert nontoxic-appearing male in no apparent distress  HEENT: Head is atraumatic  Respiratory: Lung sounds are clear bilateral  Cardiac: Heart is regular rate and rhythm  GI: Abdomen soft nontender bowel sounds are normal    DIFFERENTIAL DIAGNOSIS/ MDM:     Urinalysis    DIAGNOSTIC RESULTS     EKG: All EKG's are interpreted by the Emergency Department Physician who either signs or Co-signs this chart in the absence of a cardiologist.        RADIOLOGY:   I directly visualized the following  images and reviewed the radiologist interpretations:  No orders to display         LABS:  Labs Reviewed   URINALYSIS - Abnormal; Notable for the following components:       Result Value    Bilirubin Urine 2+ (*)     Ketones, Urine TRACE (*)     Specific Gravity, UA 1.030 (*)     Urine Hgb TRACE (*)     Protein, UA 2+ (*)     All other components within normal limits   MICROSCOPIC URINALYSIS - Abnormal; Notable for the following components:    Bacteria, UA 3+ (*)     Mucus, UA 2+ (*)     Amorphous, UA 1+ (*)     Other Observations UA Specimen Cultured (*)     All other components within normal limits   CULTURE, URINE         EMERGENCY DEPARTMENT COURSE:   Vitals:    Vitals:    04/18/21 0202 04/18/21 0255   BP: (!) 162/90    Pulse: 115 100   Resp: 16    Temp: 100.5 °F (38.1 °C) 99.6 °F (37.6 °C)   TempSrc: Tympanic Tympanic   SpO2: 95%    Weight: 268 lb (121.6 kg)    Height: 5' 11\" (1.803 m)      -------------------------  BP: (!) 162/90, Temp: 99.6 °F (37.6 °C), Pulse: 100, Resp: 16    Orders Placed This Encounter   Medications    ibuprofen (ADVIL;MOTRIN) tablet 800 mg    sulfamethoxazole-trimethoprim (BACTRIM DS;SEPTRA DS) 800-160 MG per tablet 1 tablet     Order Specific Question:   Antimicrobial Indications     Answer:   Urinary Tract Infection    sulfamethoxazole-trimethoprim (BACTRIM DS) 800-160 MG per tablet     Sig: Take 1 tablet by mouth 2 times daily for 7 days     Dispense:  14 tablet     Refill:  0    sulfamethoxazole-trimethoprim (BACTRIM DS) 800-160 MG per tablet     Sig: Take 1 tablet by mouth 2 times daily for 7 days     Dispense:  14 tablet     Refill:  0           Re-evaluation Notes    I did review his culture results from last year which was no growth repeat urinalysis showed no nitrates or leukocyte Estrace however he had even higher amounts of WBCs in the urine's and now 3+ bacteria in his urine I will switch him to Bactrim at this time repeat culture follow-up as directed return if worse    CRITICAL CARE:   None      CONSULTS:      PROCEDURES:  None    FINAL IMPRESSION      1.  Dysuria          DISPOSITION/PLAN   DISPOSITION Decision To Discharge 04/18/2021 02:44:41 AM      Condition on Disposition    Stable    PATIENT REFERRED TO:  Angela Groves DO  [address] [facility] [address]  [phone]    In 2 days        DISCHARGE MEDICATIONS:  Discharge Medication List as of 4/18/2021  2:45 AM      START taking these medications    Details   sulfamethoxazole-trimethoprim (BACTRIM DS) 800-160 MG per tablet Take 1 tablet by mouth 2 times daily for 7 days, Disp-14 tablet, R-0Normal             (Please note that portions of this note were completed with a voice recognition program.  Efforts were made to edit the dictations but occasionally words are mis-transcribed.)    Del Cid MD, F.A.C.E.P.   Attending Emergency Physician        Nedra Draper MD  04/18/21 0862

## 2021-04-19 ENCOUNTER — CARE COORDINATION (OUTPATIENT)
Dept: CARE COORDINATION | Age: 46
End: 2021-04-19

## 2021-04-19 LAB
CULTURE: NO GROWTH
Lab: NORMAL
SPECIMEN DESCRIPTION: NORMAL

## 2021-04-19 NOTE — CARE COORDINATION
Pa Workman was seen at [facility] ED 4/18/2021- Dysuria- antibiotic was changed from Keflex to Bactrim. He is eligible for [facility].     4/19/2021- 1:20 pm Left message requesting return call re: Initial ER/Covid F/U call and to discuss ACC.   4/20/2021- 10:41 am Left message requesting return call.

## 2021-04-22 ENCOUNTER — PATIENT MESSAGE (OUTPATIENT)
Dept: FAMILY MEDICINE CLINIC | Age: 46
End: 2021-04-22

## 2021-04-22 NOTE — TELEPHONE ENCOUNTER
From: Inga Rangel  To: Pierre Simmons DO  Sent: 4/22/2021 12:38 AM EDT  Subject: Non-Urgent Medical Question    would a Nutricost Caffeine Pills 100mg work for me to get a dose of caffeine for the day stop drinking so much dew and not stop cold turkey

## 2021-04-26 ENCOUNTER — PATIENT MESSAGE (OUTPATIENT)
Dept: FAMILY MEDICINE CLINIC | Age: 46
End: 2021-04-26

## 2021-04-26 DIAGNOSIS — G43.909 MIGRAINE WITHOUT STATUS MIGRAINOSUS, NOT INTRACTABLE, UNSPECIFIED MIGRAINE TYPE: ICD-10-CM

## 2021-04-26 NOTE — TELEPHONE ENCOUNTER
When I pend the refill, there is a Pop-up message saying there's a quantity limit of 12 tabs per 30 days.

## 2021-04-26 NOTE — TELEPHONE ENCOUNTER
From: Liberty Vaca  To: Gabbi Castro DO  Sent: 4/26/2021 9:20 AM EDT  Subject: Prescription Question    I just talked to my insurance company and said they would cover as many sumatriptan as u prescribe it is up to the Doctor they said and since I been on the antibiatic medicine this last week and a half I dealt with more Migraines and I just had them filled and i went through those already Leona Cisneros is thinking it is either me stopping cafeine or possible it could be a sense issue or possible my neck n shoulder is causing them just not sure how to tell which one it could be     Thanks hope u and ur staff is doing well  Willian

## 2021-04-27 ENCOUNTER — CARE COORDINATION (OUTPATIENT)
Dept: CARE COORDINATION | Age: 46
End: 2021-04-27

## 2021-04-27 RX ORDER — SUMATRIPTAN 100 MG/1
100 TABLET, FILM COATED ORAL
Qty: 30 TABLET | Refills: 5 | Status: SHIPPED | OUTPATIENT
Start: 2021-04-27 | End: 2021-05-10 | Stop reason: ALTCHOICE

## 2021-04-27 SDOH — ECONOMIC STABILITY: TRANSPORTATION INSECURITY
IN THE PAST 12 MONTHS, HAS THE LACK OF TRANSPORTATION KEPT YOU FROM MEDICAL APPOINTMENTS OR FROM GETTING MEDICATIONS?: NO

## 2021-04-27 SDOH — SOCIAL STABILITY: SOCIAL NETWORK: HOW OFTEN DO YOU ATTENT MEETINGS OF THE CLUB OR ORGANIZATION YOU BELONG TO?: NEVER

## 2021-04-27 SDOH — ECONOMIC STABILITY: FOOD INSECURITY: WITHIN THE PAST 12 MONTHS, YOU WORRIED THAT YOUR FOOD WOULD RUN OUT BEFORE YOU GOT MONEY TO BUY MORE.: NEVER TRUE

## 2021-04-27 SDOH — HEALTH STABILITY: MENTAL HEALTH
STRESS IS WHEN SOMEONE FEELS TENSE, NERVOUS, ANXIOUS, OR CAN'T SLEEP AT NIGHT BECAUSE THEIR MIND IS TROUBLED. HOW STRESSED ARE YOU?: TO SOME EXTENT

## 2021-04-27 SDOH — SOCIAL STABILITY: SOCIAL NETWORK: HOW OFTEN DO YOU ATTEND CHURCH OR RELIGIOUS SERVICES?: NEVER

## 2021-04-27 SDOH — HEALTH STABILITY: MENTAL HEALTH: HOW OFTEN DO YOU HAVE A DRINK CONTAINING ALCOHOL?: NEVER

## 2021-04-27 ASSESSMENT — ENCOUNTER SYMPTOMS: DYSPNEA ASSOCIATED WITH: EXERTION

## 2021-04-27 NOTE — CARE COORDINATION
Luis Daniel was seen at [facility] ED 4/18/2021- Dysuria- antibiotic was changed from Keflex to Bactrim. He is eligible for [facility].      4/19/2021- 1:20 pm Left message requesting return call re: Initial ER/Covid F/U call and to discuss [facility].   4/20/2021- 10:41 am Left message requesting return call.      4/27/2021- 1:31 pm Spoke with Luis Daniel and [facility] [employer]. He stated he is doing much better- denied painful urination or frequency. He stated he is unable to take Keflex- caused Migraines and upset stomach. Requested EMR updted- done. Ambulatory Care Coordination Note  4/27/2021  CM Risk Score: 5  Charlson 10 Year Mortality Risk Score: 4%     ACC: Marisol Torres, RN    Summary Note: Luis Daniel was referred for ACM from report. He has: COPD- on medications and follows with PCP. Had seen pulmonologist in the past     Bipolar, Depression, anxiety- on medications      Migraine Headaches- on medications and has seen Dr. Elif Dahl in the past     Plan of Care : Enrolled in [facility]     Continue assessments, education, and support     COPD Zone Tool     SDOH completed\     Medications reviewed     [facility]. decision maker updated. ACP discussed- he will think about it     Review clinic hours, Urgent Care and he uses My Chart     Covid education     Care Gaps     Care Team     Nutrition     PCP F/U 5/10/2021     Eye exam 6/14/2021     He needs to reschedule with pulmonology and neurology    [facility] In agreement to ACM. This writer's contact information given.      General Assessment    Do you have any symptoms that are causing concern?: No                   Ambulatory Care Coordination Assessment    Care Coordination Protocol  Program Enrollment: Complex Care  Referral from Primary Care Provider: No  Week 1 - Initial Assessment     Do you have all of your prescriptions and are they filled?: Yes  Barriers to medication adherence: None  Are you able to afford your medications?: Yes  How often do you have trouble 2/9/21  Yes Woody Vance DO   albuterol sulfate HFA (VENTOLIN HFA) 108 (90 Base) MCG/ACT inhaler Inhale 1-2 puffs into the lungs every 6 hours as needed for Wheezing 2/2/21  Yes Komal Kwok MD   risperiDONE (RISPERDAL) 3 MG tablet Take 1 tablet by mouth nightly 2/2/21  Yes Woody Vance DO   venlafaxine (EFFEXOR XR) 150 MG extended release capsule TAKE ONE CAPSULE BY MOUTH DAILY 2/2/21  Yes Woody Vance DO   loratadine (CLARITIN) 10 MG tablet Take 1 tablet by mouth daily 2/2/21  Yes Woody Vance DO   ondansetron (ZOFRAN) 4 MG tablet Take 1 tablet by mouth every 8 hours as needed for Nausea 1/7/21  Yes Jluis Cherry MD   fluticasone (FLONASE) 50 MCG/ACT nasal spray 2 sprays by Nasal route daily 12/31/20  Yes Woody Vance DO   busPIRone (BUSPAR) 15 MG tablet Take 15 mg by mouth 2 times daily as needed (anxiety) 12/3/20  Yes Woody Vance DO   Cholecalciferol (VITAMIN D3) 50 MCG (2000 UT) TABS Take 1 tablet by mouth daily 11/10/20  Yes Woody Vance DO   vitamin B-12 (CYANOCOBALAMIN) 1000 MCG tablet Take 1 tablet by mouth daily 11/10/20  Yes Woody Vance DO   folic acid (FOLVITE) 783 MCG tablet Take 1 tablet by mouth daily 11/10/20  Yes Woody Vance DO   aspirin (ASPIRIN 81) 81 MG EC tablet Take 1 tablet by mouth daily 11/10/20  Yes Woody Vance DO   tiZANidine (ZANAFLEX) 4 MG tablet Take 1 tablet by mouth every 8 hours as needed (muscle spasm) 3/10/21   Woody Vance DO   ibuprofen (ADVIL;MOTRIN) 600 MG tablet Take 1 tablet by mouth every 8 hours as needed for Pain 2/2/21   Woody Vance DO   famotidine (PEPCID) 20 MG tablet Take 1 tablet by mouth 2 times daily  Patient not taking: Reported on 4/27/2021 2/2/21   Woody Vance DO   SUMAtriptan (IMITREX) 100 MG tablet Take 1 tablet by mouth once as needed for Migraine 1/7/21 1/4/44  Jluis Cherry MD   tiotropium (SPIRIVA HANDIHALER) 18 MCG inhalation capsule Inhale 1 capsule into the lungs daily 11/10/20 2/8/21  Kamron Rodriguez DO Menthol, Topical Analgesic, (BIOFREEZE) 4 % GEL Apply to shoulders & neck to relieve pain. 10/21/20   Nancy Vance, DO   albuterol (PROVENTIL) (2.5 MG/3ML) 0.083% nebulizer solution Take 3 mLs by nebulization every 6 hours as needed for Wheezing or Shortness of Breath  Patient not taking: Reported on 4/27/2021 5/13/20   Durga Mendieta, DO   Respiratory Therapy Supplies (NEBULIZER COMPRESSOR) KIT 1 kit by Does not apply route once for 1 dose 7/22/19 7/22/19  Viva Marshall, DO   Respiratory Therapy Supplies (NEBULIZER/TUBING/MOUTHPIECE) KIT 1 kit by Does not apply route once for 1 dose 7/22/19 7/22/19  Viva Marshall, DO   Compression Stockings MISC by Does not apply route Wear on bilateral lower extremities daily as needed for edema.   Patient not taking: Reported on 11/10/2020 12/7/18   Durga Mendieta DO       Future Appointments   Date Time Provider Haley Corado   5/10/2021 10:20 AM Durga Mendieta DO [facility]   6/14/2021 10:00 AM Amy Ananias Phalen, OD [facility]

## 2021-04-27 NOTE — ACP (ADVANCE CARE PLANNING)
Advance Care Planning   Healthcare Decision Maker:    Primary Decision Maker: Lanier Moises - Domestic Partner - [phone]    Click here to complete Healthcare Decision Makers including selection of the Healthcare Decision Maker Relationship (ie \"Primary\"). Today we discussed completion of ACP and he will think about it. Assistance is available.

## 2021-04-29 ENCOUNTER — PATIENT MESSAGE (OUTPATIENT)
Dept: FAMILY MEDICINE CLINIC | Age: 46
End: 2021-04-29

## 2021-04-29 NOTE — TELEPHONE ENCOUNTER
From: Sera Merchant  To: Zhao Jacobs DO  Sent: 4/29/2021 2:50 PM EDT  Subject: Non-Urgent Medical Question    I been taking my blood pressure for a few days now today it is 188 sys 84 vaibhav 79 pul is this a normal blood pressure or is it high as when i was at the er the last two times it was high both times     [facility] [employer] ur all is doing well thank you for ur endless work you do for us    Rashid Gillette

## 2021-05-04 ENCOUNTER — HOSPITAL ENCOUNTER (OUTPATIENT)
Dept: LAB | Age: 46
Discharge: HOME | End: 2021-05-04
Payer: MEDICARE

## 2021-05-04 DIAGNOSIS — E55.9 VITAMIN D DEFICIENCY: ICD-10-CM

## 2021-05-04 DIAGNOSIS — E78.1 HYPERTRIGLYCERIDEMIA: ICD-10-CM

## 2021-05-04 LAB
ALBUMIN SERPL-MCNC: 4.6 G/DL (ref 3.5–5.2)
ALBUMIN/GLOBULIN RATIO: 1.5 (ref 1–2.5)
ALP BLD-CCNC: 113 U/L (ref 40–129)
ALT SERPL-CCNC: 67 U/L (ref 5–41)
AST SERPL-CCNC: 36 U/L
BILIRUB SERPL-MCNC: 0.41 MG/DL (ref 0.3–1.2)
BILIRUBIN DIRECT: 0.12 MG/DL
BILIRUBIN, INDIRECT: 0.29 MG/DL (ref 0–1)
CHOLESTEROL/HDL RATIO: 6.6
CHOLESTEROL: 244 MG/DL
GLOBULIN: 3.1 G/DL (ref 1.5–3.8)
HDLC SERPL-MCNC: 37 MG/DL
LDL CHOLESTEROL: 163 MG/DL (ref 0–130)
TOTAL PROTEIN: 7.7 G/DL (ref 6.4–8.3)
TRIGL SERPL-MCNC: 220 MG/DL
VITAMIN D 25-HYDROXY: 21 NG/ML (ref 30–100)
VLDLC SERPL CALC-MCNC: ABNORMAL MG/DL (ref 1–30)

## 2021-05-04 PROCEDURE — 80076 HEPATIC FUNCTION PANEL: CPT

## 2021-05-04 PROCEDURE — 36415 COLL VENOUS BLD VENIPUNCTURE: CPT

## 2021-05-04 PROCEDURE — 80061 LIPID PANEL: CPT

## 2021-05-04 PROCEDURE — 82306 VITAMIN D 25 HYDROXY: CPT

## 2021-05-04 RX ORDER — GEMFIBROZIL 600 MG/1
TABLET, FILM COATED ORAL
Qty: 180 TABLET | Refills: 0 | OUTPATIENT
Start: 2021-05-04

## 2021-05-05 ENCOUNTER — CARE COORDINATION (OUTPATIENT)
Dept: CARE COORDINATION | Age: 46
End: 2021-05-05

## 2021-05-05 NOTE — CARE COORDINATION
Ambulatory Care Coordination Note  5/5/2021  CM Risk Score: 5  Charlson 10 Year Mortality Risk Score: 4%     ACC: Pedro Joyner, RN    Summary Note:  Leonel Diego was referred for ACM from report.      He has:            COPD- on medications and follows with PCP. Had seen pulmonologist in the past                                      Bipolar, Depression, anxiety- on medications                                       Migraine Headaches- on medications and has seen Dr. Bruno Kiran in the past      Plan of Care :             Continue assessments, education, and support                                      COPD Zone Tool                                      SDOH completed\                                      Medications reviewed                                      [facility] decision maker updated. ACP discussed- he will think about it                                      Review clinic hours, Urgent Care and he uses My Chart                                      Covid education                                      Care Gaps                                      Care Team                                      Nutrition                                      PCP F/U 5/10/2021                                      Eye exam 6/14/2021                                      He needs to reschedule with pulmonology and neurology    5/5/2021- 2:22 pm Left message requesting return call and with details for 5/10 apt. Care Coordination Interventions    Program Enrollment: Complex Care  Referral from Primary Care Provider: No  Suggested Interventions and Community Resources  Zone Management Tools: In Process         Goals Addressed    None         Prior to Admission medications    Medication Sig Start Date End Date Taking? Authorizing Provider   SUMAtriptan (IMITREX) 100 MG tablet Take 1 tablet by mouth once as needed for Migraine May repeat dose again in 2 hours, if headache persists.  4/27/21 4/27/21  Gabi Vance, DO   divalproex (DEPAKOTE ER) 500 MG extended release tablet Take 1 tablet by mouth 2 times daily 5/08/53   Cynthia Tello MD   tiZANidine (ZANAFLEX) 4 MG tablet Take 1 tablet by mouth every 8 hours as needed (muscle spasm) 3/10/21   Cleatis Blush Black, DO   omega-3 acid ethyl esters (LOVAZA) 1 g capsule Take 2 capsules by mouth 2 times daily Take with meals.  2/9/21   Cleatis Blush Black, DO   albuterol sulfate HFA (VENTOLIN HFA) 108 (90 Base) MCG/ACT inhaler Inhale 1-2 puffs into the lungs every 6 hours as needed for Wheezing 2/2/21   Glover Babinski, MD   ibuprofen (ADVIL;MOTRIN) 600 MG tablet Take 1 tablet by mouth every 8 hours as needed for Pain 2/2/21   Cleatis Blush Black, DO   risperiDONE (RISPERDAL) 3 MG tablet Take 1 tablet by mouth nightly 2/2/21   Cleatis Blush Black, DO   famotidine (PEPCID) 20 MG tablet Take 1 tablet by mouth 2 times daily  Patient not taking: Reported on 4/27/2021 2/2/21   Cleatis Blush Black, DO   venlafaxine (EFFEXOR XR) 150 MG extended release capsule TAKE ONE CAPSULE BY MOUTH DAILY 2/2/21   Cleatis Blush Black, DO   loratadine (CLARITIN) 10 MG tablet Take 1 tablet by mouth daily 2/2/21   Cleatis Blush Black, DO   ondansetron (ZOFRAN) 4 MG tablet Take 1 tablet by mouth every 8 hours as needed for Nausea 5/7/78   Cynthia Tello MD   fluticasone (FLONASE) 50 MCG/ACT nasal spray 2 sprays by Nasal route daily 12/31/20   Cleatis Blush Black, DO   busPIRone (BUSPAR) 15 MG tablet Take 15 mg by mouth 2 times daily as needed (anxiety) 12/3/20   Cleatis Blush Black, DO   tiotropium (SPIRIVA HANDIHALER) 18 MCG inhalation capsule Inhale 1 capsule into the lungs daily 11/10/20 2/8/21  Cleatis Blush Black, DO   Cholecalciferol (VITAMIN D3) 50 MCG (2000 UT) TABS Take 1 tablet by mouth daily 11/10/20   Cleatis Blush Black, DO   vitamin B-12 (CYANOCOBALAMIN) 1000 MCG tablet Take 1 tablet by mouth daily 11/10/20   Cleatis Blush Black, DO   folic acid (FOLVITE) 118 MCG tablet Take 1 tablet by mouth daily 11/10/20   Cleatis Blush Black, DO   aspirin (ASPIRIN 81) 81 MG EC tablet Take 1 tablet by mouth daily 11/10/20   Jeff Vance, DO   Menthol, Topical Analgesic, (BIOFREEZE) 4 % GEL Apply to shoulders & neck to relieve pain. 10/21/20   Jeff Vance, DO   albuterol (PROVENTIL) (2.5 MG/3ML) 0.083% nebulizer solution Take 3 mLs by nebulization every 6 hours as needed for Wheezing or Shortness of Breath  Patient not taking: Reported on 4/27/2021 5/13/20   Dafne Pelayo, DO   Respiratory Therapy Supplies (NEBULIZER COMPRESSOR) KIT 1 kit by Does not apply route once for 1 dose 7/22/19 7/22/19  Raghav Hillman, DO   Respiratory Therapy Supplies (NEBULIZER/TUBING/MOUTHPIECE) KIT 1 kit by Does not apply route once for 1 dose 7/22/19 7/22/19  Raghav Hillman, DO   Compression Stockings MISC by Does not apply route Wear on bilateral lower extremities daily as needed for edema.   Patient not taking: Reported on 11/10/2020 12/7/18   Dafne Pelayo DO       Future Appointments   Date Time Provider Haley Corado   5/10/2021 10:20 AM DO JUAN Avina DELORESP   6/14/2021 10:00 AM ADAMARIS Nieto [facility]

## 2021-05-06 ENCOUNTER — PATIENT MESSAGE (OUTPATIENT)
Dept: FAMILY MEDICINE CLINIC | Age: 46
End: 2021-05-06

## 2021-05-10 ENCOUNTER — PATIENT MESSAGE (OUTPATIENT)
Dept: FAMILY MEDICINE CLINIC | Age: 46
End: 2021-05-10

## 2021-05-10 ENCOUNTER — TELEPHONE (OUTPATIENT)
Dept: FAMILY MEDICINE CLINIC | Age: 46
End: 2021-05-10

## 2021-05-10 ENCOUNTER — TELEPHONE (OUTPATIENT)
Dept: PULMONOLOGY | Age: 46
End: 2021-05-10

## 2021-05-10 ENCOUNTER — OFFICE VISIT (OUTPATIENT)
Dept: FAMILY MEDICINE CLINIC | Age: 46
End: 2021-05-10
Payer: MEDICARE

## 2021-05-10 VITALS
HEART RATE: 72 BPM | OXYGEN SATURATION: 97 % | WEIGHT: 274 LBS | HEIGHT: 71 IN | BODY MASS INDEX: 38.36 KG/M2 | SYSTOLIC BLOOD PRESSURE: 122 MMHG | DIASTOLIC BLOOD PRESSURE: 80 MMHG

## 2021-05-10 DIAGNOSIS — J44.9 CHRONIC OBSTRUCTIVE PULMONARY DISEASE, UNSPECIFIED COPD TYPE (HCC): ICD-10-CM

## 2021-05-10 DIAGNOSIS — E55.9 VITAMIN D DEFICIENCY: ICD-10-CM

## 2021-05-10 DIAGNOSIS — G43.909 MIGRAINE WITHOUT STATUS MIGRAINOSUS, NOT INTRACTABLE, UNSPECIFIED MIGRAINE TYPE: Primary | ICD-10-CM

## 2021-05-10 DIAGNOSIS — E78.2 HYPERCHOLESTEROLEMIA WITH HYPERTRIGLYCERIDEMIA: ICD-10-CM

## 2021-05-10 DIAGNOSIS — E66.01 MORBIDLY OBESE (HCC): ICD-10-CM

## 2021-05-10 PROBLEM — F31.9 BIPOLAR DISORDER (HCC): Status: RESOLVED | Noted: 2018-03-12 | Resolved: 2021-05-10

## 2021-05-10 PROCEDURE — G8427 DOCREV CUR MEDS BY ELIG CLIN: HCPCS | Performed by: FAMILY MEDICINE

## 2021-05-10 PROCEDURE — 1036F TOBACCO NON-USER: CPT | Performed by: FAMILY MEDICINE

## 2021-05-10 PROCEDURE — 99214 OFFICE O/P EST MOD 30 MIN: CPT | Performed by: FAMILY MEDICINE

## 2021-05-10 PROCEDURE — 99212 OFFICE O/P EST SF 10 MIN: CPT | Performed by: FAMILY MEDICINE

## 2021-05-10 PROCEDURE — G8417 CALC BMI ABV UP PARAM F/U: HCPCS | Performed by: FAMILY MEDICINE

## 2021-05-10 PROCEDURE — 3023F SPIROM DOC REV: CPT | Performed by: FAMILY MEDICINE

## 2021-05-10 PROCEDURE — G8926 SPIRO NO PERF OR DOC: HCPCS | Performed by: FAMILY MEDICINE

## 2021-05-10 RX ORDER — ATORVASTATIN CALCIUM 10 MG/1
10 TABLET, FILM COATED ORAL DAILY
Qty: 90 TABLET | Refills: 1 | Status: SHIPPED | OUTPATIENT
Start: 2021-05-10 | End: 2021-09-22 | Stop reason: SDUPTHER

## 2021-05-10 RX ORDER — NEBULIZER ACCESSORIES
1 KIT MISCELLANEOUS DAILY PRN
Qty: 1 KIT | Refills: 0 | Status: SHIPPED | OUTPATIENT
Start: 2021-05-10 | End: 2021-05-13 | Stop reason: SDUPTHER

## 2021-05-10 RX ORDER — RIZATRIPTAN BENZOATE 10 MG/1
10 TABLET ORAL
Qty: 30 TABLET | Refills: 0 | Status: SHIPPED | OUTPATIENT
Start: 2021-05-10 | End: 2021-08-05 | Stop reason: SDUPTHER

## 2021-05-10 RX ORDER — ALBUTEROL SULFATE 2.5 MG/3ML
2.5 SOLUTION RESPIRATORY (INHALATION) EVERY 6 HOURS PRN
Qty: 120 VIAL | Refills: 5 | Status: SHIPPED | OUTPATIENT
Start: 2021-05-10

## 2021-05-10 NOTE — PROGRESS NOTES
[address]  [address], [address] [place]  [phone]      Jack Sanchez is a 50 y.o. male who presents today for his medical conditions/complaints as noted below. Jack Sanchez is c/o of Headache (daily), COPD, Hypertension, and Discuss Labs (done 5-4)      HPI:     Pt here today for follow-up of HTN and HLD. Pt has been checking his BP at home - usually checking mid-day, and admits that he does not always rest beforehand. Just got the BP cuff they have at home. Has cut down his soda to one per day and smaller Mt. Dew bottles for his one he allows himself per day. Tries to watch his salt. Wants to start walking for exercise this summer. Weight stable x past year at 274 lbs. Pt would like Rx for nebulizer machine to use as needed for his COPD - has used one in the past, and it was helpful for him. Would use it as needed, especially when he has been more active or in the warmer weather. Overdue to see Pulmonology - last visit was 4/2019, but his visit was cancelled in 2020 due to Covid. Would like to wait until the fall to see them. Over the past week, he has had almost a constant migraine. Before that, he was still having 3x's per month. Feels like it could be worse right now due to worsened sinus sx's or intermittent neck pain. In the past, he     Has tried Imitrex, Topamax, and Depakote.         Past Medical History:   Diagnosis Date    Allergic rhinitis     Bipolar disorder ([address]     Diagnosed in 1998    [facility] pulmonary [facility] ([facility]D) [facility] [address]     Depression     Headache(784.0)     Osteoarthritis     TMJ (dislocation of temporomandibular joint)     Torticollis       Past Surgical History:   Procedure Laterality Date    TONSILLECTOMY       Family History   Problem Relation Age of Onset    Stroke Mother     High Blood Pressure Mother     High Blood Pressure Father     Stroke Father     Glaucoma Neg Hx     Diabetes Neg Hx     Cataracts Neg Hx      Social History     Tobacco Use    Smoking status: Former Smoker     Packs/day: 1.00     Years: 25.00     Pack years: 25.00     Types: Cigarettes     Start date: 10/16/1992     Quit date: 6/21/2017     Years since quitting: 3.8    Smokeless tobacco: Never Used    Tobacco comment: handout given. Substance Use Topics    Alcohol use: No     Frequency: Never     Binge frequency: Never      Current Outpatient Medications   Medication Sig Dispense Refill    atorvastatin (LIPITOR) 10 MG tablet Take 1 tablet by mouth daily 90 tablet 1    SUMAtriptan (IMITREX) 100 MG tablet Take 1 tablet by mouth once as needed for Migraine May repeat dose again in 2 hours, if headache persists. 30 tablet 5    divalproex (DEPAKOTE ER) 500 MG extended release tablet Take 1 tablet by mouth 2 times daily 60 tablet 2    tiZANidine (ZANAFLEX) 4 MG tablet Take 1 tablet by mouth every 8 hours as needed (muscle spasm) 60 tablet 2    omega-3 acid ethyl esters (LOVAZA) 1 g capsule Take 2 capsules by mouth 2 times daily Take with meals.  120 capsule 5    albuterol sulfate HFA (VENTOLIN HFA) 108 (90 Base) MCG/ACT inhaler Inhale 1-2 puffs into the lungs every 6 hours as needed for Wheezing 3 Inhaler 3    ibuprofen (ADVIL;MOTRIN) 600 MG tablet Take 1 tablet by mouth every 8 hours as needed for Pain 120 tablet 3    risperiDONE (RISPERDAL) 3 MG tablet Take 1 tablet by mouth nightly 90 tablet 3    famotidine (PEPCID) 20 MG tablet Take 1 tablet by mouth 2 times daily 180 tablet 3    venlafaxine (EFFEXOR XR) 150 MG extended release capsule TAKE ONE CAPSULE BY MOUTH DAILY 90 capsule 3    loratadine (CLARITIN) 10 MG tablet Take 1 tablet by mouth daily 90 tablet 3    ondansetron (ZOFRAN) 4 MG tablet Take 1 tablet by mouth every 8 hours as needed for Nausea 30 tablet 2    fluticasone (FLONASE) 50 MCG/ACT nasal spray 2 sprays by Nasal route daily 3 Bottle 3    busPIRone (BUSPAR) 15 MG tablet Take 15 mg by mouth 2 times daily as needed (anxiety) 60 tablet 5    tiotropium (SPIRIVA HANDIHALER) 18 MCG inhalation capsule Inhale 1 capsule into the lungs daily 90 capsule 3    Cholecalciferol (VITAMIN D3) 50 MCG (2000 UT) TABS Take 1 tablet by mouth daily 30 tablet 11    vitamin B-12 (CYANOCOBALAMIN) 1000 MCG tablet Take 1 tablet by mouth daily 30 tablet 11    folic acid (FOLVITE) 639 MCG tablet Take 1 tablet by mouth daily 30 tablet 11    aspirin (ASPIRIN 81) 81 MG EC tablet Take 1 tablet by mouth daily 30 tablet 11    Menthol, Topical Analgesic, (BIOFREEZE) 4 % GEL Apply to shoulders & neck to relieve pain.  237 mL 5    albuterol (PROVENTIL) (2.5 MG/3ML) 0.083% nebulizer solution Take 3 mLs by nebulization every 6 hours as needed for Wheezing or Shortness of Breath 120 each 3    Respiratory Therapy Supplies (NEBULIZER COMPRESSOR) KIT 1 kit by Does not apply route once for 1 dose 1 kit 0    Respiratory Therapy Supplies (NEBULIZER/TUBING/MOUTHPIECE) KIT 1 kit by Does not apply route once for 1 dose 1 kit 0     Current Facility-Administered Medications   Medication Dose Route Frequency Provider Last Rate Last Admin    phenylephrine (MYDFRIN) 2.5 % ophthalmic solution 1 drop  1 drop Both Eyes Once Acie Pulse, MD        tropicamide (MYDRIACYL) 1 % ophthalmic solution 1 drop  1 drop Both Eyes Once Acie Pulse, MD         Allergies   Allergen Reactions    Keflex [Cephalexin] Other (See Comments)     Migraines, upset stomach     Naproxen      ulcers       Health Maintenance   Topic Date Due    Hepatitis C screen  Never done    HIV screen  Never done    COVID-19 Vaccine (1) Never done    Diabetes screen  Never done   [employer] Visit (AWV)  06/09/2021    Pneumococcal 0-64 years Vaccine (1 of 1 - PPSV23) 11/08/2021 (Originally 9/7/1979)    DTaP/Tdap/Td vaccine (1 - Tdap) 11/10/2021 (Originally 9/7/1992)    Flu vaccine (Season Ended) 11/10/2021 (Originally 9/1/2021)    Lipid screen  05/04/2026    Hepatitis A vaccine Future     Standing Expiration Date:   5/10/2022    Lipid Panel     Standing Status:   Future     Standing Expiration Date:   5/10/2022     Order Specific Question:   Is Patient Fasting?/# of Hours     Answer:   12     Orders Placed This Encounter   Medications    atorvastatin (LIPITOR) 10 MG tablet     Sig: Take 1 tablet by mouth daily     Dispense:  90 tablet     Refill:  1       Patient given educational materials - see patient instructions. Discussed use, benefit, and side effects of prescribed medications. All patient questions answered. Pt voiced understanding. Reviewed health maintenance.             Electronically signed by Chilo Allison DO on 5/10/2021 at 11:21 AM

## 2021-05-10 NOTE — TELEPHONE ENCOUNTER
Call rec'd from Dr. Destini Sidhu office to schedule patient for upcoming pulmonary appointment. This writer contacted patient, patient's fiance answered phone and told writer she would have patient contact office when he got home.

## 2021-05-10 NOTE — TELEPHONE ENCOUNTER
[facility] pharmacist just wanted us to know that [facility] only covers 30 Maxalt tabs in 75 days. They will try to inform pt of this.

## 2021-05-11 DIAGNOSIS — E78.2 HYPERCHOLESTEROLEMIA WITH HYPERTRIGLYCERIDEMIA: Primary | ICD-10-CM

## 2021-05-11 DIAGNOSIS — J44.9 CHRONIC OBSTRUCTIVE PULMONARY DISEASE, UNSPECIFIED COPD TYPE (HCC): ICD-10-CM

## 2021-05-11 NOTE — TELEPHONE ENCOUNTER
Jennifer Shall called requesting a refill of the below medication which has been pended for you:     Requested Prescriptions     Pending Prescriptions Disp Refills    gemfibrozil (LOPID) 600 MG tablet [Pharmacy Med Name: Gemfibrozil 600 MG Oral Tablet] 180 tablet 0     Sig: Take 1 tablet by mouth twice daily       Last Appointment Date: 5/10/2021  Next Appointment Date: 11/12/2021    Allergies   Allergen Reactions    Keflex [Cephalexin] Other (See Comments)     Migraines, upset stomach     Naproxen      ulcers

## 2021-05-12 ENCOUNTER — CARE COORDINATION (OUTPATIENT)
Dept: CARE COORDINATION | Age: 46
End: 2021-05-12

## 2021-05-12 NOTE — TELEPHONE ENCOUNTER
[facility] called- they are not in pt's insurance network. Please resend nebulizer & supplies to [facility].

## 2021-05-12 NOTE — CARE COORDINATION
Ambulatory Care Coordination Note  5/12/2021  CM Risk Score: 5  Charlson 10 Year Mortality Risk Score: 4%     ACC: Woodrow Mckeon RN    Summary Note: Essie Esparza was referred for ACM from report.      He has:            COPD- on medications and follows with PCP. Had seen pulmonologist in the past                                      Bipolar, Depression, anxiety- on medications                                       Migraine Headaches- on medications and has seen Dr. Svitlana Oneill in the past      Plan of Care :             Continue assessments, education, and support                                      COPD Zone Tool                                      SDOH completed\                                      Medications reviewed  [facility] decision maker updated. ACP discussed- he will think about it                                      Review clinic hours, Urgent Care and he uses My Chart                                      Covid education                                      YVRB Gaps                                      Care Team                                      Nutrition                                      FVX F/U 5/10/2021- completed. F/U 11/2021                                      Eye exam 6/14/2021                                      He needs to reschedule with pulmonology and neurology     5/5/2021- 2:22 pm Left message requesting return call and with details for 5/10 apt.   5/12/2021- 3:18 pm Left message requesting return call.            Care Coordination Interventions    Program Enrollment: Complex Care  Referral from Primary Care Provider: No  Suggested Interventions and Community Resources  Zone Management Tools: In Process         Goals Addressed    None         Prior to Admission medications    Medication Sig Start Date End Date Taking?  Authorizing Provider   Respiratory Therapy Supplies (NEBULIZER/TUBING/MOUTHPIECE) KIT 1 kit by Does not apply route daily as needed

## 2021-05-13 RX ORDER — NEBULIZER ACCESSORIES
KIT MISCELLANEOUS
Qty: 1 KIT | Refills: 0 | Status: SHIPPED | OUTPATIENT
Start: 2021-05-13

## 2021-05-13 RX ORDER — GEMFIBROZIL 600 MG/1
TABLET, FILM COATED ORAL
Qty: 180 TABLET | Refills: 3 | OUTPATIENT
Start: 2021-05-13

## 2021-05-17 NOTE — TELEPHONE ENCOUNTER
Will not prescribe at this time - would like pt to be as active as possible due to weight gain. If still not able to tolerate ambulating very far, he should make an appt for the pain. Just saw pt on 5/10 and he did not mention these sx's during visit.

## 2021-05-18 ENCOUNTER — CARE COORDINATION (OUTPATIENT)
Dept: CARE COORDINATION | Age: 46
End: 2021-05-18

## 2021-05-18 ASSESSMENT — ENCOUNTER SYMPTOMS: DYSPNEA ASSOCIATED WITH: EXERTION

## 2021-05-18 NOTE — CARE COORDINATION
medication )       COPD Assessment    Does the patient understand envrionmental exposure?: Yes  Is the patient able to verbalize Rescue vs. Long Acting medications?: Yes  Does the patient have a nebulizer?: No  Does the patient use a space with inhaled medications?: Yes     No patient-reported symptoms         Symptoms:     Symptom course: stable  Breathlessness: exertion  Increase use of rapid acting/rescue inhaled medications?: No  Change in chronic cough?: No/At Baseline  Change in sputum?: No/At Baseline  Sputum characteristics: Clear  Self Monitoring - SaO2: No         Care Coordination Interventions    Program Enrollment: Complex Care  Referral from Primary Care Provider: No  Suggested Interventions and Community Resources  Zone Management Tools: In Process         Goals Addressed                    This Visit's Progress       Patient Stated      Conditions and Symptoms (pt-stated)   On track      I will schedule office visits, as directed by my provider. I will keep my appointment or reschedule if I have to cancel. I will notify my provider of any barriers to my plan of care. I will follow my Zone Management tool to seek urgent or emergent care. I will notify my provider of any symptoms that indicate a worsening of my condition. Barriers: lack of support and lack of education  Plan for overcoming my barriers: Care Coordination Intervention  Confidence: 10/10  Anticipated Goal Completion Date: 7/27/2021              Prior to Admission medications    Medication Sig Start Date End Date Taking? Authorizing Provider   Respiratory Therapy Supplies (NEBULIZER/TUBING/MOUTHPIECE) KIT Use daily as needed for shortness of breath.  5/13/21   Jareth Hancock, DO   Nebulizers (COMPRESSOR/NEBULIZER) MISC Use daily as needed for shortness of breath 5/13/21   Elsi Vance DO   atorvastatin (LIPITOR) 10 MG tablet Take 1 tablet by mouth daily 5/10/21   Elsi Vance DO   albuterol (PROVENTIL) (2.5 MG/3ML) 0.083% Rasta Vance,    vitamin B-12 (CYANOCOBALAMIN) 1000 MCG tablet Take 1 tablet by mouth daily 11/10/20   Rasta Vance DO   folic acid (FOLVITE) 767 MCG tablet Take 1 tablet by mouth daily 11/10/20   Angely Vega DO   aspirin (ASPIRIN 81) 81 MG EC tablet Take 1 tablet by mouth daily 11/10/20   Rasta Vance DO   Menthol, Topical Analgesic, (BIOFREEZE) 4 % GEL Apply to shoulders & neck to relieve pain.  10/21/20   Rasta Vance DO   albuterol (PROVENTIL) (2.5 MG/3ML) 0.083% nebulizer solution Take 3 mLs by nebulization every 6 hours as needed for Wheezing or Shortness of Breath 5/13/20   Angely Vega DO   Respiratory Therapy Supplies (NEBULIZER COMPRESSOR) KIT 1 kit by Does not apply route once for 1 dose 7/22/19 7/22/19  Andres Benjamin, DO       Future Appointments   Date Time Provider Haley Corado   6/14/2021 10:00 AM ADAMARIS Garcia [facility]   11/12/2021 10:40 AM Angely Vega DO [facility]

## 2021-05-18 NOTE — CARE COORDINATION
Ambulatory Care Coordination Note  5/18/2021  CM Risk Score: 5  Charlson 10 Year Mortality Risk Score: 4%     ACC: Vangie Queen RN    Summary Note: Teresa Leary was referred for ACM from report.      He has:            COPD- on medications and follows with PCP. Had seen pulmonologist in the past                                      Bipolar, Depression, anxiety- on medications                                       Migraine Headaches- on medications and has seen Dr. Karina Dhaliwal in the past      Plan of Care :             Continue assessments, education, and support                                      COPD Zone Tool                                      SDOH completed\                                      Medications reviewed  [facility] decision maker updated. ACP discussed- he will think about it                                      Review clinic hours, Urgent Care and he uses My Chart                                      Covid education                                      YIUO Gaps                                      Care Team                                      Nutrition                                      NPS F/U 5/10/2021- completed. F/U 11/2021                                      Eye exam 6/14/2021                                      He needs to reschedule with pulmonology and neurology     5/5/2021- 2:22 pm Left message requesting return call and with details for 5/10 apt.   5/12/2021- 3:18 pm Left message requesting return call. 5/18/2021- 8:47 am left message with fiance to return call. Care Coordination Interventions    Program Enrollment: Complex Care  Referral from Primary Care Provider: No  Suggested Interventions and Community Resources  Zone Management Tools: In Process         Goals Addressed    None         Prior to Admission medications    Medication Sig Start Date End Date Taking?  Authorizing Provider   Respiratory Therapy Supplies (NEBULIZER/TUBING/MOUTHPIECE) KIT Use daily as needed for shortness of breath. 5/13/21   Phineas La, DO   Nebulizers (COMPRESSOR/NEBULIZER) MISC Use daily as needed for shortness of breath 5/13/21   Elliott Gobble Black, DO   atorvastatin (LIPITOR) 10 MG tablet Take 1 tablet by mouth daily 5/10/21   Elliott Gobble Black, DO   albuterol (PROVENTIL) (2.5 MG/3ML) 0.083% nebulizer solution Take 3 mLs by nebulization every 6 hours as needed for Wheezing or Shortness of Breath 5/10/21   Phineas La, DO   rizatriptan (MAXALT) 10 MG tablet Take 1 tablet by mouth once as needed for Migraine May repeat in 2 hours if needed 5/10/21 5/10/21  Elliott Gobble Black, DO   divalproex (DEPAKOTE ER) 500 MG extended release tablet Take 1 tablet by mouth 2 times daily 3/44/36   Rachelle Durand MD   tiZANidine (ZANAFLEX) 4 MG tablet Take 1 tablet by mouth every 8 hours as needed (muscle spasm) 3/10/21   Elliott Gobble Black, DO   omega-3 acid ethyl esters (LOVAZA) 1 g capsule Take 2 capsules by mouth 2 times daily Take with meals.  2/9/21   Elliott Gobble Black, DO   albuterol sulfate HFA (VENTOLIN HFA) 108 (90 Base) MCG/ACT inhaler Inhale 1-2 puffs into the lungs every 6 hours as needed for Wheezing 2/2/21   Anastacia Vance MD   ibuprofen (ADVIL;MOTRIN) 600 MG tablet Take 1 tablet by mouth every 8 hours as needed for Pain 2/2/21   Elliott Gobble Black, DO   risperiDONE (RISPERDAL) 3 MG tablet Take 1 tablet by mouth nightly 2/2/21   Elliott Gobble Black, DO   famotidine (PEPCID) 20 MG tablet Take 1 tablet by mouth 2 times daily 2/2/21   Elliott Gobble Black, DO   venlafaxine (EFFEXOR XR) 150 MG extended release capsule TAKE ONE CAPSULE BY MOUTH DAILY 2/2/21   Elliott Gobble Black, DO   loratadine (CLARITIN) 10 MG tablet Take 1 tablet by mouth daily 2/2/21   Elliott Vance DO   ondansetron (ZOFRAN) 4 MG tablet Take 1 tablet by mouth every 8 hours as needed for Nausea 1/1/04   Rachelle Durand MD   fluticasone [facility]) 50 MCG/ACT nasal spray 2 sprays by Nasal route daily 12/31/20   Danica Martin HORTENSIA Vance,    busPIRone (BUSPAR) 15 MG tablet Take 15 mg by mouth 2 times daily as needed (anxiety) 12/3/20   Mannie Boas Black, DO   tiotropium (SPIRIVA HANDIHALER) 18 MCG inhalation capsule Inhale 1 capsule into the lungs daily 11/10/20 5/10/21  Mannie Boas Black, DO   Cholecalciferol (VITAMIN D3) 50 MCG (2000 UT) TABS Take 1 tablet by mouth daily 11/10/20   Mehnaz Almonte, DO   vitamin B-12 (CYANOCOBALAMIN) 1000 MCG tablet Take 1 tablet by mouth daily 11/10/20   Mannie Boas Black, DO   folic acid (FOLVITE) 103 MCG tablet Take 1 tablet by mouth daily 11/10/20   Mehnaz Almonte, DO   aspirin (ASPIRIN 81) 81 MG EC tablet Take 1 tablet by mouth daily 11/10/20   Mannie Boas Black, DO   Menthol, Topical Analgesic, (BIOFREEZE) 4 % GEL Apply to shoulders & neck to relieve pain.  10/21/20   Mannie Boas Black, DO   albuterol (PROVENTIL) (2.5 MG/3ML) 0.083% nebulizer solution Take 3 mLs by nebulization every 6 hours as needed for Wheezing or Shortness of Breath 5/13/20   Mehnaz Almonte, DO   Respiratory Therapy Supplies (NEBULIZER COMPRESSOR) KIT 1 kit by Does not apply route once for 1 dose 7/22/19 7/22/19  Samantha Huber DO       Future Appointments   Date Time Provider Haley Corado   6/14/2021 10:00 AM Arpita Barron, ADAMARIS MORALES [facility]   11/12/2021 10:40 AM DO [facility]

## 2021-05-30 ENCOUNTER — PATIENT MESSAGE (OUTPATIENT)
Dept: FAMILY MEDICINE CLINIC | Age: 46
End: 2021-05-30

## 2021-05-30 DIAGNOSIS — R26.2 DIFFICULTY WALKING: ICD-10-CM

## 2021-05-30 DIAGNOSIS — M54.2 CHRONIC NECK PAIN: ICD-10-CM

## 2021-05-30 DIAGNOSIS — G89.29 CHRONIC LEFT SHOULDER PAIN: ICD-10-CM

## 2021-05-30 DIAGNOSIS — J44.9 CHRONIC OBSTRUCTIVE PULMONARY DISEASE, UNSPECIFIED COPD TYPE (HCC): Primary | ICD-10-CM

## 2021-05-30 DIAGNOSIS — M25.512 CHRONIC LEFT SHOULDER PAIN: ICD-10-CM

## 2021-05-30 DIAGNOSIS — F41.9 ANXIETY: ICD-10-CM

## 2021-05-30 DIAGNOSIS — M54.50 CHRONIC LOW BACK PAIN WITHOUT SCIATICA, UNSPECIFIED BACK PAIN LATERALITY: ICD-10-CM

## 2021-05-30 DIAGNOSIS — G89.29 CHRONIC LOW BACK PAIN WITHOUT SCIATICA, UNSPECIFIED BACK PAIN LATERALITY: ICD-10-CM

## 2021-05-30 DIAGNOSIS — G89.29 CHRONIC NECK PAIN: ICD-10-CM

## 2021-06-01 ENCOUNTER — CARE COORDINATION (OUTPATIENT)
Dept: CARE COORDINATION | Age: 46
End: 2021-06-01

## 2021-06-01 ASSESSMENT — ENCOUNTER SYMPTOMS: DYSPNEA ASSOCIATED WITH: EXERTION

## 2021-06-01 NOTE — CARE COORDINATION
Ambulatory Care Coordination Note  6/1/2021  CM Risk Score: 1  Charlson 10 Year Mortality Risk Score: 4%     ACC: Deann Peres, RN    Summary Note: Jhoan Watt was referred for ACM from report.      He has:            COPD- on medications and follows with PCP. Had seen pulmonologist in the past                                      Bipolar, Depression, anxiety- on medications                                       Migraine Headaches- on medications and has seen Dr. Torrey Gagnon in the past      Plan of Care :             Continue assessments, education, and support                                      COPD Zone Tool                                      SDOH completed\                                      Medications reviewed  [facility] decision maker updated. ACP discussed- he will think about it                                      Review clinic hours, Urgent Care and he uses My Chart- 5/18/2021                                      Covid education                                      [facility] Gaps                                      Care Team                                      Nutrition                                      BXK F/U 5/10/2021- completed. F/U 11/2021                                      Eye exam 6/14/2021                                      He needs to reschedule with pulmonology and neurology. 5/18/2021- he declined neurology but he stated he will call and schedule with pulmonology                                      F/U on air conditioner. 6/1/2021- he stated he is working with [facility]. He will receive one mid June. 6/1/2021- 4:14 pm spoke with Jhoan Watt. He denied any other concerns today.       COPD Assessment    Does the patient understand envrionmental exposure?: Yes  Is the patient able to verbalize Rescue vs. Long Acting medications?: Yes  Does the patient have a nebulizer?: No  Does the patient use a space with inhaled medications?: Yes     No patient-reported symptoms         Symptoms:     Symptom course: stable  Breathlessness: exertion  Increase use of rapid acting/rescue inhaled medications?: No  Change in chronic cough?: No/At Baseline  Change in sputum?: No/At Baseline  Sputum characteristics: Clear  Self Monitoring - SaO2: No         Care Coordination Interventions    Program Enrollment: Complex Care  Referral from Primary Care Provider: No  Suggested Interventions and Community Resources  Zone Management Tools: In Process         Goals Addressed                    This Visit's Progress       Patient Stated      Conditions and Symptoms (pt-stated)   On track      I will schedule office visits, as directed by my provider. I will keep my appointment or reschedule if I have to cancel. I will notify my provider of any barriers to my plan of care. I will follow my Zone Management tool to seek urgent or emergent care. I will notify my provider of any symptoms that indicate a worsening of my condition. Barriers: lack of support and lack of education  Plan for overcoming my barriers: Care Coordination Intervention  Confidence: 10/10  Anticipated Goal Completion Date: 7/27/2021              Prior to Admission medications    Medication Sig Start Date End Date Taking? Authorizing Provider   Respiratory Therapy Supplies (NEBULIZER/TUBING/MOUTHPIECE) KIT Use daily as needed for shortness of breath.  5/13/21   Dafne Pelayo, DO   Nebulizers (COMPRESSOR/NEBULIZER) MISC Use daily as needed for shortness of breath 5/13/21   Jeff Vance DO   atorvastatin (LIPITOR) 10 MG tablet Take 1 tablet by mouth daily 5/10/21   Jeff Vance, DO   albuterol (PROVENTIL) (2.5 MG/3ML) 0.083% nebulizer solution Take 3 mLs by nebulization every 6 hours as needed for Wheezing or Shortness of Breath 5/10/21   Dafne Pelayo DO   rizatriptan (MAXALT) 10 MG tablet Take 1 tablet by mouth once as needed for Migraine May repeat in 2 hours if needed 5/10/21 5/10/21  John Vance, DO   divalproex (DEPAKOTE ER) 500 MG extended release tablet Take 1 tablet by mouth 2 times daily 8/82/38   Marcy Wilkins MD   tiZANidine (ZANAFLEX) 4 MG tablet Take 1 tablet by mouth every 8 hours as needed (muscle spasm) 3/10/21   John Vance, DO   omega-3 acid ethyl esters (LOVAZA) 1 g capsule Take 2 capsules by mouth 2 times daily Take with meals.  2/9/21   John Vance, DO   albuterol sulfate HFA (VENTOLIN HFA) 108 (90 Base) MCG/ACT inhaler Inhale 1-2 puffs into the lungs every 6 hours as needed for Wheezing 2/2/21   Joseph High MD   ibuprofen (ADVIL;MOTRIN) 600 MG tablet Take 1 tablet by mouth every 8 hours as needed for Pain 2/2/21   John Vance, DO   risperiDONE (RISPERDAL) 3 MG tablet Take 1 tablet by mouth nightly 2/2/21   John Vance, DO   famotidine (PEPCID) 20 MG tablet Take 1 tablet by mouth 2 times daily 2/2/21   John Vance, DO   venlafaxine (EFFEXOR XR) 150 MG extended release capsule TAKE ONE CAPSULE BY MOUTH DAILY 2/2/21   John Vance, DO   loratadine (CLARITIN) 10 MG tablet Take 1 tablet by mouth daily 2/2/21   John Vance, DO   ondansetron (ZOFRAN) 4 MG tablet Take 1 tablet by mouth every 8 hours as needed for Nausea 0/5/57   Marcy Wilkins MD   fluticasone (FLONASE) 50 MCG/ACT nasal spray 2 sprays by Nasal route daily 12/31/20   John Vance, DO   busPIRone (BUSPAR) 15 MG tablet Take 15 mg by mouth 2 times daily as needed (anxiety) 12/3/20   John Vance, DO   tiotropium (SPIRIVA HANDIHALER) 18 MCG inhalation capsule Inhale 1 capsule into the lungs daily 11/10/20 5/10/21  John Vance, DO   Cholecalciferol (VITAMIN D3) 50 MCG (2000 UT) TABS Take 1 tablet by mouth daily 11/10/20   John Vance, DO   vitamin B-12 (CYANOCOBALAMIN) 1000 MCG tablet Take 1 tablet by mouth daily 11/10/20   John Vance, DO   folic acid (FOLVITE) 083 MCG tablet Take 1 tablet by mouth daily 11/10/20   John Vance, DO   aspirin (ASPIRIN 81) 81 MG EC tablet Take 1 tablet by mouth daily 11/10/20   Meme Vance, DO   Menthol, Topical Analgesic, (BIOFREEZE) 4 % GEL Apply to shoulders & neck to relieve pain.  10/21/20   Meme Vance, DO   albuterol (PROVENTIL) (2.5 MG/3ML) 0.083% nebulizer solution Take 3 mLs by nebulization every 6 hours as needed for Wheezing or Shortness of Breath 5/13/20   Angela Groves, DO   Respiratory Therapy Supplies (NEBULIZER COMPRESSOR) KIT 1 kit by Does not apply route once for 1 dose 7/22/19 7/22/19  Aretha Adams, DO       Future Appointments   Date Time Provider Haley Corado   6/14/2021 10:00 AM Amy Gwendel Felty, OD [facility]   11/12/2021 10:40 AM [facility]

## 2021-06-01 NOTE — TELEPHONE ENCOUNTER
From: Jean Pierre Martinez  To: Karrie Martinez DO  Sent: 5/30/2021 8:53 AM EDT  Subject: Non-Urgent Medical Question    When I came and seen you the last time I forgot to ask if u could possible help me get a handicap place card for my car so I don't have to walk so far to get in to the store as you know I have trouble walk long distances if not that is alright     Thank you hope u and ur staff have a great Maciej [place]

## 2021-06-03 DIAGNOSIS — M54.2 CHRONIC NECK PAIN: ICD-10-CM

## 2021-06-03 DIAGNOSIS — M25.512 CHRONIC LEFT SHOULDER PAIN: ICD-10-CM

## 2021-06-03 DIAGNOSIS — J44.9 CHRONIC OBSTRUCTIVE PULMONARY DISEASE, UNSPECIFIED COPD TYPE (HCC): ICD-10-CM

## 2021-06-03 DIAGNOSIS — G89.29 CHRONIC NECK PAIN: ICD-10-CM

## 2021-06-03 DIAGNOSIS — G89.29 CHRONIC LEFT SHOULDER PAIN: ICD-10-CM

## 2021-06-03 RX ORDER — TIZANIDINE 4 MG/1
TABLET ORAL
Qty: 60 TABLET | Refills: 0 | OUTPATIENT
Start: 2021-06-03

## 2021-06-03 RX ORDER — BUSPIRONE HYDROCHLORIDE 15 MG/1
15 TABLET ORAL 2 TIMES DAILY PRN
Qty: 60 TABLET | Refills: 5 | Status: SHIPPED | OUTPATIENT
Start: 2021-06-03 | End: 2021-12-01 | Stop reason: SDUPTHER

## 2021-06-03 RX ORDER — TIZANIDINE 4 MG/1
4 TABLET ORAL EVERY 8 HOURS PRN
Qty: 60 TABLET | Refills: 5 | Status: SHIPPED | OUTPATIENT
Start: 2021-06-03 | End: 2022-03-14 | Stop reason: ALTCHOICE

## 2021-06-03 RX ORDER — TIOTROPIUM BROMIDE 18 UG/1
18 CAPSULE ORAL; RESPIRATORY (INHALATION) DAILY
Qty: 90 CAPSULE | Refills: 3 | Status: SHIPPED | OUTPATIENT
Start: 2021-06-03 | End: 2022-05-23

## 2021-06-03 NOTE — TELEPHONE ENCOUNTER
Last Appt:  4/23/2019  Next Appt:   Visit date not found  Med verified in [facility]    Patient needs refill on [facility]

## 2021-06-03 NOTE — TELEPHONE ENCOUNTER
Shazia Hull is requesting a refill on the following medication(s):  Requested Prescriptions     Pending Prescriptions Disp Refills    tiZANidine (ZANAFLEX) 4 MG tablet [Pharmacy Med Name: tiZANidine HCl 4 MG Oral Tablet] 60 tablet 0     Sig: TAKE 1 TABLET BY MOUTH EVERY 8 HOURS AS NEEDED FOR MUSCLE SPASM       Last Visit Date (If Applicable):  3/69/0917    Next Visit Date:    11/12/2021

## 2021-06-14 ENCOUNTER — OFFICE VISIT (OUTPATIENT)
Dept: OPTOMETRY | Age: 46
End: 2021-06-14
Payer: COMMERCIAL

## 2021-06-14 DIAGNOSIS — H52.4 ASTIGMATISM OF BOTH EYES WITH PRESBYOPIA: Primary | ICD-10-CM

## 2021-06-14 DIAGNOSIS — H52.203 ASTIGMATISM OF BOTH EYES WITH PRESBYOPIA: Primary | ICD-10-CM

## 2021-06-14 DIAGNOSIS — H53.8 BLURRED VISION, BILATERAL: ICD-10-CM

## 2021-06-14 PROCEDURE — 92014 COMPRE OPH EXAM EST PT 1/>: CPT | Performed by: OPTOMETRIST

## 2021-06-14 ASSESSMENT — VISUAL ACUITY
METHOD: SNELLEN - LINEAR
OD_SC: 20/50
OS_SC: 20/50
OD_SC: 20/30 OU

## 2021-06-14 ASSESSMENT — ENCOUNTER SYMPTOMS
GASTROINTESTINAL NEGATIVE: 0
RESPIRATORY NEGATIVE: 0
EYES NEGATIVE: 0
ALLERGIC/IMMUNOLOGIC NEGATIVE: 0

## 2021-06-14 ASSESSMENT — REFRACTION_WEARINGRX
OD_AXIS: 090
SPECS_TYPE: BIFOCAL
OD_CYLINDER: -1.00
OD_SPHERE: PLANO
OS_CYLINDER: -0.25
OS_SPHERE: -1.00
OS_ADD: +1.50
OS_AXIS: 135
OD_ADD: +1.50

## 2021-06-14 ASSESSMENT — REFRACTION_MANIFEST
OS_SPHERE: -1.00
OD_AXIS: 090
OD_CYLINDER: -1.00
OS_CYLINDER: DS
OD_SPHERE: PLANO

## 2021-06-14 ASSESSMENT — TONOMETRY
OS_IOP_MMHG: 19
OD_IOP_MMHG: 17
IOP_METHOD: NON-CONTACT AIR PUFF

## 2021-06-14 ASSESSMENT — SLIT LAMP EXAM - LIDS
COMMENTS: NORMAL
COMMENTS: NORMAL

## 2021-06-14 NOTE — PROGRESS NOTES
Salena Schneider presents today for   Chief Complaint   Patient presents with    Vision Exam    Blurred Vision   . HPI     Blurred Vision     In both eyes. Vision is blurred. Context:  distance vision and reading. Comments     Last Vision Exam: 6/9/2020 Aw  Last Ophthalmology Exam: n/a  Last Filled Glasses Rx: 2019  Insurance: [facility] [employer]  Update: Glasses  Distance and reading are getting more blurry  Wearing old rx, was not elig for glasses last year  Needs new glasses                  Current Outpatient Medications   Medication Sig Dispense Refill    [place]ic[place]acard [facility] [place] Does not apply route Issue parking placard for person with disability. [facility] [id number]   Applicant meets the qualifying disability criteria. Expires 2 years from issuing date. 1 each 0    busPIRone (BUSPAR) 15 MG tablet Take 15 mg by mouth 2 times daily as needed (anxiety) 60 tablet 5    tiZANidine (ZANAFLEX) 4 MG tablet Take 1 tablet by mouth every 8 hours as needed (muscle spasm) 60 tablet 5    tiotropium (SPIRIVA HANDIHALER) 18 MCG inhalation capsule Inhale 1 capsule into the lungs daily 90 capsule 3    Respiratory Therapy Supplies (NEBULIZER/TUBING/MOUTHPIECE) KIT Use daily as needed for shortness of breath. 1 kit 0    Nebulizers (COMPRESSOR/NEBULIZER) MISC Use daily as needed for shortness of breath 1 each 0    atorvastatin (LIPITOR) 10 MG tablet Take 1 tablet by mouth daily 90 tablet 1    albuterol (PROVENTIL) (2.5 MG/3ML) 0.083% nebulizer solution Take 3 mLs by nebulization every 6 hours as needed for Wheezing or Shortness of Breath 120 vial 5    divalproex (DEPAKOTE ER) 500 MG extended release tablet Take 1 tablet by mouth 2 times daily 60 tablet 2    omega-3 acid ethyl esters (LOVAZA) 1 g capsule Take 2 capsules by mouth 2 times daily Take with meals.  120 capsule 5    albuterol sulfate HFA (VENTOLIN HFA) 108 (90 Base) MCG/ACT inhaler Inhale 1-2 puffs into the lungs every 6 hours as needed for Wheezing 3 Inhaler 3    ibuprofen (ADVIL;MOTRIN) 600 MG tablet Take 1 tablet by mouth every 8 hours as needed for Pain 120 tablet 3    risperiDONE (RISPERDAL) 3 MG tablet Take 1 tablet by mouth nightly 90 tablet 3    famotidine (PEPCID) 20 MG tablet Take 1 tablet by mouth 2 times daily 180 tablet 3    venlafaxine (EFFEXOR XR) 150 MG extended release capsule TAKE ONE CAPSULE BY MOUTH DAILY 90 capsule 3    loratadine (CLARITIN) 10 MG tablet Take 1 tablet by mouth daily 90 tablet 3    ondansetron (ZOFRAN) 4 MG tablet Take 1 tablet by mouth every 8 hours as needed for Nausea 30 tablet 2    fluticasone (FLONASE) 50 MCG/ACT nasal spray 2 sprays by Nasal route daily 3 Bottle 3    Cholecalciferol (VITAMIN D3) 50 MCG (2000 UT) TABS Take 1 tablet by mouth daily 30 tablet 11    vitamin B-12 (CYANOCOBALAMIN) 1000 MCG tablet Take 1 tablet by mouth daily 30 tablet 11    folic acid (FOLVITE) 759 MCG tablet Take 1 tablet by mouth daily 30 tablet 11    aspirin (ASPIRIN 81) 81 MG EC tablet Take 1 tablet by mouth daily 30 tablet 11    Menthol, Topical Analgesic, (BIOFREEZE) 4 % GEL Apply to shoulders & neck to relieve pain.  237 mL 5    albuterol (PROVENTIL) (2.5 MG/3ML) 0.083% nebulizer solution Take 3 mLs by nebulization every 6 hours as needed for Wheezing or Shortness of Breath 120 each 3    rizatriptan (MAXALT) 10 MG tablet Take 1 tablet by mouth once as needed for Migraine May repeat in 2 hours if needed 30 tablet 0    Respiratory Therapy Supplies (NEBULIZER COMPRESSOR) KIT 1 kit by Does not apply route once for 1 dose 1 kit 0     Current Facility-Administered Medications   Medication Dose Route Frequency Provider Last Rate Last Admin    phenylephrine (MYDFRIN) 2.5 % ophthalmic solution 1 drop  1 drop Both Eyes Once Sarina Clark MD        tropicamide (MYDRIACYL) 1 % ophthalmic solution 1 drop  1 drop Both Eyes Once Sarina Clark MD             Family History   Problem Relation Age of Onset    Stroke Mother     High Blood Pressure Mother     High Blood Pressure Father     Stroke Father     Glaucoma Neg Hx     Diabetes Neg Hx     Cataracts Neg Hx      Social History     Socioeconomic History    Marital status: Life Partner     Spouse name: Sowmya Mandujano Number of children: 1    Years of education: 15    Highest education level: Associate degree: occupational, technical, or vocational program   Occupational History    Occupation: factory    Tobacco Use    Smoking status: Former Smoker     Packs/day: 1.00     Years: 25.00     Pack years: 25.00     Types: Cigarettes     Start date: 10/16/1992     Quit date: 6/21/2017     Years since quitting: 3.9    Smokeless tobacco: Never Used    Tobacco comment: handout given. Substance and Sexual Activity    Alcohol use: No    Drug use: No    Sexual activity: Yes     Partners: Female   Other Topics Concern    None   Social History Narrative    4/27/2021- uses [employer]. Social Determinants of Health     Financial Resource Strain: Low Risk     Difficulty of Paying Living Expenses: Not hard at all   Food Insecurity: No Food Insecurity    Worried About Running Out of Food in the Last Year: Never true    Paresh of Food in the Last Year: Never true   Transportation Needs: No Transportation Needs    Lack of Transportation (Medical): No    Lack of Transportation (Non-Medical): No   Physical Activity: Inactive    Days of Exercise per Week: 0 days    Minutes of Exercise per Session: 0 min   Stress: Stress Concern Present    Feeling of Stress : To some extent   Social Connections:  Moderately Isolated    Frequency of Communication with Friends and Family: More than three times a week    Frequency of Social Gatherings with Friends and Family: Once a week    Attends Confucianism Services: Never    Active Member of Clubs or Organizations: No    Attends Club or Organization Meetings: Never    Marital Status: Living with partner   Intimate Partner Violence:     Fear of Current or Ex-Partner:     Emotionally Abused:     Physically Abused:     Sexually Abused:      Past Medical History:   Diagnosis Date    Allergic rhinitis     Bipolar disorder ([facility] [address]     Diagnosed in     Chronic obstructive pulmonary disease (COPD) ([facility] [address]     Depression     Headache(784.0)     Osteoarthritis     TMJ (dislocation of temporomandibular joint)     Torticollis        ROS     Negative for: Constitutional, Gastrointestinal, Neurological, Skin, Genitourinary, Musculoskeletal, HENT, Endocrine, Cardiovascular, Eyes, Respiratory, Psychiatric, Allergic/Imm, Heme/Lymph          Main Ophthalmology Exam     External Exam       Right Left    External Normal Normal          Slit Lamp Exam       Right Left    Lids/Lashes Normal Normal    Conjunctiva/Sclera White and quiet White and quiet    Cornea Clear Clear    Anterior Chamber Deep and quiet Deep and quiet    Iris Round and reactive Round and reactive    Lens Clear Clear    Vitreous Normal Normal          Fundus Exam       Right Left    Disc Normal Normal    C/D Ratio 0.2 0.2    Macula Normal Normal    Vessels Normal Normal                   Tonometry     Tonometry (Non-contact air puff, 10:28 AM)       Right Left    Pressure 17 19   IOP.1             27.7  CH:  12.5          17.7  WS: 2.8          0.8                  Not recorded         Not recorded          Visual Acuity (Snellen - Linear)       Right Left    Dist sc 20/50 20/50    Near sc 20/30 OU           Pupils     Pupils       Pupils    Right PERRL    Left PERRL              Neuro/Psych     Neuro/Psych     Oriented x3: Yes    Mood/Affect: Normal               Not recorded            Ophthalmology Exam     Wearing Rx       Sphere Cylinder Axis Add    Right [place] -1.00 090 +1.50    Left -1.00 -0.25 135 +1.50    Age: 1yr    Type: Bifocal              Manifest Refraction     Manifest Refraction       Sphere Cylinder Axis    Right [place] -1.00 090    Left -1.00 ds           Manifest Refraction #2 (Auto)       Sphere Cylinder Axis    Right [place] -1.00 095    Left -1.00 ds                Final Rx       Sphere Cylinder Axis Add    Right [place] -1.00 090 +1.75    Left -1.00 ds  +1.75    Type: Bifocal    Expiration Date: 6/15/2023            No orders of the defined types were placed in this encounter. IMPRESSION:  1. Astigmatism of both eyes with presbyopia    2. Blurred vision, bilateral        PLAN:    1. New glasses recommended   2.  \"       Patient Instructions   New glasses recommended      Return in about 2 years (around 6/14/2023) for complete eye exam.

## 2021-06-15 ENCOUNTER — CARE COORDINATION (OUTPATIENT)
Dept: CARE COORDINATION | Age: 46
End: 2021-06-15

## 2021-06-15 NOTE — CARE COORDINATION
nightly 2/2/21   Nancy Vance, DO   famotidine (PEPCID) 20 MG tablet Take 1 tablet by mouth 2 times daily 2/2/21   Nancy Vance, DO   venlafaxine (EFFEXOR XR) 150 MG extended release capsule TAKE ONE CAPSULE BY MOUTH DAILY 2/2/21   Nancy Vance, DO   loratadine (CLARITIN) 10 MG tablet Take 1 tablet by mouth daily 2/2/21   Nancy Vance, DO   ondansetron (ZOFRAN) 4 MG tablet Take 1 tablet by mouth every 8 hours as needed for Nausea 8/7/14   Mariposa Bernal MD   fluticasone [facility]) 50 MCG/ACT nasal spray 2 sprays by Nasal route daily 12/31/20   Nancy Vance, DO   Cholecalciferol (VITAMIN D3) 50 MCG (2000 UT) TABS Take 1 tablet by mouth daily 11/10/20   Durga Mendieta, DO   vitamin B-12 (CYANOCOBALAMIN) 1000 MCG tablet Take 1 tablet by mouth daily 11/10/20   Nancy Vance, DO   folic acid (FOLVITE) 991 MCG tablet Take 1 tablet by mouth daily 11/10/20   Durga Mendieta, DO   aspirin (ASPIRIN 81) 81 MG EC tablet Take 1 tablet by mouth daily 11/10/20   Nancy Vance, DO   Menthol, Topical Analgesic, (BIOFREEZE) 4 % GEL Apply to shoulders & neck to relieve pain.  10/21/20   Nancy Vance, DO   albuterol (PROVENTIL) (2.5 MG/3ML) 0.083% nebulizer solution Take 3 mLs by nebulization every 6 hours as needed for Wheezing or Shortness of Breath 5/13/20   Durga Mendieta, DO   Respiratory Therapy Supplies (NEBULIZER COMPRESSOR) KIT 1 kit by Does not apply route once for 1 dose 7/22/19 7/22/19  Josya Marshall, DO       Future Appointments   Date Time Provider Haley Corado   11/12/2021 10:40 AM Durga Mendieta, DO [facility]

## 2021-06-21 ENCOUNTER — PATIENT MESSAGE (OUTPATIENT)
Dept: FAMILY MEDICINE CLINIC | Age: 46
End: 2021-06-21

## 2021-06-22 ENCOUNTER — CARE COORDINATION (OUTPATIENT)
Dept: CARE COORDINATION | Age: 46
End: 2021-06-22

## 2021-06-22 NOTE — TELEPHONE ENCOUNTER
From: Rosangela Pak  To: Lorraine Sanchez DO  Sent: 6/21/2021 6:27 PM EDT  Subject: Non-Urgent Felicia Audi Alfaro and staff I just wanted to share with you that since cutting back on caffeine I haven't had a migraine for 2 weeks now I also wanted to tell you that I have lost 12 pounds I hope to lose more I am working on it like you asked    Have a great rest of your week    Jonny Muñoz

## 2021-06-22 NOTE — CARE COORDINATION
Ambulatory Care Coordination Note  6/22/2021  CM Risk Score: 1  Charlson 10 Year Mortality Risk Score: 4%     ACC: Kecia Mcallister RN    Summary Note: Aubree Jennings was referred for ACM from report.      He has:            COPD- on medications and follows with PCP. Had seen pulmonologist in the past                                      Bipolar, Depression, anxiety- on medications                                       Migraine Headaches- on medications and has seen Dr. Orlando Rosas in the past      Plan of Care :             Continue assessments, education, and support                                      COPD Zone Tool                                      SDOH completed\                                      Medications reviewed  [facility] decision maker updated. ACP discussed- he will think about it                                      Review clinic hours, Urgent Care and he uses My Chart- 5/18/2021                                      Covid education                                      [facility] Gaps                                      Care Team                                      Nutrition                                      [facility] F/U 5/10/2021- completed. F/U 11/2021                                      Eye exam 6/14/2021- completed                                      He needs to reschedule with pulmonology and neurology. 5/18/2021- he declined neurology but he stated he will call and schedule with pulmonology                                      F/U on air conditioner. 6/1/2021- he stated he is working with [facility]. He will receive one mid June.      6/1/2021- 10:11 am Left message requesting return call @ [phone].   6/22/2021- 12:49 pm Left message requesting return call @ [phone].            Care Coordination Interventions    Program Enrollment: Complex Care  Referral from Primary Care Provider: No  Suggested Interventions and Community Resources  Zone Management Tools:  In Process Goals Addressed    None         Prior to Admission medications    Medication Sig Start Date End Date Taking? Authorizing Provider   Handicap Placard MISC by Does not apply route Issue parking placard for person with disability. [facility] [id number]   Applicant meets the qualifying disability criteria. Expires 2 years from issuing date. 6/11/21   Imtiaz Vance DO   busPIRone (BUSPAR) 15 MG tablet Take 15 mg by mouth 2 times daily as needed (anxiety) 6/3/21   Imtiaz Vance DO   tiZANidine (ZANAFLEX) 4 MG tablet Take 1 tablet by mouth every 8 hours as needed (muscle spasm) 6/3/21   Imtiaz Vance DO   tiotropium (SPIRIVA HANDIHALER) 18 MCG inhalation capsule Inhale 1 capsule into the lungs daily 6/3/21 9/1/21  Abdoul Stanford MD   Respiratory Therapy Supplies (NEBULIZER/TUBING/MOUTHPIECE) KIT Use daily as needed for shortness of breath. 5/13/21   Azalia Sierra DO   Nebulizers (COMPRESSOR/NEBULIZER) MISC Use daily as needed for shortness of breath 5/13/21   Imtiaz Vance DO   atorvastatin (LIPITOR) 10 MG tablet Take 1 tablet by mouth daily 5/10/21   Imtiaz Vance DO   albuterol (PROVENTIL) (2.5 MG/3ML) 0.083% nebulizer solution Take 3 mLs by nebulization every 6 hours as needed for Wheezing or Shortness of Breath 5/10/21   Azalia Sierra DO   rizatriptan (MAXALT) 10 MG tablet Take 1 tablet by mouth once as needed for Migraine May repeat in 2 hours if needed 5/10/21 5/10/21  Imtiaz Vance DO   divalproex (DEPAKOTE ER) 500 MG extended release tablet Take 1 tablet by mouth 2 times daily 0/88/02   Ladonna Smith MD   omega-3 acid ethyl esters (LOVAZA) 1 g capsule Take 2 capsules by mouth 2 times daily Take with meals.  2/9/21   Imtiaz Vance DO   albuterol sulfate HFA (VENTOLIN HFA) 108 (90 Base) MCG/ACT inhaler Inhale 1-2 puffs into the lungs every 6 hours as needed for Wheezing 2/2/21   Abdoul Stanford MD   ibuprofen (ADVIL;MOTRIN) 600 MG tablet Take 1 tablet by mouth every 8 hours as needed for Pain 2/2/21 Camilla Vance DO   risperiDONE (RISPERDAL) 3 MG tablet Take 1 tablet by mouth nightly 2/2/21   Camilla Vance DO   famotidine (PEPCID) 20 MG tablet Take 1 tablet by mouth 2 times daily 2/2/21   Camilla Vance DO   venlafaxine (EFFEXOR XR) 150 MG extended release capsule TAKE ONE CAPSULE BY MOUTH DAILY 2/2/21   Camilla Vance DO   loratadine (CLARITIN) 10 MG tablet Take 1 tablet by mouth daily 2/2/21   Camilla Vance DO   ondansetron (ZOFRAN) 4 MG tablet Take 1 tablet by mouth every 8 hours as needed for Nausea 6/0/73   Smiley Salazar MD   fluticasone [facility]) 50 MCG/ACT nasal spray 2 sprays by Nasal route daily 12/31/20   Camilla Vance DO   Cholecalciferol (VITAMIN D3) 50 MCG (2000 UT) TABS Take 1 tablet by mouth daily 11/10/20   Sacha Boone DO   vitamin B-12 (CYANOCOBALAMIN) 1000 MCG tablet Take 1 tablet by mouth daily 11/10/20   Camilla Vance DO   folic acid (FOLVITE) 397 MCG tablet Take 1 tablet by mouth daily 11/10/20   Sacha Boone DO   aspirin (ASPIRIN 81) 81 MG EC tablet Take 1 tablet by mouth daily 11/10/20   Camilla Vance DO   Menthol, Topical Analgesic, (BIOFREEZE) 4 % GEL Apply to shoulders & neck to relieve pain.  10/21/20   Camilla Vance DO   albuterol (PROVENTIL) (2.5 MG/3ML) 0.083% nebulizer solution Take 3 mLs by nebulization every 6 hours as needed for Wheezing or Shortness of Breath 5/13/20   Sacha Boone,    Respiratory Therapy Supplies (NEBULIZER COMPRESSOR) KIT 1 kit by Does not apply route once for 1 dose 7/22/19 7/22/19  Beulah Ramirez, DO       Future Appointments   Date Time Provider Haley Corado   11/12/2021 10:40 AM Sacha Boone DO [facility]

## 2021-06-23 ENCOUNTER — CARE COORDINATION (OUTPATIENT)
Dept: CARE COORDINATION | Age: 46
End: 2021-06-23

## 2021-06-23 NOTE — CARE COORDINATION
Ambulatory Care Coordination Note  6/23/2021  CM Risk Score: 1  Charlson 10 Year Mortality Risk Score: 4%     ACC: Sara East, RN    Summary Note: Rashid Gillette was referred for ACM from report.      He has:            COPD- on medications and follows with PCP. Had seen pulmonologist in the past                                      Bipolar, Depression, anxiety- on medications                                       Migraine Headaches- on medications and has seen Dr. Steven Arenas in the past      Plan of Care :             Continue assessments, education, and support                                      COPD Zone Tool                                      SDOH completed\                                      Medications reviewed  [facility] decision maker updated. ACP discussed- he will think about it                                      Review clinic hours, Urgent Care and he uses My Chart- 5/18/2021                                      Covid education                                      [facility] Gaps                                      Care Team                                      Nutrition                                      ZACHARIAH F/U 5/10/2021- completed. F/U 11/2021                                      Eye exam 6/14/2021- completed                                      He needs to reschedule with pulmonology and neurology. 5/18/2021- he declined neurology but he stated he will call and schedule with pulmonology                                      F/U on air conditioner. 6/1/2021- he stated he is working with [facility]. He will receive one mid June.      6/23/2021- Spoke with Rashid Gillette. He continues to work with [facility] for an air conditioner. He is aware and stated he will call and schedule with pumonology. He stated he has given up [facility] and has not had a headache in over 2 weeks. He declined following with neurology.         General Assessment    Do you have any symptoms that are causing concern?: No COPD Assessment    Does the patient understand envrionmental exposure?: Yes  Is the patient able to verbalize Rescue vs. Long Acting medications?: Yes  Does the patient have a nebulizer?: No  Does the patient use a space with inhaled medications?: Yes     No patient-reported symptoms         Symptoms:            Care Coordination Interventions    Program Enrollment: Complex Care  Referral from Primary Care Provider: No  Suggested Interventions and Community Resources  Zone Management Tools: In Process         Goals Addressed                    This Visit's Progress       Patient Stated      Conditions and Symptoms (pt-stated)   On track      I will schedule office visits, as directed by my provider. I will keep my appointment or reschedule if I have to cancel. I will notify my provider of any barriers to my plan of care. I will follow my Zone Management tool to seek urgent or emergent care. I will notify my provider of any symptoms that indicate a worsening of my condition. Barriers: lack of support and lack of education  Plan for overcoming my barriers: Care Coordination Intervention  Confidence: 10/10  Anticipated Goal Completion Date: 7/27/2021              Prior to Admission medications    Medication Sig Start Date End Date Taking? Authorizing Provider   Handicap Placard MISC by Does not apply route Issue parking placard for person with disability. [id number]   Applicant meets the qualifying disability criteria. Expires 2 years from issuing date.  6/11/21   Elsi Vance, DO   busPIRone (BUSPAR) 15 MG tablet Take 15 mg by mouth 2 times daily as needed (anxiety) 6/3/21   Elsi Vance DO   tiZANidine (ZANAFLEX) 4 MG tablet Take 1 tablet by mouth every 8 hours as needed (muscle spasm) 6/3/21   Jareth Hancock, DO   tiotropium (SPIRIVA HANDIHALER) 18 MCG inhalation capsule Inhale 1 capsule into the lungs daily 6/3/21 9/1/21  oCrrine Cordero MD   Respiratory Therapy Supplies (NEBULIZER/TUBING/MOUTHPIECE) KIT Use daily as needed for shortness of breath. 5/13/21   Sacha Boone DO   Nebulizers (COMPRESSOR/NEBULIZER) MISC Use daily as needed for shortness of breath 5/13/21   Camilla Vance DO   atorvastatin (LIPITOR) 10 MG tablet Take 1 tablet by mouth daily 5/10/21   Camilla Vance DO   albuterol (PROVENTIL) (2.5 MG/3ML) 0.083% nebulizer solution Take 3 mLs by nebulization every 6 hours as needed for Wheezing or Shortness of Breath 5/10/21   Sacha Boone DO   rizatriptan (MAXALT) 10 MG tablet Take 1 tablet by mouth once as needed for Migraine May repeat in 2 hours if needed 5/10/21 5/10/21  Camilla Vance DO   divalproex (DEPAKOTE ER) 500 MG extended release tablet Take 1 tablet by mouth 2 times daily 3/89/87   Smiley Salazar MD   omega-3 acid ethyl esters (LOVAZA) 1 g capsule Take 2 capsules by mouth 2 times daily Take with meals.  2/9/21   Camilla Vance DO   albuterol sulfate HFA (VENTOLIN HFA) 108 (90 Base) MCG/ACT inhaler Inhale 1-2 puffs into the lungs every 6 hours as needed for Wheezing 2/2/21   Darell Aguilar MD   ibuprofen (ADVIL;MOTRIN) 600 MG tablet Take 1 tablet by mouth every 8 hours as needed for Pain 2/2/21   Camilla Vance DO   risperiDONE (RISPERDAL) 3 MG tablet Take 1 tablet by mouth nightly 2/2/21   Camilla Vance DO   famotidine (PEPCID) 20 MG tablet Take 1 tablet by mouth 2 times daily 2/2/21   Camilla Vance DO   venlafaxine (EFFEXOR XR) 150 MG extended release capsule TAKE ONE CAPSULE BY MOUTH DAILY 2/2/21   Camilla Vance DO   loratadine (CLARITIN) 10 MG tablet Take 1 tablet by mouth daily 2/2/21   Camilla Vance DO   ondansetron (ZOFRAN) 4 MG tablet Take 1 tablet by mouth every 8 hours as needed for Nausea 0/2/47   Smiley Salazar MD   fluticasone Lorelee Neighbours) 50 MCG/ACT nasal spray 2 sprays by Nasal route daily 12/31/20   Camilla Vance,    Cholecalciferol (VITAMIN D3) 50 MCG (2000 UT) TABS Take 1 tablet by mouth daily 11/10/20   Sacha Boone, DO vitamin B-12 (CYANOCOBALAMIN) 1000 MCG tablet Take 1 tablet by mouth daily 11/10/20   Juan Vance, DO   folic acid (FOLVITE) 112 MCG tablet Take 1 tablet by mouth daily 11/10/20   Ciara Mcfadden, DO   aspirin (ASPIRIN 81) 81 MG EC tablet Take 1 tablet by mouth daily 11/10/20   Juan Vance, DO   Menthol, Topical Analgesic, (BIOFREEZE) 4 % GEL Apply to shoulders & neck to relieve pain.  10/21/20   Juan Vanec, DO   albuterol (PROVENTIL) (2.5 MG/3ML) 0.083% nebulizer solution Take 3 mLs by nebulization every 6 hours as needed for Wheezing or Shortness of Breath 5/13/20   Ciara Mcfadden,    Respiratory Therapy Supplies (NEBULIZER COMPRESSOR) KIT 1 kit by Does not apply route once for 1 dose 7/22/19 7/22/19  Ora Zhang,        Future Appointments   Date Time Provider Haley Corado   11/12/2021 10:40 AM [facility]

## 2021-06-25 DIAGNOSIS — F41.9 ANXIETY: ICD-10-CM

## 2021-06-25 NOTE — TELEPHONE ENCOUNTER
Sathya Mason called requesting a refill of the below medication which has been pended for you:     Requested Prescriptions     Pending Prescriptions Disp Refills    busPIRone (BUSPAR) 15 MG tablet 60 tablet 5     Sig: Take 15 mg by mouth 2 times daily as needed (anxiety)       Last Appointment Date: 5/10/2021  Next Appointment Date: 11/12/2021    Allergies   Allergen Reactions    Keflex [Cephalexin] Other (See Comments)     Migraines, upset stomach     Naproxen      ulcers

## 2021-06-29 RX ORDER — BUSPIRONE HYDROCHLORIDE 15 MG/1
15 TABLET ORAL 2 TIMES DAILY PRN
Qty: 60 TABLET | Refills: 5 | OUTPATIENT
Start: 2021-06-29

## 2021-07-07 ENCOUNTER — CARE COORDINATION (OUTPATIENT)
Dept: CARE COORDINATION | Age: 46
End: 2021-07-07

## 2021-07-07 NOTE — CARE COORDINATION
Acting medications?: Yes  Does the patient have a nebulizer?: No  Does the patient use a space with inhaled medications?: Yes     No patient-reported symptoms         Symptoms:  None: Yes      Symptom course: stable  Increase use of rapid acting/rescue inhaled medications?: No  Change in chronic cough?: No/At Baseline  Change in sputum?: No/At Baseline  Sputum characteristics: Clear  Self Monitoring - SaO2: No         Care Coordination Interventions    Program Enrollment: Complex Care  Referral from Primary Care Provider: No  Suggested Interventions and Community Resources  Zone Management Tools: In Process         Goals Addressed                    This Visit's Progress       Patient Stated      Conditions and Symptoms (pt-stated)   On track      I will schedule office visits, as directed by my provider. I will keep my appointment or reschedule if I have to cancel. I will notify my provider of any barriers to my plan of care. I will follow my Zone Management tool to seek urgent or emergent care. I will notify my provider of any symptoms that indicate a worsening of my condition. Barriers: lack of support and lack of education  Plan for overcoming my barriers: Care Coordination Intervention  Confidence: 10/10  Anticipated Goal Completion Date: 7/27/2021              Prior to Admission medications    Medication Sig Start Date End Date Taking? Authorizing Provider   Handicap Placard MISC by Does not apply route Issue parking placard for person with disability. [facility] [phone]   Applicant meets the qualifying disability criteria. Expires 2 years from issuing date.  6/11/21   Ashish Vance, DO   busPIRone (BUSPAR) 15 MG tablet Take 15 mg by mouth 2 times daily as needed (anxiety) 6/3/21   Ashish Vance, DO   tiZANidine (ZANAFLEX) 4 MG tablet Take 1 tablet by mouth every 8 hours as needed (muscle spasm) 6/3/21   Ashish Vance, DO   tiotropium (SPIRIVA HANDIHALER) 18 MCG inhalation capsule Inhale 1 capsule into the lungs daily 6/3/21 9/1/21  Yariel Okeefe MD   Respiratory Therapy Supplies (NEBULIZER/TUBING/MOUTHPIECE) KIT Use daily as needed for shortness of breath. 5/13/21   Haroon Batista DO   Nebulizers (COMPRESSOR/NEBULIZER) MISC Use daily as needed for shortness of breath 5/13/21   Saadia Vance DO   atorvastatin (LIPITOR) 10 MG tablet Take 1 tablet by mouth daily 5/10/21   Saadia Vance DO   albuterol (PROVENTIL) (2.5 MG/3ML) 0.083% nebulizer solution Take 3 mLs by nebulization every 6 hours as needed for Wheezing or Shortness of Breath 5/10/21   Haroon Batista,    rizatriptan (MAXALT) 10 MG tablet Take 1 tablet by mouth once as needed for Migraine May repeat in 2 hours if needed 5/10/21 5/10/21  Saadia Vance DO   divalproex (DEPAKOTE ER) 500 MG extended release tablet Take 1 tablet by mouth 2 times daily 3/30/20   Ritika Parekh MD   omega-3 acid ethyl esters (LOVAZA) 1 g capsule Take 2 capsules by mouth 2 times daily Take with meals.  2/9/21   Saadia Vance,    albuterol sulfate HFA (VENTOLIN HFA) 108 (90 Base) MCG/ACT inhaler Inhale 1-2 puffs into the lungs every 6 hours as needed for Wheezing 2/2/21   Yariel Okeefe MD   ibuprofen (ADVIL;MOTRIN) 600 MG tablet Take 1 tablet by mouth every 8 hours as needed for Pain 2/2/21   Saadia Vance DO   risperiDONE (RISPERDAL) 3 MG tablet Take 1 tablet by mouth nightly 2/2/21   Saadia Vance, DO   famotidine (PEPCID) 20 MG tablet Take 1 tablet by mouth 2 times daily 2/2/21   Saadia Vance, DO   venlafaxine (EFFEXOR XR) 150 MG extended release capsule TAKE ONE CAPSULE BY MOUTH DAILY 2/2/21   Saadia Vance, DO   loratadine (CLARITIN) 10 MG tablet Take 1 tablet by mouth daily 2/2/21   Saadia Vance, DO   ondansetron (ZOFRAN) 4 MG tablet Take 1 tablet by mouth every 8 hours as needed for Nausea 5/5/03   Ritika Parekh MD   fluticasone (FLONASE) 50 MCG/ACT nasal spray 2 sprays by Nasal route daily 12/31/20   Saadia Vance DO   Cholecalciferol (VITAMIN D3) 50 MCG (2000 UT) TABS Take 1 tablet by mouth daily 11/10/20   Dinorah Vance, DO   vitamin B-12 (CYANOCOBALAMIN) 1000 MCG tablet Take 1 tablet by mouth daily 11/10/20   Dinorah Vance DO   folic acid (FOLVITE) 317 MCG tablet Take 1 tablet by mouth daily 11/10/20   Julieta Alcala,    aspirin (ASPIRIN 81) 81 MG EC tablet Take 1 tablet by mouth daily 11/10/20   Dinorah Vance, DO   Menthol, Topical Analgesic, (BIOFREEZE) 4 % GEL Apply to shoulders & neck to relieve pain.  10/21/20   Dinorah Vance DO   albuterol (PROVENTIL) (2.5 MG/3ML) 0.083% nebulizer solution Take 3 mLs by nebulization every 6 hours as needed for Wheezing or Shortness of Breath 5/13/20   Julieta Alcala DO   Respiratory Therapy Supplies (NEBULIZER COMPRESSOR) KIT 1 kit by Does not apply route once for 1 dose 7/22/19 7/22/19  Steven Mahan DO       Future Appointments   Date Time Provider Haley Corado   11/12/2021 10:40 AM [facility]

## 2021-07-12 ENCOUNTER — PATIENT MESSAGE (OUTPATIENT)
Dept: FAMILY MEDICINE CLINIC | Age: 46
End: 2021-07-12

## 2021-07-13 NOTE — TELEPHONE ENCOUNTER
From: Alex Khan  To: Larisa Oviedo DO  Sent: 7/12/2021 8:41 PM EDT  Subject: Prescription [address] Sb Palacio and staff  It has been almost a month since I have had a migraine so I was wondering if it would be safe to stop all my migraine medicines I take since I cut back on my caffine intake my migraines have went away Heron Garcia said she told me I was drinking too much [address]

## 2021-07-20 ENCOUNTER — CARE COORDINATION (OUTPATIENT)
Dept: CARE COORDINATION | Age: 46
End: 2021-07-20

## 2021-07-20 NOTE — CARE COORDINATION
Ambulatory Care Coordination Note  7/20/2021  CM Risk Score: 1  Charlson 10 Year Mortality Risk Score: 4%     ACC: Leena Caruso, RN    Summary Note: Chuy Rai was referred for ACM from report.      He has:            COPD- on medications and follows with PCP. Had seen pulmonologist in the past                                      Bipolar, Depression, anxiety- on medications                                       Migraine Headaches- on medications and has seen Dr. Tye Chavarria in the past      Plan of Care :             F/U in 2 weeks- if appropriate- graduate from [employer]       Continue assessments, education, and support                                      COPD Zone Tool  [employer] completed                                      Medications reviewed  [facility] decision maker updated. ACP discussed- he will think about it                                      Review clinic hours, Urgent Care and he uses My Chart- 5/18/2021                                      Covid education                                      [facility]                                      Care Team                                      Nutrition                                      QRI F/U 5/10/2021- completed. F/U 11/2021                                      Eye exam 6/14/2021- completed                                      He needs to reschedule with pulmonology and neurology. 5/18/2021- he declined neurology but he stated he will call and schedule with pulmonology                                     Spoke with SO. Chuy Rai was sleeping. She stated they are running central air. He has not had any concerns with SOB. He has not had any headaches either.       General Assessment    Do you have any symptoms that are causing concern?: No       COPD Assessment    Does the patient understand envrionmental exposure?: Yes  Is the patient able to verbalize Rescue vs. Long Acting medications?: Yes  Does the patient have a nebulizer?: No  Does the patient use a space with inhaled medications?: Yes     No patient-reported symptoms         Symptoms:            Care Coordination Interventions    Program Enrollment: Complex Care  Referral from Primary Care Provider: No  Suggested Interventions and Community Resources  Zone Management Tools: In Process         Goals Addressed                    This Visit's Progress       Patient Stated      Conditions and Symptoms (pt-stated)   On track      I will schedule office visits, as directed by my provider. I will keep my appointment or reschedule if I have to cancel. I will notify my provider of any barriers to my plan of care. I will follow my Zone Management tool to seek urgent or emergent care. I will notify my provider of any symptoms that indicate a worsening of my condition. Barriers: lack of support and lack of education  Plan for overcoming my barriers: Care Coordination Intervention  Confidence: 10/10  Anticipated Goal Completion Date: 7/27/2021              Prior to Admission medications    Medication Sig Start Date End Date Taking? Authorizing Provider   Handicap Placard MISC by Does not apply route Issue parking placard for person with disability. [facility] [id number]   Applicant meets the qualifying disability criteria. Expires 2 years from issuing date. 6/11/21   Rafyy Kevin Black, DO   busPIRone (BUSPAR) 15 MG tablet Take 15 mg by mouth 2 times daily as needed (anxiety) 6/3/21   Lul Vance, DO   tiZANidine (ZANAFLEX) 4 MG tablet Take 1 tablet by mouth every 8 hours as needed (muscle spasm) 6/3/21   Lul Vance, DO   tiotropium (SPIRIVA HANDIHALER) 18 MCG inhalation capsule Inhale 1 capsule into the lungs daily 6/3/21 9/1/21  Zeb Hooper MD   Respiratory Therapy Supplies (NEBULIZER/TUBING/MOUTHPIECE) KIT Use daily as needed for shortness of breath.  5/13/21   Lul Vance, DO   Nebulizers (COMPRESSOR/NEBULIZER) MISC Use daily as needed for shortness of breath 5/13/21   Pepe Vance DO   atorvastatin (LIPITOR) 10 MG tablet Take 1 tablet by mouth daily 5/10/21   Pepe Vance DO   albuterol (PROVENTIL) (2.5 MG/3ML) 0.083% nebulizer solution Take 3 mLs by nebulization every 6 hours as needed for Wheezing or Shortness of Breath 5/10/21   Ginger Blazing, DO   rizatriptan (MAXALT) 10 MG tablet Take 1 tablet by mouth once as needed for Migraine May repeat in 2 hours if needed 5/10/21 5/10/21  Pepe Vance DO   divalproex (DEPAKOTE ER) 500 MG extended release tablet Take 1 tablet by mouth 2 times daily 0/65/31   Afsaneh Ferro MD   omega-3 acid ethyl esters (LOVAZA) 1 g capsule Take 2 capsules by mouth 2 times daily Take with meals.  2/9/21   Pepe Vance DO   albuterol sulfate HFA (VENTOLIN HFA) 108 (90 Base) MCG/ACT inhaler Inhale 1-2 puffs into the lungs every 6 hours as needed for Wheezing 2/2/21   Grant Mills MD   ibuprofen (ADVIL;MOTRIN) 600 MG tablet Take 1 tablet by mouth every 8 hours as needed for Pain 2/2/21   Pepe Vance DO   risperiDONE (RISPERDAL) 3 MG tablet Take 1 tablet by mouth nightly 2/2/21   Pepe Vance DO   famotidine (PEPCID) 20 MG tablet Take 1 tablet by mouth 2 times daily 2/2/21   Pepe Vance DO   venlafaxine (EFFEXOR XR) 150 MG extended release capsule TAKE ONE CAPSULE BY MOUTH DAILY 2/2/21   Pepe Vance DO   loratadine (CLARITIN) 10 MG tablet Take 1 tablet by mouth daily 2/2/21   Pepe Vance DO   ondansetron (ZOFRAN) 4 MG tablet Take 1 tablet by mouth every 8 hours as needed for Nausea 1/9/62   Afsaneh Ferro MD   fluticasone (FLONASE) 50 MCG/ACT nasal spray 2 sprays by Nasal route daily 12/31/20   Pepe Vance DO   Cholecalciferol (VITAMIN D3) 50 MCG (2000 UT) TABS Take 1 tablet by mouth daily 11/10/20   Pepe Vance DO   vitamin B-12 (CYANOCOBALAMIN) 1000 MCG tablet Take 1 tablet by mouth daily 11/10/20   Pepe Vance DO   folic acid (FOLVITE) 810 MCG tablet Take 1 tablet by mouth daily 11/10/20 Adair Vance, DO   aspirin (ASPIRIN 81) 81 MG EC tablet Take 1 tablet by mouth daily 11/10/20   Adair Vance, DO   Menthol, Topical Analgesic, (BIOFREEZE) 4 % GEL Apply to shoulders & neck to relieve pain.  10/21/20   Adair Vance, DO   albuterol (PROVENTIL) (2.5 MG/3ML) 0.083% nebulizer solution Take 3 mLs by nebulization every 6 hours as needed for Wheezing or Shortness of Breath 5/13/20   Adi Cox,    Respiratory Therapy Supplies (NEBULIZER COMPRESSOR) KIT 1 kit by Does not apply route once for 1 dose 7/22/19 7/22/19  Urszula Dhillon, DO       Future Appointments   Date Time Provider Haley Corado   11/12/2021 10:40 AM Adi Cox DO [facility]

## 2021-08-02 ENCOUNTER — PATIENT MESSAGE (OUTPATIENT)
Dept: FAMILY MEDICINE CLINIC | Age: 46
End: 2021-08-02

## 2021-08-02 DIAGNOSIS — G43.909 MIGRAINE WITHOUT STATUS MIGRAINOSUS, NOT INTRACTABLE, UNSPECIFIED MIGRAINE TYPE: ICD-10-CM

## 2021-08-02 RX ORDER — SUMATRIPTAN 100 MG/1
100 TABLET, FILM COATED ORAL
COMMUNITY
End: 2021-08-05 | Stop reason: CLARIF

## 2021-08-02 RX ORDER — SUMATRIPTAN 100 MG/1
100 TABLET, FILM COATED ORAL
Qty: 9 TABLET | Status: CANCELLED | OUTPATIENT
Start: 2021-08-02 | End: 2021-08-02

## 2021-08-02 NOTE — TELEPHONE ENCOUNTER
From: Jessica Mendiola  To: Vini Dai DO  Sent: 8/2/2021 4:32 AM EDT  Subject: Prescription Question    Can you put me back on my migraine medicine that i used to take as I was able to take it more and get it monthly please I am getting migraines again I thought we had them beat but I guess not I hope u all have a great Monday      Carlos Alberto Kramer

## 2021-08-02 NOTE — TELEPHONE ENCOUNTER
Giles Ramírez called requesting a refill of the below medication which has been pended for you:     Requested Prescriptions     Pending Prescriptions Disp Refills    SUMAtriptan (IMITREX) 100 MG tablet 9 tablet      Sig: Take 1 tablet by mouth once as needed Take 100mg by mouth at onset of a migraine.  May repeat 2 hours later if no relief       Last Appointment Date: 5/10/2021  Next Appointment Date: 11/12/2021    Allergies   Allergen Reactions    Keflex [Cephalexin] Other (See Comments)     Migraines, upset stomach     Naproxen      ulcers

## 2021-08-03 ENCOUNTER — CARE COORDINATION (OUTPATIENT)
Dept: CARE COORDINATION | Age: 46
End: 2021-08-03

## 2021-08-03 NOTE — CARE COORDINATION
Ambulatory Care Coordination Note  8/3/2021  CM Risk Score: 1  Charlson 10 Year Mortality Risk Score: 4%     ACC: Dav Carrera, GALLO    Summary Note:  Jaqueline Joshua was referred for ACM from report.      He has:            COPD- on medications and follows with PCP. Had seen pulmonologist in the past                                      Bipolar, Depression, anxiety- on medications                                       Migraine Headaches- on medications and has seen Dr. Black Brown in the past      Plan of Care :             F/U in 1 week- if appropriate- graduate from [email or web address]                                                   Continue assessments, education, and support                                      COPD Zone Tool  Critical access hospital completed                                      Medications reviewed  [facility] decision maker updated. ACP discussed- he will think about it                                      Review clinic hours, Urgent Care and he uses My Chart- 5/18/2021                                      Covid education                                      EOCA Gaps                                      Care Team                                      Nutrition                                      [facility] F/U 5/10/2021- completed. F/U 11/2021                                      Eye exam 6/14/2021- completed                                      He needs to reschedule with pulmonology and neurology. 5/18/2021- he declined neurology but he stated he will call and schedule with pulmonology    8/3/2021- 1:26 pm Left message requesting return call @ [phone]. Care Coordination Interventions    Program Enrollment: Complex Care  Referral from Primary Care Provider: No  Suggested Interventions and Community Resources  Zone Management Tools: In Process         Goals Addressed    None         Prior to Admission medications    Medication Sig Start Date End Date Taking?  Authorizing Provider   SUMAtriptan (IMITREX) 100 MG tablet Take 100 mg by mouth once as needed Take 100mg by mouth at onset of a migraine. May repeat 2 hours later if no relief    Historical Provider, MD   Handicap Placard MISC by Does not apply route Issue parking placard for person with disability. [facility] [phone]   Applicant meets the qualifying disability criteria. Expires 2 years from issuing date. 6/11/21   Gita Vance, DO   busPIRone (BUSPAR) 15 MG tablet Take 15 mg by mouth 2 times daily as needed (anxiety) 6/3/21   Gita Vance, DO   tiZANidine (ZANAFLEX) 4 MG tablet Take 1 tablet by mouth every 8 hours as needed (muscle spasm) 6/3/21   Gita Vance, DO   tiotropium (SPIRIVA HANDIHALER) 18 MCG inhalation capsule Inhale 1 capsule into the lungs daily 6/3/21 9/1/21  Juana Morrison MD   Respiratory Therapy Supplies (NEBULIZER/TUBING/MOUTHPIECE) KIT Use daily as needed for shortness of breath. 5/13/21   Lyndia Brittle,    Nebulizers (COMPRESSOR/NEBULIZER) MISC Use daily as needed for shortness of breath 5/13/21   Gita Vance, DO   atorvastatin (LIPITOR) 10 MG tablet Take 1 tablet by mouth daily 5/10/21   Gita Vance, DO   albuterol (PROVENTIL) (2.5 MG/3ML) 0.083% nebulizer solution Take 3 mLs by nebulization every 6 hours as needed for Wheezing or Shortness of Breath 5/10/21   Lyndia Brittle,    rizatriptan (MAXALT) 10 MG tablet Take 1 tablet by mouth once as needed for Migraine May repeat in 2 hours if needed 5/10/21 5/10/21  Gita Vance, DO   divalproex (DEPAKOTE ER) 500 MG extended release tablet Take 1 tablet by mouth 2 times daily 2/65/16   Inge Sim MD   omega-3 acid ethyl esters (LOVAZA) 1 g capsule Take 2 capsules by mouth 2 times daily Take with meals.  2/9/21   Gita Vance, DO   albuterol sulfate HFA (VENTOLIN HFA) 108 (90 Base) MCG/ACT inhaler Inhale 1-2 puffs into the lungs every 6 hours as needed for Wheezing 2/2/21   Juana Morirson MD   ibuprofen (ADVIL;MOTRIN) 600 MG tablet Take 1 tablet by mouth every 8 hours as needed for Pain 2/2/21   Marii Vance, DO   risperiDONE (RISPERDAL) 3 MG tablet Take 1 tablet by mouth nightly 2/2/21   Marii Vance, DO   famotidine (PEPCID) 20 MG tablet Take 1 tablet by mouth 2 times daily 2/2/21   Marii Vance, DO   venlafaxine (EFFEXOR XR) 150 MG extended release capsule TAKE ONE CAPSULE BY MOUTH DAILY 2/2/21   Marii Vance, DO   loratadine (CLARITIN) 10 MG tablet Take 1 tablet by mouth daily 2/2/21   Marii Vance, DO   ondansetron (ZOFRAN) 4 MG tablet Take 1 tablet by mouth every 8 hours as needed for Nausea 6/1/71   Chun Goldsmith MD   fluticasone [facility]) 50 MCG/ACT nasal spray 2 sprays by Nasal route daily 12/31/20   Marii Vance, DO   Cholecalciferol (VITAMIN D3) 50 MCG (2000 UT) TABS Take 1 tablet by mouth daily 11/10/20   Rox Gastelum, DO   vitamin B-12 (CYANOCOBALAMIN) 1000 MCG tablet Take 1 tablet by mouth daily 11/10/20   Marii Vance, DO   folic acid (FOLVITE) 830 MCG tablet Take 1 tablet by mouth daily 11/10/20   Rox Gastelum, DO   aspirin (ASPIRIN 81) 81 MG EC tablet Take 1 tablet by mouth daily 11/10/20   Marii Vance, DO   Menthol, Topical Analgesic, (BIOFREEZE) 4 % GEL Apply to shoulders & neck to relieve pain.  10/21/20   Marii Vance, DO   albuterol (PROVENTIL) (2.5 MG/3ML) 0.083% nebulizer solution Take 3 mLs by nebulization every 6 hours as needed for Wheezing or Shortness of Breath 5/13/20   Rox Gastelum, DO   Respiratory Therapy Supplies (NEBULIZER COMPRESSOR) KIT 1 kit by Does not apply route once for 1 dose 7/22/19 7/22/19  Nat Grant DO       Future Appointments   Date Time Provider Haley Corado   11/12/2021 10:40 AM Rox Gastelum DO [facility]

## 2021-08-05 DIAGNOSIS — G43.909 MIGRAINE WITHOUT STATUS MIGRAINOSUS, NOT INTRACTABLE, UNSPECIFIED MIGRAINE TYPE: ICD-10-CM

## 2021-08-05 DIAGNOSIS — R52 GENERALIZED PAIN: ICD-10-CM

## 2021-08-05 RX ORDER — RIZATRIPTAN BENZOATE 10 MG/1
10 TABLET ORAL
Qty: 30 TABLET | Refills: 5 | Status: SHIPPED | OUTPATIENT
Start: 2021-08-05 | End: 2022-04-21 | Stop reason: SDUPTHER

## 2021-08-06 RX ORDER — IBUPROFEN 600 MG/1
600 TABLET ORAL EVERY 8 HOURS PRN
Qty: 90 TABLET | Refills: 3 | Status: SHIPPED | OUTPATIENT
Start: 2021-08-06 | End: 2021-08-11

## 2021-08-06 RX ORDER — RIZATRIPTAN BENZOATE 10 MG/1
10 TABLET ORAL
Qty: 30 TABLET | Refills: 0 | OUTPATIENT
Start: 2021-08-06 | End: 2021-08-06

## 2021-08-06 NOTE — TELEPHONE ENCOUNTER
Has refills on maxalt per [facility], does not have any refills left on ibu 600.      Char Bautista called requesting a refill of the below medication which has been pended for you:     Requested Prescriptions     Pending Prescriptions Disp Refills    ibuprofen (ADVIL;MOTRIN) 600 MG tablet [Pharmacy Med Name: Ibuprofen 600 MG Oral Tablet] 120 tablet 0     Sig: TAKE 1 TABLET BY MOUTH EVERY 8 HOURS AS NEEDED FOR PAIN     Refused Prescriptions Disp Refills    rizatriptan (MAXALT) 10 MG tablet [Pharmacy Med Name: Rizatriptan Benzoate 10 MG Oral Tablet] 30 tablet 0     Sig: Take 1 tablet by mouth once as needed for Migraine May repeat in 2 hours if needed       Last Appointment Date: 5/10/2021  Next Appointment Date: 11/12/2021    Allergies   Allergen Reactions    Keflex [Cephalexin] Other (See Comments)     Migraines, upset stomach     Naproxen      ulcers

## 2021-08-09 DIAGNOSIS — G43.909 MIGRAINE WITHOUT STATUS MIGRAINOSUS, NOT INTRACTABLE, UNSPECIFIED MIGRAINE TYPE: ICD-10-CM

## 2021-08-09 DIAGNOSIS — R52 GENERALIZED PAIN: ICD-10-CM

## 2021-08-11 ENCOUNTER — CARE COORDINATION (OUTPATIENT)
Dept: CARE COORDINATION | Age: 46
End: 2021-08-11

## 2021-08-11 RX ORDER — IBUPROFEN 600 MG/1
600 TABLET ORAL EVERY 8 HOURS PRN
Qty: 90 TABLET | Refills: 3 | Status: SHIPPED | OUTPATIENT
Start: 2021-08-11 | End: 2022-08-15 | Stop reason: DRUGHIGH

## 2021-08-11 ASSESSMENT — ENCOUNTER SYMPTOMS: DYSPNEA ASSOCIATED WITH: EXERTION

## 2021-08-11 NOTE — CARE COORDINATION
Ambulatory Care Coordination Note  8/11/2021  CM Risk Score: 1  Charlson 10 Year Mortality Risk Score: 4%     ACC: Yg Khan, RN    Summary Note: Umesh Edwards was referred for ACM from report.      He has:            COPD- on medications and follows with PCP. Had seen pulmonologist in the past                                      Bipolar, Depression, anxiety- on medications                                       Migraine Headaches- on medications and has seen Dr. Amelia Carpenter in the past      Plan of Care :            [id number] from [place] [address] decision maker updated. ACP discussed- he will think about it. He is aware to reach out if he would like referral to complete paperwork                                      Reviewed clinic hours, Urgent Care and he uses My Chart- 5/18/2021  [facility] education- discussed vaccine and he declined                                      PCP F/U 5/10/2021- completed. F/U 11/2021                                      Eye exam 6/14/2021- completed                                      He needs to reschedule with pulmonology and neurology. 5/18/2021- he declined neurology but he stated he will call and schedule with pulmonology        8/11/2021- 9:39 am spoke with angella. He is aware to reach out to [facility] for assistance if needed. He has their contact information. Education completed. We discussed completion of ACM. He is aware that this writer will no longer be calling him. He has this writer's contact information.       General Assessment    Do you have any symptoms that are causing concern?: No       COPD Assessment    Does the patient understand envrionmental exposure?: Yes  Is the patient able to verbalize Rescue vs. Long Acting medications?: Yes  Does the patient have a nebulizer?: No  Does the patient use a space with inhaled medications?: Yes     No patient-reported symptoms         Symptoms:     Symptom course: stable  Breathlessness: exertion  Change in chronic cough?: No/At Baseline  Change in sputum?: No/At Baseline  Sputum characteristics: Clear  Self Monitoring - SaO2: No         Care Coordination Interventions    Program Enrollment: Complex Care  Referral from Primary Care Provider: No  Suggested Interventions and Community Resources  Zone Management Tools: In Process         Goals Addressed                    This Visit's Progress       Patient Stated      Conditions and Symptoms (pt-stated)   On track      I will schedule office visits, as directed by my provider. I will keep my appointment or reschedule if I have to cancel. I will notify my provider of any barriers to my plan of care. I will follow my Zone Management tool to seek urgent or emergent care. I will notify my provider of any symptoms that indicate a worsening of my condition. Barriers: lack of support and lack of education  Plan for overcoming my barriers: Care Coordination Intervention  Confidence: 10/10  Anticipated Goal Completion Date: 7/27/2021              Prior to Admission medications    Medication Sig Start Date End Date Taking? Authorizing Provider   ibuprofen (ADVIL;MOTRIN) 600 MG tablet TAKE 1 TABLET BY MOUTH EVERY 8 HOURS AS NEEDED FOR PAIN 8/6/21   Montse Dixon, DO   rizatriptan (MAXALT) 10 MG tablet Take 1 tablet by mouth once as needed for Migraine May repeat in 2 hours if needed 8/5/21 8/5/21  Blossom Vance, DO   Handicap Placard MISC by Does not apply route Issue parking placard for person with disability. [facility] [id number]   Applicant meets the qualifying disability criteria. Expires 2 years from issuing date.  6/11/21   Blossom Vance DO   busPIRone (BUSPAR) 15 MG tablet Take 15 mg by mouth 2 times daily as needed (anxiety) 6/3/21   Blossom Vance DO   tiZANidine (ZANAFLEX) 4 MG tablet Take 1 tablet by mouth every 8 hours as needed (muscle spasm) 6/3/21   Blossom Vance, DO   tiotropium (SPIRIVA HANDIHALER) 18 MCG inhalation capsule Inhale 1 capsule into the lungs (ASPIRIN 81) 81 MG EC tablet Take 1 tablet by mouth daily 11/10/20   Juaquin Gilford Black, DO   Menthol, Topical Analgesic, (BIOFREEZE) 4 % GEL Apply to shoulders & neck to relieve pain.  10/21/20   Juaquin Gilford Black, DO   albuterol (PROVENTIL) (2.5 MG/3ML) 0.083% nebulizer solution Take 3 mLs by nebulization every 6 hours as needed for Wheezing or Shortness of Breath 5/13/20   Edison Mendoza,    Respiratory Therapy Supplies (NEBULIZER COMPRESSOR) KIT 1 kit by Does not apply route once for 1 dose 7/22/19 7/22/19  Larry Roads, DO       Future Appointments   Date Time Provider Haley Corado   11/12/2021 10:40 AM [facility]

## 2021-08-17 ENCOUNTER — PATIENT MESSAGE (OUTPATIENT)
Dept: FAMILY MEDICINE CLINIC | Age: 46
End: 2021-08-17

## 2021-08-17 DIAGNOSIS — G43.009 MIGRAINE WITHOUT AURA AND WITHOUT STATUS MIGRAINOSUS, NOT INTRACTABLE: ICD-10-CM

## 2021-08-17 NOTE — TELEPHONE ENCOUNTER
From: Asim Damian  To: Haroon Batista DO  Sent: 8/17/2021 6:06 AM EDT  Subject: Emani Richardson and Staff I am needing a refill of my divalproex 500 MG extended release tablet and seeing as I no longer see Dr Peña Gave any more I was wondering if you could prescript it for me now I am out and need them now Thank you     Payton Paredes

## 2021-08-17 NOTE — TELEPHONE ENCOUNTER
Indira Evans called requesting a refill of the below medication which has been pended for you:     Requested Prescriptions     Pending Prescriptions Disp Refills    divalproex (DEPAKOTE ER) 500 MG extended release tablet 60 tablet 2     Sig: Take 1 tablet by mouth 2 times daily       Last Appointment Date: 5/10/2021  Next Appointment Date: 11/12/2021    Allergies   Allergen Reactions    Keflex [Cephalexin] Other (See Comments)     Migraines, upset stomach     Naproxen      ulcers

## 2021-08-18 RX ORDER — DIVALPROEX SODIUM 500 MG/1
500 TABLET, EXTENDED RELEASE ORAL 2 TIMES DAILY
Qty: 60 TABLET | Refills: 5 | Status: SHIPPED | OUTPATIENT
Start: 2021-08-18 | End: 2022-05-13 | Stop reason: SDUPTHER

## 2021-08-20 DIAGNOSIS — E78.1 HYPERTRIGLYCERIDEMIA: ICD-10-CM

## 2021-08-23 NOTE — TELEPHONE ENCOUNTER
Delbra Bloch called requesting a refill of the below medication which has been pended for you:     Requested Prescriptions     Pending Prescriptions Disp Refills    omega-3 acid ethyl esters (LOVAZA) 1 g capsule 120 capsule 5     Sig: Take 2 capsules by mouth 2 times daily Take with meals.        Last Appointment Date: 5/10/2021  Next Appointment Date: 11/12/2021    Allergies   Allergen Reactions    Keflex [Cephalexin] Other (See Comments)     Migraines, upset stomach     Naproxen      ulcers

## 2021-08-25 DIAGNOSIS — E78.1 HYPERTRIGLYCERIDEMIA: ICD-10-CM

## 2021-08-25 RX ORDER — OMEGA-3-ACID ETHYL ESTERS 1 G/1
2 CAPSULE, LIQUID FILLED ORAL 2 TIMES DAILY
Qty: 120 CAPSULE | Refills: 3 | Status: SHIPPED | OUTPATIENT
Start: 2021-08-25 | End: 2021-11-12 | Stop reason: SDUPTHER

## 2021-08-25 NOTE — TELEPHONE ENCOUNTER
Giles Ramírez called requesting a refill of the below medication which has been pended for you:     Requested Prescriptions     Pending Prescriptions Disp Refills    omega-3 acid ethyl esters (LOVAZA) 1 g capsule [Pharmacy Med Name: Omega-3-acid Ethyl Esters 1 GM Oral Capsule] 120 capsule 0     Sig: TAKE 2 CAPSULES BY MOUTH TWICE DAILY WITH MEALS       Last Appointment Date: 5/10/2021  Next Appointment Date: 11/12/2021    Allergies   Allergen Reactions    Keflex [Cephalexin] Other (See Comments)     Migraines, upset stomach     Naproxen      ulcers

## 2021-08-26 RX ORDER — OMEGA-3-ACID ETHYL ESTERS 1 G/1
CAPSULE, LIQUID FILLED ORAL
Qty: 120 CAPSULE | Refills: 0 | OUTPATIENT
Start: 2021-08-26

## 2021-09-21 DIAGNOSIS — R10.13 DYSPEPSIA: ICD-10-CM

## 2021-09-21 DIAGNOSIS — R11.10 DRY HEAVES: ICD-10-CM

## 2021-09-21 RX ORDER — FAMOTIDINE 20 MG/1
20 TABLET, FILM COATED ORAL 2 TIMES DAILY
Qty: 180 TABLET | Refills: 3 | OUTPATIENT
Start: 2021-09-21

## 2021-09-22 DIAGNOSIS — E78.2 HYPERCHOLESTEROLEMIA WITH HYPERTRIGLYCERIDEMIA: ICD-10-CM

## 2021-09-22 NOTE — TELEPHONE ENCOUNTER
Armond Avila called requesting a refill of the below medication which has been pended for you:     Requested Prescriptions     Pending Prescriptions Disp Refills    atorvastatin (LIPITOR) 10 MG tablet 90 tablet 1     Sig: Take 1 tablet by mouth daily       Last Appointment Date: 5/10/2021  Next Appointment Date: 11/12/2021    Allergies   Allergen Reactions    Keflex [Cephalexin] Other (See Comments)     Migraines, upset stomach     Naproxen      ulcers

## 2021-09-23 ENCOUNTER — PATIENT MESSAGE (OUTPATIENT)
Dept: FAMILY MEDICINE CLINIC | Age: 46
End: 2021-09-23

## 2021-09-23 RX ORDER — ATORVASTATIN CALCIUM 10 MG/1
10 TABLET, FILM COATED ORAL DAILY
Qty: 90 TABLET | Refills: 0 | Status: SHIPPED | OUTPATIENT
Start: 2021-09-23 | End: 2021-11-12 | Stop reason: SDUPTHER

## 2021-09-24 ENCOUNTER — OFFICE VISIT (OUTPATIENT)
Dept: FAMILY MEDICINE CLINIC | Age: 46
End: 2021-09-24
Payer: MEDICARE

## 2021-09-24 VITALS
HEART RATE: 76 BPM | SYSTOLIC BLOOD PRESSURE: 116 MMHG | OXYGEN SATURATION: 99 % | DIASTOLIC BLOOD PRESSURE: 80 MMHG | BODY MASS INDEX: 35.59 KG/M2 | TEMPERATURE: 97.7 F | HEIGHT: 71 IN | WEIGHT: 254.2 LBS

## 2021-09-24 DIAGNOSIS — F41.9 ANXIETY: ICD-10-CM

## 2021-09-24 DIAGNOSIS — L21.9 SEBORRHEIC DERMATITIS: Primary | ICD-10-CM

## 2021-09-24 DIAGNOSIS — R53.83 FATIGUE, UNSPECIFIED TYPE: ICD-10-CM

## 2021-09-24 DIAGNOSIS — F32.0 CURRENT MILD EPISODE OF MAJOR DEPRESSIVE DISORDER, UNSPECIFIED WHETHER RECURRENT (HCC): ICD-10-CM

## 2021-09-24 PROCEDURE — 99214 OFFICE O/P EST MOD 30 MIN: CPT | Performed by: FAMILY MEDICINE

## 2021-09-24 PROCEDURE — 1036F TOBACCO NON-USER: CPT | Performed by: FAMILY MEDICINE

## 2021-09-24 PROCEDURE — G8427 DOCREV CUR MEDS BY ELIG CLIN: HCPCS | Performed by: FAMILY MEDICINE

## 2021-09-24 PROCEDURE — G8417 CALC BMI ABV UP PARAM F/U: HCPCS | Performed by: FAMILY MEDICINE

## 2021-09-24 PROCEDURE — 99212 OFFICE O/P EST SF 10 MIN: CPT

## 2021-09-24 RX ORDER — KETOCONAZOLE 20 MG/ML
SHAMPOO TOPICAL
Qty: 120 ML | Refills: 0 | Status: SHIPPED | OUTPATIENT
Start: 2021-09-24 | End: 2022-03-28

## 2021-09-24 RX ORDER — VENLAFAXINE HYDROCHLORIDE 75 MG/1
75 CAPSULE, EXTENDED RELEASE ORAL DAILY
Qty: 30 CAPSULE | Refills: 0 | Status: SHIPPED | OUTPATIENT
Start: 2021-09-24 | End: 2021-10-21 | Stop reason: SDUPTHER

## 2021-09-24 NOTE — PROGRESS NOTES
[address]  [address], [address]  [phone]      Mallika Ng is a 48 y.o. male who presents today for his medical conditions/complaints as noted below. Mallika Ng is c/o of Rash (recurring for 2-3 months, burns and itches more)      HPI:     Rash  This is a new problem. The current episode started more than 1 month ago (2-3 months ago). The problem is unchanged. The affected locations include the face. The rash is characterized by redness, itchiness and dryness. Past treatments include nothing. Past Medical History:   Diagnosis Date    Allergic rhinitis     Bipolar disorder ([facility] [address]     Diagnosed in     Chronic obstructive pulmonary disease (COPD) ([address]     Depression     Headache(784.0)     Osteoarthritis     TMJ (dislocation of temporomandibular joint)     Torticollis       Past Surgical History:   Procedure Laterality Date    TONSILLECTOMY       Family History   Problem Relation Age of Onset    Stroke Mother     High Blood Pressure Mother     High Blood Pressure Father     Stroke Father     Glaucoma Neg Hx     Diabetes Neg Hx     Cataracts Neg Hx      Social History     Tobacco Use    Smoking status: Former Smoker     Packs/day: 1.00     Years: 25.00     Pack years: 25.00     Types: Cigarettes     Start date: 10/16/1992     Quit date: 2017     Years since quittin.2    Smokeless tobacco: Never Used    Tobacco comment: handout given. Substance Use Topics    Alcohol use: No      Current Outpatient Medications   Medication Sig Dispense Refill    ketoconazole (NIZORAL) 2 % shampoo Apply topically daily to affected areas x 14 days, then twice weekly as needed. 120 mL 0    venlafaxine (EFFEXOR XR) 75 MG extended release capsule Take 1 capsule by mouth daily Take with 150 mg capsule for total daily dose of 225 mg daily.  30 capsule 0    atorvastatin (LIPITOR) 10 MG tablet Take 1 tablet by mouth daily 90 tablet 0    omega-3 acid ethyl esters (LOVAZA) 1 g capsule Take 2 capsules by mouth 2 times daily Take with meals. 120 capsule 3    divalproex (DEPAKOTE ER) 500 MG extended release tablet Take 1 tablet by mouth 2 times daily 60 tablet 5    ibuprofen (ADVIL;MOTRIN) 600 MG tablet TAKE 1 TABLET BY MOUTH EVERY 8 HOURS AS NEEDED FOR PAIN 90 tablet 3    Handicap Placard [facility] by Does not apply route Issue parking placard for person with disability. [phone]   Applicant meets the qualifying disability criteria. Expires 2 years from issuing date. 1 each 0    busPIRone (BUSPAR) 15 MG tablet Take 15 mg by mouth 2 times daily as needed (anxiety) 60 tablet 5    tiZANidine (ZANAFLEX) 4 MG tablet Take 1 tablet by mouth every 8 hours as needed (muscle spasm) 60 tablet 5    Respiratory Therapy Supplies (NEBULIZER/TUBING/MOUTHPIECE) KIT Use daily as needed for shortness of breath.  1 kit 0    Nebulizers (COMPRESSOR/NEBULIZER) MISC Use daily as needed for shortness of breath 1 each 0    albuterol (PROVENTIL) (2.5 MG/3ML) 0.083% nebulizer solution Take 3 mLs by nebulization every 6 hours as needed for Wheezing or Shortness of Breath 120 vial 5    albuterol sulfate HFA (VENTOLIN HFA) 108 (90 Base) MCG/ACT inhaler Inhale 1-2 puffs into the lungs every 6 hours as needed for Wheezing 3 Inhaler 3    risperiDONE (RISPERDAL) 3 MG tablet Take 1 tablet by mouth nightly 90 tablet 3    famotidine (PEPCID) 20 MG tablet Take 1 tablet by mouth 2 times daily 180 tablet 3    venlafaxine (EFFEXOR XR) 150 MG extended release capsule TAKE ONE CAPSULE BY MOUTH DAILY 90 capsule 3    loratadine (CLARITIN) 10 MG tablet Take 1 tablet by mouth daily 90 tablet 3    ondansetron (ZOFRAN) 4 MG tablet Take 1 tablet by mouth every 8 hours as needed for Nausea 30 tablet 2    fluticasone (FLONASE) 50 MCG/ACT nasal spray 2 sprays by Nasal route daily 3 Bottle 3    Cholecalciferol (VITAMIN D3) 50 MCG (2000 UT) TABS Take 1 tablet by mouth daily 30 tablet 11    vitamin B-12 (CYANOCOBALAMIN) 1000 MCG tablet Take 1 tablet by mouth daily 30 tablet 11    folic acid (FOLVITE) 651 MCG tablet Take 1 tablet by mouth daily 30 tablet 11    aspirin (ASPIRIN 81) 81 MG EC tablet Take 1 tablet by mouth daily 30 tablet 11    Menthol, Topical Analgesic, (BIOFREEZE) 4 % GEL Apply to shoulders & neck to relieve pain. 237 mL 5    rizatriptan (MAXALT) 10 MG tablet Take 1 tablet by mouth once as needed for Migraine May repeat in 2 hours if needed 30 tablet 5    tiotropium (SPIRIVA HANDIHALER) 18 MCG inhalation capsule Inhale 1 capsule into the lungs daily 90 capsule 3    Respiratory Therapy Supplies (NEBULIZER COMPRESSOR) KIT 1 kit by Does not apply route once for 1 dose 1 kit 0     Current Facility-Administered Medications   Medication Dose Route Frequency Provider Last Rate Last Admin    phenylephrine (MYDFRIN) 2.5 % ophthalmic solution 1 drop  1 drop Both Eyes Once Norris Glez MD        tropicamide (MYDRIACYL) 1 % ophthalmic solution 1 drop  1 drop Both Eyes Once Henrine Batch, MD         Allergies   Allergen Reactions    Keflex [Cephalexin] Other (See Comments)     Migraines, upset stomach     Naproxen      ulcers       Health Maintenance   Topic Date Due    Hepatitis C screen  Never done    COVID-19 Vaccine (1) Never done    HIV screen  Never done    Diabetes screen  Never done    Colon cancer screen colonoscopy  Never done    Flu vaccine (1) Never done    Pneumococcal 0-64 years Vaccine (1 of 2 - PPSV23) 11/08/2021 (Originally 9[birth year]7/1979)    DTaP/Tdap/Td vaccine (1 - Tdap) 11/10/2021 (Originally 9/7/1992)    Lipid screen  05/04/2022    Hepatitis A vaccine  Aged Out    Hepatitis B vaccine  Aged Out    Hib vaccine  Aged Out    Meningococcal (ACWY) vaccine  Aged Out       Subjective:      Review of Systems   Skin: Positive for rash.        Objective:     Vitals:    09/24/21 1315   BP: 116/80   Site: Right Upper Arm   Position: Sitting   Cuff Size: Large Adult Pulse: 76   Temp: 97.7 °F (36.5 °C)   TempSrc: Tympanic   SpO2: 99%   Weight: 254 lb 3.2 oz (115.3 kg)   Height: 5' 11\" (1.803 m)     Physical Exam  Constitutional:       General: He is not in acute distress. Appearance: Normal appearance. HENT:      Head: Normocephalic and atraumatic. Eyes:      Conjunctiva/sclera: Conjunctivae normal.   Cardiovascular:      Rate and Rhythm: Normal rate and regular rhythm. Heart sounds: Normal heart sounds. Pulmonary:      Effort: Pulmonary effort is normal. No respiratory distress. Breath sounds: Normal breath sounds. Abdominal:      General: Bowel sounds are normal. There is no distension. Palpations: Abdomen is soft. Tenderness: There is no abdominal tenderness. Musculoskeletal:      Right lower leg: No edema. Left lower leg: No edema. Skin:     General: Skin is warm and dry. Findings: Erythema (facial folds, around nose and eyebrows b/l) present. Neurological:      General: No focal deficit present. Mental Status: He is alert and oriented to person, place, and time. Psychiatric:         Mood and Affect: Mood normal.         Assessment:      1. Seborrheic dermatitis  -     ketoconazole (NIZORAL) 2 % shampoo; Apply topically daily to affected areas x 14 days, then twice weekly as needed. , Disp-120 mL, R-0, Normal  2. Fatigue, unspecified type  -     TSH With Reflex Ft4; Future  -     CBC Auto Differential; Future  3. Current mild episode of major depressive disorder, unspecified whether recurrent (HCC)  -     venlafaxine (EFFEXOR XR) 75 MG extended release capsule; Take 1 capsule by mouth daily Take with 150 mg capsule for total daily dose of 225 mg daily. , Disp-30 capsule, R-0Normal  4. Anxiety  -     venlafaxine (EFFEXOR XR) 75 MG extended release capsule; Take 1 capsule by mouth daily Take with 150 mg capsule for total daily dose of 225 mg daily. , Disp-30 capsule, R-0Normal         Plan:      Return if symptoms worsen or fail to improve in 2-4 weeks. Orders Placed This Encounter   Procedures    TSH With Reflex Ft4     Standing Status:   Future     Standing Expiration Date:   9/24/2022    CBC Auto Differential     Standing Status:   Future     Standing Expiration Date:   9/24/2022     Orders Placed This Encounter   Medications    ketoconazole (NIZORAL) 2 % shampoo     Sig: Apply topically daily to affected areas x 14 days, then twice weekly as needed. Dispense:  120 mL     Refill:  0    venlafaxine (EFFEXOR XR) 75 MG extended release capsule     Sig: Take 1 capsule by mouth daily Take with 150 mg capsule for total daily dose of 225 mg daily. Dispense:  30 capsule     Refill:  0       Patient given educational materials - see patient instructions. Discussed use, benefit, and side effects of prescribed medications. All patient questions answered. Pt voiced understanding. Reviewed health maintenance.             Electronically signed by Carolyn Ahmadi DO, DO on 10/3/2021 at 11:52 PM

## 2021-09-24 NOTE — TELEPHONE ENCOUNTER
From: Martínez Jerome  To: Lyndia Brittle, DO  Sent: 9/23/2021 11:18 PM EDT  Subject: Non-Urgent [facility] Sands and Staff Here as of late I have been tired all the time even after sleeping for long hours I am not sure if it is cause of cutting back on my soda intake or eating less just seems even after sleeping I am still tired

## 2021-09-24 NOTE — PATIENT INSTRUCTIONS
Patient Education        Seborrheic Dermatitis: Care Instructions  Your Care Instructions  Seborrheic dermatitis (say \"xvj-euo-FWA-ick ily-vov-AU-tus\") is a skin problem that causes a reddish rash with greasy, flaky, yellow skin patches. The rash may appear on many parts of the body. It may be on the scalp, face (especially the eyebrow area and between the nose and mouth), ears, breasts, underarms, and genital area. The flaky skin on the scalp is called dandruff. This rash is often a long-term (chronic) condition. It may last for years. But the symptoms may come and go. Symptoms can be treated with special creams, shampoos, or other skin care. The cause of seborrheic dermatitis is not fully understood. It may occur when skin glands make too much oil. It may get worse in cold weather or with stress. A type of skin fungus, or yeast, may also be linked with this condition. Follow-up care is a key part of your treatment and safety. Be sure to make and go to all appointments, and call your doctor if you are having problems. It's also a good idea to know your test results and keep a list of the medicines you take. How can you care for yourself at home? · If your doctor prescribes a steroid cream, dandruff shampoo, or antifungal cream or medicine, use it as directed. If your doctor prescribes other medicine, take it as directed. · Use a dandruff shampoo if seborrheic dermatitis affects your scalp. This includes Head & Shoulders, Sebulex, and Selsun Blue. You may need to try a few kinds of shampoo to find the one that works best for you. · To help with itching:  ? Use hydrocortisone cream. Follow the directions on the label. ? Use cold, wet cloths. ? Take an over-the-counter antihistamine, such as diphenhydramine (Benadryl) or loratadine (Claritin). Read and follow all instructions on the label. When should you call for help?    Call your doctor now or seek immediate medical care if:    · You have signs of infection, such as:  ? Increased pain, swelling, warmth, or redness. ? Red streaks leading from the rash. ? Pus draining from the rash. ? A fever. Watch closely for changes in your health, and be sure to contact your doctor if:    · The rash gets worse or spreads to other parts of your body.     · You do not get better as expected. Where can you learn more? Go to https://Plum.iopepiceweb.Access MediQuip. org and sign in to your SIPphone account. Enter Z674 in the Meetup box to learn more about \"Seborrheic Dermatitis: Care Instructions. \"     If you do not have an account, please click on the \"Sign Up Now\" link. Current as of: March 3, 2021               Content Version: 13.0  © 2006-2021 Healthwise, Incorporated. Care instructions adapted under license by [facility] ([facility]). If you have questions about a medical condition or this instruction, always ask your healthcare professional. [place] [address] any warranty or liability for your use of this information.

## 2021-09-27 ENCOUNTER — PATIENT MESSAGE (OUTPATIENT)
Dept: FAMILY MEDICINE CLINIC | Age: 46
End: 2021-09-27

## 2021-09-28 DIAGNOSIS — F31.32 BIPOLAR AFFECTIVE DISORDER, CURRENTLY DEPRESSED, MODERATE (HCC): ICD-10-CM

## 2021-09-28 DIAGNOSIS — G47.00 INSOMNIA, UNSPECIFIED TYPE: ICD-10-CM

## 2021-09-28 RX ORDER — RISPERIDONE 3 MG/1
3 TABLET, FILM COATED ORAL NIGHTLY
Qty: 90 TABLET | Refills: 3 | OUTPATIENT
Start: 2021-09-28

## 2021-09-28 NOTE — TELEPHONE ENCOUNTER
From: Christos Lozano  To: Melina Rodriguez DO  Sent: 9/27/2021 5:59 PM EDT  Subject: Non-Urgent Medical Question    I was wondering if you could prescribe me a glucose meter and supplys so I am able to self check my sugar levels as diabetes run in my family and I don't want to use [facility] Thank you have a great Day Patient's mother is calling requesting a letter written stating when he became a patient and the name and address of mom and dad listed as the persons responsible for bringing him to appointments.  She states this needs to be on letterhead.  Call when ready for  at [phone].  She says she needs this letter for tax purposes.

## 2021-10-15 ENCOUNTER — APPOINTMENT (OUTPATIENT)
Dept: CT IMAGING | Age: 46
End: 2021-10-15
Payer: MEDICARE

## 2021-10-15 ENCOUNTER — HOSPITAL ENCOUNTER (EMERGENCY)
Age: 46
Discharge: HOME | End: 2021-10-15
Attending: EMERGENCY MEDICINE
Payer: MEDICARE

## 2021-10-15 VITALS
OXYGEN SATURATION: 98 % | SYSTOLIC BLOOD PRESSURE: 150 MMHG | HEART RATE: 85 BPM | RESPIRATION RATE: 16 BRPM | TEMPERATURE: 97.3 F | DIASTOLIC BLOOD PRESSURE: 83 MMHG

## 2021-10-15 DIAGNOSIS — R07.81 PLEURITIC CHEST PAIN: Primary | ICD-10-CM

## 2021-10-15 LAB
ABSOLUTE EOS #: 0.13 K/UL (ref 0–0.44)
ABSOLUTE IMMATURE GRANULOCYTE: 0.21 K/UL (ref 0–0.3)
ABSOLUTE LYMPH #: 3.34 K/UL (ref 1.1–3.7)
ABSOLUTE MONO #: 1.02 K/UL (ref 0.1–1.2)
ANION GAP SERPL CALCULATED.3IONS-SCNC: 12 MMOL/L (ref 9–17)
BASOPHILS # BLD: 1 % (ref 0–2)
BASOPHILS ABSOLUTE: 0.11 K/UL (ref 0–0.2)
BUN BLDV-MCNC: 12 MG/DL (ref 6–20)
BUN/CREAT BLD: 18 (ref 9–20)
CALCIUM SERPL-MCNC: 9.6 MG/DL (ref 8.6–10.4)
CHLORIDE BLD-SCNC: 99 MMOL/L (ref 98–107)
CO2: 27 MMOL/L (ref 20–31)
CREAT SERPL-MCNC: 0.67 MG/DL (ref 0.7–1.2)
DIFFERENTIAL TYPE: ABNORMAL
EOSINOPHILS RELATIVE PERCENT: 1 % (ref 1–4)
GFR AFRICAN AMERICAN: >60 ML/MIN
GFR NON-AFRICAN AMERICAN: >60 ML/MIN
GFR SERPL CREATININE-BSD FRML MDRD: ABNORMAL ML/MIN/{1.73_M2}
GFR SERPL CREATININE-BSD FRML MDRD: ABNORMAL ML/MIN/{1.73_M2}
GLUCOSE BLD-MCNC: 82 MG/DL (ref 70–99)
HCT VFR BLD CALC: 40.8 % (ref 40.7–50.3)
HEMOGLOBIN: 13.9 G/DL (ref 13–17)
IMMATURE GRANULOCYTES: 2 %
LYMPHOCYTES # BLD: 34 % (ref 24–43)
MCH RBC QN AUTO: 31.1 PG (ref 25.2–33.5)
MCHC RBC AUTO-ENTMCNC: 34.1 G/DL (ref 25.2–33.5)
MCV RBC AUTO: 91.3 FL (ref 82.6–102.9)
MONOCYTES # BLD: 10 % (ref 3–12)
NRBC AUTOMATED: 0 PER 100 WBC
PDW BLD-RTO: 12.8 % (ref 11.8–14.4)
PLATELET # BLD: 328 K/UL (ref 138–453)
PLATELET ESTIMATE: ABNORMAL
PMV BLD AUTO: 9.8 FL (ref 8.1–13.5)
POTASSIUM SERPL-SCNC: 3.7 MMOL/L (ref 3.7–5.3)
RBC # BLD: 4.47 M/UL (ref 4.21–5.77)
RBC # BLD: ABNORMAL 10*6/UL
SEG NEUTROPHILS: 52 % (ref 36–65)
SEGMENTED NEUTROPHILS ABSOLUTE COUNT: 5.01 K/UL (ref 1.5–8.1)
SODIUM BLD-SCNC: 138 MMOL/L (ref 135–144)
TROPONIN INTERP: NORMAL
TROPONIN T: NORMAL NG/ML
TROPONIN, HIGH SENSITIVITY: 12 NG/L (ref 0–22)
WBC # BLD: 9.8 K/UL (ref 3.5–11.3)
WBC # BLD: ABNORMAL 10*3/UL

## 2021-10-15 PROCEDURE — 99285 EMERGENCY DEPT VISIT HI MDM: CPT

## 2021-10-15 PROCEDURE — 84484 ASSAY OF TROPONIN QUANT: CPT

## 2021-10-15 PROCEDURE — 80048 BASIC METABOLIC PNL TOTAL CA: CPT

## 2021-10-15 PROCEDURE — 36415 COLL VENOUS BLD VENIPUNCTURE: CPT

## 2021-10-15 PROCEDURE — 85025 COMPLETE CBC W/AUTO DIFF WBC: CPT

## 2021-10-15 PROCEDURE — 2709999900 CT CHEST PULMONARY EMBOLISM W CONTRAST

## 2021-10-15 PROCEDURE — 93005 ELECTROCARDIOGRAM TRACING: CPT | Performed by: EMERGENCY MEDICINE

## 2021-10-15 PROCEDURE — 6360000004 HC RX CONTRAST MEDICATION: Performed by: EMERGENCY MEDICINE

## 2021-10-15 RX ADMIN — IOPAMIDOL 100 ML: 755 INJECTION, SOLUTION INTRAVENOUS at 20:38

## 2021-10-15 ASSESSMENT — PAIN DESCRIPTION - LOCATION: LOCATION: RIB CAGE

## 2021-10-15 ASSESSMENT — PAIN DESCRIPTION - PAIN TYPE: TYPE: ACUTE PAIN

## 2021-10-15 ASSESSMENT — ENCOUNTER SYMPTOMS
VOMITING: 0
SHORTNESS OF BREATH: 0
NAUSEA: 0

## 2021-10-15 ASSESSMENT — PAIN DESCRIPTION - ONSET: ONSET: ON-GOING

## 2021-10-15 ASSESSMENT — PAIN DESCRIPTION - ORIENTATION: ORIENTATION: LEFT

## 2021-10-15 ASSESSMENT — PAIN DESCRIPTION - DESCRIPTORS: DESCRIPTORS: ACHING;SHARP

## 2021-10-15 ASSESSMENT — PAIN SCALES - GENERAL: PAINLEVEL_OUTOF10: 6

## 2021-10-15 ASSESSMENT — PAIN DESCRIPTION - FREQUENCY: FREQUENCY: CONTINUOUS

## 2021-10-16 NOTE — ED PROVIDER NOTES
[facility] ED  [address]  [place] [address]  Phone: [phone]  eMERGENCY dEPARTMENT eNCOUnter      Pt Name: Shyanne Shannon  MRN: [id number]  [place] 1975  Date of evaluation: 10/15/21      CHIEF COMPLAINT     Chief Complaint   Patient presents with    Fatigue    Rib Pain       HISTORY OF PRESENT ILLNESS    Shyanne Shannon is a 48 y.o. male who presents today for evaluation of bilateral rib pain. Patient denies associated cough cold or congestion or shortness of breath or fever. Does have a history of COPD is a non-smoker. Did take his bronchodilators today with some improvement. Pain has been intermittent all day. No symptoms of anterior or substernal chest pain or pressure. No associated nausea or vomiting. Pain is reproducible to palpation. Sometimes worse with taking a deep breath. REVIEW OF SYSTEMS     Review of Systems   Constitutional: Negative for fever. HENT: Negative for congestion. Respiratory: Negative for shortness of breath. Gastrointestinal: Negative for nausea and vomiting. Skin: Negative for rash. Neurological: Negative for syncope. All other systems reviewed and are negative. PAST MEDICAL HISTORY    has a past medical history of Allergic rhinitis, Bipolar disorder ([address] Chronic obstructive pulmonary disease (COPD) ([facility] [address] Depression, Headache(784.0), Osteoarthritis, TMJ (dislocation of temporomandibular joint), and Torticollis. SURGICAL HISTORY      has a past surgical history that includes Tonsillectomy. CURRENT MEDICATIONS       Discharge Medication List as of 10/15/2021  9:44 PM      CONTINUE these medications which have NOT CHANGED    Details   ketoconazole (NIZORAL) 2 % shampoo Apply topically daily to affected areas x 14 days, then twice weekly as needed. , Disp-120 mL, R-0, Normal      !! venlafaxine (EFFEXOR XR) 75 MG extended release capsule Take 1 capsule by mouth daily Take with 150 mg capsule for total daily dose of 225 mg daily. , Disp-30 capsule, R-0Normal      atorvastatin (LIPITOR) 10 MG tablet Take 1 tablet by mouth daily, Disp-90 tablet, R-0Normal      omega-3 acid ethyl esters (LOVAZA) 1 g capsule Take 2 capsules by mouth 2 times daily Take with meals. , Disp-120 capsule, R-3Normal      divalproex (DEPAKOTE ER) 500 MG extended release tablet Take 1 tablet by mouth 2 times daily, Disp-60 tablet, R-5Normal      ibuprofen (ADVIL;MOTRIN) 600 MG tablet TAKE 1 TABLET BY MOUTH EVERY 8 HOURS AS NEEDED FOR PAIN, Disp-90 tablet, R-3Normal      rizatriptan (MAXALT) 10 MG tablet Take 1 tablet by mouth once as needed for Migraine May repeat in 2 hours if needed, Disp-30 tablet, R-5Normal      Handicap Placard [facility] Starting Fri 6/11/2021, Disp-1 each, R-0, PrintIssue parking placard for person with disability. [facility] [phone]   Applicant meets the qualifying disability criteria. Expires 2 years from issuing date. busPIRone (BUSPAR) 15 MG tablet Take 15 mg by mouth 2 times daily as needed (anxiety), Disp-60 tablet, R-5Normal      tiZANidine (ZANAFLEX) 4 MG tablet Take 1 tablet by mouth every 8 hours as needed (muscle spasm), Disp-60 tablet, R-5Normal      tiotropium (SPIRIVA HANDIHALER) 18 MCG inhalation capsule Inhale 1 capsule into the lungs daily, Disp-90 capsule, R-3Normal      Respiratory Therapy Supplies (NEBULIZER/TUBING/MOUTHPIECE) KIT Disp-1 kit, R-0, NormalUse daily as needed for shortness of breath.       Nebulizers (COMPRESSOR/NEBULIZER) MISC Disp-1 each, R-0, NormalUse daily as needed for shortness of breath      albuterol (PROVENTIL) (2.5 MG/3ML) 0.083% nebulizer solution Take 3 mLs by nebulization every 6 hours as needed for Wheezing or Shortness of Breath, Disp-120 vial, R-5Normal      albuterol sulfate HFA (VENTOLIN HFA) 108 (90 Base) MCG/ACT inhaler Inhale 1-2 puffs into the lungs every 6 hours as needed for Wheezing, Disp-3 Inhaler, R-3Normal      risperiDONE (RISPERDAL) 3 MG tablet Take 1 tablet by mouth nightly, Disp-90 tablet, R-3Normal      famotidine (PEPCID) 20 MG tablet Take 1 tablet by mouth 2 times daily, Disp-180 tablet, R-3Normal      !! venlafaxine (EFFEXOR XR) 150 MG extended release capsule TAKE ONE CAPSULE BY MOUTH DAILY, Disp-90 capsule, R-3Normal      loratadine (CLARITIN) 10 MG tablet Take 1 tablet by mouth daily, Disp-90 tablet, R-3Normal      ondansetron (ZOFRAN) 4 MG tablet Take 1 tablet by mouth every 8 hours as needed for Nausea, Disp-30 tablet, R-2Normal      fluticasone (FLONASE) 50 MCG/ACT nasal spray 2 sprays by Nasal route daily, Disp-3 Bottle, R-3Normal      Cholecalciferol (VITAMIN D3) 50 MCG (2000 UT) TABS Take 1 tablet by mouth daily, Disp-30 tablet,R-11Normal      vitamin B-12 (CYANOCOBALAMIN) 1000 MCG tablet Take 1 tablet by mouth daily, Disp-30 [id number]      folic acid (FOLVITE) 616 MCG tablet Take 1 tablet by mouth daily, Disp-30 tablet,R-11Normal      aspirin (ASPIRIN 81) 81 MG EC tablet Take 1 tablet by mouth daily, Disp-30 tablet,R-11Normal      Menthol, Topical Analgesic, (BIOFREEZE) 4 % GEL Apply to shoulders & neck to relieve pain., Disp-237 mL,R-5Normal       !! - Potential duplicate medications found. Please discuss with provider. ALLERGIES     is allergic to keflex [cephalexin] and naproxen. FAMILY HISTORY     He indicated that the status of his mother is unknown. He indicated that the status of his father is unknown. He indicated that the status of his neg hx is unknown.     family history includes High Blood Pressure in his father and mother; Stroke in his father and mother. SOCIAL HISTORY      reports that he quit smoking about 4 years ago. His smoking use included cigarettes. He started smoking about 29 years ago. He has a 25.00 pack-year smoking history. He has never used smokeless tobacco. He reports that he does not drink alcohol and does not use drugs. PHYSICAL EXAM     INITIAL VITALS:  tympanic temperature is 97.3 °F (36.3 °C).  His blood pressure is 150/83 (abnormal) and his pulse is 85. His respiration is 16 and oxygen saturation is 98%. Physical Exam  Vitals reviewed. Constitutional:       General: He is not in acute distress. Appearance: Normal appearance. He is not ill-appearing. HENT:      Head: Normocephalic and atraumatic. Cardiovascular:      Rate and Rhythm: Normal rate and regular rhythm. Pulses: Normal pulses. Heart sounds: Normal heart sounds. Pulmonary:      Effort: Pulmonary effort is normal. No respiratory distress. Breath sounds: Normal breath sounds. Comments: Tenderness to palpation to the bilateral outer lower rib cage. No overlying rash. Chest:      Chest wall: Tenderness present. Abdominal:      Palpations: Abdomen is soft. Tenderness: There is no abdominal tenderness. There is no guarding or rebound. Musculoskeletal:         General: No swelling or tenderness. Normal range of motion. Skin:     General: Skin is warm and dry. Capillary Refill: Capillary refill takes less than 2 seconds. Findings: No rash. Neurological:      General: No focal deficit present. Mental Status: He is alert and oriented to person, place, and time. Cranial Nerves: No cranial nerve deficit. Motor: No weakness. Psychiatric:         Mood and Affect: Mood normal.         Behavior: Behavior normal.       DIFFERENTIAL DIAGNOSIS / MDM / EMERGENCY DEPARTMENT COURSE:     Differential diagnosis history is atypical for ACS, more concerning for pulmonary embolism although no discernible risk factors. Patient was borderline tachycardic upon arrival with pleuritic chest pain. Other diagnoses could include COPD exacerbation versus pneumonia. Plan for CT of the chest PE protocol EKG labs including 1 troponin. Work-up does not show any acute ischemia on the EKG troponin is normal CT PE protocol shows possible mild atelectasis but no evidence of pulmonary embolism or infiltrate.   Test results were explained to the patient. At this point I feel that he is appropriate for outpatient management. I have discussed with him trying to make sure he is taking deep breaths throughout the day. I discussed with him the warning signs which return to the emergency department and emphasized the need for close follow-up with his PCP. He had no further questions or concerns. I have reviewed the disposition diagnosis with the patient and or their family/guardian. I have answered their questions and givendischarge instructions. They voiced understanding of these instructions and did not have any further questions or complaints. DIAGNOSTIC RESULTS     EKG: All EKG's are interpreted by the Emergency Department Physician who either signs or Co-signs this chart inthe absence of a cardiologist.    EKG normal sinus rhythm at 74 bpm normal intervals and axis. No acute ST ovation depressions or T wave inversions perhaps nonspecific ST-T wave changes. There is no old for comparison. Interpretation is a nonacute twelve-lead EKG. RADIOLOGY:   Radiologist interpretation of radiologic studies:     CT CHEST PULMONARY EMBOLISM W CONTRAST    Result Date: 10/15/2021  EXAMINATION: CTA OF THE CHEST 10/15/2021 5:31 pm TECHNIQUE: CTA of the chest was performed after the administration of intravenous contrast.  Multiplanar reformatted images are provided for review. MIP images are provided for review. Dose modulation, iterative reconstruction, and/or weight based adjustment of the mA/kV was utilized to reduce the radiation dose to as low as reasonably achievable.  COMPARISON: 04/16/2019 HISTORY: ORDERING SYSTEM PROVIDED HISTORY: pleuritic CP / SOB TECHNOLOGIST PROVIDED HISTORY: pleuritic CP / SOB Decision Support Exception - unselect if not a suspected or confirmed emergency medical condition->Emergency Medical Condition (MA) Reason for Exam: chest tightness, rib pain, SOB Acuity: Acute Type of Exam: Initial FINDINGS: Pulmonary Arteries: Pulmonary arteries are adequately opacified for evaluation. No evidence of intraluminal filling defect to suggest pulmonary embolism. Main pulmonary artery is normal in caliber. Mediastinum: No mediastinal lymphadenopathy. Normal appearing trace remnant thymic tissue in the anterior mediastinum. The heart and pericardium demonstrate no acute abnormality. There is no acute abnormality of the thoracic aorta. Lungs/pleura: Trace subsegmental atelectasis in the left lower lobe. The lungs are otherwise without acute process with unchanged scattered mild areas of scarring in the right middle lobe, lingula, and lower lobes. No focal consolidation or pulmonary edema. No pleural effusion or pneumothorax. Central airways are patent and the airways appear normal caliber. Upper Abdomen: Limited images of the upper abdomen are without acute abnormality. Soft Tissues/Bones: No acute bone or soft tissue abnormality. No evidence of pulmonary embolism. Trace subsegmental atelectasis in the left lower lobe.        LABS:  Results for orders placed or performed during the hospital encounter of 10/15/21   Troponin   Result Value Ref Range    Troponin, High Sensitivity 12 0 - 22 ng/L    Troponin T NOT REPORTED <0.03 ng/mL    Troponin Interp NOT REPORTED    CBC Auto Differential   Result Value Ref Range    WBC 9.8 3.5 - 11.3 k/uL    RBC 4.47 4.21 - 5.77 m/uL    Hemoglobin 13.9 13.0 - 17.0 g/dL    Hematocrit 40.8 40.7 - 50.3 %    MCV 91.3 82.6 - 102.9 fL    MCH 31.1 25.2 - 33.5 pg    MCHC 34.1 (H) 25.2 - 33.5 g/dL    RDW 12.8 11.8 - 14.4 %    Platelets 555 231 - 223 k/uL    MPV 9.8 8.1 - 13.5 fL    NRBC Automated 0.0 0.0 per 100 WBC    Differential Type NOT REPORTED     Seg Neutrophils 52 36 - 65 %    Lymphocytes 34 24 - 43 %    Monocytes 10 3 - 12 %    Eosinophils % 1 1 - 4 %    Basophils 1 0 - 2 %    Immature Granulocytes 2 (H) 0 %    Segs Absolute 5.01 1.50 - 8.10 k/uL    Absolute Lymph # 3.34 1.10 - 3.70 k/uL    Absolute Mono # 1.02 0.10 - 1.20 k/uL    Absolute Eos # 0.13 0.00 - 0.44 k/uL    Basophils Absolute 0.11 0.00 - 0.20 k/uL    Absolute Immature Granulocyte 0.21 0.00 - 0.30 k/uL    WBC Morphology NOT REPORTED     RBC Morphology NOT REPORTED     Platelet Estimate NOT REPORTED    Basic Metabolic Panel   Result Value Ref Range    Glucose 82 70 - 99 mg/dL    BUN 12 6 - 20 mg/dL    CREATININE 0.67 (L) 0.70 - 1.20 mg/dL    Bun/Cre Ratio 18 9 - 20    Calcium 9.6 8.6 - 10.4 mg/dL    Sodium 138 135 - 144 mmol/L    Potassium 3.7 3.7 - 5.3 mmol/L    Chloride 99 98 - 107 mmol/L    CO2 27 20 - 31 mmol/L    Anion Gap 12 9 - 17 mmol/L    GFR Non-African American >60 >60 mL/min    GFR African American >60 >60 mL/min    GFR Comment          GFR Staging NOT REPORTED    EKG 12 Lead   Result Value Ref Range    Ventricular Rate 74 BPM    Atrial Rate 74 BPM    P-R Interval 166 ms    QRS Duration 90 ms    Q-T Interval 408 ms    QTc Calculation (Bazett) 452 ms    P Axis 74 degrees    R Axis 42 degrees    T Axis 79 degrees       EMERGENCY DEPARTMENT COURSE:   Vitals:    Vitals:    10/15/21 1953 10/15/21 2050 10/15/21 2052 10/15/21 2211   BP: (!) 144/85  (!) 140/74 (!) 150/83   Pulse: 92 75 74 85   Resp: 16 18 18 16   Temp: 97.3 °F (36.3 °C)      TempSrc: Tympanic      SpO2: 98% 97% 98% 98%     -------------------------  BP: (!) 150/83, Temp: 97.3 °F (36.3 °C), Pulse: 85, Resp: 16    CONSULTS:  None    PROCEDURES:  None    FINAL IMPRESSION      1. Pleuritic chest pain          DISPOSITION/PLAN   DISPOSITION Decision To Discharge 10/15/2021 09:43:39 PM      PATIENT REFERRED TO:  Antonina Pardo DO  [address] [address]  [phone]    In 2 days        DISCHARGE MEDICATIONS:  Discharge Medication List as of 10/15/2021  9:44 PM          There are no active hospital problems to display for this patient.       (Please note that portions of this note were completed with avoice recognition program.  Efforts were made to edit the dictations but occasionally words are mis-transcribed.)    Hermelindo Espinoza MD, [facility]  Attending Emergency Medicine Physician        Hermelindo Espinoza MD  10/15/21 0167

## 2021-10-17 LAB
EKG ATRIAL RATE: 74 BPM
EKG P AXIS: 74 DEGREES
EKG P-R INTERVAL: 166 MS
EKG Q-T INTERVAL: 408 MS
EKG QRS DURATION: 90 MS
EKG QTC CALCULATION (BAZETT): 452 MS
EKG R AXIS: 42 DEGREES
EKG T AXIS: 79 DEGREES
EKG VENTRICULAR RATE: 74 BPM

## 2021-10-18 ENCOUNTER — HOSPITAL ENCOUNTER (OUTPATIENT)
Dept: LAB | Age: 46
Discharge: HOME | End: 2021-10-18
Payer: MEDICARE

## 2021-10-18 DIAGNOSIS — E55.9 VITAMIN D DEFICIENCY: ICD-10-CM

## 2021-10-18 DIAGNOSIS — E78.2 HYPERCHOLESTEROLEMIA WITH HYPERTRIGLYCERIDEMIA: ICD-10-CM

## 2021-10-18 DIAGNOSIS — R53.83 FATIGUE, UNSPECIFIED TYPE: ICD-10-CM

## 2021-10-18 DIAGNOSIS — R73.01 IMPAIRED FASTING GLUCOSE: ICD-10-CM

## 2021-10-18 LAB
ABSOLUTE EOS #: 0.22 K/UL (ref 0–0.44)
ABSOLUTE IMMATURE GRANULOCYTE: 0.19 K/UL (ref 0–0.3)
ABSOLUTE LYMPH #: 4.45 K/UL (ref 1.1–3.7)
ABSOLUTE MONO #: 1.18 K/UL (ref 0.1–1.2)
ALBUMIN SERPL-MCNC: 5 G/DL (ref 3.5–5.2)
ALBUMIN/GLOBULIN RATIO: 1.9 (ref 1–2.5)
ALP BLD-CCNC: 84 U/L (ref 40–129)
ALT SERPL-CCNC: 18 U/L (ref 5–41)
ANION GAP SERPL CALCULATED.3IONS-SCNC: 15 MMOL/L (ref 9–17)
AST SERPL-CCNC: 19 U/L
BASOPHILS # BLD: 1 % (ref 0–2)
BASOPHILS ABSOLUTE: 0.14 K/UL (ref 0–0.2)
BILIRUB SERPL-MCNC: 0.53 MG/DL (ref 0.3–1.2)
BUN BLDV-MCNC: 8 MG/DL (ref 6–20)
BUN/CREAT BLD: 10 (ref 9–20)
CALCIUM SERPL-MCNC: 9.8 MG/DL (ref 8.6–10.4)
CHLORIDE BLD-SCNC: 100 MMOL/L (ref 98–107)
CHOLESTEROL/HDL RATIO: 4.7
CHOLESTEROL: 154 MG/DL
CO2: 26 MMOL/L (ref 20–31)
CREAT SERPL-MCNC: 0.81 MG/DL (ref 0.7–1.2)
DIFFERENTIAL TYPE: ABNORMAL
EOSINOPHILS RELATIVE PERCENT: 2 % (ref 1–4)
ESTIMATED AVERAGE GLUCOSE: 103 MG/DL
GFR AFRICAN AMERICAN: >60 ML/MIN
GFR NON-AFRICAN AMERICAN: >60 ML/MIN
GFR SERPL CREATININE-BSD FRML MDRD: NORMAL ML/MIN/{1.73_M2}
GFR SERPL CREATININE-BSD FRML MDRD: NORMAL ML/MIN/{1.73_M2}
GLUCOSE BLD-MCNC: 81 MG/DL (ref 70–99)
HBA1C MFR BLD: 5.2 % (ref 4–6)
HCT VFR BLD CALC: 44 % (ref 40.7–50.3)
HDLC SERPL-MCNC: 33 MG/DL
HEMOGLOBIN: 14.8 G/DL (ref 13–17)
IMMATURE GRANULOCYTES: 2 %
LDL CHOLESTEROL: 82 MG/DL (ref 0–130)
LYMPHOCYTES # BLD: 39 % (ref 24–43)
MCH RBC QN AUTO: 30.8 PG (ref 25.2–33.5)
MCHC RBC AUTO-ENTMCNC: 33.6 G/DL (ref 25.2–33.5)
MCV RBC AUTO: 91.7 FL (ref 82.6–102.9)
MONOCYTES # BLD: 10 % (ref 3–12)
NRBC AUTOMATED: 0 PER 100 WBC
PDW BLD-RTO: 12.7 % (ref 11.8–14.4)
PLATELET # BLD: 336 K/UL (ref 138–453)
PLATELET ESTIMATE: ABNORMAL
PMV BLD AUTO: 9.7 FL (ref 8.1–13.5)
POTASSIUM SERPL-SCNC: 3.9 MMOL/L (ref 3.7–5.3)
RBC # BLD: 4.8 M/UL (ref 4.21–5.77)
RBC # BLD: ABNORMAL 10*6/UL
SEG NEUTROPHILS: 46 % (ref 36–65)
SEGMENTED NEUTROPHILS ABSOLUTE COUNT: 5.15 K/UL (ref 1.5–8.1)
SODIUM BLD-SCNC: 141 MMOL/L (ref 135–144)
THYROXINE, FREE: 1.07 NG/DL (ref 0.93–1.7)
TOTAL PROTEIN: 7.6 G/DL (ref 6.4–8.3)
TRIGL SERPL-MCNC: 194 MG/DL
TSH SERPL DL<=0.05 MIU/L-ACNC: 8.82 MIU/L (ref 0.3–5)
VITAMIN D 25-HYDROXY: 25.7 NG/ML (ref 30–100)
VLDLC SERPL CALC-MCNC: ABNORMAL MG/DL (ref 1–30)
WBC # BLD: 11.3 K/UL (ref 3.5–11.3)
WBC # BLD: ABNORMAL 10*3/UL

## 2021-10-18 PROCEDURE — 84439 ASSAY OF FREE THYROXINE: CPT

## 2021-10-18 PROCEDURE — 84443 ASSAY THYROID STIM HORMONE: CPT

## 2021-10-18 PROCEDURE — 83036 HEMOGLOBIN GLYCOSYLATED A1C: CPT

## 2021-10-18 PROCEDURE — 82306 VITAMIN D 25 HYDROXY: CPT

## 2021-10-18 PROCEDURE — 36415 COLL VENOUS BLD VENIPUNCTURE: CPT

## 2021-10-18 PROCEDURE — 80061 LIPID PANEL: CPT

## 2021-10-18 PROCEDURE — 85025 COMPLETE CBC W/AUTO DIFF WBC: CPT

## 2021-10-18 PROCEDURE — 80053 COMPREHEN METABOLIC PANEL: CPT

## 2021-10-19 ENCOUNTER — TELEPHONE (OUTPATIENT)
Dept: FAMILY MEDICINE CLINIC | Age: 46
End: 2021-10-19

## 2021-10-21 DIAGNOSIS — R10.13 DYSPEPSIA: ICD-10-CM

## 2021-10-21 DIAGNOSIS — R11.10 DRY HEAVES: ICD-10-CM

## 2021-10-21 DIAGNOSIS — F32.0 CURRENT MILD EPISODE OF MAJOR DEPRESSIVE DISORDER, UNSPECIFIED WHETHER RECURRENT (HCC): ICD-10-CM

## 2021-10-21 DIAGNOSIS — F41.9 ANXIETY: ICD-10-CM

## 2021-10-21 NOTE — TELEPHONE ENCOUNTER
Spoke with patient. Patient states the effexor has helped and would like to continue taking it. Kamryn Espinal called requesting a refill of the below medication which has been pended for you:     Requested Prescriptions     Pending Prescriptions Disp Refills    famotidine (PEPCID) 20 MG tablet 180 tablet 3     Sig: Take 1 tablet by mouth 2 times daily    venlafaxine (EFFEXOR XR) 75 MG extended release capsule 30 capsule 0     Sig: Take 1 capsule by mouth daily Take with 150 mg capsule for total daily dose of 225 mg daily.        Last Appointment Date: 9/24/2021  Next Appointment Date: 11/12/2021    Allergies   Allergen Reactions    Keflex [Cephalexin] Other (See Comments)     Migraines, upset stomach     Naproxen      ulcers

## 2021-10-23 RX ORDER — VENLAFAXINE HYDROCHLORIDE 75 MG/1
75 CAPSULE, EXTENDED RELEASE ORAL DAILY
Qty: 90 CAPSULE | Refills: 1 | Status: SHIPPED | OUTPATIENT
Start: 2021-10-23 | End: 2022-01-27 | Stop reason: SDUPTHER

## 2021-10-23 RX ORDER — FAMOTIDINE 20 MG/1
20 TABLET, FILM COATED ORAL 2 TIMES DAILY
Qty: 180 TABLET | Refills: 3 | Status: CANCELLED | OUTPATIENT
Start: 2021-10-23

## 2021-10-23 NOTE — TELEPHONE ENCOUNTER
Effexor refilled. Pepcid was refilled to [facility] on 2/2 for 1-year supply - should not yet need refilled. Please confirm.

## 2021-10-27 NOTE — TELEPHONE ENCOUNTER
Informed patient of the effexor being refilled and the pepcid should not be refilled yet since PCP gave him a 1 year supply. Patient stated that yes she did give him a 1 year supply and does not need another prescription at this time.

## 2021-11-01 DIAGNOSIS — G47.00 INSOMNIA, UNSPECIFIED TYPE: ICD-10-CM

## 2021-11-01 DIAGNOSIS — G43.909 MIGRAINE WITHOUT STATUS MIGRAINOSUS, NOT INTRACTABLE, UNSPECIFIED MIGRAINE TYPE: ICD-10-CM

## 2021-11-01 DIAGNOSIS — F31.32 BIPOLAR AFFECTIVE DISORDER, CURRENTLY DEPRESSED, MODERATE (HCC): ICD-10-CM

## 2021-11-01 RX ORDER — RIZATRIPTAN BENZOATE 10 MG/1
10 TABLET ORAL
Qty: 30 TABLET | Refills: 5 | OUTPATIENT
Start: 2021-11-01 | End: 2021-11-01

## 2021-11-01 NOTE — TELEPHONE ENCOUNTER
Call to pharm, states maxalt has 11 refills left, no refills remaining on risperdal.    Andre Alfredo called requesting a refill of the below medication which has been pended for you:     Requested Prescriptions     Pending Prescriptions Disp Refills    risperiDONE (RISPERDAL) 3 MG tablet 90 tablet 3     Sig: Take 1 tablet by mouth nightly     Refused Prescriptions Disp Refills    rizatriptan (MAXALT) 10 MG tablet 30 tablet 5     Sig: Take 1 tablet by mouth once as needed for Migraine May repeat in 2 hours if needed       Last Appointment Date: 9/24/2021  Next Appointment Date: 11/12/2021    Allergies   Allergen Reactions    Keflex [Cephalexin] Other (See Comments)     Migraines, upset stomach     Naproxen      ulcers

## 2021-11-03 RX ORDER — RISPERIDONE 3 MG/1
3 TABLET, FILM COATED ORAL NIGHTLY
Qty: 90 TABLET | Refills: 3 | Status: SHIPPED | OUTPATIENT
Start: 2021-11-03 | End: 2022-05-13 | Stop reason: DRUGHIGH

## 2021-11-03 ASSESSMENT — LIFESTYLE VARIABLES
HOW OFTEN DO YOU HAVE A DRINK CONTAINING ALCOHOL: NEVER
AUDIT-C TOTAL SCORE: INCOMPLETE
HOW OFTEN DO YOU HAVE A DRINK CONTAINING ALCOHOL: 0
AUDIT TOTAL SCORE: INCOMPLETE

## 2021-11-03 ASSESSMENT — PATIENT HEALTH QUESTIONNAIRE - PHQ9
SUM OF ALL RESPONSES TO PHQ QUESTIONS 1-9: 0
SUM OF ALL RESPONSES TO PHQ QUESTIONS 1-9: 0
2. FEELING DOWN, DEPRESSED OR HOPELESS: 0
SUM OF ALL RESPONSES TO PHQ QUESTIONS 1-9: 0
SUM OF ALL RESPONSES TO PHQ9 QUESTIONS 1 & 2: 0
1. LITTLE INTEREST OR PLEASURE IN DOING THINGS: 0

## 2021-11-12 ENCOUNTER — OFFICE VISIT (OUTPATIENT)
Dept: FAMILY MEDICINE CLINIC | Age: 46
End: 2021-11-12
Payer: MEDICARE

## 2021-11-12 VITALS
HEIGHT: 71 IN | HEART RATE: 80 BPM | DIASTOLIC BLOOD PRESSURE: 88 MMHG | OXYGEN SATURATION: 98 % | BODY MASS INDEX: 37.2 KG/M2 | WEIGHT: 265.7 LBS | SYSTOLIC BLOOD PRESSURE: 126 MMHG | TEMPERATURE: 98.1 F

## 2021-11-12 DIAGNOSIS — F41.9 ANXIETY: ICD-10-CM

## 2021-11-12 DIAGNOSIS — G89.29 CHRONIC NECK PAIN: ICD-10-CM

## 2021-11-12 DIAGNOSIS — E55.9 VITAMIN D DEFICIENCY: ICD-10-CM

## 2021-11-12 DIAGNOSIS — J44.9 CHRONIC OBSTRUCTIVE PULMONARY DISEASE, UNSPECIFIED COPD TYPE (HCC): ICD-10-CM

## 2021-11-12 DIAGNOSIS — L21.9 SEBORRHEIC DERMATITIS: ICD-10-CM

## 2021-11-12 DIAGNOSIS — E78.2 HYPERCHOLESTEROLEMIA WITH HYPERTRIGLYCERIDEMIA: ICD-10-CM

## 2021-11-12 DIAGNOSIS — M54.2 CHRONIC NECK PAIN: ICD-10-CM

## 2021-11-12 DIAGNOSIS — F31.32 BIPOLAR AFFECTIVE DISORDER, CURRENTLY DEPRESSED, MODERATE (HCC): ICD-10-CM

## 2021-11-12 DIAGNOSIS — E03.9 HYPOTHYROIDISM, UNSPECIFIED TYPE: Primary | ICD-10-CM

## 2021-11-12 DIAGNOSIS — Z12.11 SCREEN FOR COLON CANCER: ICD-10-CM

## 2021-11-12 DIAGNOSIS — R73.01 IMPAIRED FASTING GLUCOSE: ICD-10-CM

## 2021-11-12 DIAGNOSIS — Z00.00 ROUTINE GENERAL MEDICAL EXAMINATION AT A HEALTH CARE FACILITY: ICD-10-CM

## 2021-11-12 DIAGNOSIS — R73.01 IMPAIRED FASTING GLUCOSE: Primary | ICD-10-CM

## 2021-11-12 DIAGNOSIS — G43.909 MIGRAINE WITHOUT STATUS MIGRAINOSUS, NOT INTRACTABLE, UNSPECIFIED MIGRAINE TYPE: ICD-10-CM

## 2021-11-12 PROCEDURE — 99214 OFFICE O/P EST MOD 30 MIN: CPT | Performed by: FAMILY MEDICINE

## 2021-11-12 PROCEDURE — G8926 SPIRO NO PERF OR DOC: HCPCS | Performed by: FAMILY MEDICINE

## 2021-11-12 PROCEDURE — G8484 FLU IMMUNIZE NO ADMIN: HCPCS | Performed by: FAMILY MEDICINE

## 2021-11-12 PROCEDURE — 1036F TOBACCO NON-USER: CPT | Performed by: FAMILY MEDICINE

## 2021-11-12 PROCEDURE — 3023F SPIROM DOC REV: CPT | Performed by: FAMILY MEDICINE

## 2021-11-12 PROCEDURE — G0439 PPPS, SUBSEQ VISIT: HCPCS | Performed by: FAMILY MEDICINE

## 2021-11-12 PROCEDURE — G8417 CALC BMI ABV UP PARAM F/U: HCPCS | Performed by: FAMILY MEDICINE

## 2021-11-12 PROCEDURE — 99212 OFFICE O/P EST SF 10 MIN: CPT | Performed by: FAMILY MEDICINE

## 2021-11-12 PROCEDURE — G8427 DOCREV CUR MEDS BY ELIG CLIN: HCPCS | Performed by: FAMILY MEDICINE

## 2021-11-12 RX ORDER — LEVOTHYROXINE SODIUM 0.05 MG/1
50 TABLET ORAL DAILY
Qty: 90 TABLET | Refills: 0 | Status: SHIPPED | OUTPATIENT
Start: 2021-11-12 | End: 2022-02-07 | Stop reason: SDUPTHER

## 2021-11-12 RX ORDER — LANCETS 30 GAUGE
EACH MISCELLANEOUS
Qty: 100 EACH | Refills: 1 | Status: SHIPPED | OUTPATIENT
Start: 2021-11-12

## 2021-11-12 RX ORDER — ATORVASTATIN CALCIUM 10 MG/1
10 TABLET, FILM COATED ORAL DAILY
Qty: 90 TABLET | Refills: 3 | Status: SHIPPED | OUTPATIENT
Start: 2021-11-12

## 2021-11-12 RX ORDER — OMEGA-3-ACID ETHYL ESTERS 1 G/1
2 CAPSULE, LIQUID FILLED ORAL 2 TIMES DAILY
Qty: 120 CAPSULE | Refills: 11 | Status: SHIPPED | OUTPATIENT
Start: 2021-11-12

## 2021-11-12 ASSESSMENT — ENCOUNTER SYMPTOMS
BLOOD IN STOOL: 0
SHORTNESS OF BREATH: 1
CONSTIPATION: 0
DIARRHEA: 0

## 2021-11-12 NOTE — PROGRESS NOTES
EKATERINA Barreto 98  [address], [address] Rehana Garrick Yo  [phone]      Mar Runner is a 48 y.o. male who presents today for his medical conditions/complaints as noted below. Mar Runner is c/o of Medicare AWV and Discuss Labs      HPI:     ***    Past Medical History:   Diagnosis Date    Allergic rhinitis     Bipolar disorder ([facility] [address]     Diagnosed in     Chronic obstructive pulmonary disease (COPD) ([facility] [address] [facility]     Depression     Headache(784.0)     Osteoarthritis     TMJ (dislocation of temporomandibular joint)     Torticollis       Past Surgical History:   Procedure Laterality Date    TONSILLECTOMY       Family History   Problem Relation Age of Onset    Stroke Mother     High Blood Pressure Mother     High Blood Pressure Father     Stroke Father     Glaucoma Neg Hx     Diabetes Neg Hx     Cataracts Neg Hx      Social History     Tobacco Use    Smoking status: Former Smoker     Packs/day: 1.00     Years: 25.00     Pack years: 25.00     Types: Cigarettes     Start date: 10/16/1992     Quit date: 2017     Years since quittin.3    Smokeless tobacco: Never Used    Tobacco comment: handout given. Substance Use Topics    Alcohol use: No      Current Outpatient Medications   Medication Sig Dispense Refill    levothyroxine (SYNTHROID) 50 MCG tablet Take 1 tablet by mouth daily Take first thing in the morning, on an empty stomach, 30 mins to other meds/food. 90 tablet 0    blood glucose test strips (ASCENSIA AUTODISC VI;ONE TOUCH ULTRA TEST VI) strip Use daily and as needed to check blood glucose. 100 each 1    Lancets MISC Use daily and as needed to check blood glucose. Please dispense per patients insurance.  100 each 1    risperiDONE (RISPERDAL) 3 MG tablet Take 1 tablet by mouth nightly 90 tablet 3    venlafaxine (EFFEXOR XR) 75 MG extended release capsule Take 1 capsule by mouth daily Take with 150 mg capsule for total daily dose of 225 mg daily. 90 capsule 1    ketoconazole (NIZORAL) 2 % shampoo Apply topically daily to affected areas x 14 days, then twice weekly as needed. 120 mL 0    atorvastatin (LIPITOR) 10 MG tablet Take 1 tablet by mouth daily 90 tablet 0    omega-3 acid ethyl esters (LOVAZA) 1 g capsule Take 2 capsules by mouth 2 times daily Take with meals. 120 capsule 3    divalproex (DEPAKOTE ER) 500 MG extended release tablet Take 1 tablet by mouth 2 times daily 60 tablet 5    ibuprofen (ADVIL;MOTRIN) 600 MG tablet TAKE 1 TABLET BY MOUTH EVERY 8 HOURS AS NEEDED FOR PAIN 90 tablet 3    Handicap Placard MISC by Does not apply route Issue parking placard for person with disability. [facility] [id number]   Applicant meets the qualifying disability criteria. Expires 2 years from issuing date. 1 each 0    busPIRone (BUSPAR) 15 MG tablet Take 15 mg by mouth 2 times daily as needed (anxiety) 60 tablet 5    tiZANidine (ZANAFLEX) 4 MG tablet Take 1 tablet by mouth every 8 hours as needed (muscle spasm) 60 tablet 5    Respiratory Therapy Supplies (NEBULIZER/TUBING/MOUTHPIECE) KIT Use daily as needed for shortness of breath.  1 kit 0    Nebulizers (COMPRESSOR/NEBULIZER) MISC Use daily as needed for shortness of breath 1 each 0    albuterol (PROVENTIL) (2.5 MG/3ML) 0.083% nebulizer solution Take 3 mLs by nebulization every 6 hours as needed for Wheezing or Shortness of Breath 120 vial 5    albuterol sulfate HFA (VENTOLIN HFA) 108 (90 Base) MCG/ACT inhaler Inhale 1-2 puffs into the lungs every 6 hours as needed for Wheezing 3 Inhaler 3    famotidine (PEPCID) 20 MG tablet Take 1 tablet by mouth 2 times daily 180 tablet 3    venlafaxine (EFFEXOR XR) 150 MG extended release capsule TAKE ONE CAPSULE BY MOUTH DAILY 90 capsule 3    loratadine (CLARITIN) 10 MG tablet Take 1 tablet by mouth daily 90 tablet 3    ondansetron (ZOFRAN) 4 MG tablet Take 1 tablet by mouth every 8 hours as needed for Nausea 30 tablet 2    fluticasone (FLONASE) 50 MCG/ACT nasal spray 2 sprays by Nasal route daily 3 Bottle 3    vitamin B-12 (CYANOCOBALAMIN) 1000 MCG tablet Take 1 tablet by mouth daily 30 tablet 11    folic acid (FOLVITE) 426 MCG tablet Take 1 tablet by mouth daily 30 tablet 11    aspirin (ASPIRIN 81) 81 MG EC tablet Take 1 tablet by mouth daily 30 tablet 11    Menthol, Topical Analgesic, (BIOFREEZE) 4 % GEL Apply to shoulders & neck to relieve pain.  237 mL 5    rizatriptan (MAXALT) 10 MG tablet Take 1 tablet by mouth once as needed for Migraine May repeat in 2 hours if needed 30 tablet 5    tiotropium (SPIRIVA HANDIHALER) 18 MCG inhalation capsule Inhale 1 capsule into the lungs daily 90 capsule 3    Respiratory Therapy Supplies (NEBULIZER COMPRESSOR) KIT 1 kit by Does not apply route once for 1 dose 1 kit 0     Current Facility-Administered Medications   Medication Dose Route Frequency Provider Last Rate Last Admin    phenylephrine (MYDFRIN) 2.5 % ophthalmic solution 1 drop  1 drop Both Eyes Once Inge Peters MD        tropicamide (MYDRIACYL) 1 % ophthalmic solution 1 drop  1 drop Both Eyes Once Inge Peters MD         Allergies   Allergen Reactions    Keflex [Cephalexin] Other (See Comments)     Migraines, upset stomach     Naproxen      ulcers       Health Maintenance   Topic Date Due    Hepatitis C screen  Never done    Pneumococcal 0-64 years Vaccine (1 of 2 - PPSV23) Never done    COVID-19 Vaccine (1) Never done    HIV screen  Never done    DTaP/Tdap/Td vaccine (1 - Tdap) Never done    Colon cancer screen colonoscopy  Never done    Flu vaccine (1) Never done    Lipid screen  10/18/2022    Hepatitis A vaccine  Aged Out    Hepatitis B vaccine  Aged Out    Hib vaccine  Aged Out    Meningococcal (ACWY) vaccine  Aged Out       Subjective:      Review of Systems    Objective:     Vitals:    11/12/21 1108   BP: 126/88   Site: Right Upper Arm   Position: Sitting   Cuff Size: Large Adult   Pulse: 80   Temp: 98.1 °F (36.7 °C)   TempSrc: Temporal   SpO2: 98%   Weight: 265 lb 11.2 oz (120.5 kg)   Height: 5' 11\" (1.803 m)     Physical Exam    Assessment:      1. Hypothyroidism, unspecified type  -     TSH With Reflex Ft4; Future  -     levothyroxine (SYNTHROID) 50 MCG tablet; Take 1 tablet by mouth daily Take first thing in the morning, on an empty stomach, 30 mins to other meds/food. , Disp-90 tablet, R-0Normal  2. Impaired fasting glucose  -     blood glucose test strips (ASCENSIA AUTODISC VI;ONE TOUCH ULTRA TEST VI) strip; Disp-100 each, R-1, NormalUse daily and as needed to check blood glucose. -     Lancets MISC; Disp-100 each, R-1, NormalUse daily and as needed to check blood glucose. Please dispense per patients insurance. Plan:      No follow-ups on file. Orders Placed This Encounter   Procedures    TSH With Reflex Ft4     Standing Status:   Future     Standing Expiration Date:   11/12/2022     Orders Placed This Encounter   Medications    levothyroxine (SYNTHROID) 50 MCG tablet     Sig: Take 1 tablet by mouth daily Take first thing in the morning, on an empty stomach, 30 mins to other meds/food. Dispense:  90 tablet     Refill:  0    blood glucose test strips (ASCENSIA AUTODISC VI;ONE TOUCH ULTRA TEST VI) strip     Sig: Use daily and as needed to check blood glucose. Dispense:  100 each     Refill:  1     Please dispense for Relion glucometer.  Lancets MISC     Sig: Use daily and as needed to check blood glucose. Please dispense per patients insurance. Dispense:  100 each     Refill:  1       Patient given educational materials - see patient instructions. Discussed use, benefit, and side effects of prescribed medications. All patient questions answered. Pt voiced understanding. Reviewed health maintenance.             Electronically signed by Dominique Ballesteros DO, DO on 11/12/2021 at 11:25 AM

## 2021-11-12 NOTE — TELEPHONE ENCOUNTER
Pt seen in the office today - he has already purchased a glucose meter, but was given Rx's for glucose test strips and lancets at [address] today.

## 2021-11-12 NOTE — PROGRESS NOTES
Medicare Annual Wellness Visit  Name: Jamil Herring Date: 2021   MRN: [id number] Sex: Male   Age: 48 y.o. Ethnicity: Non- / Non    : 1975 Race: White (non-)      Lucy Martinez is here for Medicare AWV and Discuss Labs    Screenings for behavioral, psychosocial and functional/safety risks, and cognitive dysfunction are all negative except as indicated below. These results, as well as other patient data from the [address] form, are documented in Flowsheets linked to this Encounter. Allergies   Allergen Reactions    Keflex [Cephalexin] Other (See Comments)     Migraines, upset stomach     Naproxen      ulcers         Prior to Visit Medications    Medication Sig Taking? Authorizing Provider   levothyroxine (SYNTHROID) 50 MCG tablet Take 1 tablet by mouth daily Take first thing in the morning, on an empty stomach, 30 mins to other meds/food. Yes Eileen Vance, DO   blood glucose test strips (ASCENSIA AUTODISC VI;ONE TOUCH ULTRA TEST VI) strip Use daily and as needed to check blood glucose. Yes Eileen Vance, DO   Lancets MISC Use daily and as needed to check blood glucose. Please dispense per patients insurance. Yes Eileen Vance, DO   omega-3 acid ethyl esters (LOVAZA) 1 g capsule Take 2 capsules by mouth 2 times daily Take with meals. Yes Eileen Vance, DO   atorvastatin (LIPITOR) 10 MG tablet Take 1 tablet by mouth daily Yes Eileen Vance, DO   risperiDONE (RISPERDAL) 3 MG tablet Take 1 tablet by mouth nightly Yes Eileen Vance, DO   venlafaxine (EFFEXOR XR) 75 MG extended release capsule Take 1 capsule by mouth daily Take with 150 mg capsule for total daily dose of 225 mg daily. Yes Eileen Vance, DO   ketoconazole (NIZORAL) 2 % shampoo Apply topically daily to affected areas x 14 days, then twice weekly as needed.  Yes Eileen Vance, DO   divalproex (DEPAKOTE ER) 500 MG extended release tablet Take 1 tablet by mouth 2 times daily Yes Eileen Franco Black, DO   ibuprofen (ADVIL;MOTRIN) 600 MG tablet TAKE 1 TABLET BY MOUTH EVERY 8 HOURS AS NEEDED FOR PAIN Yes Dionne Vance, DO   Handicap Placard MISC by Does not apply route Issue parking placard for person with disability. [facility] [id number]   Applicant meets the qualifying disability criteria. Expires 2 years from issuing date. Yes Dionne Vance, DO   busPIRone (BUSPAR) 15 MG tablet Take 15 mg by mouth 2 times daily as needed (anxiety) Yes Dionne Vance, DO   tiZANidine (ZANAFLEX) 4 MG tablet Take 1 tablet by mouth every 8 hours as needed (muscle spasm) Yes Azam Reynolds, DO   Respiratory Therapy Supplies (NEBULIZER/TUBING/MOUTHPIECE) KIT Use daily as needed for shortness of breath. Yes Dionne Vance, DO   Nebulizers (COMPRESSOR/NEBULIZER) MISC Use daily as needed for shortness of breath Yes Dionne Vance, DO   albuterol (PROVENTIL) (2.5 MG/3ML) 0.083% nebulizer solution Take 3 mLs by nebulization every 6 hours as needed for Wheezing or Shortness of Breath Yes Dionne Vance, DO   albuterol sulfate HFA (VENTOLIN HFA) 108 (90 Base) MCG/ACT inhaler Inhale 1-2 puffs into the lungs every 6 hours as needed for Wheezing Yes Sveta Alonzo MD   famotidine (PEPCID) 20 MG tablet Take 1 tablet by mouth 2 times daily Yes Dionne Vance, DO   venlafaxine (EFFEXOR XR) 150 MG extended release capsule TAKE ONE CAPSULE BY MOUTH DAILY Yes Dionne Vance, DO   loratadine (CLARITIN) 10 MG tablet Take 1 tablet by mouth daily Yes Dionne Vance, DO   fluticasone (FLONASE) 50 MCG/ACT nasal spray 2 sprays by Nasal route daily Yes Dionne Vance, DO   vitamin B-12 (CYANOCOBALAMIN) 1000 MCG tablet Take 1 tablet by mouth daily Yes Dionne Vance, DO   folic acid (FOLVITE) 431 MCG tablet Take 1 tablet by mouth daily Yes Dionne Vance, DO   aspirin (ASPIRIN 81) 81 MG EC tablet Take 1 tablet by mouth daily Yes Dionne Vance, DO   Menthol, Topical Analgesic, (BIOFREEZE) 4 % GEL Apply to shoulders & neck to relieve pain.  Yes Azam Reynolds, DO docusate sodium (COLACE) 100 MG capsule Take 1 capsule by mouth 1-2x's daily as needed for constipation. Rufina Blair DO   blood glucose monitor kit and supplies Test 3 times a day & as needed for symptoms of irregular blood glucose.   Sveta Vance DO   rizatriptan (MAXALT) 10 MG tablet Take 1 tablet by mouth once as needed for Migraine May repeat in 2 hours if needed  Karen Vance DO   tiotropium (SPIRIVA HANDIHALER) 18 MCG inhalation capsule Inhale 1 capsule into the lungs daily  Tyron Rosenbaum MD   Respiratory Therapy Supplies (NEBULIZER COMPRESSOR) KIT 1 kit by Does not apply route once for 1 dose  Sarah Avelar DO         Past Medical History:   Diagnosis Date    Allergic rhinitis     Bipolar disorder ([facility] [address]     Diagnosed in 1998    Chronic obstructive pulmonary disease (COPD) ([facility] [address]     Depression     Headache(784.0)     Osteoarthritis     TMJ (dislocation of temporomandibular joint)     Torticollis        Past Surgical History:   Procedure Laterality Date    TONSILLECTOMY           Family History   Problem Relation Age of Onset    Stroke Mother     High Blood Pressure Mother     High Blood Pressure Father     Stroke Father     Glaucoma Neg Hx     Diabetes Neg Hx     Cataracts Neg Hx        CareTeam (Including outside providers/suppliers regularly involved in providing care):   Patient Care Team:  Rufina Blair DO as PCP - General  Rufina Blair DO as PCP - [facility] Empaneled Provider  Milagros Ba MD as Neurologist (Neurology)  Tyron Rosenbaum MD as Consulting Physician (Pulmonary Disease)  Arpita Jay Ar, OD (Optometry)    Wt Readings from Last 3 Encounters:   11/12/21 265 lb 11.2 oz (120.5 kg)   09/24/21 254 lb 3.2 oz (115.3 kg)   05/10/21 274 lb (124.3 kg)     Vitals:    11/12/21 1108   BP: 126/88   Site: Right Upper Arm   Position: Sitting   Cuff Size: Large Adult   Pulse: 80   Temp: 98.1 °F (36.7 °C)   TempSrc: Temporal   SpO2: 98%   Weight: 265 lb 11.2 oz (120.5 kg) Height: 5' 11\" (1.803 m)     Body mass index is 37.06 kg/m². Based upon direct observation of the patient, evaluation of cognition reveals recent and remote memory intact. Patient's complete Health Risk Assessment and screening values have been reviewed and are found in Flowsheets. The following problems were reviewed today and where indicated follow up appointments were made and/or referrals ordered. Positive Risk Factor Screenings with Interventions:      Cognitive: Words recalled: 1 Word Recalled  Clock Drawing Test (CDT) Score: (!) Abnormal  Total Score Interpretation: Positive Mini-Cog  Cognitive Impairment Interventions:  · will monitor         General Health and ACP:  General  In general, how would you say your health is?: Fair  In the past 7 days, have you experienced any of the following? New or Increased Pain, New or Increased Fatigue, Loneliness, Social Isolation, Stress or Anger?: None of These  Do you get the social and emotional support that you need?: Yes  Do you have a Living Will?: (!) No  Advance Directives     Power of [address] [place] [address] Will ACP-Advance Directive ACP-Power of     Not on File Not on File Not on File Not on File      General Health Risk Interventions:  · No Living Will: info given to pt today    Health Habits/Nutrition:  Health Habits/Nutrition  Do you exercise for at least 20 minutes 2-3 times per week?: (!) No  Have you lost any weight without trying in the past 3 months?: No  Do you eat only one meal per day?: No  Have you seen the dentist within the past year?: (!) No  Body mass index: (!) 37.05  Health Habits/Nutrition Interventions:  · Dental exam overdue:  patient declines dental evaluation       Personalized Preventive Plan   Current Health Maintenance Status    There is no immunization history on file for this patient.      Health Maintenance   Topic Date Due    Hepatitis C screen  Never done    COVID-19 Vaccine (1) Never done    Pneumococcal 0-64 years Vaccine (1 of 2 - PPSV23) Never done    HIV screen  Never done    DTaP/Tdap/Td vaccine (1 - Tdap) Never done    Colon cancer screen colonoscopy  Never done    Flu vaccine (1) Never done    Lipid screen  10/18/2022    Hepatitis A vaccine  Aged Out    Hepatitis B vaccine  Aged Out    Hib vaccine  Aged Out    Meningococcal (ACWY) vaccine  Aged Out     Recommendations for [employer] Due: see orders and patient instructions/AVS.  . Recommended screening schedule for the next 5-10 years is provided to the patient in written form: see Patient Instructions/AVS.    Mahesh Sullivan was seen today for medicare awv and discuss labs. Diagnoses and all orders for this visit:    Hypothyroidism, unspecified type  -     TSH With Reflex Ft4; Future  -     levothyroxine (SYNTHROID) 50 MCG tablet; Take 1 tablet by mouth daily Take first thing in the morning, on an empty stomach, 30 mins to other meds/food. Vitamin D deficiency  -     Vitamin D 25 Hydroxy; Future    Hypercholesterolemia with hypertriglyceridemia  -     Comprehensive Metabolic Panel; Future  -     Lipid Panel; Future  -     omega-3 acid ethyl esters (LOVAZA) 1 g capsule; Take 2 capsules by mouth 2 times daily Take with meals. -     atorvastatin (LIPITOR) 10 MG tablet; Take 1 tablet by mouth daily    Impaired fasting glucose  -     blood glucose test strips (ASCENSIA AUTODISC VI;ONE TOUCH ULTRA TEST VI) strip; Use daily and as needed to check blood glucose. -     Lancets MISC; Use daily and as needed to check blood glucose. Please dispense per patients insurance. -     Comprehensive Metabolic Panel;  Future    Bipolar affective disorder, currently depressed, moderate (HCC)    Migraine without status migrainosus, not intractable, unspecified migraine type    Anxiety    Seborrheic dermatitis    Chronic obstructive pulmonary disease, unspecified COPD type (HCC)    Chronic neck pain    Routine general medical examination at a health care facility    Screen for colon cancer  -     Cologuard;  Future

## 2021-11-12 NOTE — PROGRESS NOTES
Call from pharm, states insurance won't cover meter supplies associated with meter pt currently has. Asking for an order for generic BG monitor and they will be able to get him what is covered as well as supplies.

## 2021-11-12 NOTE — PATIENT INSTRUCTIONS
Personalized Preventive Plan for Mar Runner - 11/12/2021  Medicare offers a range of preventive health benefits. Some of the tests and screenings are paid in full while other may be subject to a deductible, co-insurance, and/or copay. Some of these benefits include a comprehensive review of your medical history including lifestyle, illnesses that may run in your family, and various assessments and screenings as appropriate. After reviewing your medical record and screening and assessments performed today your provider may have ordered immunizations, labs, imaging, and/or referrals for you. A list of these orders (if applicable) as well as your Preventive Care list are included within your After Visit Summary for your review. Other Preventive Recommendations:    · A preventive eye exam performed by an eye specialist is recommended every 1-2 years to screen for glaucoma; cataracts, macular degeneration, and other eye disorders. · A preventive dental visit is recommended every 6 months. · Try to get at least 150 minutes of exercise per week or 10,000 steps per day on a pedometer . · Order or download the FREE \"Exercise & Physical Activity: Your Everyday Guide\" from [employer]. Call 5-516.212.6795 or search [employer] online. · You need 5308-4319 mg of calcium and 5975-5259 IU of vitamin D per day. It is possible to meet your calcium requirement with diet alone, but a vitamin D supplement is usually necessary to meet this goal.  · When exposed to the sun, use a sunscreen that protects against both UVA and UVB radiation with an SPF of 30 or greater. Reapply every 2 to 3 hours or after sweating, drying off with a towel, or swimming. · Always wear a seat belt when traveling in a car. Always wear a helmet when riding a bicycle or motorcycle.

## 2021-11-12 NOTE — PROGRESS NOTES
[address]  [address], [address]  [phone]      Navya Ochoa is a 48 y.o. male who presents today for his medical conditions/complaints as noted below. Navya Ochoa is c/o of Medicare AWV and Discuss Labs      HPI:     Pt here today for follow-up of HYPERLIPIDEMIA, mood, and for MAW visit (see other note). Results reviewed with pt during OV today - A1c normal; CMP normal; CBC essentially normal.  LP much improved with only low HDL at 33 and slightly high TG's at 194 now - pt started Lovaza 2 g BID, and is tolerating well. Also taking Lipitor 10 mg daily. TSH increased to 8.82; T4 normal.  Pt has been more tired and wanting to sleep more; has also noticed some weight gain and dry skin recently. Weight back up from last OV on 9/24 (11 lbs). No h/o hypothyroidism. Vit D improving, but still low at 25.7. Pt currently taking Vit D3 2000 IU daily. Has been using coconut oil on his face for areas of seborrheic dermatitis, along with Cortisone-10. Pt used the Nizoral shampoo 1-2x's, but has not used it recently. Does feel that it is improving. Has cut down on caffeine - having maybe 1 pop per day. Migraines have been better - has only maybe 2 per month. Taking Depakote- mg BID for migraine prevention. Taking Maxalt 10 mg as needed for migraine. BP well-controlled today - 126/88. Mood has been stable - does not get as frustrated or angry with his son as he used to. Taking Effexor- mg (one 150 mg and one 75 mg tab) daily, Buspar 10 mg BID, and Risperdal 3 mg nightly. Has been able to cut down on his use of Pepcid and no longer using Zofran - no longer having the gaggy feeling as much. No recent heartburn. Using Spiriva inhaler once daily (when he remembers) and Albuterol inhaler 1-2x's per day as needed. Works well when he uses it. Has not used the nebulizer recently.   Uses Claritin and Flonase as needed. Taking Zanaflex 4 mg nightly with his Risperdal for neck pain; also uses Ibuprofen 600 mg as needed for neck pain and/or BioFreeze. Taking B-12 9611 mcg daily, folic acid 596 mcg daily, and ASA 81 mg daily. Past Medical History:   Diagnosis Date    Allergic rhinitis     Bipolar disorder ([facility] [address]     Diagnosed in     Chronic obstructive pulmonary disease (COPD) ([facility] [address]     Depression     Headache(784.0)     Osteoarthritis     TMJ (dislocation of temporomandibular joint)     Torticollis       Past Surgical History:   Procedure Laterality Date    TONSILLECTOMY       Family History   Problem Relation Age of Onset    Stroke Mother     High Blood Pressure Mother     High Blood Pressure Father     Stroke Father     Glaucoma Neg Hx     Diabetes Neg Hx     Cataracts Neg Hx      Social History     Tobacco Use    Smoking status: Former Smoker     Packs/day: 1.00     Years: 25.00     Pack years: 25.00     Types: Cigarettes     Start date: 10/16/1992     Quit date: 2017     Years since quittin.4    Smokeless tobacco: Never Used    Tobacco comment: handout given. Substance Use Topics    Alcohol use: No      Current Outpatient Medications   Medication Sig Dispense Refill    levothyroxine (SYNTHROID) 50 MCG tablet Take 1 tablet by mouth daily Take first thing in the morning, on an empty stomach, 30 mins to other meds/food. 90 tablet 0    blood glucose test strips (ASCENSIA AUTODISC VI;ONE TOUCH ULTRA TEST VI) strip Use daily and as needed to check blood glucose. 100 each 1    Lancets MISC Use daily and as needed to check blood glucose. Please dispense per patients insurance. 100 each 1    omega-3 acid ethyl esters (LOVAZA) 1 g capsule Take 2 capsules by mouth 2 times daily Take with meals.  120 capsule 11    atorvastatin (LIPITOR) 10 MG tablet Take 1 tablet by mouth daily 90 tablet 3    risperiDONE (RISPERDAL) 3 MG tablet Take 1 tablet by mouth nightly 90 tablet 3    venlafaxine (EFFEXOR XR) 75 MG extended release capsule Take 1 capsule by mouth daily Take with 150 mg capsule for total daily dose of 225 mg daily. 90 capsule 1    ketoconazole (NIZORAL) 2 % shampoo Apply topically daily to affected areas x 14 days, then twice weekly as needed. 120 mL 0    divalproex (DEPAKOTE ER) 500 MG extended release tablet Take 1 tablet by mouth 2 times daily 60 tablet 5    ibuprofen (ADVIL;MOTRIN) 600 MG tablet TAKE 1 TABLET BY MOUTH EVERY 8 HOURS AS NEEDED FOR PAIN 90 tablet 3    Handicap Placard MISC by Does not apply route Issue parking placard for person with disability. [facility] [phone]   Applicant meets the qualifying disability criteria. Expires 2 years from issuing date. 1 each 0    busPIRone (BUSPAR) 15 MG tablet Take 15 mg by mouth 2 times daily as needed (anxiety) 60 tablet 5    tiZANidine (ZANAFLEX) 4 MG tablet Take 1 tablet by mouth every 8 hours as needed (muscle spasm) 60 tablet 5    Respiratory Therapy Supplies (NEBULIZER/TUBING/MOUTHPIECE) KIT Use daily as needed for shortness of breath.  1 kit 0    Nebulizers (COMPRESSOR/NEBULIZER) MISC Use daily as needed for shortness of breath 1 each 0    albuterol (PROVENTIL) (2.5 MG/3ML) 0.083% nebulizer solution Take 3 mLs by nebulization every 6 hours as needed for Wheezing or Shortness of Breath 120 vial 5    albuterol sulfate HFA (VENTOLIN HFA) 108 (90 Base) MCG/ACT inhaler Inhale 1-2 puffs into the lungs every 6 hours as needed for Wheezing 3 Inhaler 3    famotidine (PEPCID) 20 MG tablet Take 1 tablet by mouth 2 times daily 180 tablet 3    venlafaxine (EFFEXOR XR) 150 MG extended release capsule TAKE ONE CAPSULE BY MOUTH DAILY 90 capsule 3    loratadine (CLARITIN) 10 MG tablet Take 1 tablet by mouth daily 90 tablet 3    fluticasone (FLONASE) 50 MCG/ACT nasal spray 2 sprays by Nasal route daily 3 Bottle 3    vitamin B-12 (CYANOCOBALAMIN) 1000 MCG tablet Take 1 tablet by mouth daily 30 tablet 11    folic acid (FOLVITE) 400 MCG tablet Take 1 tablet by mouth daily 30 tablet 11    aspirin (ASPIRIN 81) 81 MG EC tablet Take 1 tablet by mouth daily 30 tablet 11    Menthol, Topical Analgesic, (BIOFREEZE) 4 % GEL Apply to shoulders & neck to relieve pain. 237 mL 5    docusate sodium (COLACE) 100 MG capsule Take 1 capsule by mouth 1-2x's daily as needed for constipation. 60 capsule 5    blood glucose monitor kit and supplies Test 3 times a day & as needed for symptoms of irregular blood glucose. 1 kit 0    rizatriptan (MAXALT) 10 MG tablet Take 1 tablet by mouth once as needed for Migraine May repeat in 2 hours if needed 30 tablet 5    tiotropium (SPIRIVA HANDIHALER) 18 MCG inhalation capsule Inhale 1 capsule into the lungs daily 90 capsule 3    Respiratory Therapy Supplies (NEBULIZER COMPRESSOR) KIT 1 kit by Does not apply route once for 1 dose 1 kit 0     Current Facility-Administered Medications   Medication Dose Route Frequency Provider Last Rate Last Admin    phenylephrine (MYDFRIN) 2.5 % ophthalmic solution 1 drop  1 drop Both Eyes Once Mukesh Leonardo MD        tropicamide (MYDRIACYL) 1 % ophthalmic solution 1 drop  1 drop Both Eyes Once Mukesh Leonardo MD         Allergies   Allergen Reactions    Keflex [Cephalexin] Other (See Comments)     Migraines, upset stomach     Naproxen      ulcers       Health Maintenance   Topic Date Due    Hepatitis C screen  Never done    COVID-19 Vaccine (1) Never done    Pneumococcal 0-64 years Vaccine (1 of 2 - PPSV23) Never done    HIV screen  Never done    DTaP/Tdap/Td vaccine (1 - Tdap) Never done    Colon cancer screen colonoscopy  Never done    Flu vaccine (1) Never done    Lipid screen  10/18/2022    Hepatitis A vaccine  Aged Out    Hepatitis B vaccine  Aged Out    Hib vaccine  Aged Out    Meningococcal (ACWY) vaccine  Aged Out       Subjective:      Review of Systems   Constitutional: Positive for fatigue. Negative for unexpected weight change.    Respiratory: Positive for shortness of breath (stable). Cardiovascular: Positive for leg swelling. Gastrointestinal: Negative for blood in stool, constipation and diarrhea. Genitourinary: Negative for hematuria. Psychiatric/Behavioral: Positive for dysphoric mood (stable). Negative for suicidal ideas. Objective:     Vitals:    11/12/21 1108   BP: 126/88   Site: Right Upper Arm   Position: Sitting   Cuff Size: Large Adult   Pulse: 80   Temp: 98.1 °F (36.7 °C)   TempSrc: Temporal   SpO2: 98%   Weight: 265 lb 11.2 oz (120.5 kg)   Height: 5' 11\" (1.803 m)     Physical Exam  Vitals and nursing note reviewed. Constitutional:       General: He is not in acute distress. Appearance: He is well-developed. HENT:      Head: Normocephalic and atraumatic. Right Ear: Tympanic membrane, ear canal and external ear normal.      Left Ear: Tympanic membrane, ear canal and external ear normal.      Nose: Nose normal.      Mouth/Throat:      Mouth: Mucous membranes are moist.      Pharynx: Oropharynx is clear. No oropharyngeal exudate. Eyes:      Conjunctiva/sclera: Conjunctivae normal.   Cardiovascular:      Rate and Rhythm: Normal rate and regular rhythm. Heart sounds: Normal heart sounds. Pulmonary:      Effort: Pulmonary effort is normal. No respiratory distress. Breath sounds: Normal breath sounds. Abdominal:      General: Bowel sounds are normal. There is no distension. Palpations: Abdomen is soft. Tenderness: There is no abdominal tenderness. Musculoskeletal:      Right lower leg: Edema (1-2+) present. Left lower leg: Edema (1-2+) present. Skin:     General: Skin is warm and dry. Neurological:      Mental Status: He is alert and oriented to person, place, and time. Assessment:      1. Hypothyroidism, unspecified type  -     TSH With Reflex Ft4; Future  -     levothyroxine (SYNTHROID) 50 MCG tablet;  Take 1 tablet by mouth daily Take first thing in the morning, on an empty stomach, 30 mins to other meds/food. , Disp-90 tablet, R-0Normal  2. Vitamin D deficiency  -     Vitamin D 25 Hydroxy; Future  3. Hypercholesterolemia with hypertriglyceridemia  -     Comprehensive Metabolic Panel; Future  -     Lipid Panel; Future  -     omega-3 acid ethyl esters (LOVAZA) 1 g capsule; Take 2 capsules by mouth 2 times daily Take with meals. , Disp-120 capsule, R-11Normal  -     atorvastatin (LIPITOR) 10 MG tablet; Take 1 tablet by mouth daily, Disp-90 tablet, R-3Normal  4. Impaired fasting glucose  -     blood glucose test strips (ASCENSIA AUTODISC VI;ONE TOUCH ULTRA TEST VI) strip; Disp-100 each, R-1, NormalUse daily and as needed to check blood glucose. -     Lancets MISC; Disp-100 each, R-1, NormalUse daily and as needed to check blood glucose. Please dispense per patients insurance. -     Comprehensive Metabolic Panel; Future  5. Bipolar affective disorder, currently depressed, moderate ([address]  6. Migraine without status migrainosus, not intractable, unspecified migraine type  7. Anxiety  8. Seborrheic dermatitis  9. Chronic obstructive pulmonary disease, unspecified COPD type ([address]  10. Chronic neck pain  11. Routine general medical examination at a health care facility  12. Screen for colon cancer  -     Cologuard; Future         Plan: Will start Synthroid 50 mcg daily and re-check levels again in 3 months. Will increase Vit D3 to 3000 IU daily, and re-check level again in 6 months. Return in 6 months (on 5/12/2022) for f/u thyroid, HLD, Vit D. Orders Placed This Encounter   Procedures    Cologuard     This test is performed by an external laboratory and is used for result attachment only. It is required that this order requisition be faxed to: [facility] @ [phone]. See [email or web address] for further information.      Standing Status:   Future     Standing Expiration Date:   11/12/2022    TSH With Reflex Ft4     Standing Status:   Future     Standing Expiration Date: 11/12/2022    Vitamin D 25 Hydroxy     Standing Status:   Future     Standing Expiration Date:   11/12/2022    Comprehensive Metabolic Panel     Standing Status:   Future     Standing Expiration Date:   12/30/2022    Lipid Panel     Standing Status:   Future     Standing Expiration Date:   12/30/2022     Order Specific Question:   Is Patient Fasting?/# of Hours     Answer:   12     Orders Placed This Encounter   Medications    levothyroxine (SYNTHROID) 50 MCG tablet     Sig: Take 1 tablet by mouth daily Take first thing in the morning, on an empty stomach, 30 mins to other meds/food. Dispense:  90 tablet     Refill:  0    blood glucose test strips (ASCENSIA AUTODISC VI;ONE TOUCH ULTRA TEST VI) strip     Sig: Use daily and as needed to check blood glucose. Dispense:  100 each     Refill:  1     Please dispense for Relion glucometer.  Lancets MISC     Sig: Use daily and as needed to check blood glucose. Please dispense per patients insurance. Dispense:  100 each     Refill:  1    omega-3 acid ethyl esters (LOVAZA) 1 g capsule     Sig: Take 2 capsules by mouth 2 times daily Take with meals. Dispense:  120 capsule     Refill:  11    atorvastatin (LIPITOR) 10 MG tablet     Sig: Take 1 tablet by mouth daily     Dispense:  90 tablet     Refill:  3       Patient given educational materials - see patient instructions. Discussed use, benefit, and side effects of prescribed medications. All patient questions answered. Pt voiced understanding. Reviewed health maintenance.             Electronically signed by Dmitry Gilbert DO, DO on 11/22/2021 at 12:04 AM

## 2021-11-14 ENCOUNTER — PATIENT MESSAGE (OUTPATIENT)
Dept: FAMILY MEDICINE CLINIC | Age: 46
End: 2021-11-14

## 2021-11-14 DIAGNOSIS — K59.03 DRUG-INDUCED CONSTIPATION: Primary | ICD-10-CM

## 2021-11-15 NOTE — TELEPHONE ENCOUNTER
From: Luz Maria Almanza  To: Dr. Quang Russ: 11/14/2021 10:07 PM EST  Subject: Stool softener    I was wondering if you could prescript me a stool softener I think some of the medicine I am on makes it hard at times two go number 2 thank you all so much    Ford Culp

## 2021-11-16 ENCOUNTER — TELEPHONE (OUTPATIENT)
Dept: FAMILY MEDICINE CLINIC | Age: 46
End: 2021-11-16

## 2021-11-16 RX ORDER — DOCUSATE SODIUM 100 MG/1
CAPSULE, LIQUID FILLED ORAL
Qty: 60 CAPSULE | Refills: 5 | Status: SHIPPED | OUTPATIENT
Start: 2021-11-16

## 2021-11-16 NOTE — TELEPHONE ENCOUNTER
Received denial letter from [facility] stating pt does not meet criteria as he is under 46. Call to pt, states he does not wish to have traditional colonoscopy. Will order [facility] again in 2 years.

## 2021-11-25 ENCOUNTER — PATIENT MESSAGE (OUTPATIENT)
Dept: FAMILY MEDICINE CLINIC | Age: 46
End: 2021-11-25

## 2021-11-26 NOTE — TELEPHONE ENCOUNTER
From: Zeinab Abel  To: Dr. Shah Litzy: 11/25/2021 7:19 AM EST  Subject: Good Morning    Just wanted to Say Happy Thanksgiving to you and your staff     Mark Quintanilla

## 2021-12-01 DIAGNOSIS — F41.9 ANXIETY: ICD-10-CM

## 2021-12-01 DIAGNOSIS — R10.13 DYSPEPSIA: ICD-10-CM

## 2021-12-01 DIAGNOSIS — F31.32 BIPOLAR AFFECTIVE DISORDER, CURRENTLY DEPRESSED, MODERATE (HCC): ICD-10-CM

## 2021-12-01 DIAGNOSIS — R11.10 DRY HEAVES: ICD-10-CM

## 2021-12-01 DIAGNOSIS — E78.2 HYPERCHOLESTEROLEMIA WITH HYPERTRIGLYCERIDEMIA: ICD-10-CM

## 2021-12-01 RX ORDER — OMEGA-3-ACID ETHYL ESTERS 1 G/1
2 CAPSULE, LIQUID FILLED ORAL 2 TIMES DAILY
Qty: 120 CAPSULE | Refills: 11 | OUTPATIENT
Start: 2021-12-01

## 2021-12-01 RX ORDER — VENLAFAXINE HYDROCHLORIDE 150 MG/1
CAPSULE, EXTENDED RELEASE ORAL
Qty: 90 CAPSULE | Refills: 3 | OUTPATIENT
Start: 2021-12-01

## 2021-12-01 RX ORDER — ATORVASTATIN CALCIUM 10 MG/1
10 TABLET, FILM COATED ORAL DAILY
Qty: 90 TABLET | Refills: 3 | OUTPATIENT
Start: 2021-12-01

## 2021-12-01 RX ORDER — FAMOTIDINE 20 MG/1
20 TABLET, FILM COATED ORAL 2 TIMES DAILY
Qty: 180 TABLET | Refills: 3 | OUTPATIENT
Start: 2021-12-01

## 2021-12-01 NOTE — TELEPHONE ENCOUNTER
Spoke with patient. Patient has refills on the others and the lipitor and omega 3 was already filled. [facility] called requesting a refill of the below medication which has been pended for you:     Requested Prescriptions     Pending Prescriptions Disp Refills    busPIRone (BUSPAR) 15 MG tablet 60 tablet 5     Sig: Take 15 mg by mouth 2 times daily as needed (anxiety)     Refused Prescriptions Disp Refills    famotidine (PEPCID) 20 MG tablet 180 tablet 3     Sig: Take 1 tablet by mouth 2 times daily    venlafaxine (EFFEXOR XR) 150 MG extended release capsule 90 capsule 3     Sig: TAKE ONE CAPSULE BY MOUTH DAILY    omega-3 acid ethyl esters (LOVAZA) 1 g capsule 120 capsule 11     Sig: Take 2 capsules by mouth 2 times daily Take with meals.     atorvastatin (LIPITOR) 10 MG tablet 90 tablet 3     Sig: Take 1 tablet by mouth daily       Last Appointment Date: 11/12/2021  Next Appointment Date: 5/13/2022    Allergies   Allergen Reactions    Keflex [Cephalexin] Other (See Comments)     Migraines, upset stomach     Naproxen      ulcers

## 2021-12-02 ENCOUNTER — PATIENT MESSAGE (OUTPATIENT)
Dept: FAMILY MEDICINE CLINIC | Age: 46
End: 2021-12-02

## 2021-12-02 RX ORDER — BUSPIRONE HYDROCHLORIDE 15 MG/1
15 TABLET ORAL 2 TIMES DAILY PRN
Qty: 60 TABLET | Refills: 11 | Status: SHIPPED | OUTPATIENT
Start: 2021-12-02

## 2021-12-02 NOTE — TELEPHONE ENCOUNTER
From: Taylor Shetty  To: Dr. Littie Najjar: 12/2/2021 11:54 AM EST  Subject: blood work     when do i go in for my blood work to check my thyroid I lost the paper work and i am not seeing it on my main menu page

## 2021-12-15 DIAGNOSIS — R10.13 DYSPEPSIA: ICD-10-CM

## 2021-12-15 DIAGNOSIS — R11.10 DRY HEAVES: ICD-10-CM

## 2021-12-15 DIAGNOSIS — F41.9 ANXIETY: ICD-10-CM

## 2021-12-15 DIAGNOSIS — E78.2 HYPERCHOLESTEROLEMIA WITH HYPERTRIGLYCERIDEMIA: ICD-10-CM

## 2021-12-15 RX ORDER — ATORVASTATIN CALCIUM 10 MG/1
10 TABLET, FILM COATED ORAL DAILY
Qty: 90 TABLET | Refills: 3 | OUTPATIENT
Start: 2021-12-15

## 2021-12-15 RX ORDER — BUSPIRONE HYDROCHLORIDE 15 MG/1
15 TABLET ORAL 2 TIMES DAILY PRN
Qty: 60 TABLET | Refills: 11 | OUTPATIENT
Start: 2021-12-15

## 2021-12-15 RX ORDER — FAMOTIDINE 20 MG/1
TABLET, FILM COATED ORAL
Qty: 180 TABLET | Refills: 0 | OUTPATIENT
Start: 2021-12-15

## 2021-12-15 RX ORDER — FAMOTIDINE 20 MG/1
20 TABLET, FILM COATED ORAL 2 TIMES DAILY
Qty: 180 TABLET | Refills: 3 | OUTPATIENT
Start: 2021-12-15

## 2021-12-16 RX ORDER — FAMOTIDINE 20 MG/1
20 TABLET, FILM COATED ORAL 2 TIMES DAILY
Qty: 180 TABLET | Refills: 3 | Status: SHIPPED | OUTPATIENT
Start: 2021-12-16

## 2021-12-21 ENCOUNTER — PATIENT MESSAGE (OUTPATIENT)
Dept: FAMILY MEDICINE CLINIC | Age: 46
End: 2021-12-21

## 2021-12-21 NOTE — TELEPHONE ENCOUNTER
From: Sade Britton  To: Dr. Matamoros Close: 12/21/2021 3:39 PM EST  Subject: my scalp    Dr Haider Wheatley and Staff here lately my scalp feels as it is being poked my needles and really itcy it is driving me nuts what can i do to help :)    Thanks Sherry Nowak

## 2021-12-22 ENCOUNTER — HOSPITAL ENCOUNTER (EMERGENCY)
Age: 46
Discharge: HOME | End: 2021-12-23
Attending: EMERGENCY MEDICINE
Payer: MEDICARE

## 2021-12-22 VITALS
SYSTOLIC BLOOD PRESSURE: 172 MMHG | DIASTOLIC BLOOD PRESSURE: 90 MMHG | WEIGHT: 275 LBS | RESPIRATION RATE: 14 BRPM | HEIGHT: 71 IN | TEMPERATURE: 97.5 F | OXYGEN SATURATION: 98 % | BODY MASS INDEX: 38.5 KG/M2 | HEART RATE: 88 BPM

## 2021-12-22 DIAGNOSIS — L29.9 SCALP PRURITUS: Primary | ICD-10-CM

## 2021-12-22 PROCEDURE — 99284 EMERGENCY DEPT VISIT MOD MDM: CPT

## 2021-12-23 ENCOUNTER — PATIENT MESSAGE (OUTPATIENT)
Dept: FAMILY MEDICINE CLINIC | Age: 46
End: 2021-12-23

## 2021-12-23 PROCEDURE — 6370000000 HC RX 637 (ALT 250 FOR IP): Performed by: EMERGENCY MEDICINE

## 2021-12-23 RX ORDER — DIPHENHYDRAMINE HCL 25 MG
50 TABLET ORAL ONCE
Status: COMPLETED | OUTPATIENT
Start: 2021-12-23 | End: 2021-12-23

## 2021-12-23 RX ORDER — DIPHENHYDRAMINE HCL 25 MG
50 CAPSULE ORAL EVERY 6 HOURS PRN
Qty: 40 CAPSULE | Refills: 0 | Status: SHIPPED | OUTPATIENT
Start: 2021-12-23 | End: 2021-12-28

## 2021-12-23 RX ADMIN — DIPHENHYDRAMINE HYDROCHLORIDE 50 MG: 25 TABLET ORAL at 00:21

## 2021-12-23 ASSESSMENT — ENCOUNTER SYMPTOMS: SHORTNESS OF BREATH: 0

## 2021-12-23 NOTE — TELEPHONE ENCOUNTER
From: Artur Ziegler  To: Dr. Arvizu Founds: 12/23/2021 8:16 AM EST  Subject: er visit 2    Good Morning Dr Martinez January and staff I put the wrong thing the first time about the er visit I ment to say that I am having an allergy to something and they put me on benadryl for it    Arpita Sheets

## 2021-12-23 NOTE — ED PROVIDER NOTES
[facility] ED  [address]  [place] [address]  Phone: [phone]  eMERGENCY dEPARTMENT eNCOUnter      Pt Name: Justin Nolan  MRN: [id number]  Nadyagfmarco antonio 1975  Date of evaluation: 12/23/21      CHIEF COMPLAINT     Chief Complaint   Patient presents with    Other     itchy scalp, tingling sensation       HISTORY OF PRESENT ILLNESS    Justin Nolan is a 48 y.o. male who presents today with 2 weeks of itchy scalp and tingling sensation. Patient denies any new environmental factors any new personal hygiene products. Previously had an episode of something similar on his face his doctor prescribed him a medicated shampoo which he tried on this as well but without relief. Has not tried an antihistamine. No related fevers. No one that lives with him with similar. Has not observed any insect bites. REVIEW OF SYSTEMS     Review of Systems   Constitutional: Negative for fever. Respiratory: Negative for shortness of breath. Cardiovascular: Negative for chest pain. Neurological: Negative for syncope, weakness, numbness and headaches. All other systems reviewed and are negative. PAST MEDICAL HISTORY    has a past medical history of Allergic rhinitis, Bipolar disorder ([address] Chronic obstructive pulmonary disease (COPD) ([address] Depression, Headache(784.0), Osteoarthritis, TMJ (dislocation of temporomandibular joint), and Torticollis. SURGICAL HISTORY      has a past surgical history that includes Tonsillectomy.     CURRENT MEDICATIONS       Discharge Medication List as of 12/23/2021 12:20 AM      CONTINUE these medications which have NOT CHANGED    Details   famotidine (PEPCID) 20 MG tablet Take 1 tablet by mouth 2 times daily, Disp-180 tablet, R-3Normal      busPIRone (BUSPAR) 15 MG tablet Take 15 mg by mouth 2 times daily as needed (anxiety), Disp-60 tablet, R-11Normal      docusate sodium (COLACE) 100 MG capsule Take 1 capsule by mouth 1-2x's daily as needed for constipation. , Disp-60 capsule, R-5Normal      levothyroxine (SYNTHROID) 50 MCG tablet Take 1 tablet by mouth daily Take first thing in the morning, on an empty stomach, 30 mins to other meds/food. , Disp-90 tablet, R-0Normal      blood glucose test strips (ASCENSIA AUTODISC VI;ONE TOUCH ULTRA TEST VI) strip Disp-100 each, R-1, NormalUse daily and as needed to check blood glucose. Lancets MISC Disp-100 each, R-1, NormalUse daily and as needed to check blood glucose. Please dispense per patients insurance. omega-3 acid ethyl esters (LOVAZA) 1 g capsule Take 2 capsules by mouth 2 times daily Take with meals. , Disp-120 capsule, R-11Normal      atorvastatin (LIPITOR) 10 MG tablet Take 1 tablet by mouth daily, Disp-90 tablet, R-3Normal      blood glucose monitor kit and supplies Test 3 times a day & as needed for symptoms of irregular blood glucose., Disp-1 kit, R-0, Normal      risperiDONE (RISPERDAL) 3 MG tablet Take 1 tablet by mouth nightly, Disp-90 tablet, R-3Normal      !! venlafaxine (EFFEXOR XR) 75 MG extended release capsule Take 1 capsule by mouth daily Take with 150 mg capsule for total daily dose of 225 mg daily. , Disp-90 capsule, R-1Normal      ketoconazole (NIZORAL) 2 % shampoo Apply topically daily to affected areas x 14 days, then twice weekly as needed. , Disp-120 mL, R-0, Normal      divalproex (DEPAKOTE ER) 500 MG extended release tablet Take 1 tablet by mouth 2 times daily, Disp-60 tablet, R-5Normal      ibuprofen (ADVIL;MOTRIN) 600 MG tablet TAKE 1 TABLET BY MOUTH EVERY 8 HOURS AS NEEDED FOR PAIN, Disp-90 tablet, R-3Normal      rizatriptan (MAXALT) 10 MG tablet Take 1 tablet by mouth once as needed for Migraine May repeat in 2 hours if needed, Disp-30 tablet, R-5Normal      Handicap Plac[place] [facility] [place], Disp-1 each, R-0, PrintIssue parking placard for person with disability. [facility] [phone]   Applicant meets the qualifying disability criteria.   Expires 2 years from issuing date. tiZANidine (ZANAFLEX) 4 MG tablet Take 1 tablet by mouth every 8 hours as needed (muscle spasm), Disp-60 tablet, R-5Normal      tiotropium (SPIRIVA HANDIHALER) 18 MCG inhalation capsule Inhale 1 capsule into the lungs daily, Disp-90 capsule, R-3Normal      Respiratory Therapy Supplies (NEBULIZER/TUBING/MOUTHPIECE) KIT Disp-1 kit, R-0, NormalUse daily as needed for shortness of breath. Nebulizers (COMPRESSOR/NEBULIZER) MISC Disp-1 each, R-0, NormalUse daily as needed for shortness of breath      albuterol (PROVENTIL) (2.5 MG/3ML) 0.083% nebulizer solution Take 3 mLs by nebulization every 6 hours as needed for Wheezing or Shortness of Breath, Disp-120 vial, R-5Normal      albuterol sulfate HFA (VENTOLIN HFA) 108 (90 Base) MCG/ACT inhaler Inhale 1-2 puffs into the lungs every 6 hours as needed for Wheezing, Disp-3 Inhaler, R-3Normal      !! venlafaxine (EFFEXOR XR) 150 MG extended release capsule TAKE ONE CAPSULE BY MOUTH DAILY, Disp-90 capsule, R-3Normal      loratadine (CLARITIN) 10 MG tablet Take 1 tablet by mouth daily, Disp-90 tablet, R-3Normal      fluticasone (FLONASE) 50 MCG/ACT nasal spray 2 sprays by Nasal route daily, Disp-3 Bottle, R-3Normal      vitamin B-12 (CYANOCOBALAMIN) 1000 MCG tablet Take 1 tablet by mouth daily, Disp-30 [id number]      folic acid (FOLVITE) 759 MCG tablet Take 1 tablet by mouth daily, Disp-30 tablet,R-11Normal      aspirin (ASPIRIN 81) 81 MG EC tablet Take 1 tablet by mouth daily, Disp-30 tablet,R-11Normal      Menthol, Topical Analgesic, (BIOFREEZE) 4 % GEL Apply to shoulders & neck to relieve pain., Disp-237 mL,R-5Normal       !! - Potential duplicate medications found. Please discuss with provider. ALLERGIES     is allergic to keflex [cephalexin] and naproxen. FAMILY HISTORY     He indicated that the status of his mother is unknown. He indicated that the status of his father is unknown. He indicated that the status of his neg hx is unknown. family history includes High Blood Pressure in his father and mother; Stroke in his father and mother. SOCIAL HISTORY      reports that he quit smoking about 4 years ago. His smoking use included cigarettes. He started smoking about 29 years ago. He has a 25.00 pack-year smoking history. He has never used smokeless tobacco. He reports that he does not drink alcohol and does not use drugs. PHYSICAL EXAM     INITIAL VITALS:  height is 5' 11\" (1.803 m) and weight is 275 lb (124.7 kg). His tympanic temperature is 97.5 °F (36.4 °C). His blood pressure is 172/90 (abnormal) and his pulse is 88. His respiration is 14 and oxygen saturation is 98%. Physical Exam  Vitals reviewed. Constitutional:       General: He is not in acute distress. Appearance: Normal appearance. He is not ill-appearing. HENT:      Head: Normocephalic and atraumatic. Comments: Patient with short hair. No significant rash do not observe any nits. There is a single pustule in the right occipital region. No other similar is noted. Right Ear: Tympanic membrane normal.      Left Ear: Tympanic membrane normal.      Nose: Nose normal.      Mouth/Throat:      Mouth: Mucous membranes are moist.      Comments: No intraoral lesions. Eyes:      Extraocular Movements: Extraocular movements intact. Pupils: Pupils are equal, round, and reactive to light. Cardiovascular:      Rate and Rhythm: Normal rate and regular rhythm. Heart sounds: Normal heart sounds. Pulmonary:      Effort: Pulmonary effort is normal. No respiratory distress. Breath sounds: Normal breath sounds. Skin:     General: Skin is warm and dry. Findings: No rash. Neurological:      General: No focal deficit present. Mental Status: He is alert and oriented to person, place, and time. Cranial Nerves: No cranial nerve deficit. Motor: No weakness.    Psychiatric:         Mood and Affect: Mood normal.         Behavior: Behavior words are mis-transcribed.)    Solomon Calderon MD, [facility]  Attending Emergency Medicine Physician        Solomon Calderon MD  12/23/21 3614

## 2022-01-11 ENCOUNTER — PATIENT MESSAGE (OUTPATIENT)
Dept: FAMILY MEDICINE CLINIC | Age: 47
End: 2022-01-11

## 2022-01-12 NOTE — TELEPHONE ENCOUNTER
From: Mono Jaramillo  To: Dr. Bret Berg: 1/11/2022 6:50 PM EST  Subject: medicine for my bladder and head    I was wondering if you sent in the scripts for my having weak urine stream and a medicine for my head as it is always itchy

## 2022-01-14 DIAGNOSIS — F32.0 CURRENT MILD EPISODE OF MAJOR DEPRESSIVE DISORDER, UNSPECIFIED WHETHER RECURRENT (HCC): ICD-10-CM

## 2022-01-14 DIAGNOSIS — F41.9 ANXIETY: ICD-10-CM

## 2022-01-17 RX ORDER — VENLAFAXINE HYDROCHLORIDE 75 MG/1
CAPSULE, EXTENDED RELEASE ORAL
Qty: 90 CAPSULE | Refills: 0 | OUTPATIENT
Start: 2022-01-17

## 2022-01-21 DIAGNOSIS — R39.12 WEAK URINARY STREAM: ICD-10-CM

## 2022-01-21 DIAGNOSIS — F31.32 BIPOLAR AFFECTIVE DISORDER, CURRENTLY DEPRESSED, MODERATE (HCC): ICD-10-CM

## 2022-01-21 DIAGNOSIS — R32 URINARY INCONTINENCE, UNSPECIFIED TYPE: Primary | ICD-10-CM

## 2022-01-21 DIAGNOSIS — F32.0 CURRENT MILD EPISODE OF MAJOR DEPRESSIVE DISORDER, UNSPECIFIED WHETHER RECURRENT (HCC): ICD-10-CM

## 2022-01-21 DIAGNOSIS — F41.9 ANXIETY: ICD-10-CM

## 2022-01-21 NOTE — TELEPHONE ENCOUNTER
Jerry Bond called requesting a refill of the below medication which has been pended for you:     Requested Prescriptions     Pending Prescriptions Disp Refills    venlafaxine (EFFEXOR XR) 150 MG extended release capsule 90 capsule 3     Sig: TAKE ONE CAPSULE BY MOUTH DAILY       Last Appointment Date: 11/12/2021  Next Appointment Date: 5/13/2022    Allergies   Allergen Reactions    Keflex [Cephalexin] Other (See Comments)     Migraines, upset stomach     Naproxen      ulcers

## 2022-01-25 ENCOUNTER — PATIENT MESSAGE (OUTPATIENT)
Dept: FAMILY MEDICINE CLINIC | Age: 47
End: 2022-01-25

## 2022-01-25 NOTE — TELEPHONE ENCOUNTER
From: Augustus Marsh  To: Dr. Miller Ban: 1/25/2022 1:20 PM EST  Subject:  In office    I was just wondering if Dr Sotero Clarke was in the office today as I need refills and my questions I asked her answered   thanks Mouna Lovell

## 2022-01-25 NOTE — TELEPHONE ENCOUNTER
Call from wife, states pt is out of refills for both 150mg and 75mg effexor. Call to pharm to confirm, yes pt will need new rx for both of these. While on phone, wife states pt had sent a message in [employer] requesting a rx for prostate medication for weak urine stream and a new shampoo, the original one prescribed by ER for scalp pruritis is no longer working, scalp itchy.  Would also like a rx for depends for urinary incontinence sent to [facility].

## 2022-01-27 RX ORDER — VENLAFAXINE HYDROCHLORIDE 75 MG/1
75 CAPSULE, EXTENDED RELEASE ORAL DAILY
Qty: 90 CAPSULE | Refills: 3 | Status: SHIPPED | OUTPATIENT
Start: 2022-01-27

## 2022-01-27 RX ORDER — VENLAFAXINE HYDROCHLORIDE 150 MG/1
150 CAPSULE, EXTENDED RELEASE ORAL DAILY
Qty: 90 CAPSULE | Refills: 3 | Status: SHIPPED | OUTPATIENT
Start: 2022-01-27

## 2022-01-28 NOTE — TELEPHONE ENCOUNTER
Noted - [facility] med requests addressed in [employer] message. Why does pt need incontinence supplies? We have not discussed urinary incontinence. If he is having weak urinary stream and incontinence, we may need to do urine testing and/or Urology referral.  He has no other reason to have easily explainable incontinence.

## 2022-02-02 NOTE — TELEPHONE ENCOUNTER
Pt returning call, agreeable to having lab work done and would like Urology referral, pended at this time.

## 2022-02-02 NOTE — TELEPHONE ENCOUNTER
Will check UA to rule out infection - order placed now. Please inform pt to come in and have this done. Also due for re-check of thyroid levels - can do at the same time. Referral to Urology signed.

## 2022-02-04 DIAGNOSIS — R09.81 NASAL CONGESTION: ICD-10-CM

## 2022-02-04 RX ORDER — LORATADINE 10 MG/1
10 TABLET ORAL DAILY
Qty: 90 TABLET | Refills: 3 | Status: SHIPPED | OUTPATIENT
Start: 2022-02-04

## 2022-02-04 NOTE — TELEPHONE ENCOUNTER
Jerry Bond called requesting a refill of the below medication which has been pended for you:     Requested Prescriptions     Pending Prescriptions Disp Refills    loratadine (CLARITIN) 10 MG tablet [Pharmacy Med Name: EQ LORATADINE 10MG TAB] 90 tablet 0     Sig: Take 1 tablet by mouth once daily       Last Appointment Date: 11/12/2021  Next Appointment Date: 5/13/2022    Allergies   Allergen Reactions    Keflex [Cephalexin] Other (See Comments)     Migraines, upset stomach     Naproxen      ulcers

## 2022-02-07 ENCOUNTER — HOSPITAL ENCOUNTER (OUTPATIENT)
Dept: LAB | Age: 47
Discharge: HOME | End: 2022-02-07
Payer: MEDICARE

## 2022-02-07 DIAGNOSIS — R32 URINARY INCONTINENCE, UNSPECIFIED TYPE: ICD-10-CM

## 2022-02-07 DIAGNOSIS — E03.9 HYPOTHYROIDISM, UNSPECIFIED TYPE: ICD-10-CM

## 2022-02-07 LAB
-: ABNORMAL
AMORPHOUS: ABNORMAL
BACTERIA: ABNORMAL
BILIRUBIN URINE: NEGATIVE
CASTS UA: ABNORMAL /LPF (ref 0–2)
COLOR: ABNORMAL
COMMENT UA: ABNORMAL
CRYSTALS, UA: ABNORMAL /HPF
EPITHELIAL CELLS UA: ABNORMAL /HPF (ref 0–5)
GLUCOSE URINE: NEGATIVE
KETONES, URINE: NEGATIVE
LEUKOCYTE ESTERASE, URINE: NEGATIVE
MUCUS: ABNORMAL
NITRITE, URINE: NEGATIVE
OTHER OBSERVATIONS UA: ABNORMAL
PH UA: 7 (ref 5–6)
PROTEIN UA: NEGATIVE
RBC UA: ABNORMAL /HPF (ref 0–4)
RENAL EPITHELIAL, UA: ABNORMAL /HPF
SPECIFIC GRAVITY UA: 1.02 (ref 1.01–1.02)
TRICHOMONAS: ABNORMAL
TSH SERPL DL<=0.05 MIU/L-ACNC: 0.82 MIU/L (ref 0.3–5)
TURBIDITY: ABNORMAL
URINE HGB: NEGATIVE
UROBILINOGEN, URINE: NORMAL
WBC UA: ABNORMAL /HPF (ref 0–4)
YEAST: ABNORMAL

## 2022-02-07 PROCEDURE — 81001 URINALYSIS AUTO W/SCOPE: CPT

## 2022-02-07 PROCEDURE — 36415 COLL VENOUS BLD VENIPUNCTURE: CPT

## 2022-02-07 PROCEDURE — 84443 ASSAY THYROID STIM HORMONE: CPT

## 2022-02-08 NOTE — TELEPHONE ENCOUNTER
Ashley Cisneros called requesting a refill of the below medication which has been pended for you:     Requested Prescriptions     Pending Prescriptions Disp Refills    levothyroxine (SYNTHROID) 50 MCG tablet 90 tablet 0     Sig: Take 1 tablet by mouth daily Take first thing in the morning, on an empty stomach, 30 mins to other meds/food.        Last Appointment Date: 11/12/2021  Next Appointment Date: 5/13/2022    Allergies   Allergen Reactions    Keflex [Cephalexin] Other (See Comments)     Migraines, upset stomach     Naproxen      ulcers

## 2022-02-11 RX ORDER — LEVOTHYROXINE SODIUM 0.05 MG/1
50 TABLET ORAL DAILY
Qty: 90 TABLET | Refills: 0 | Status: SHIPPED | OUTPATIENT
Start: 2022-02-11 | End: 2022-04-28 | Stop reason: SDUPTHER

## 2022-02-13 ENCOUNTER — PATIENT MESSAGE (OUTPATIENT)
Dept: FAMILY MEDICINE CLINIC | Age: 47
End: 2022-02-13

## 2022-02-14 NOTE — TELEPHONE ENCOUNTER
From: Francesca Moses  To: Dr. David Chang: 2/13/2022 11:12 PM EST  Subject: exempt card    Good Morning Dr Daniella Abreu and staff I was wondering If I could get a exempt card for not having to wear a seat belt please every time I go to take it off my shoulder pops n grinds going to therapy doesn't help it makes it feel worse please and Thank you    Tito Fair

## 2022-03-14 ENCOUNTER — TELEPHONE (OUTPATIENT)
Dept: FAMILY MEDICINE CLINIC | Age: 47
End: 2022-03-14

## 2022-03-14 ENCOUNTER — HOSPITAL ENCOUNTER (OUTPATIENT)
Dept: GENERAL RADIOLOGY | Age: 47
Discharge: HOME | End: 2022-03-16
Payer: MEDICARE

## 2022-03-14 ENCOUNTER — OFFICE VISIT (OUTPATIENT)
Dept: PRIMARY CARE CLINIC | Age: 47
End: 2022-03-14
Payer: MEDICARE

## 2022-03-14 VITALS
DIASTOLIC BLOOD PRESSURE: 80 MMHG | SYSTOLIC BLOOD PRESSURE: 138 MMHG | TEMPERATURE: 98 F | BODY MASS INDEX: 37.8 KG/M2 | OXYGEN SATURATION: 98 % | HEART RATE: 74 BPM | WEIGHT: 271 LBS

## 2022-03-14 DIAGNOSIS — G24.3 TORTICOLLIS, SPASMODIC: Primary | ICD-10-CM

## 2022-03-14 DIAGNOSIS — G24.3 TORTICOLLIS, SPASMODIC: ICD-10-CM

## 2022-03-14 PROCEDURE — G8484 FLU IMMUNIZE NO ADMIN: HCPCS | Performed by: NURSE PRACTITIONER

## 2022-03-14 PROCEDURE — 1036F TOBACCO NON-USER: CPT | Performed by: NURSE PRACTITIONER

## 2022-03-14 PROCEDURE — 99213 OFFICE O/P EST LOW 20 MIN: CPT | Performed by: NURSE PRACTITIONER

## 2022-03-14 PROCEDURE — 72050 X-RAY EXAM NECK SPINE 4/5VWS: CPT

## 2022-03-14 PROCEDURE — 99212 OFFICE O/P EST SF 10 MIN: CPT | Performed by: NURSE PRACTITIONER

## 2022-03-14 PROCEDURE — G8417 CALC BMI ABV UP PARAM F/U: HCPCS | Performed by: NURSE PRACTITIONER

## 2022-03-14 PROCEDURE — G8427 DOCREV CUR MEDS BY ELIG CLIN: HCPCS | Performed by: NURSE PRACTITIONER

## 2022-03-14 RX ORDER — CYCLOBENZAPRINE HCL 10 MG
10 TABLET ORAL 3 TIMES DAILY PRN
Qty: 30 TABLET | Refills: 0 | Status: SHIPPED | OUTPATIENT
Start: 2022-03-14 | End: 2022-03-28 | Stop reason: SINTOL

## 2022-03-14 RX ORDER — METHYLPREDNISOLONE 4 MG/1
TABLET ORAL
Qty: 21 TABLET | Refills: 0 | Status: SHIPPED | OUTPATIENT
Start: 2022-03-14 | End: 2022-03-20

## 2022-03-14 ASSESSMENT — PATIENT HEALTH QUESTIONNAIRE - PHQ9
1. LITTLE INTEREST OR PLEASURE IN DOING THINGS: 0
SUM OF ALL RESPONSES TO PHQ QUESTIONS 1-9: 0
SUM OF ALL RESPONSES TO PHQ QUESTIONS 1-9: 0
2. FEELING DOWN, DEPRESSED OR HOPELESS: 0
SUM OF ALL RESPONSES TO PHQ QUESTIONS 1-9: 0
SUM OF ALL RESPONSES TO PHQ9 QUESTIONS 1 & 2: 0
SUM OF ALL RESPONSES TO PHQ QUESTIONS 1-9: 0

## 2022-03-14 NOTE — PATIENT INSTRUCTIONS
Patient Education        Neck Pain: Care Instructions  Your Care Instructions     You can have neck pain anywhere from the bottom of your head to the top of your shoulders. It can spread to the upper back or arms. Injuries, painting a ceiling, sleeping with your neck twisted, staying in one position for too long, and many other activities can cause neck pain. Most neck pain gets better with home care. Your doctor may recommend medicine to relieve pain or relax your muscles. He or she may suggest exercise and physical therapy to increase flexibility and relieve stress. You may need to wear a special (cervical) collar to support your neck for a day or two. Follow-up care is a key part of your treatment and safety. Be sure to make and go to all appointments, and call your doctor if you are having problems. It's also a good idea to know your test results and keep a list of the medicines you take. How can you care for yourself at home? · Try using a heating pad on a low or medium setting for 15 to 20 minutes every 2 or 3 hours. Try a warm shower in place of one session with the heating pad. · You can also try an ice pack for 10 to 15 minutes every 2 to 3 hours. Put a thin cloth between the ice and your skin. · Take pain medicines exactly as directed. ? If the doctor gave you a prescription medicine for pain, take it as prescribed. ? If you are not taking a prescription pain medicine, ask your doctor if you can take an over-the-counter medicine. · If your doctor recommends a cervical collar, wear it exactly as directed. When should you call for help? Call your doctor now or seek immediate medical care if:    · You have new or worsening numbness in your arms, buttocks or legs.     · You have new or worsening weakness in your arms or legs. (This could make it hard to stand up.)     · You lose control of your bladder or bowels.    Watch closely for changes in your health, and be sure to contact your doctor if:    · Your neck pain is getting worse.     · You are not getting better after 1 week.     · You do not get better as expected. Where can you learn more? Go to [email or web address].[email or web address] org and sign in to your [employer] account. Enter [phone] in the [facility] box to learn more about \"Neck Pain: Care Instructions. \"     If you do not have an account, please click on the \"Sign Up Now\" link. Current as of: July 1, 2021               Content Version: 13.1  © 9964-7701 Healthwise, Incorporated. Care instructions adapted under license by [facility] ([facility]). If you have questions about a medical condition or this instruction, always ask your healthcare professional. [address] any warranty or liability for your use of this information.

## 2022-03-14 NOTE — PROGRESS NOTES
Subjective:      Patient ID: Betsy Dc is a 48 y.o. male coming in for   Chief Complaint   Patient presents with    Shoulder Pain     bilat hx of shoulder pain over the years         Shoulder Pain   The pain is present in the neck (bilateral trapezius areas). This is a chronic problem. The current episode started 1 to 4 weeks ago. The problem occurs intermittently. The problem has been waxing and waning. The quality of the pain is described as aching. Associated symptoms include a limited range of motion and stiffness. Treatments tried: motrin and tizanidine  The treatment provided mild relief. last cervical neck xray in 2018       Review of Systems   Musculoskeletal: Positive for stiffness. Objective:  /80 (Site: Left Upper Arm, Position: Sitting, Cuff Size: Large Adult)   Pulse 74   Temp 98 °F (36.7 °C) (Tympanic)   Wt 271 lb (122.9 kg)   SpO2 98%   BMI 37.80 kg/m²      Physical Exam  Vitals and nursing note reviewed. Constitutional:       General: He is not in acute distress. Appearance: Normal appearance. He is obese. He is not ill-appearing. Pulmonary:      Effort: Pulmonary effort is normal.   Musculoskeletal:      Right shoulder: Normal range of motion. Normal strength. Left shoulder: Normal range of motion. Normal strength. Cervical back: Muscular tenderness present. No pain with movement or spinous process tenderness. Decreased range of motion. Skin:     General: Skin is warm. Findings: No bruising or erythema. Neurological:      General: No focal deficit present. Mental Status: He is alert and oriented to person, place, and time. Motor: No weakness. Assessment:      1. Torticollis, spasmodic           Plan:   -stop tizanidine, start flexeril prn  -medrol dose pack for inflammation  -will obtain new xrays of neck  -heat and stretching recommended.       Orders Placed This Encounter   Procedures    XR CERVICAL SPINE (4-5 VIEWS) Standing Status:   Future     Number of Occurrences:   1     Standing Expiration Date:   3/14/2023     Order Specific Question:   Reason for exam:     Answer:   bilateral neck and trapezius pain      Outpatient Encounter Medications as of 3/14/2022   Medication Sig Dispense Refill    methylPREDNISolone (MEDROL DOSEPACK) 4 MG tablet Take by mouth. 21 tablet 0    cyclobenzaprine (FLEXERIL) 10 MG tablet Take 1 tablet by mouth 3 times daily as needed for Muscle spasms 30 tablet 0    levothyroxine (SYNTHROID) 50 MCG tablet Take 1 tablet by mouth daily Take first thing in the morning, on an empty stomach, 30 mins to other meds/food. 90 tablet 0    loratadine (CLARITIN) 10 MG tablet Take 1 tablet by mouth daily 90 tablet 3    venlafaxine (EFFEXOR XR) 150 MG extended release capsule Take 1 capsule by mouth daily Take with 75 mg capsule for total daily dose of 225 mg daily 90 capsule 3    venlafaxine (EFFEXOR XR) 75 MG extended release capsule Take 1 capsule by mouth daily Take with 150 mg capsule for total daily dose of 225 mg daily. 90 capsule 3    famotidine (PEPCID) 20 MG tablet Take 1 tablet by mouth 2 times daily 180 tablet 3    busPIRone (BUSPAR) 15 MG tablet Take 15 mg by mouth 2 times daily as needed (anxiety) 60 tablet 11    docusate sodium (COLACE) 100 MG capsule Take 1 capsule by mouth 1-2x's daily as needed for constipation. 60 capsule 5    blood glucose test strips (ASCENSIA AUTODISC VI;ONE TOUCH ULTRA TEST VI) strip Use daily and as needed to check blood glucose. 100 each 1    Lancets MISC Use daily and as needed to check blood glucose. Please dispense per patients insurance. 100 each 1    omega-3 acid ethyl esters (LOVAZA) 1 g capsule Take 2 capsules by mouth 2 times daily Take with meals. 120 capsule 11    atorvastatin (LIPITOR) 10 MG tablet Take 1 tablet by mouth daily 90 tablet 3    blood glucose monitor kit and supplies Test 3 times a day & as needed for symptoms of irregular blood glucose. 1 kit 0    risperiDONE (RISPERDAL) 3 MG tablet Take 1 tablet by mouth nightly 90 tablet 3    divalproex (DEPAKOTE ER) 500 MG extended release tablet Take 1 tablet by mouth 2 times daily 60 tablet 5    ibuprofen (ADVIL;MOTRIN) 600 MG tablet TAKE 1 TABLET BY MOUTH EVERY 8 HOURS AS NEEDED FOR PAIN 90 tablet 3    Handicap Placard MISC by Does not apply route Issue parking placard for person with disability. [facility] [id number]   Applicant meets the qualifying disability criteria. Expires 2 years from issuing date. 1 each 0    Respiratory Therapy Supplies (NEBULIZER/TUBING/MOUTHPIECE) KIT Use daily as needed for shortness of breath. 1 kit 0    Nebulizers (COMPRESSOR/NEBULIZER) MISC Use daily as needed for shortness of breath 1 each 0    albuterol (PROVENTIL) (2.5 MG/3ML) 0.083% nebulizer solution Take 3 mLs by nebulization every 6 hours as needed for Wheezing or Shortness of Breath 120 vial 5    albuterol sulfate HFA (VENTOLIN HFA) 108 (90 Base) MCG/ACT inhaler Inhale 1-2 puffs into the lungs every 6 hours as needed for Wheezing 3 Inhaler 3    fluticasone (FLONASE) 50 MCG/ACT nasal spray 2 sprays by Nasal route daily 3 Bottle 3    vitamin B-12 (CYANOCOBALAMIN) 1000 MCG tablet Take 1 tablet by mouth daily 30 tablet 11    folic acid (FOLVITE) 822 MCG tablet Take 1 tablet by mouth daily 30 tablet 11    aspirin (ASPIRIN 81) 81 MG EC tablet Take 1 tablet by mouth daily 30 tablet 11    Menthol, Topical Analgesic, (BIOFREEZE) 4 % GEL Apply to shoulders & neck to relieve pain. 237 mL 5    ketoconazole (NIZORAL) 2 % shampoo Apply topically daily to affected areas x 14 days, then twice weekly as needed.  (Patient not taking: Reported on 3/14/2022) 120 mL 0    rizatriptan (MAXALT) 10 MG tablet Take 1 tablet by mouth once as needed for Migraine May repeat in 2 hours if needed 30 tablet 5    tiotropium (SPIRIVA HANDIHALER) 18 MCG inhalation capsule Inhale 1 capsule into the lungs daily 90 capsule 3    [DISCONTINUED] tiZANidine (ZANAFLEX) 4 MG tablet Take 1 tablet by mouth every 8 hours as needed (muscle spasm) 60 tablet 5    Respiratory Therapy Supplies (NEBULIZER COMPRESSOR) KIT 1 kit by Does not apply route once for 1 dose 1 kit 0     Facility-Administered Encounter Medications as of 3/14/2022   Medication Dose Route Frequency Provider Last Rate Last Admin    phenylephrine (MYDFRIN) 2.5 % ophthalmic solution 1 drop  1 drop Both Eyes Once Silver Ko MD        tropicamide (MYDRIACYL) 1 % ophthalmic solution 1 drop  1 drop Both Eyes Once MD Tram Flores, APRN - CNP

## 2022-03-14 NOTE — TELEPHONE ENCOUNTER
Referral to Urology still open, call to office to check on status, states will call to schedule, has not done yet.

## 2022-03-17 ENCOUNTER — PATIENT MESSAGE (OUTPATIENT)
Dept: FAMILY MEDICINE CLINIC | Age: 47
End: 2022-03-17

## 2022-03-17 DIAGNOSIS — E03.9 HYPOTHYROIDISM, UNSPECIFIED TYPE: Primary | ICD-10-CM

## 2022-03-25 ENCOUNTER — PATIENT MESSAGE (OUTPATIENT)
Dept: FAMILY MEDICINE CLINIC | Age: 47
End: 2022-03-25

## 2022-03-25 NOTE — TELEPHONE ENCOUNTER
From: Sabrina Rice  To: Dr. Jodie Marcial: 3/25/2022 12:56 PM EDT  Subject: tremors in arms    hello dr Ramone Durand i was in the urgent care they gave me a script that i was on before but stefanie been having tremors in my arms not sure if its a side effect from a med or meds or if its due to my c3-c7 in my neck causing it what else can we do to figure this out?

## 2022-03-28 ENCOUNTER — OFFICE VISIT (OUTPATIENT)
Dept: FAMILY MEDICINE CLINIC | Age: 47
End: 2022-03-28
Payer: MEDICARE

## 2022-03-28 VITALS
RESPIRATION RATE: 18 BRPM | OXYGEN SATURATION: 97 % | HEART RATE: 76 BPM | SYSTOLIC BLOOD PRESSURE: 128 MMHG | DIASTOLIC BLOOD PRESSURE: 70 MMHG | HEIGHT: 71 IN | BODY MASS INDEX: 37.46 KG/M2 | TEMPERATURE: 97.5 F | WEIGHT: 267.6 LBS

## 2022-03-28 DIAGNOSIS — M54.2 CHRONIC NECK PAIN: Primary | ICD-10-CM

## 2022-03-28 DIAGNOSIS — R39.12 WEAK URINARY STREAM: ICD-10-CM

## 2022-03-28 DIAGNOSIS — G89.29 CHRONIC NECK PAIN: Primary | ICD-10-CM

## 2022-03-28 PROCEDURE — 99214 OFFICE O/P EST MOD 30 MIN: CPT | Performed by: FAMILY MEDICINE

## 2022-03-28 PROCEDURE — 1036F TOBACCO NON-USER: CPT | Performed by: FAMILY MEDICINE

## 2022-03-28 PROCEDURE — G8427 DOCREV CUR MEDS BY ELIG CLIN: HCPCS | Performed by: FAMILY MEDICINE

## 2022-03-28 PROCEDURE — G8417 CALC BMI ABV UP PARAM F/U: HCPCS | Performed by: FAMILY MEDICINE

## 2022-03-28 PROCEDURE — G8484 FLU IMMUNIZE NO ADMIN: HCPCS | Performed by: FAMILY MEDICINE

## 2022-03-28 PROCEDURE — 99212 OFFICE O/P EST SF 10 MIN: CPT | Performed by: FAMILY MEDICINE

## 2022-03-28 RX ORDER — BACLOFEN 10 MG/1
10 TABLET ORAL 3 TIMES DAILY PRN
Qty: 40 TABLET | Refills: 1 | Status: SHIPPED | OUTPATIENT
Start: 2022-03-28 | End: 2022-07-09

## 2022-03-28 ASSESSMENT — PATIENT HEALTH QUESTIONNAIRE - PHQ9
SUM OF ALL RESPONSES TO PHQ QUESTIONS 1-9: 0
SUM OF ALL RESPONSES TO PHQ QUESTIONS 1-9: 0
3. TROUBLE FALLING OR STAYING ASLEEP: 0
SUM OF ALL RESPONSES TO PHQ QUESTIONS 1-9: 0
SUM OF ALL RESPONSES TO PHQ9 QUESTIONS 1 & 2: 0
2. FEELING DOWN, DEPRESSED OR HOPELESS: 0
1. LITTLE INTEREST OR PLEASURE IN DOING THINGS: 0
5. POOR APPETITE OR OVEREATING: 0
4. FEELING TIRED OR HAVING LITTLE ENERGY: 0
8. MOVING OR SPEAKING SO SLOWLY THAT OTHER PEOPLE COULD HAVE NOTICED. OR THE OPPOSITE, BEING SO FIGETY OR RESTLESS THAT YOU HAVE BEEN MOVING AROUND A LOT MORE THAN USUAL: 0
SUM OF ALL RESPONSES TO PHQ QUESTIONS 1-9: 0
7. TROUBLE CONCENTRATING ON THINGS, SUCH AS READING THE NEWSPAPER OR WATCHING TELEVISION: 0
6. FEELING BAD ABOUT YOURSELF - OR THAT YOU ARE A FAILURE OR HAVE LET YOURSELF OR YOUR FAMILY DOWN: 0
9. THOUGHTS THAT YOU WOULD BE BETTER OFF DEAD, OR OF HURTING YOURSELF: 0

## 2022-03-28 NOTE — PROGRESS NOTES
baclofen (LIORESAL) 10 MG tablet Take 1 tablet by mouth 3 times daily as needed (muscle pain/spasm) 40 tablet 1    levothyroxine (SYNTHROID) 50 MCG tablet Take 1 tablet by mouth daily Take first thing in the morning, on an empty stomach, 30 mins to other meds/food. 90 tablet 0    loratadine (CLARITIN) 10 MG tablet Take 1 tablet by mouth daily 90 tablet 3    venlafaxine (EFFEXOR XR) 150 MG extended release capsule Take 1 capsule by mouth daily Take with 75 mg capsule for total daily dose of 225 mg daily 90 capsule 3    venlafaxine (EFFEXOR XR) 75 MG extended release capsule Take 1 capsule by mouth daily Take with 150 mg capsule for total daily dose of 225 mg daily. 90 capsule 3    famotidine (PEPCID) 20 MG tablet Take 1 tablet by mouth 2 times daily 180 tablet 3    busPIRone (BUSPAR) 15 MG tablet Take 15 mg by mouth 2 times daily as needed (anxiety) 60 tablet 11    docusate sodium (COLACE) 100 MG capsule Take 1 capsule by mouth 1-2x's daily as needed for constipation. 60 capsule 5    blood glucose test strips (ASCENSIA AUTODISC VI;ONE TOUCH ULTRA TEST VI) strip Use daily and as needed to check blood glucose. 100 each 1    Lancets MISC Use daily and as needed to check blood glucose. Please dispense per patients insurance. 100 each 1    omega-3 acid ethyl esters (LOVAZA) 1 g capsule Take 2 capsules by mouth 2 times daily Take with meals. 120 capsule 11    atorvastatin (LIPITOR) 10 MG tablet Take 1 tablet by mouth daily 90 tablet 3    blood glucose monitor kit and supplies Test 3 times a day & as needed for symptoms of irregular blood glucose.  1 kit 0    risperiDONE (RISPERDAL) 3 MG tablet Take 1 tablet by mouth nightly 90 tablet 3    divalproex (DEPAKOTE ER) 500 MG extended release tablet Take 1 tablet by mouth 2 times daily 60 tablet 5    ibuprofen (ADVIL;MOTRIN) 600 MG tablet TAKE 1 TABLET BY MOUTH EVERY 8 HOURS AS NEEDED FOR PAIN 90 tablet 3    Handicap Placard MISC by Does not apply route Issue parking placard for person with disability. [facility] [phone]   Applicant meets the qualifying disability criteria. Expires 2 years from issuing date. 1 each 0    Respiratory Therapy Supplies (NEBULIZER/TUBING/MOUTHPIECE) KIT Use daily as needed for shortness of breath. 1 kit 0    Nebulizers (COMPRESSOR/NEBULIZER) MISC Use daily as needed for shortness of breath 1 each 0    albuterol (PROVENTIL) (2.5 MG/3ML) 0.083% nebulizer solution Take 3 mLs by nebulization every 6 hours as needed for Wheezing or Shortness of Breath 120 vial 5    albuterol sulfate HFA (VENTOLIN HFA) 108 (90 Base) MCG/ACT inhaler Inhale 1-2 puffs into the lungs every 6 hours as needed for Wheezing 3 Inhaler 3    fluticasone (FLONASE) 50 MCG/ACT nasal spray 2 sprays by Nasal route daily 3 Bottle 3    vitamin B-12 (CYANOCOBALAMIN) 1000 MCG tablet Take 1 tablet by mouth daily 30 tablet 11    folic acid (FOLVITE) 011 MCG tablet Take 1 tablet by mouth daily 30 tablet 11    aspirin (ASPIRIN 81) 81 MG EC tablet Take 1 tablet by mouth daily 30 tablet 11    Menthol, Topical Analgesic, (BIOFREEZE) 4 % GEL Apply to shoulders & neck to relieve pain.  237 mL 5    rizatriptan (MAXALT) 10 MG tablet Take 1 tablet by mouth once as needed for Migraine May repeat in 2 hours if needed 30 tablet 5    tiotropium (SPIRIVA HANDIHALER) 18 MCG inhalation capsule Inhale 1 capsule into the lungs daily 90 capsule 3    Respiratory Therapy Supplies (NEBULIZER COMPRESSOR) KIT 1 kit by Does not apply route once for 1 dose 1 kit 0     Current Facility-Administered Medications   Medication Dose Route Frequency Provider Last Rate Last Admin    phenylephrine (MYDFRIN) 2.5 % ophthalmic solution 1 drop  1 drop Both Eyes Once Joselinda Henry MD        tropicamide (MYDRIACYL) 1 % ophthalmic solution 1 drop  1 drop Both Eyes Once Joselinda Henry MD         Allergies   Allergen Reactions    Keflex [Cephalexin] Other (See Comments)     Migraines, upset stomach     Naproxen      ulcers Health Maintenance   Topic Date Due    Hepatitis C screen  Never done    COVID-19 Vaccine (1) Never done    Pneumococcal 0-64 years Vaccine (1 of 2 - PPSV23) Never done    HIV screen  Never done    DTaP/Tdap/Td vaccine (1 - Tdap) Never done    Colorectal Cancer Screen  Never done    Flu vaccine (Season Ended) 09/01/2022    Lipid screen  10/18/2022    Depression Monitoring  03/28/2023    Hepatitis A vaccine  Aged Out    Hepatitis B vaccine  Aged Out    Hib vaccine  Aged Out    Meningococcal (ACWY) vaccine  Aged Out       Subjective:      Review of Systems   Genitourinary: Positive for frequency (at night - improving). Negative for difficulty urinating (improved), dysuria and hematuria. Skin: Negative for rash and wound. Neurological: Positive for tremors (as side effect to Flexeril). Objective:     Vitals:    03/28/22 1336   BP: 128/70   Site: Right Upper Arm   Position: Sitting   Pulse: 76   Resp: 18   Temp: 97.5 °F (36.4 °C)   TempSrc: Temporal   SpO2: 97%   Weight: 267 lb 9.6 oz (121.4 kg)   Height: 5' 11\" (1.803 m)     Physical Exam  Constitutional:       General: He is not in acute distress. Appearance: Normal appearance. HENT:      Head: Normocephalic and atraumatic. Eyes:      Conjunctiva/sclera: Conjunctivae normal.   Cardiovascular:      Rate and Rhythm: Normal rate and regular rhythm. Heart sounds: Normal heart sounds. Pulmonary:      Effort: Pulmonary effort is normal. No respiratory distress. Breath sounds: Normal breath sounds. Abdominal:      General: Bowel sounds are normal. There is no distension. Palpations: Abdomen is soft. Tenderness: There is no abdominal tenderness. Musculoskeletal:      Right lower leg: No edema. Left lower leg: No edema. Skin:     General: Skin is warm and dry. Neurological:      General: No focal deficit present. Mental Status: He is alert and oriented to person, place, and time.    Psychiatric: Mood and Affect: Mood normal.         Assessment:      1. Chronic neck pain  -     baclofen (LIORESAL) 10 MG tablet; Take 1 tablet by mouth 3 times daily as needed (muscle pain/spasm), Disp-40 tablet, R-1Normal  2. Weak urinary stream         Plan:      Return in about 7 weeks (around 5/13/2022) for already scheduled f/u on 5/13. No orders of the defined types were placed in this encounter. Orders Placed This Encounter   Medications    baclofen (LIORESAL) 10 MG tablet     Sig: Take 1 tablet by mouth 3 times daily as needed (muscle pain/spasm)     Dispense:  40 tablet     Refill:  1       Patient given educational materials - see patient instructions. Discussed use, benefit, and side effects of prescribed medications. All patient questions answered. Pt voiced understanding. Reviewed health maintenance.             Electronically signed by Fara Portillo DO, DO on 4/3/2022 at 11:58 PM

## 2022-03-30 ENCOUNTER — PATIENT MESSAGE (OUTPATIENT)
Dept: FAMILY MEDICINE CLINIC | Age: 47
End: 2022-03-30

## 2022-03-31 NOTE — TELEPHONE ENCOUNTER
From: Shazia Hull  To: Dr. Freddie Jauregui: 3/30/2022 5:57 PM EDT  Subject: new medicine     I just wanted to give you a heads up on the new muscle relaxer Baclofen I haven't had any side effects like I did with the one I got from the ER Thank you Dr Ceron and Staff for ur help    Jhoan Watt

## 2022-04-15 ENCOUNTER — HOSPITAL ENCOUNTER (EMERGENCY)
Age: 47
Discharge: HOME | End: 2022-04-15
Attending: EMERGENCY MEDICINE
Payer: MEDICARE

## 2022-04-15 VITALS
HEIGHT: 71 IN | RESPIRATION RATE: 16 BRPM | BODY MASS INDEX: 38.5 KG/M2 | DIASTOLIC BLOOD PRESSURE: 69 MMHG | WEIGHT: 275 LBS | OXYGEN SATURATION: 97 % | SYSTOLIC BLOOD PRESSURE: 166 MMHG | TEMPERATURE: 97.4 F | HEART RATE: 82 BPM

## 2022-04-15 DIAGNOSIS — R51.9 NONINTRACTABLE EPISODIC HEADACHE, UNSPECIFIED HEADACHE TYPE: Primary | ICD-10-CM

## 2022-04-15 PROCEDURE — 99284 EMERGENCY DEPT VISIT MOD MDM: CPT

## 2022-04-15 ASSESSMENT — PAIN DESCRIPTION - DESCRIPTORS: DESCRIPTORS: SHARP

## 2022-04-15 ASSESSMENT — PAIN DESCRIPTION - LOCATION: LOCATION: HEAD

## 2022-04-15 ASSESSMENT — PAIN SCALES - GENERAL: PAINLEVEL_OUTOF10: 7

## 2022-04-15 ASSESSMENT — PAIN DESCRIPTION - ORIENTATION: ORIENTATION: ANTERIOR

## 2022-04-15 ASSESSMENT — PAIN - FUNCTIONAL ASSESSMENT: PAIN_FUNCTIONAL_ASSESSMENT: 0-10

## 2022-04-15 NOTE — ED PROVIDER NOTES
eMERGENCY dEPARTMENT eNCOUnter      Pt Name: Martínez Jerome  MRN: [id number]  [place] 1975  Date of evaluation: 4/15/2022      CHIEF COMPLAINT       Chief Complaint   Patient presents with    Headache     BLOODY NOSE         HISTORY OF PRESENT ILLNESS    Martínez Jerome is a 48 y.o. male who presents with headache. Patient states he has been having his usual migraine for the last 2 days with that he says he had a little bit of bloody nose and was concerned he had a sinus infection he did take his usual Imitrex at home without relief complains of the headache is frontal in nature, sharp no exacerbating relieving factors no nausea no vomiting        REVIEW OF SYSTEMS       Review of systems are all reviewed and negative except stated above in HPI    PAST MEDICAL HISTORY    has a past medical history of Allergic rhinitis, Bipolar disorder ([address] Chronic obstructive pulmonary disease (COPD) ([address] Depression, Headache(784.0), Osteoarthritis, TMJ (dislocation of temporomandibular joint), and Torticollis. SURGICAL HISTORY      has a past surgical history that includes Tonsillectomy. CURRENT MEDICATIONS       Discharge Medication List as of 4/15/2022  3:34 AM      CONTINUE these medications which have NOT CHANGED    Details   baclofen (LIORESAL) 10 MG tablet Take 1 tablet by mouth 3 times daily as needed (muscle pain/spasm), Disp-40 tablet, R-1Normal      levothyroxine (SYNTHROID) 50 MCG tablet Take 1 tablet by mouth daily Take first thing in the morning, on an empty stomach, 30 mins to other meds/food. , Disp-90 tablet, R-0Normal      loratadine (CLARITIN) 10 MG tablet Take 1 tablet by mouth daily, Disp-90 tablet, R-3Normal      !! venlafaxine (EFFEXOR XR) 150 MG extended release capsule Take 1 capsule by mouth daily Take with 75 mg capsule for total daily dose of 225 mg daily, Disp-90 capsule, R-3Normal      !! venlafaxine (EFFEXOR XR) 75 MG extended release capsule Take 1 capsule by mouth daily Take with 150 mg capsule for total daily dose of 225 mg daily. , Disp-90 capsule, R-3Normal      famotidine (PEPCID) 20 MG tablet Take 1 tablet by mouth 2 times daily, Disp-180 tablet, R-3Normal      busPIRone (BUSPAR) 15 MG tablet Take 15 mg by mouth 2 times daily as needed (anxiety), Disp-60 tablet, R-11Normal      docusate sodium (COLACE) 100 MG capsule Take 1 capsule by mouth 1-2x's daily as needed for constipation. , Disp-60 capsule, R-5Normal      blood glucose test strips (ASCENSIA AUTODISC VI;ONE TOUCH ULTRA TEST VI) strip Disp-100 each, R-1, NormalUse daily and as needed to check blood glucose. Lancets MISC Disp-100 each, R-1, NormalUse daily and as needed to check blood glucose. Please dispense per patients insurance. omega-3 acid ethyl esters (LOVAZA) 1 g capsule Take 2 capsules by mouth 2 times daily Take with meals. , Disp-120 capsule, R-11Normal      atorvastatin (LIPITOR) 10 MG tablet Take 1 tablet by mouth daily, Disp-90 tablet, R-3Normal      blood glucose monitor kit and supplies Test 3 times a day & as needed for symptoms of irregular blood glucose., Disp-1 kit, R-0, Normal      risperiDONE (RISPERDAL) 3 MG tablet Take 1 tablet by mouth nightly, Disp-90 tablet, R-3Normal      divalproex (DEPAKOTE ER) 500 MG extended release tablet Take 1 tablet by mouth 2 times daily, Disp-60 tablet, R-5Normal      ibuprofen (ADVIL;MOTRIN) 600 MG tablet TAKE 1 TABLET BY MOUTH EVERY 8 HOURS AS NEEDED FOR PAIN, Disp-90 tablet, R-3Normal      rizatriptan (MAXALT) 10 MG tablet Take 1 tablet by mouth once as needed for Migraine May repeat in 2 hours if needed, Disp-30 tablet, R-5Normal      Handicap Placard [facility] Starting Fri 6/11/2021, Disp-1 each, R-0, PrintIssue parking placard for person with disability. [facility] [phone]   Applicant meets the qualifying disability criteria. Expires 2 years from issuing date.       tiotropium (SPIRIVA HANDIHALER) 18 MCG inhalation capsule Inhale 1 capsule into the lungs daily, Disp-90 capsule, R-3Normal      Respiratory Therapy Supplies (NEBULIZER/TUBING/MOUTHPIECE) KIT Disp-1 kit, R-0, NormalUse daily as needed for shortness of breath. Nebulizers (COMPRESSOR/NEBULIZER) MISC Disp-1 each, R-0, NormalUse daily as needed for shortness of breath      albuterol (PROVENTIL) (2.5 MG/3ML) 0.083% nebulizer solution Take 3 mLs by nebulization every 6 hours as needed for Wheezing or Shortness of Breath, Disp-120 vial, R-5Normal      albuterol sulfate HFA (VENTOLIN HFA) 108 (90 Base) MCG/ACT inhaler Inhale 1-2 puffs into the lungs every 6 hours as needed for Wheezing, Disp-3 Inhaler, R-3Normal      fluticasone (FLONASE) 50 MCG/ACT nasal spray 2 sprays by Nasal route daily, Disp-3 Bottle, R-3Normal      vitamin B-12 (CYANOCOBALAMIN) 1000 MCG tablet Take 1 tablet by mouth daily, Disp-30 DOKFOG,K-[id number]      folic acid (FOLVITE) 203 MCG tablet Take 1 tablet by mouth daily, Disp-30 tablet,R-11Normal      aspirin (ASPIRIN 81) 81 MG EC tablet Take 1 tablet by mouth daily, Disp-30 tablet,R-11Normal      Menthol, Topical Analgesic, (BIOFREEZE) 4 % GEL Apply to shoulders & neck to relieve pain., Disp-237 mL,R-5Normal       !! - Potential duplicate medications found. Please discuss with provider. ALLERGIES     is allergic to keflex [cephalexin] and naproxen. FAMILY HISTORY     He indicated that the status of his mother is unknown. He indicated that the status of his father is unknown. He indicated that the status of his neg hx is unknown.     family history includes High Blood Pressure in his father and mother; Stroke in his father and mother. SOCIAL HISTORY      reports that he quit smoking about 4 years ago. His smoking use included cigarettes. He started smoking about 29 years ago. He has a 25.00 pack-year smoking history. He has never used smokeless tobacco. He reports that he does not drink alcohol and does not use drugs.     PHYSICAL EXAM     INITIAL VITALS:  height is 5' 11\" (1.803 m) and weight is 275 lb (124.7 kg). His temperature is 97.4 °F (36.3 °C). His blood pressure is 166/69 (abnormal) and his pulse is 82. His respiration is 16 and oxygen saturation is 97%. General: Patient alert nontoxic-appearing male in no apparent distress  HEENT: Head is atraumatic conjunctive are clear nose shows no rhinorrhea or blood mouth shows moist mucous membranes  Neck: Supple  Respiratory: Lung sounds are clear bilateral  Cardiac: Heart is regular rate and rhythm  GI: Abdomen soft nontender    DIFFERENTIAL DIAGNOSIS/ MDM:     Migraine    DIAGNOSTIC RESULTS     EKG: All EKG's are interpreted by the Emergency Department Physician who either signs or Co-signs this chart in the absence of a cardiologist.        RADIOLOGY:   I directly visualized the following  images and reviewed the radiologist interpretations:  No orders to display         LABS:  Labs Reviewed - No data to display      EMERGENCY DEPARTMENT COURSE:   Vitals:    Vitals:    04/15/22 0310   BP: (!) 166/69   Pulse: 82   Resp: 16   Temp: 97.4 °F (36.3 °C)   SpO2: 97%   Weight: 275 lb (124.7 kg)   Height: 5' 11\" (1.803 m)     -------------------------  BP: (!) 166/69, Temp: 97.4 °F (36.3 °C), Pulse: 82, Resp: 16    No orders of the defined types were placed in this encounter. Re-evaluation Notes    Patient has no acute neurological deficits no indications of infectious process she will be discharged with continuation of his medicine follow-up as directed return if worse    CRITICAL CARE:   None      CONSULTS:      PROCEDURES:  None    FINAL IMPRESSION      1.  Nonintractable episodic headache, unspecified headache type          DISPOSITION/PLAN   DISPOSITION Decision To Discharge 04/15/2022 03:22:28 AM      Condition on Disposition    Stable    PATIENT REFERRED TO:  Jesse Melchor DO  [address] [place] [address]  [phone]    In 1 day        DISCHARGE MEDICATIONS:  Discharge Medication List as of 4/15/2022  3:34 AM (Please note that portions of this note were completed with a voice recognition program.  Efforts were made to edit the dictations but occasionally words are mis-transcribed.)    Brendan Wing MD,, MD, F.A.C.E.P.   Attending Emergency Physician        Brendan Wing MD  04/15/22 1376

## 2022-04-21 DIAGNOSIS — G43.909 MIGRAINE WITHOUT STATUS MIGRAINOSUS, NOT INTRACTABLE, UNSPECIFIED MIGRAINE TYPE: ICD-10-CM

## 2022-04-21 RX ORDER — RIZATRIPTAN BENZOATE 10 MG/1
10 TABLET ORAL
Qty: 30 TABLET | Refills: 5 | Status: SHIPPED | OUTPATIENT
Start: 2022-04-21 | End: 2022-09-28

## 2022-04-21 NOTE — TELEPHONE ENCOUNTER
Bard Li called requesting a refill of the below medication which has been pended for you:     Requested Prescriptions     Pending Prescriptions Disp Refills    rizatriptan (MAXALT) 10 MG tablet 30 tablet 5     Sig: Take 1 tablet by mouth once as needed for Migraine May repeat in 2 hours if needed       Last Appointment Date: 3/28/2022  Next Appointment Date: 5/13/2022    Allergies   Allergen Reactions    Keflex [Cephalexin] Other (See Comments)     Migraines, upset stomach     Naproxen      ulcers

## 2022-04-28 DIAGNOSIS — E03.9 HYPOTHYROIDISM, UNSPECIFIED TYPE: ICD-10-CM

## 2022-04-29 NOTE — TELEPHONE ENCOUNTER
LM for patient regarding lab work to complete. Sj Huynh called requesting a refill of the below medication which has been pended for you:     Requested Prescriptions     Pending Prescriptions Disp Refills    levothyroxine (SYNTHROID) 50 MCG tablet 90 tablet 0     Sig: Take 1 tablet by mouth daily Take first thing in the morning, on an empty stomach, 30 mins to other meds/food.        Last Appointment Date: 3/28/2022  Next Appointment Date: 5/13/2022    Allergies   Allergen Reactions    Keflex [Cephalexin] Other (See Comments)     Migraines, upset stomach     Naproxen      ulcers

## 2022-04-30 ENCOUNTER — PATIENT MESSAGE (OUTPATIENT)
Dept: FAMILY MEDICINE CLINIC | Age: 47
End: 2022-04-30

## 2022-05-02 ENCOUNTER — HOSPITAL ENCOUNTER (OUTPATIENT)
Dept: LAB | Age: 47
Discharge: HOME | End: 2022-05-02
Payer: MEDICARE

## 2022-05-02 DIAGNOSIS — E03.9 HYPOTHYROIDISM, UNSPECIFIED TYPE: ICD-10-CM

## 2022-05-02 DIAGNOSIS — E55.9 VITAMIN D DEFICIENCY: ICD-10-CM

## 2022-05-02 LAB
TSH SERPL DL<=0.05 MIU/L-ACNC: 1.92 UIU/ML (ref 0.3–5)
VITAMIN D 25-HYDROXY: 31.3 NG/ML

## 2022-05-02 PROCEDURE — 84443 ASSAY THYROID STIM HORMONE: CPT

## 2022-05-02 PROCEDURE — 36415 COLL VENOUS BLD VENIPUNCTURE: CPT

## 2022-05-02 PROCEDURE — 82306 VITAMIN D 25 HYDROXY: CPT

## 2022-05-02 NOTE — TELEPHONE ENCOUNTER
From: Jessica Mendiola  To: Dr. Fer Coburn: 4/30/2022 2:44 AM EDT  Subject: blood work    Dr Corinne Llanes and staff when I go in for my blood work here in a few days do I need to fast I don't remember what you said if I needed to or not.      Carlos Alberto Kramer

## 2022-05-03 RX ORDER — LEVOTHYROXINE SODIUM 0.05 MG/1
50 TABLET ORAL DAILY
Qty: 90 TABLET | Refills: 1 | Status: SHIPPED | OUTPATIENT
Start: 2022-05-03

## 2022-05-10 DIAGNOSIS — E55.9 VITAMIN D DEFICIENCY: ICD-10-CM

## 2022-05-10 DIAGNOSIS — E03.9 HYPOTHYROIDISM, UNSPECIFIED TYPE: Primary | ICD-10-CM

## 2022-05-13 ENCOUNTER — OFFICE VISIT (OUTPATIENT)
Dept: FAMILY MEDICINE CLINIC | Age: 47
End: 2022-05-13
Payer: MEDICARE

## 2022-05-13 ENCOUNTER — PATIENT MESSAGE (OUTPATIENT)
Dept: FAMILY MEDICINE CLINIC | Age: 47
End: 2022-05-13

## 2022-05-13 VITALS
WEIGHT: 276.6 LBS | SYSTOLIC BLOOD PRESSURE: 130 MMHG | TEMPERATURE: 97.7 F | HEIGHT: 71 IN | DIASTOLIC BLOOD PRESSURE: 78 MMHG | HEART RATE: 74 BPM | OXYGEN SATURATION: 97 % | BODY MASS INDEX: 38.72 KG/M2

## 2022-05-13 DIAGNOSIS — J44.9 CHRONIC OBSTRUCTIVE PULMONARY DISEASE, UNSPECIFIED COPD TYPE (HCC): ICD-10-CM

## 2022-05-13 DIAGNOSIS — G43.009 MIGRAINE WITHOUT AURA AND WITHOUT STATUS MIGRAINOSUS, NOT INTRACTABLE: ICD-10-CM

## 2022-05-13 DIAGNOSIS — R73.01 IMPAIRED FASTING GLUCOSE: ICD-10-CM

## 2022-05-13 DIAGNOSIS — G47.10 HYPERSOMNIA: Primary | ICD-10-CM

## 2022-05-13 DIAGNOSIS — F31.32 BIPOLAR AFFECTIVE DISORDER, CURRENTLY DEPRESSED, MODERATE (HCC): ICD-10-CM

## 2022-05-13 DIAGNOSIS — G47.00 INSOMNIA, UNSPECIFIED TYPE: ICD-10-CM

## 2022-05-13 DIAGNOSIS — R06.83 SNORING: ICD-10-CM

## 2022-05-13 DIAGNOSIS — I77.810 AORTIC ROOT DILATION (HCC): ICD-10-CM

## 2022-05-13 DIAGNOSIS — R60.0 BILATERAL LOWER EXTREMITY EDEMA: ICD-10-CM

## 2022-05-13 DIAGNOSIS — Z79.899 LONG TERM USE OF DRUG: ICD-10-CM

## 2022-05-13 PROCEDURE — 1036F TOBACCO NON-USER: CPT | Performed by: FAMILY MEDICINE

## 2022-05-13 PROCEDURE — G8427 DOCREV CUR MEDS BY ELIG CLIN: HCPCS | Performed by: FAMILY MEDICINE

## 2022-05-13 PROCEDURE — 3023F SPIROM DOC REV: CPT | Performed by: FAMILY MEDICINE

## 2022-05-13 PROCEDURE — 99214 OFFICE O/P EST MOD 30 MIN: CPT | Performed by: FAMILY MEDICINE

## 2022-05-13 PROCEDURE — 99212 OFFICE O/P EST SF 10 MIN: CPT | Performed by: FAMILY MEDICINE

## 2022-05-13 PROCEDURE — G8417 CALC BMI ABV UP PARAM F/U: HCPCS | Performed by: FAMILY MEDICINE

## 2022-05-13 RX ORDER — RISPERIDONE 4 MG/1
4 TABLET, FILM COATED ORAL NIGHTLY
Qty: 30 TABLET | Refills: 1 | Status: SHIPPED | OUTPATIENT
Start: 2022-05-13 | End: 2022-07-09

## 2022-05-13 RX ORDER — DIVALPROEX SODIUM 500 MG/1
500 TABLET, EXTENDED RELEASE ORAL 2 TIMES DAILY
Qty: 60 TABLET | Refills: 5 | Status: SHIPPED | OUTPATIENT
Start: 2022-05-13

## 2022-05-13 NOTE — TELEPHONE ENCOUNTER
Aby Hatch called requesting a refill of the below medication which has been pended for you:     Requested Prescriptions     Pending Prescriptions Disp Refills    divalproex (DEPAKOTE ER) 500 MG extended release tablet 60 tablet 5     Sig: Take 1 tablet by mouth 2 times daily    blood glucose test strips (ASCENSIA AUTODISC VI;ONE TOUCH ULTRA TEST VI) strip 100 each 1     Sig: Use daily and as needed to check blood glucose.        Last Appointment Date: 3/28/2022  Next Appointment Date: 5/13/2022    Allergies   Allergen Reactions    Keflex [Cephalexin] Other (See Comments)     Migraines, upset stomach     Naproxen      ulcers

## 2022-05-13 NOTE — PROGRESS NOTES
[address]  [address], [address] [place] [address] [place]  [phone]      Kade Man is a 48 y.o. male who presents today for his medical conditions/complaints as noted below. Kade Man is c/o of Hypothyroidism, Other (hot/cold flashes, throughout the day), and Insomnia      HPI:     Pt here today for follow-up of hypothyroidism. Taking Baclofen 10 mg as needed for chronic neck pain - takes this maybe once per week. Denies side effects; does seem to help. Has noticed that any time he eats/drinks anything x past couple months, he gets very gassy. Has increased belching and intermittently has pain; no nausea. Sometimes feels heartburn. Has tried Gas-X, Tums, and Bean-o, which helps sometimes. Lasts a couple hours. Has stopped drinking as much Mt. Dew, and is now drinking more [place] and [address]. Pt has also noticed that he often has hot flashes during the day; will then get cold right after. He has a fan on his desk that he turns on/off when he feels these ways. Has been checking his glucose - has been 120 (fasting) and 160 after eating. Denies sick sx's. Denies anxiety or relation to weather/stress. Naps during the day often.           Past Medical History:   Diagnosis Date    ADHD (attention deficit hyperactivity disorder)     don't remember    Allergic rhinitis     Anxiety     Asthma     Bipolar disorder ([facility]     Diagnosed in 1998    Chronic back pain     Chronic obstructive pulmonary disease (COPD) ([facility]     Depression     Erectile dysfunction     Headache(784.0)     Osteoarthritis     TMJ (dislocation of temporomandibular joint)     Torticollis       Past Surgical History:   Procedure Laterality Date    TONSILLECTOMY       Family History   Problem Relation Age of Onset    Stroke Mother     High Blood Pressure Mother     High Blood Pressure Father     Stroke Father     Substance Abuse Father     Learning Disabilities Sister     Substance Abuse Sister     Learning Disabilities Brother     Glaucoma Neg Hx     Diabetes Neg Hx     Cataracts Neg Hx      Social History     Tobacco Use    Smoking status: Former Smoker     Packs/day: 0.00     Years: 25.00     Pack years: 0.00     Types: Cigarettes     Start date: 10/16/1992     Quit date: 2017     Years since quittin.9    Smokeless tobacco: Never Used    Tobacco comment: handout given. Substance Use Topics    Alcohol use: Never      Current Outpatient Medications   Medication Sig Dispense Refill    divalproex (DEPAKOTE ER) 500 MG extended release tablet Take 1 tablet by mouth 2 times daily 60 tablet 5    blood glucose test strips (ASCENSIA AUTODISC VI;ONE TOUCH ULTRA TEST VI) strip Use daily and as needed to check blood glucose. 100 each 3    risperiDONE (RISPERDAL) 4 MG tablet Take 1 tablet by mouth at bedtime 30 tablet 1    levothyroxine (SYNTHROID) 50 MCG tablet Take 1 tablet by mouth daily Take first thing in the morning, on an empty stomach, 30 mins to other meds/food. 90 tablet 1    baclofen (LIORESAL) 10 MG tablet Take 1 tablet by mouth 3 times daily as needed (muscle pain/spasm) 40 tablet 1    loratadine (CLARITIN) 10 MG tablet Take 1 tablet by mouth daily 90 tablet 3    venlafaxine (EFFEXOR XR) 150 MG extended release capsule Take 1 capsule by mouth daily Take with 75 mg capsule for total daily dose of 225 mg daily 90 capsule 3    venlafaxine (EFFEXOR XR) 75 MG extended release capsule Take 1 capsule by mouth daily Take with 150 mg capsule for total daily dose of 225 mg daily. 90 capsule 3    famotidine (PEPCID) 20 MG tablet Take 1 tablet by mouth 2 times daily 180 tablet 3    busPIRone (BUSPAR) 15 MG tablet Take 15 mg by mouth 2 times daily as needed (anxiety) 60 tablet 11    docusate sodium (COLACE) 100 MG capsule Take 1 capsule by mouth 1-2x's daily as needed for constipation.  60 capsule 5    Lancets MISC Use daily and as needed to check  Respiratory Therapy Supplies (NEBULIZER COMPRESSOR) KIT 1 kit by Does not apply route once for 1 dose 1 kit 0     Current Facility-Administered Medications   Medication Dose Route Frequency Provider Last Rate Last Admin    phenylephrine (MYDFRIN) 2.5 % ophthalmic solution 1 drop  1 drop Both Eyes Once Sanjeev Anderson MD        tropicamide (MYDRIACYL) 1 % ophthalmic solution 1 drop  1 drop Both Eyes Once Sanjeev Anderson MD         Allergies   Allergen Reactions    Keflex [Cephalexin] Other (See Comments)     Migraines, upset stomach     Naproxen      ulcers       Health Maintenance   Topic Date Due    COVID-19 Vaccine (1) Never done    Pneumococcal 0-64 years Vaccine (1 - PCV) Never done    HIV screen  Never done    Hepatitis C screen  Never done    DTaP/Tdap/Td vaccine (1 - Tdap) Never done    Colorectal Cancer Screen  Never done    Flu vaccine (Season Ended) 09/01/2022    Lipids  10/18/2022    Depression Monitoring  03/28/2023    Hepatitis A vaccine  Aged Out    Hepatitis B vaccine  Aged Out    Hib vaccine  Aged Out    Meningococcal (ACWY) vaccine  Aged Out       Subjective:      Review of Systems    Objective:     Vitals:    05/13/22 1342   BP: 130/78   Site: Right Upper Arm   Position: Sitting   Cuff Size: Large Adult   Pulse: 74   Temp: 97.7 °F (36.5 °C)   TempSrc: Temporal   SpO2: 97%   Weight: 276 lb 9.6 oz (125.5 kg)   Height: 5' 11\" (1.803 m)     Physical Exam  Constitutional:       General: He is not in acute distress. Appearance: Normal appearance. HENT:      Head: Normocephalic and atraumatic. Eyes:      Conjunctiva/sclera: Conjunctivae normal.   Cardiovascular:      Rate and Rhythm: Normal rate and regular rhythm. Heart sounds: Normal heart sounds. Pulmonary:      Effort: Pulmonary effort is normal. No respiratory distress. Breath sounds: Normal breath sounds. Abdominal:      General: Bowel sounds are normal. There is no distension. Palpations: Abdomen is soft. Tenderness: There is no abdominal tenderness. Musculoskeletal:      Right lower leg: Edema present. Left lower leg: Edema present. Skin:     General: Skin is warm and dry. Neurological:      General: No focal deficit present. Mental Status: He is alert and oriented to person, place, and time. Psychiatric:         Mood and Affect: Mood normal.         Assessment:      1. Hypersomnia  -     Baseline Diagnostic Sleep Study; Future  -     [address]  2. Snoring  3. Chronic obstructive pulmonary disease, unspecified COPD type ([facility]  -     Bradly Milton MD, Pulmonology, [place]  4. Bilateral lower extremity edema  5. Aortic root dilation (HCC)  -     CTA CHEST W CONTRAST; Future  6. Bipolar affective disorder, currently depressed, moderate (HCC)  -     risperiDONE (RISPERDAL) 4 MG tablet; Take 1 tablet by mouth at bedtime, Disp-30 tablet, R-1Normal  7. Insomnia, unspecified type  -     risperiDONE (RISPERDAL) 4 MG tablet; Take 1 tablet by mouth at bedtime, Disp-30 tablet, R-1Normal  8. Migraine without aura and without status migrainosus, not intractable  -     divalproex (DEPAKOTE ER) 500 MG extended release tablet; Take 1 tablet by mouth 2 times daily, Disp-60 tablet, R-5Normal  -     Valproic Acid Level, Total and Free; Future  9. Impaired fasting glucose  -     blood glucose test strips (ASCENSIA AUTODISC VI;ONE TOUCH ULTRA TEST VI) strip; Disp-100 each, R-3, NormalUse daily and as needed to check blood glucose. 10. Long term use of drug  -     Valproic Acid Level, Total and Free; Future         Plan:      Return in about 3 months (around 8/13/2022) for f/u sleep, mood, swelling.     Orders Placed This Encounter   Procedures    CTA CHEST W CONTRAST     Standing Status:   Future     Number of Occurrences:   1     Standing Expiration Date:   5/13/2023     Order Specific Question:   STAT Creatinine as needed:     Answer:   Yes     Order Specific Question:   Reason for exam:     Answer:   dilation of aortic root noted on Echo in 9/2018 - need for re-check of area    Valproic Acid Level, Total and Free     Standing Status:   Future     Standing Expiration Date:   5/13/2023   Faviola Berry     Referral Priority:   Routine     Referral Type:   Eval and Treat     Referral Reason:   Specialty Services Required     Requested Specialty:   Sleep Medicine [facility]     Number of Visits Requested:   Renzo Selby MD, Pulmonology, [place]     Referral Priority:   Routine     Referral Type:   Eval and Treat     Referral Reason:   Specialty Services Required     Referred to Provider:   Yariel Okeefe MD     Requested Specialty:   Pulmonary Disease     Number of Visits Requested:   1    Baseline Diagnostic Sleep Study     Standing Status:   Future     Standing Expiration Date:   5/13/2023     Order Specific Question:   Adult or Pediatric     Answer:   Adult Study (>7 Years)     Order Specific Question:   Location For Sleep Study     Answer:   [place]     Order Specific Question:   Select Sleep Lab Location     Answer:   [facility]     Orders Placed This Encounter   Medications    divalproex (DEPAKOTE ER) 500 MG extended release tablet     Sig: Take 1 tablet by mouth 2 times daily     Dispense:  60 tablet     Refill:  5    blood glucose test strips (ASCENSIA AUTODISC VI;ONE TOUCH ULTRA TEST VI) strip     Sig: Use daily and as needed to check blood glucose. Dispense:  100 each     Refill:  3     Please dispense for Relion glucometer.  risperiDONE (RISPERDAL) 4 MG tablet     Sig: Take 1 tablet by mouth at bedtime     Dispense:  30 tablet     Refill:  1       Patient given educational materials - see patient instructions. Discussed use, benefit, and side effects of prescribed medications. All patient questions answered. Pt voiced understanding. Reviewed health maintenance.             Electronically signed by Saadia De La Torre

## 2022-05-13 NOTE — PATIENT INSTRUCTIONS
Patient Education        Leg and Ankle Edema: Care Instructions  Your Care Instructions  Swelling in the legs, ankles, and feet is called edema. It is common after you sit or stand for a while. Long plane flights or car rides often cause swelling in the legs and feet. You may also have swelling if you have to stand for long periods of time at your job. Problems with the veins in the legs (varicose veins) and changes in hormones can also cause swelling. Sometimes the swelling in the ankles and feet is caused by a more serious problem, such as heartfailure, infection, blood clots, or liver or kidney disease. Follow-up care is a key part of your treatment and safety. Be sure to make and go to all appointments, and call your doctor if you are having problems. It's also a good idea to know your test results and keep alist of the medicines you take. How can you care for yourself at home?  If your doctor gave you medicine, take it as prescribed. Call your doctor if you think you are having a problem with your medicine.  Whenever you are resting, raise your legs up. Try to keep the swollen area higher than the level of your heart.  Take breaks from standing or sitting in one position. ? Walk around to increase the blood flow in your lower legs. ? Move your feet and ankles often while you stand, or tighten and relax your leg muscles.  Wear support stockings. Put them on in the morning, before swelling gets worse.  Eat a balanced diet. Lose weight if you need to.  Limit the amount of salt (sodium) in your diet. Salt holds fluid in the body and may increase swelling. When should you call for help? Call 911 anytime you think you may need emergency care. For example, call if:     You have symptoms of a blood clot in your lung (called a pulmonary embolism). These may include:  ? Sudden chest pain. ? Trouble breathing. ? Coughing up blood.    Call your doctor now or seek immediate medical care if:     You

## 2022-05-14 RX ORDER — DIVALPROEX SODIUM 500 MG/1
500 TABLET, EXTENDED RELEASE ORAL 2 TIMES DAILY
Qty: 60 TABLET | Refills: 5 | OUTPATIENT
Start: 2022-05-14

## 2022-05-15 DIAGNOSIS — R73.01 IMPAIRED FASTING GLUCOSE: ICD-10-CM

## 2022-05-16 RX ORDER — BLOOD SUGAR DIAGNOSTIC
STRIP MISCELLANEOUS
Qty: 100 EACH | Refills: 0 | OUTPATIENT
Start: 2022-05-16

## 2022-05-16 NOTE — TELEPHONE ENCOUNTER
From: Heri Garcia  To: Dr. Daniel Andrade: 5/13/2022 10:43 PM EDT  Subject: Denis Fuse Dr Dianne Freire and Staff yes I take my Pepcid every day 2 times like I am suppose to but still have gas    Thank you   Darrell Brown

## 2022-05-19 ENCOUNTER — HOSPITAL ENCOUNTER (OUTPATIENT)
Dept: CT IMAGING | Age: 47
Discharge: HOME | End: 2022-05-21
Payer: MEDICARE

## 2022-05-19 DIAGNOSIS — I77.810 AORTIC ROOT DILATION (HCC): ICD-10-CM

## 2022-05-19 LAB
CREAT SERPL-MCNC: 0.61 MG/DL (ref 0.7–1.2)
GFR AFRICAN AMERICAN: >60 ML/MIN
GFR NON-AFRICAN AMERICAN: >60 ML/MIN
GFR SERPL CREATININE-BSD FRML MDRD: ABNORMAL ML/MIN/{1.73_M2}

## 2022-05-19 PROCEDURE — 2580000003 HC RX 258: Performed by: FAMILY MEDICINE

## 2022-05-19 PROCEDURE — 6360000004 HC RX CONTRAST MEDICATION: Performed by: FAMILY MEDICINE

## 2022-05-19 PROCEDURE — 36415 COLL VENOUS BLD VENIPUNCTURE: CPT

## 2022-05-19 PROCEDURE — 82565 ASSAY OF CREATININE: CPT

## 2022-05-19 PROCEDURE — 71275 CT ANGIOGRAPHY CHEST: CPT

## 2022-05-19 RX ORDER — SODIUM CHLORIDE 0.9 % (FLUSH) 0.9 %
10 SYRINGE (ML) INJECTION PRN
Status: DISCONTINUED | OUTPATIENT
Start: 2022-05-19 | End: 2022-05-22 | Stop reason: HOSPADM

## 2022-05-19 RX ORDER — 0.9 % SODIUM CHLORIDE 0.9 %
80 INTRAVENOUS SOLUTION INTRAVENOUS ONCE
Status: COMPLETED | OUTPATIENT
Start: 2022-05-19 | End: 2022-05-19

## 2022-05-19 RX ADMIN — SODIUM CHLORIDE, PRESERVATIVE FREE 10 ML: 5 INJECTION INTRAVENOUS at 14:50

## 2022-05-19 RX ADMIN — SODIUM CHLORIDE 80 ML: 9 INJECTION, SOLUTION INTRAVENOUS at 14:49

## 2022-05-19 RX ADMIN — IOPAMIDOL 75 ML: 755 INJECTION, SOLUTION INTRAVENOUS at 14:50

## 2022-05-21 DIAGNOSIS — J44.9 CHRONIC OBSTRUCTIVE PULMONARY DISEASE, UNSPECIFIED COPD TYPE (HCC): ICD-10-CM

## 2022-05-22 ENCOUNTER — PATIENT MESSAGE (OUTPATIENT)
Dept: FAMILY MEDICINE CLINIC | Age: 47
End: 2022-05-22

## 2022-05-23 DIAGNOSIS — J44.9 CHRONIC OBSTRUCTIVE PULMONARY DISEASE, UNSPECIFIED COPD TYPE (HCC): ICD-10-CM

## 2022-05-23 RX ORDER — TIOTROPIUM BROMIDE 18 UG/1
CAPSULE ORAL; RESPIRATORY (INHALATION)
Qty: 90 CAPSULE | Refills: 0 | Status: SHIPPED | OUTPATIENT
Start: 2022-05-23 | End: 2022-09-28 | Stop reason: SDUPTHER

## 2022-05-23 RX ORDER — ALBUTEROL SULFATE 90 UG/1
1-2 AEROSOL, METERED RESPIRATORY (INHALATION) EVERY 6 HOURS PRN
OUTPATIENT
Start: 2022-05-23

## 2022-05-23 RX ORDER — TIOTROPIUM BROMIDE 18 UG/1
18 CAPSULE ORAL; RESPIRATORY (INHALATION) DAILY
Qty: 90 CAPSULE | Refills: 3 | OUTPATIENT
Start: 2022-05-23 | End: 2022-08-21

## 2022-05-23 NOTE — TELEPHONE ENCOUNTER
From: Barrie Green  To: Dr. All Baker: 5/22/2022 10:45 AM EDT  Subject: Blood work     After my appointment Thursday and them needing blood and having trouble getting it (getting poked 11 times) I am never doing the blood work thing any more lol they tried my left arm then my right arm then back to my left arm I felt like a pen cusion and really considering taking stock out in needles lol   I hope u all have a great week thanks for all that u do for us    David Houston

## 2022-06-08 ENCOUNTER — TELEPHONE (OUTPATIENT)
Dept: FAMILY MEDICINE CLINIC | Age: 47
End: 2022-06-08

## 2022-06-08 DIAGNOSIS — R60.0 BILATERAL LOWER EXTREMITY EDEMA: ICD-10-CM

## 2022-06-08 NOTE — TELEPHONE ENCOUNTER
Ge calling for an order for pt's compression stockings to be sent to [facility] , fax [phone], as that is the only place pt's ins will cover

## 2022-07-08 DIAGNOSIS — G89.29 CHRONIC NECK PAIN: ICD-10-CM

## 2022-07-08 DIAGNOSIS — M54.2 CHRONIC NECK PAIN: ICD-10-CM

## 2022-07-08 DIAGNOSIS — G47.00 INSOMNIA, UNSPECIFIED TYPE: ICD-10-CM

## 2022-07-08 DIAGNOSIS — F31.32 BIPOLAR AFFECTIVE DISORDER, CURRENTLY DEPRESSED, MODERATE (HCC): ICD-10-CM

## 2022-07-08 NOTE — TELEPHONE ENCOUNTER
Linda Powell called requesting a refill of the below medication which has been pended for you:     Requested Prescriptions     Pending Prescriptions Disp Refills    baclofen (LIORESAL) 10 MG tablet [Pharmacy Med Name: Baclofen 10 MG Oral Tablet] 40 tablet 0     Sig: TAKE 1 TABLET BY MOUTH THREE TIMES DAILY AS NEEDED FOR MUSCLE SPASM OR  PAIN    risperiDONE (RISPERDAL) 4 MG tablet [Pharmacy Med Name: risperiDONE 4 MG Oral Tablet] 30 tablet 0     Sig: TAKE 1 TABLET BY MOUTH AT BEDTIME       Last Appointment Date: 5/13/2022  Next Appointment Date: 8/15/2022    Allergies   Allergen Reactions    Keflex [Cephalexin] Other (See Comments)     Migraines, upset stomach     Naproxen      ulcers

## 2022-07-09 RX ORDER — BACLOFEN 10 MG/1
TABLET ORAL
Qty: 40 TABLET | Refills: 3 | Status: SHIPPED | OUTPATIENT
Start: 2022-07-09 | End: 2022-10-13

## 2022-07-09 RX ORDER — RISPERIDONE 4 MG/1
4 TABLET, FILM COATED ORAL NIGHTLY
Qty: 30 TABLET | Refills: 1 | Status: SHIPPED | OUTPATIENT
Start: 2022-07-09 | End: 2022-09-21

## 2022-07-26 DIAGNOSIS — J44.9 CHRONIC OBSTRUCTIVE PULMONARY DISEASE, UNSPECIFIED COPD TYPE (HCC): ICD-10-CM

## 2022-08-01 ENCOUNTER — TELEPHONE (OUTPATIENT)
Dept: FAMILY MEDICINE CLINIC | Age: 47
End: 2022-08-01

## 2022-08-01 DIAGNOSIS — R06.83 SNORING: ICD-10-CM

## 2022-08-01 DIAGNOSIS — G47.10 HYPERSOMNIA: Primary | ICD-10-CM

## 2022-08-01 NOTE — TELEPHONE ENCOUNTER
Call from Chon Mccabe with Sleep Lab. States she spoke with pt and he does not wish to have facility sleep study, would like to do home study. OK per KB. Ordered at this time.

## 2022-08-12 ENCOUNTER — PATIENT MESSAGE (OUTPATIENT)
Dept: FAMILY MEDICINE CLINIC | Age: 47
End: 2022-08-12

## 2022-08-12 DIAGNOSIS — E03.9 HYPOTHYROIDISM, UNSPECIFIED TYPE: ICD-10-CM

## 2022-08-12 RX ORDER — LEVOTHYROXINE SODIUM 0.05 MG/1
50 TABLET ORAL DAILY
Qty: 90 TABLET | Refills: 1 | Status: CANCELLED | OUTPATIENT
Start: 2022-08-12

## 2022-08-12 NOTE — TELEPHONE ENCOUNTER
From: Navya Ochoa  To: Dr. Matt Escoto: 8/12/2022 7:39 AM EDT  Subject: blood work    Today I am going in and getting my blood work done I was just wondering if I need to fast before I get it done?

## 2022-08-15 ENCOUNTER — OFFICE VISIT (OUTPATIENT)
Dept: FAMILY MEDICINE CLINIC | Age: 47
End: 2022-08-15
Payer: MEDICARE

## 2022-08-15 VITALS
OXYGEN SATURATION: 97 % | HEART RATE: 66 BPM | HEIGHT: 71 IN | WEIGHT: 281 LBS | BODY MASS INDEX: 39.34 KG/M2 | TEMPERATURE: 98.1 F | DIASTOLIC BLOOD PRESSURE: 86 MMHG | SYSTOLIC BLOOD PRESSURE: 128 MMHG

## 2022-08-15 DIAGNOSIS — G43.009 MIGRAINE WITHOUT AURA AND WITHOUT STATUS MIGRAINOSUS, NOT INTRACTABLE: ICD-10-CM

## 2022-08-15 DIAGNOSIS — M54.2 CHRONIC NECK PAIN: ICD-10-CM

## 2022-08-15 DIAGNOSIS — G89.29 CHRONIC NECK PAIN: ICD-10-CM

## 2022-08-15 DIAGNOSIS — G47.00 INSOMNIA, UNSPECIFIED TYPE: Primary | ICD-10-CM

## 2022-08-15 DIAGNOSIS — G47.10 HYPERSOMNIA: ICD-10-CM

## 2022-08-15 DIAGNOSIS — J44.9 CHRONIC OBSTRUCTIVE PULMONARY DISEASE, UNSPECIFIED COPD TYPE (HCC): ICD-10-CM

## 2022-08-15 DIAGNOSIS — R52 GENERALIZED PAIN: ICD-10-CM

## 2022-08-15 PROCEDURE — 1036F TOBACCO NON-USER: CPT | Performed by: FAMILY MEDICINE

## 2022-08-15 PROCEDURE — G8417 CALC BMI ABV UP PARAM F/U: HCPCS | Performed by: FAMILY MEDICINE

## 2022-08-15 PROCEDURE — G8427 DOCREV CUR MEDS BY ELIG CLIN: HCPCS | Performed by: FAMILY MEDICINE

## 2022-08-15 PROCEDURE — 99214 OFFICE O/P EST MOD 30 MIN: CPT | Performed by: FAMILY MEDICINE

## 2022-08-15 PROCEDURE — 99213 OFFICE O/P EST LOW 20 MIN: CPT | Performed by: FAMILY MEDICINE

## 2022-08-15 PROCEDURE — 3023F SPIROM DOC REV: CPT | Performed by: FAMILY MEDICINE

## 2022-08-15 RX ORDER — IBUPROFEN 800 MG/1
800 TABLET ORAL EVERY 8 HOURS PRN
Qty: 90 TABLET | Refills: 3 | Status: SHIPPED | OUTPATIENT
Start: 2022-08-15

## 2022-08-15 RX ORDER — IBUPROFEN 600 MG/1
600 TABLET ORAL EVERY 8 HOURS PRN
Qty: 90 TABLET | Refills: 3 | Status: CANCELLED | OUTPATIENT
Start: 2022-08-15

## 2022-08-15 RX ORDER — CHOLECALCIFEROL (VITAMIN D3) 125 MCG
5 CAPSULE ORAL NIGHTLY PRN
Qty: 30 TABLET | Refills: 1 | Status: SHIPPED | OUTPATIENT
Start: 2022-08-15 | End: 2022-10-13

## 2022-08-15 ASSESSMENT — PATIENT HEALTH QUESTIONNAIRE - PHQ9
SUM OF ALL RESPONSES TO PHQ QUESTIONS 1-9: 0
10. IF YOU CHECKED OFF ANY PROBLEMS, HOW DIFFICULT HAVE THESE PROBLEMS MADE IT FOR YOU TO DO YOUR WORK, TAKE CARE OF THINGS AT HOME, OR GET ALONG WITH OTHER PEOPLE: 0
3. TROUBLE FALLING OR STAYING ASLEEP: 0
SUM OF ALL RESPONSES TO PHQ QUESTIONS 1-9: 0
7. TROUBLE CONCENTRATING ON THINGS, SUCH AS READING THE NEWSPAPER OR WATCHING TELEVISION: 0
5. POOR APPETITE OR OVEREATING: 0
1. LITTLE INTEREST OR PLEASURE IN DOING THINGS: 0
4. FEELING TIRED OR HAVING LITTLE ENERGY: 0
8. MOVING OR SPEAKING SO SLOWLY THAT OTHER PEOPLE COULD HAVE NOTICED. OR THE OPPOSITE, BEING SO FIGETY OR RESTLESS THAT YOU HAVE BEEN MOVING AROUND A LOT MORE THAN USUAL: 0
SUM OF ALL RESPONSES TO PHQ QUESTIONS 1-9: 0
2. FEELING DOWN, DEPRESSED OR HOPELESS: 0
SUM OF ALL RESPONSES TO PHQ QUESTIONS 1-9: 0
6. FEELING BAD ABOUT YOURSELF - OR THAT YOU ARE A FAILURE OR HAVE LET YOURSELF OR YOUR FAMILY DOWN: 0
9. THOUGHTS THAT YOU WOULD BE BETTER OFF DEAD, OR OF HURTING YOURSELF: 0
SUM OF ALL RESPONSES TO PHQ9 QUESTIONS 1 & 2: 0

## 2022-08-15 NOTE — PROGRESS NOTES
[facility] [address]  [address], [address] [place]  [phone]      Jamel Jane is a 48 y.o. male who presents today for his medical conditions/complaints as noted below. Jamel Jane is c/o of Insomnia, Depression, and Swelling (Bilat legs)      HPI:     Pt here today for follow-up of mood and insomnia. Pt developed red lesions on the back of his R calf one week ago - itchy and painful. Started out as white blisters. Using Calamine lotion and alcohol, and the area is improving; already 80% improved. Pt did have chicken pox as a child. No new exposures. Pt's gas/belching symptoms after eating/drinking is better; hardly has this at all anymore. No further changes to diet, except he has decreased his Mt. Dew to one daily and has one Hawaiian Punch daily. He otherwise drink tomato juice or milk (infrequently). Still getting hot flashes through the day - no change since last OV. Taking Ibuprofen 600 mg as needed for neck/back pain - wonders if he could switch to the 800 mg dose, as that has worked better for him in the past.  Takes a dose every 3 days or so. Using Albuterol inhaler as needed for SOB - currently using approx 8-9 times per day. He is more SOB when he tries to do much physical activity. Admits that he has not been using his Spiriva once daily. Has gone much longer between migraines - had gone up to 3 weeks without one recently. Taking Effexor- mg + 75 mg daily for mood, along with Buspar 15 mg BID and Risperdal 4 mg nightly. Feels this is working pretty well for his mood. Feels tired all the time; feels \"drained\". Sleeps for only 2-3 hours at a time; sometimes can fall back to sleep and other times cannot. Sleeps during the day then - states he can sleep better during the day sometimes than at night.           Past Medical History:   Diagnosis Date    ADHD (attention deficit hyperactivity disorder)     don't remember    Allergic rhinitis     Anxiety     Asthma     Bipolar disorder ([facility] [address]     Diagnosed in     Chronic back pain     Chronic obstructive pulmonary disease (COPD) (HCC)     Depression     Erectile dysfunction     Headache(784.0)     Osteoarthritis     TMJ (dislocation of temporomandibular joint)     Torticollis       Past Surgical History:   Procedure Laterality Date    TONSILLECTOMY       Family History   Problem Relation Age of Onset    Stroke Mother     High Blood Pressure Mother     High Blood Pressure Father     Stroke Father     Substance Abuse Father     Learning Disabilities Sister     Substance Abuse Sister     Learning Disabilities Brother     Glaucoma Neg Hx     Diabetes Neg Hx     Cataracts Neg Hx      Social History     Tobacco Use    Smoking status: Former     Packs/day: 0.00     Years: 25.00     Pack years: 0.00     Types: Cigarettes     Start date: 10/16/1992     Quit date: 2017     Years since quittin.1    Smokeless tobacco: Never    Tobacco comments:     handout given.     Substance Use Topics    Alcohol use: Never      Current Outpatient Medications   Medication Sig Dispense Refill    ibuprofen (ADVIL;MOTRIN) 800 MG tablet Take 1 tablet by mouth every 8 hours as needed for Pain 90 tablet 3    melatonin 5 MG TABS tablet Take 1 tablet by mouth nightly as needed (sleep) 30 tablet 1    PROAIR  (90 Base) MCG/ACT inhaler Inhale 2 puffs into the lungs every 6 hours as needed for Wheezing 9 g 3    baclofen (LIORESAL) 10 MG tablet TAKE 1 TABLET BY MOUTH THREE TIMES DAILY AS NEEDED FOR MUSCLE SPASM OR  PAIN 40 tablet 3    risperiDONE (RISPERDAL) 4 MG tablet Take 1 tablet by mouth at bedtime 30 tablet 1    SPIRIVA HANDIHALER 18 MCG inhalation capsule Inhale 1 puff by mouth once daily 90 capsule 0    divalproex (DEPAKOTE ER) 500 MG extended release tablet Take 1 tablet by mouth 2 times daily 60 tablet 5    blood glucose test strips (ASCENSIA AUTODISC VI;ONE TOUCH ULTRA TEST VI) strip Use daily and as needed to check blood glucose. 100 each 3    levothyroxine (SYNTHROID) 50 MCG tablet Take 1 tablet by mouth daily Take first thing in the morning, on an empty stomach, 30 mins to other meds/food. 90 tablet 1    loratadine (CLARITIN) 10 MG tablet Take 1 tablet by mouth daily 90 tablet 3    venlafaxine (EFFEXOR XR) 150 MG extended release capsule Take 1 capsule by mouth daily Take with 75 mg capsule for total daily dose of 225 mg daily 90 capsule 3    venlafaxine (EFFEXOR XR) 75 MG extended release capsule Take 1 capsule by mouth daily Take with 150 mg capsule for total daily dose of 225 mg daily. 90 capsule 3    famotidine (PEPCID) 20 MG tablet Take 1 tablet by mouth 2 times daily 180 tablet 3    busPIRone (BUSPAR) 15 MG tablet Take 15 mg by mouth 2 times daily as needed (anxiety) 60 tablet 11    docusate sodium (COLACE) 100 MG capsule Take 1 capsule by mouth 1-2x's daily as needed for constipation. 60 capsule 5    Lancets MISC Use daily and as needed to check blood glucose. Please dispense per patients insurance. 100 each 1    omega-3 acid ethyl esters (LOVAZA) 1 g capsule Take 2 capsules by mouth 2 times daily Take with meals. 120 capsule 11    atorvastatin (LIPITOR) 10 MG tablet Take 1 tablet by mouth daily 90 tablet 3    blood glucose monitor kit and supplies Test 3 times a day & as needed for symptoms of irregular blood glucose. 1 kit 0    Handicap Placard MISC by Does not apply route Issue parking placard for person with disability. [facility] [id number].[id number]   Applicant meets the qualifying disability criteria. Expires 2 years from issuing date. 1 each 0    Respiratory Therapy Supplies (NEBULIZER/TUBING/MOUTHPIECE) KIT Use daily as needed for shortness of breath.  1 kit 0    Nebulizers (COMPRESSOR/NEBULIZER) MISC Use daily as needed for shortness of breath 1 each 0    albuterol (PROVENTIL) (2.5 MG/3ML) 0.083% nebulizer solution Take 3 mLs by nebulization every 6 hours as needed for Wheezing or Shortness of Breath 120 vial 5    fluticasone (FLONASE) 50 MCG/ACT nasal spray 2 sprays by Nasal route daily 3 Bottle 3    vitamin B-12 (CYANOCOBALAMIN) 1000 MCG tablet Take 1 tablet by mouth daily 30 tablet 11    folic acid (FOLVITE) 868 MCG tablet Take 1 tablet by mouth daily 30 tablet 11    aspirin (ASPIRIN 81) 81 MG EC tablet Take 1 tablet by mouth daily 30 tablet 11    Menthol, Topical Analgesic, (BIOFREEZE) 4 % GEL Apply to shoulders & neck to relieve pain. 237 mL 5    rizatriptan (MAXALT) 10 MG tablet Take 1 tablet by mouth once as needed for Migraine May repeat in 2 hours if needed 30 tablet 5    Respiratory Therapy Supplies (NEBULIZER COMPRESSOR) KIT 1 kit by Does not apply route once for 1 dose 1 kit 0     Current Facility-Administered Medications   Medication Dose Route Frequency Provider Last Rate Last Admin    phenylephrine (MYDFRIN) 2.5 % ophthalmic solution 1 drop  1 drop Both Eyes Once Red MD Ann-Marie        tropicamide (MYDRIACYL) 1 % ophthalmic solution 1 drop  1 drop Both Eyes Once Red MD Ann-Marie         Allergies   Allergen Reactions    Keflex [Cephalexin] Other (See Comments)     Migraines, upset stomach     Naproxen      ulcers       Health Maintenance   Topic Date Due    COVID-19 Vaccine (1) Never done    Pneumococcal 0-64 years Vaccine (1 - PCV) Never done    HIV screen  Never done    Hepatitis C screen  Never done    DTaP/Tdap/Td vaccine (1 - Tdap) Never done    Colorectal Cancer Screen  Never done    Flu vaccine (1) 09/01/2022    Lipids  10/18/2022    Depression Monitoring  08/15/2023    Hepatitis A vaccine  Aged Out    Hepatitis B vaccine  Aged Out    Hib vaccine  Aged Out    Meningococcal (ACWY) vaccine  Aged Out       Subjective:      Review of Systems   Constitutional:  Unexpected weight change: up 5 lbs in the past 3 months. Cardiovascular:  Positive for leg swelling (chronic, stable). Psychiatric/Behavioral:  Positive for dysphoric mood (stable) and sleep disturbance.  The patient is nervous/anxious (stable). Objective:     Vitals:    08/15/22 1337   BP: 128/86   Site: Right Upper Arm   Position: Sitting   Cuff Size: Large Adult   Pulse: 66   Temp: 98.1 °F (36.7 °C)   TempSrc: Temporal   SpO2: 97%   Weight: 281 lb (127.5 kg)   Height: 5' 11\" (1.803 m)     Physical Exam  Constitutional:       General: He is not in acute distress. Appearance: Normal appearance. HENT:      Head: Normocephalic and atraumatic. Eyes:      Conjunctiva/sclera: Conjunctivae normal.   Cardiovascular:      Rate and Rhythm: Normal rate and regular rhythm. Heart sounds: Normal heart sounds. Pulmonary:      Effort: Pulmonary effort is normal. No respiratory distress. Breath sounds: Normal breath sounds. Abdominal:      General: Bowel sounds are normal. There is no distension. Palpations: Abdomen is soft. Tenderness: There is no abdominal tenderness. Musculoskeletal:      Right lower leg: No edema. Left lower leg: No edema. Skin:     General: Skin is warm and dry. Neurological:      General: No focal deficit present. Mental Status: He is alert and oriented to person, place, and time. Psychiatric:         Mood and Affect: Mood normal.       Assessment:      1. Insomnia, unspecified type  -     melatonin 5 MG TABS tablet; Take 1 tablet by mouth nightly as needed (sleep), Disp-30 tablet, R-1Normal  2. Hypersomnia  3. Generalized pain  -     ibuprofen (ADVIL;MOTRIN) 800 MG tablet; Take 1 tablet by mouth every 8 hours as needed for Pain, Disp-90 tablet, R-3Normal  4. Chronic neck pain  -     ibuprofen (ADVIL;MOTRIN) 800 MG tablet; Take 1 tablet by mouth every 8 hours as needed for Pain, Disp-90 tablet, R-3Normal  5. Chronic obstructive pulmonary disease, unspecified COPD type ([facility]  6. Migraine without aura and without status migrainosus, not intractable         Plan:      Return in about 3 months (around 11/15/2022) for f/u sleep, SOB, weight.     No orders of the defined types were placed in this encounter. Orders Placed This Encounter   Medications    ibuprofen (ADVIL;MOTRIN) 800 MG tablet     Sig: Take 1 tablet by mouth every 8 hours as needed for Pain     Dispense:  90 tablet     Refill:  3    melatonin 5 MG TABS tablet     Sig: Take 1 tablet by mouth nightly as needed (sleep)     Dispense:  30 tablet     Refill:  1       Patient given educational materials - see patient instructions. Discussed use, benefit, and side effects of prescribed medications. All patient questions answered. Pt voiced understanding. Reviewed health maintenance.             Electronically signed by Tari Antunez DO, DO on 8/22/2022 at 12:10 AM

## 2022-09-09 ENCOUNTER — PATIENT MESSAGE (OUTPATIENT)
Dept: FAMILY MEDICINE CLINIC | Age: 47
End: 2022-09-09

## 2022-09-09 NOTE — TELEPHONE ENCOUNTER
From: Flor Gutierrez  To: Dr. Aranza Crooks: 9/9/2022 12:08 AM EDT  Subject: Me and Ángela Spicer and Liat Le was wondering if you would take  back as a patient and see him he feels he is too old now to be seen by a Pedapiraton thank you Dr Hermelinda Sevilla and Staff

## 2022-09-18 DIAGNOSIS — G47.00 INSOMNIA, UNSPECIFIED TYPE: ICD-10-CM

## 2022-09-18 DIAGNOSIS — E78.2 HYPERCHOLESTEROLEMIA WITH HYPERTRIGLYCERIDEMIA: ICD-10-CM

## 2022-09-18 DIAGNOSIS — F31.32 BIPOLAR AFFECTIVE DISORDER, CURRENTLY DEPRESSED, MODERATE (HCC): ICD-10-CM

## 2022-09-19 RX ORDER — ATORVASTATIN CALCIUM 10 MG/1
10 TABLET, FILM COATED ORAL DAILY
Qty: 90 TABLET | Refills: 3 | OUTPATIENT
Start: 2022-09-19

## 2022-09-19 NOTE — TELEPHONE ENCOUNTER
Do Jean called requesting a refill of the below medication which has been pended for you:     Requested Prescriptions     Pending Prescriptions Disp Refills    risperiDONE (RISPERDAL) 4 MG tablet [Pharmacy Med Name: risperiDONE 4 MG Oral Tablet] 30 tablet 0     Sig: TAKE 1 TABLET BY MOUTH AT BEDTIME       Last Appointment Date: 8/15/2022  Next Appointment Date: 11/15/2022    Allergies   Allergen Reactions    Keflex [Cephalexin] Other (See Comments)     Migraines, upset stomach     Naproxen      ulcers

## 2022-09-21 RX ORDER — RISPERIDONE 4 MG/1
4 TABLET, FILM COATED ORAL NIGHTLY
Qty: 30 TABLET | Refills: 5 | Status: SHIPPED | OUTPATIENT
Start: 2022-09-21

## 2022-09-22 DIAGNOSIS — J44.9 CHRONIC OBSTRUCTIVE PULMONARY DISEASE, UNSPECIFIED COPD TYPE (HCC): Primary | ICD-10-CM

## 2022-09-28 ENCOUNTER — HOSPITAL ENCOUNTER (OUTPATIENT)
Dept: PULMONOLOGY | Age: 47
Discharge: HOME | End: 2022-09-28
Payer: MEDICARE

## 2022-09-28 ENCOUNTER — OFFICE VISIT (OUTPATIENT)
Dept: PULMONOLOGY | Age: 47
End: 2022-09-28
Payer: MEDICARE

## 2022-09-28 VITALS
OXYGEN SATURATION: 97 % | HEART RATE: 70 BPM | DIASTOLIC BLOOD PRESSURE: 72 MMHG | TEMPERATURE: 98.2 F | SYSTOLIC BLOOD PRESSURE: 134 MMHG | HEIGHT: 71 IN | WEIGHT: 284 LBS | BODY MASS INDEX: 39.76 KG/M2

## 2022-09-28 DIAGNOSIS — J43.2 CENTRILOBULAR EMPHYSEMA (HCC): ICD-10-CM

## 2022-09-28 DIAGNOSIS — J44.9 CHRONIC OBSTRUCTIVE PULMONARY DISEASE, UNSPECIFIED COPD TYPE (HCC): ICD-10-CM

## 2022-09-28 DIAGNOSIS — J44.9 CHRONIC OBSTRUCTIVE PULMONARY DISEASE, UNSPECIFIED COPD TYPE (HCC): Primary | ICD-10-CM

## 2022-09-28 LAB
DLCO %PRED: NORMAL
DLCO PRED: NORMAL
DLCO/VA %PRED: NORMAL
DLCO/VA PRED: NORMAL
DLCO/VA: NORMAL
DLCO: NORMAL
EXPIRATORY TIME-POST: NORMAL
EXPIRATORY TIME: NORMAL
FEF 25-75% %CHNG: NORMAL
FEF 25-75% %PRED-POST: NORMAL
FEF 25-75% %PRED-PRE: NORMAL
FEF 25-75% PRED: NORMAL
FEF 25-75%-POST: NORMAL
FEF 25-75%-PRE: NORMAL
FEV1 %PRED-POST: NORMAL
FEV1 %PRED-PRE: NORMAL
FEV1 PRED: NORMAL
FEV1-POST: NORMAL
FEV1-PRE: NORMAL
FEV1/FVC %PRED-POST: NORMAL
FEV1/FVC %PRED-PRE: NORMAL
FEV1/FVC PRED: NORMAL
FEV1/FVC-POST: NORMAL
FEV1/FVC-PRE: NORMAL
FVC %PRED-POST: NORMAL
FVC %PRED-PRE: NORMAL
FVC PRED: NORMAL
FVC-POST: NORMAL
FVC-PRE: NORMAL
GAW %PRED: NORMAL
GAW PRED: NORMAL
GAW: NORMAL
IC %PRED: NORMAL
IC PRED: NORMAL
IC: NORMAL
MEP: NORMAL
MIP: NORMAL
MVV %PRED-PRE: NORMAL
MVV PRED: NORMAL
MVV-PRE: NORMAL
PEF %PRED-POST: NORMAL
PEF %PRED-PRE: NORMAL
PEF PRED: NORMAL
PEF%CHNG: NORMAL
PEF-POST: NORMAL
PEF-PRE: NORMAL
RAW %PRED: NORMAL
RAW PRED: NORMAL
RAW: NORMAL
RV %PRED: NORMAL
RV PRED: NORMAL
RV: NORMAL
SVC %PRED: NORMAL
SVC PRED: NORMAL
SVC: NORMAL
TLC %PRED: NORMAL
TLC PRED: NORMAL
TLC: NORMAL
VA %PRED: NORMAL
VA PRED: NORMAL
VA: NORMAL
VTG %PRED: NORMAL
VTG PRED: NORMAL
VTG: NORMAL

## 2022-09-28 PROCEDURE — 99204 OFFICE O/P NEW MOD 45 MIN: CPT | Performed by: INTERNAL MEDICINE

## 2022-09-28 PROCEDURE — 1036F TOBACCO NON-USER: CPT | Performed by: INTERNAL MEDICINE

## 2022-09-28 PROCEDURE — 94640 AIRWAY INHALATION TREATMENT: CPT

## 2022-09-28 PROCEDURE — 99213 OFFICE O/P EST LOW 20 MIN: CPT | Performed by: INTERNAL MEDICINE

## 2022-09-28 PROCEDURE — 6370000000 HC RX 637 (ALT 250 FOR IP): Performed by: INTERNAL MEDICINE

## 2022-09-28 PROCEDURE — 3023F SPIROM DOC REV: CPT | Performed by: INTERNAL MEDICINE

## 2022-09-28 PROCEDURE — 94726 PLETHYSMOGRAPHY LUNG VOLUMES: CPT

## 2022-09-28 PROCEDURE — G8417 CALC BMI ABV UP PARAM F/U: HCPCS | Performed by: INTERNAL MEDICINE

## 2022-09-28 PROCEDURE — 94729 DIFFUSING CAPACITY: CPT

## 2022-09-28 PROCEDURE — 94060 EVALUATION OF WHEEZING: CPT

## 2022-09-28 PROCEDURE — G8427 DOCREV CUR MEDS BY ELIG CLIN: HCPCS | Performed by: INTERNAL MEDICINE

## 2022-09-28 RX ORDER — ALBUTEROL SULFATE 90 UG/1
4 AEROSOL, METERED RESPIRATORY (INHALATION) ONCE
Status: COMPLETED | OUTPATIENT
Start: 2022-09-28 | End: 2022-09-28

## 2022-09-28 RX ORDER — TIOTROPIUM BROMIDE 18 UG/1
CAPSULE ORAL; RESPIRATORY (INHALATION)
Qty: 90 CAPSULE | Refills: 3 | Status: SHIPPED | OUTPATIENT
Start: 2022-09-28

## 2022-09-28 RX ADMIN — ALBUTEROL SULFATE 4 PUFF: 90 AEROSOL, METERED RESPIRATORY (INHALATION) at 14:47

## 2022-09-30 ENCOUNTER — OFFICE VISIT (OUTPATIENT)
Dept: PRIMARY CARE CLINIC | Age: 47
End: 2022-09-30
Payer: MEDICARE

## 2022-09-30 VITALS
DIASTOLIC BLOOD PRESSURE: 102 MMHG | SYSTOLIC BLOOD PRESSURE: 156 MMHG | RESPIRATION RATE: 20 BRPM | HEART RATE: 88 BPM | TEMPERATURE: 98.2 F | OXYGEN SATURATION: 95 % | BODY MASS INDEX: 39.92 KG/M2 | HEIGHT: 71 IN | WEIGHT: 285.13 LBS

## 2022-09-30 DIAGNOSIS — L60.2 NAIL THICKENING: ICD-10-CM

## 2022-09-30 DIAGNOSIS — S91.202A TRAUMATIC LOSS OF TOENAIL OF LEFT GREAT TOE, INITIAL ENCOUNTER: Primary | ICD-10-CM

## 2022-09-30 PROCEDURE — G8427 DOCREV CUR MEDS BY ELIG CLIN: HCPCS | Performed by: NURSE PRACTITIONER

## 2022-09-30 PROCEDURE — 11730 AVULSION NAIL PLATE SIMPLE 1: CPT | Performed by: NURSE PRACTITIONER

## 2022-09-30 PROCEDURE — G8417 CALC BMI ABV UP PARAM F/U: HCPCS | Performed by: NURSE PRACTITIONER

## 2022-09-30 PROCEDURE — 99213 OFFICE O/P EST LOW 20 MIN: CPT | Performed by: NURSE PRACTITIONER

## 2022-09-30 PROCEDURE — 1036F TOBACCO NON-USER: CPT | Performed by: NURSE PRACTITIONER

## 2022-09-30 ASSESSMENT — ENCOUNTER SYMPTOMS
ALLERGIC/IMMUNOLOGIC NEGATIVE: 1
EYES NEGATIVE: 1
GASTROINTESTINAL NEGATIVE: 1
RESPIRATORY NEGATIVE: 1

## 2022-09-30 NOTE — PROGRESS NOTES
[facility] A department of [facility]  Phone: [phone]  Fax: [phone]      Elizabeth Mas is a 50 y.o. male who presents to the [facility] today for his medical conditions/complaints as noted below. Elizabeth Mas is c/o of Toe Pain (Left great toe. Caught on plastic bag last night, then bumped into bed today. Bleeding)          HPI:     Toe Pain   The incident occurred 12 to 24 hours ago. The incident occurred at home. The injury mechanism was a direct blow (stubbed last night and then caught on sheets tonight.). The quality of the pain is described as aching and stabbing. The pain is at a severity of 3/10. The pain is mild. The pain has been Intermittent since onset. Pertinent negatives include no inability to bear weight, loss of motion, loss of sensation, numbness or tingling. The symptoms are aggravated by movement. He has tried nothing for the symptoms. The treatment provided no relief.      Past Medical History:   Diagnosis Date    ADHD (attention deficit hyperactivity disorder)     don't remember    Allergic rhinitis     Anxiety     Asthma     Bipolar disorder ([facility] [address]     Diagnosed in 1998    Chronic back pain     Chronic obstructive pulmonary disease (COPD) ([facility] [address]     Depression     Erectile dysfunction     Headache(784.0)     Osteoarthritis     TMJ (dislocation of temporomandibular joint)     Torticollis         Allergies   Allergen Reactions    Keflex [Cephalexin] Other (See Comments)     Migraines, upset stomach     Naproxen      ulcers       Wt Readings from Last 3 Encounters:   09/30/22 285 lb 2 oz (129.3 kg)   09/28/22 284 lb (128.8 kg)   08/15/22 281 lb (127.5 kg)     BP Readings from Last 3 Encounters:   09/30/22 (!) 156/102   09/28/22 134/72   08/15/22 128/86      Temp Readings from Last 3 Encounters:   09/30/22 98.2 °F (36.8 °C) (Tympanic)   09/28/22 98.2 °F (36.8 °C)   08/15/22 98.1 °F (36.7 °C) (Temporal)     Pulse Readings from Last 3 Encounters:   09/30/22 88   09/28/22 70   08/15/22 66     SpO2 Readings from Last 3 Encounters:   09/30/22 95%   09/28/22 97%   08/15/22 97%       Subjective:      Review of Systems   Constitutional: Negative. HENT: Negative. Eyes: Negative. Respiratory: Negative. Cardiovascular: Negative. Gastrointestinal: Negative. Endocrine: Negative. Genitourinary: Negative. Musculoskeletal:  Positive for gait problem (due to bleeding great toe). Skin:  Positive for wound (left great toe nail partially removed on accident with bleeding. ). Allergic/Immunologic: Negative. Neurological:  Negative for tingling and numbness. Hematological: Negative. Psychiatric/Behavioral: Negative. All other systems reviewed and are negative. Objective:     Vitals:    09/30/22 1928   BP: (!) 156/102   Site: Left Upper Arm   Position: Sitting   Cuff Size: Large Adult   Pulse: 88   Resp: 20   Temp: 98.2 °F (36.8 °C)   TempSrc: Tympanic   SpO2: 95%   Weight: 285 lb 2 oz (129.3 kg)   Height: 5' 11\" (1.803 m)     Body mass index is 39.77 kg/m². BP (!) 156/102 (Site: Left Upper Arm, Position: Sitting, Cuff Size: Large Adult)   Pulse 88   Temp 98.2 °F (36.8 °C) (Tympanic)   Resp 20   Ht 5' 11\" (1.803 m)   Wt 285 lb 2 oz (129.3 kg)   SpO2 95%   BMI 39.77 kg/m²   Physical Exam  Vitals and nursing note reviewed. Constitutional:       General: He is not in acute distress. Appearance: He is obese. He is not ill-appearing. HENT:      Nose: Nose normal.      Mouth/Throat:      Mouth: Mucous membranes are moist.   Eyes:      Pupils: Pupils are equal, round, and reactive to light. Cardiovascular:      Rate and Rhythm: Normal rate and regular rhythm. Pulses: Normal pulses. Dorsalis pedis pulses are 2+ on the right side. Posterior tibial pulses are 2+ on the right side. Heart sounds: Normal heart sounds.    Pulmonary:      Effort: Pulmonary effort is normal.   Musculoskeletal:         General: Signs of injury present. Feet:      Left foot:      Protective Sensation: 4 sites tested. 4 sites sensed. Toenail Condition: Left toenails are abnormally thick and long. Fungal disease present. Comments: Left great toe nail extremely thick and long. Partially traumatically removed per patient getting caught on object. See photo  Skin:     General: Skin is warm. Capillary Refill: Capillary refill takes less than 2 seconds. Neurological:      General: No focal deficit present. Mental Status: He is alert and oriented to person, place, and time. Mental status is at baseline. Psychiatric:         Mood and Affect: Mood normal.         Behavior: Behavior normal.         Judgment: Judgment normal.           Assessment and Plan      Diagnosis Orders   1. Traumatic loss of toenail of left great toe, initial encounter        2. Nail thickening          No orders of the defined types were placed in this encounter. Patient was educated on all treatment options. He opted for quick removal without anesthetic. After a deep breath and while slowly exhaling the remainder of the Left great toe nail was removed with sudden force. A dry sterile pressure dressing of antibiotic ointment with gauze took place. Patient will leave dry and intact for 24 hours then start soaking bid in warm soapy water or epsom salts then apply the ointment and a band aid for the first week then continue once per day for the second week. Patient will use OTC anti-inflammatory or tylenol PRN for pain. Post nail procedure instructions were given. Any signs of infections and patient should be see back in the office immediately. He has a previously scheduled appointment with Dr. Sandi Gonzales on 10/4/2022. He was encouraged to keep this appointment for follow up. Discussed exam, POCT findings, plan of care, and follow-up at length with patient.   Reviewed all prescribed and recommended medications, administration and side effects. Encouraged patient to follow up with PCP or return to the clinic for no improvement and or worsening of symptoms. All questions were answered and they verbalized understanding and were agreeable with the plan. Follow up as needed.         Electronically signed by TREVOR Fernández CNP on 9/30/2022 at 8:07 PM

## 2022-10-01 NOTE — PATIENT INSTRUCTIONS
Keep dressing in place for the next 24 hours. May reinforce if needed for bleeding. Keep foot elevated to help decrease swelling and throbbing. Soap in warm soapy water twice a day and dry well. Keep appointment with podiatrist for wound check and treatment for other toes. May use tylenol or motrin as needed for pain.

## 2022-10-02 NOTE — PROGRESS NOTES
REASON FOR THE CONSULTATION:  COPD   HISTORY OF PRESENT ILLNESS:    Katerin Tabor is a 50y.o. year old male   Patient is following up after 2019. He has stopped smoking. Since he stopped smoking he has been feeling better he denies any purulent sputum or hemoptysis. He is here for a regular checkup. Reviewed his PFT and discussed with him. He has noticed that using Spiriva had helped his wheezing. Dyspnea is grade 2. Last visit  here for evaluation of shortness of breath which has worsened since he stopped smoking in 2016. He has a 30-pack-year history of smoking. He gets short of breath with minimal exertion. Denies any cough, hemoptysis or sputum. Denies wheezing. He has albuterol but only uses it occasionally. On further inquiry he does say that he wheezes with exertion. He's never had any surgeries on his throat has no voice problems. He tells me that he does have history of sleep study done which was negative  His AHI is1              LUNG CANCER SCREENING     CRITERIA MET    []     CT ORDERED  []      CRITERIA NOT MET   [x]      REFUSED                    []        REASON CRITERIA NOT MET     SMOKING LESS THAN 30 PY  []      AGE LESS THAN 55 or GREATER 75 YEARS  [x]      QUIT SMOKING 15 YEARS OR GREATER   []      RECENT CT WITH IN 11 MONTHS    []      LIFE EXPECTANCY < 5 YEARS   []      SIGNS  AND SYMPTOMS OF LUNG CANCER   []         Immunization     There is no immunization history on file for this patient.      Pneumococcal Vaccine     [] Up to date    [] Indicated   [x] Refused  [] Contraindicated       Influenza Vaccine   [] Up to date    [] Indicated   [x] Refused  [] Contraindicated        PAST MEDICAL HISTORY:       Diagnosis Date    ADHD (attention deficit hyperactivity disorder)     don't remember    Allergic rhinitis     Anxiety     Asthma     Bipolar disorder ([facility]     Diagnosed in 1998    Chronic back pain     Chronic obstructive pulmonary disease (COPD) ([facility]     Depression Erectile dysfunction     Headache(784.0)     Osteoarthritis     TMJ (dislocation of temporomandibular joint)     Torticollis          Family History:       Problem Relation Age of Onset    Stroke Mother     High Blood Pressure Mother     High Blood Pressure Father     Stroke Father     Substance Abuse Father     Learning Disabilities Sister     Substance Abuse Sister     Learning Disabilities Brother     Glaucoma Neg Hx     Diabetes Neg Hx     Cataracts Neg Hx        SURGICAL HISTORY:   Past Surgical History:   Procedure Laterality Date    TONSILLECTOMY             SOCIAL AND OCCUPATIONAL HEALTH:      There  NO history of TB or TB exposure. There  NO asbestos or silica dust exposure. The patient reports  NO coal, foundry, quarry or [employer] exposure. Travel history reveals NO. There  NO history of recreational or IV drug use. There  NO hot tube exposure. Pets  NO    Occupational history NOT WORKING . TOBACCO:   reports that he quit smoking about 5 years ago. His smoking use included cigarettes. He started smoking about 29 years ago. He has never used smokeless tobacco.  ETOH:   reports no history of alcohol use. ALLERGIES:      Allergies   Allergen Reactions    Keflex [Cephalexin] Other (See Comments)     Migraines, upset stomach     Naproxen      ulcers         Home Meds:   Prior to Admission medications    Medication Sig Start Date End Date Taking?  Authorizing Provider   tiotropium (SPIRIVA HANDIHALER) 18 MCG inhalation capsule Inhale 1 puff by mouth once daily 9/28/22  Yes Rey Velázquez MD   PROAIR  (90 Base) MCG/ACT inhaler Inhale 2 puffs into the lungs every 6 hours as needed for Wheezing 9/28/22  Yes Rey Velázquez MD   risperiDONE (RISPERDAL) 4 MG tablet TAKE 1 TABLET BY MOUTH AT BEDTIME 9/21/22  Yes Irais Vance, DO   ibuprofen (ADVIL;MOTRIN) 800 MG tablet Take 1 tablet by mouth every 8 hours as needed for Pain 8/15/22  Yes Karen Vance, DO   melatonin 5 MG TABS tablet Take 1 tablet by mouth nightly as needed (sleep) 8/15/22  Yes Irais Vance DO   baclofen (LIORESAL) 10 MG tablet TAKE 1 TABLET BY MOUTH THREE TIMES DAILY AS NEEDED FOR MUSCLE SPASM OR  PAIN 7/9/22  Yes Irais Vance DO   divalproex (DEPAKOTE ER) 500 MG extended release tablet Take 1 tablet by mouth 2 times daily 5/13/22  Yes Irais Vance DO   blood glucose test strips (ASCENSIA AUTODISC VI;ONE TOUCH ULTRA TEST VI) strip Use daily and as needed to check blood glucose. 5/13/22  Yes Irais Vance DO   levothyroxine (SYNTHROID) 50 MCG tablet Take 1 tablet by mouth daily Take first thing in the morning, on an empty stomach, 30 mins to other meds/food. 5/3/22  Yes Irais Vance DO   rizatriptan (MAXALT) 10 MG tablet Take 1 tablet by mouth once as needed for Migraine May repeat in 2 hours if needed 4/21/22 9/28/22 Yes Irais Vance DO   loratadine (CLARITIN) 10 MG tablet Take 1 tablet by mouth daily 2/4/22  Yes Irais Vance DO   venlafaxine (EFFEXOR XR) 150 MG extended release capsule Take 1 capsule by mouth daily Take with 75 mg capsule for total daily dose of 225 mg daily 1/27/22  Yes Irais Vance DO   venlafaxine (EFFEXOR XR) 75 MG extended release capsule Take 1 capsule by mouth daily Take with 150 mg capsule for total daily dose of 225 mg daily. 1/27/22  Yes Irais Vance DO   famotidine (PEPCID) 20 MG tablet Take 1 tablet by mouth 2 times daily 12/16/21  Yes Irais Vance DO   busPIRone (BUSPAR) 15 MG tablet Take 15 mg by mouth 2 times daily as needed (anxiety) 12/2/21  Yes Irais Vance DO   docusate sodium (COLACE) 100 MG capsule Take 1 capsule by mouth 1-2x's daily as needed for constipation. 11/16/21  Yes Irais Vance DO   Lancets MISC Use daily and as needed to check blood glucose. Please dispense per patients insurance. 11/12/21  Yes Irais Vance, DO   omega-3 acid ethyl esters (LOVAZA) 1 g capsule Take 2 capsules by mouth 2 times daily Take with meals.  11/12/21  Yes Anisha Vick, DO atorvastatin (LIPITOR) 10 MG tablet Take 1 tablet by mouth daily 11/12/21  Yes Alma Rojas, DO   blood glucose monitor kit and supplies Test 3 times a day & as needed for symptoms of irregular blood glucose. 11/12/21  Yes Julito Vance, DO   Handicap Placard MISC by Does not apply route Issue parking placard for person with disability. [facility] [id number]   Applicant meets the qualifying disability criteria. Expires 2 years from issuing date. 6/11/21  Yes Julito Vance, DO   Respiratory Therapy Supplies (NEBULIZER/TUBING/MOUTHPIECE) KIT Use daily as needed for shortness of breath. 5/13/21  Yes Julito Vance, DO   Nebulizers (COMPRESSOR/NEBULIZER) MISC Use daily as needed for shortness of breath 5/13/21  Yes Julito Vance, DO   albuterol (PROVENTIL) (2.5 MG/3ML) 0.083% nebulizer solution Take 3 mLs by nebulization every 6 hours as needed for Wheezing or Shortness of Breath 5/10/21  Yes Julito Vance, DO   fluticasone (FLONASE) 50 MCG/ACT nasal spray 2 sprays by Nasal route daily 12/31/20  Yes Julito Vance DO   vitamin B-12 (CYANOCOBALAMIN) 1000 MCG tablet Take 1 tablet by mouth daily 11/10/20  Yes Julito Vance DO   folic acid (FOLVITE) 400 MCG tablet Take 1 tablet by mouth daily 11/10/20  Yes Julito Vance DO   aspirin (ASPIRIN 81) 81 MG EC tablet Take 1 tablet by mouth daily 11/10/20  Yes Julito Vance DO   Menthol, Topical Analgesic, (BIOFREEZE) 4 % GEL Apply to shoulders & neck to relieve pain.  10/21/20  Yes Alma Rojas DO   Respiratory Therapy Supplies (NEBULIZER COMPRESSOR) KIT 1 kit by Does not apply route once for 1 dose 7/22/19 9/28/22 Yes Bina Moy, DO            Review of Systems -  General ROS: negative for - chills, fatigue, fever or weight loss  ENT ROS: negative for - headaches, oral lesions or sore throat  Cardiovascular ROS: no chest pain , orthopnea or pnd   Gastrointestinal ROS: no abdominal pain, change in bowel habits, or black or bloody stools  Skin - no rash   Neuro - no blurry vision , no loc . No focal weakness   msk - no jt tenderness or swelling    Vascular - no claudication , rest completed and negative   Lymphatic - complete and negative   Hematology - oncology - complete and negative   Allergy immunology - complete and negative    no burning or hematuria    Vitals:  /72 (Site: Left Upper Arm, Position: Sitting, Cuff Size: Large Adult)   Pulse 70   Temp 98.2 °F (36.8 °C)   Ht 5' 11\" (1.803 m)   Wt 284 lb (128.8 kg)   SpO2 97%   BMI 39.61 kg/m²     PHYSICAL EXAM:  Head and neck atraumatic, normocephalic    Lymph nodes-no cervical, supraclavicular lymphadenopathy    Neck-no JVP elevation    Lungs - Ventilating all lobes without any rales, rhonchi, wheezes or dullness. No bronchial breath sounds. Chest expansion equal bilaterally    CVS- S1, S2 regular. No S3 no S4, no murmurs    Abdomen-nontender, nondistended. Bowel sounds are present. No organomegaly    Lower extremity-no edema    Upper extremity-no edema    Neurological-grossly normal cranial nerves. No overt motor deficit           CXR   Hyperinflated lung fields            IMPRESSION:     Diagnosis Orders   1. Chronic obstructive pulmonary disease, unspecified COPD type (HCC)  tiotropium (SPIRIVA HANDIHALER) 18 MCG inhalation capsule    PROAIR  (90 Base) MCG/ACT inhaler      2. Centrilobular emphysema ([address]             :                PLAN:      PFT shows improved FEV1 FVC ratio.  76, FEV1 is 73% predicted and FVC 73% predicted. Advised patient to continue to abstain from smoking since he has stopped smoking his pulm function has improved.   Continue Spiriva once a day and use albuterol as needed  Follow-up in 1 year         Requested Prescriptions     Signed Prescriptions Disp Refills    tiotropium (SPIRIVA HANDIHALER) 18 MCG inhalation capsule 90 capsule 3     Sig: Inhale 1 puff by mouth once daily    PROAIR  (90 Base) MCG/ACT inhaler 3 each 3     Sig: Inhale 2 puffs into the lungs every 6 hours as needed for Wheezing       Medications Discontinued During This Encounter   Medication Reason    Chyna Hoist 18 MCG inhalation capsule REORDER    PROAIR  (90 Base) MCG/ACT inhaler REORDER       Efren received counseling on the following healthy behaviors: nutrition, exercise and medication adherence    Patient given educational materials : see patient instruction       Discussed use, benefit, and side effects of prescribed medications. Barriers to medication compliance addressed. All patient questions answered. Pt voiced understanding. I hope this updates you on my evaluation and clinical thinking. Thank you for allowing me to participate in his care. Sincerely,  Electronically signed by Shanell Fernandez MD on 10/2/2022 at 6:20 PM       Please note that this chart was generated using voice recognition Dragon dictation software. Although every effort was made to ensure the accuracy of this automated transcription, some errors in transcription may have occurred.

## 2022-10-04 ENCOUNTER — OFFICE VISIT (OUTPATIENT)
Dept: PODIATRY | Age: 47
End: 2022-10-04
Payer: MEDICARE

## 2022-10-04 VITALS
BODY MASS INDEX: 40.04 KG/M2 | SYSTOLIC BLOOD PRESSURE: 130 MMHG | WEIGHT: 286 LBS | HEART RATE: 76 BPM | HEIGHT: 71 IN | DIASTOLIC BLOOD PRESSURE: 80 MMHG

## 2022-10-04 DIAGNOSIS — S91.109A OPEN WOUND OF TOE, INITIAL ENCOUNTER: Primary | ICD-10-CM

## 2022-10-04 PROCEDURE — 99214 OFFICE O/P EST MOD 30 MIN: CPT | Performed by: PODIATRIST

## 2022-10-04 PROCEDURE — G8417 CALC BMI ABV UP PARAM F/U: HCPCS | Performed by: PODIATRIST

## 2022-10-04 PROCEDURE — G8484 FLU IMMUNIZE NO ADMIN: HCPCS | Performed by: PODIATRIST

## 2022-10-04 PROCEDURE — 1036F TOBACCO NON-USER: CPT | Performed by: PODIATRIST

## 2022-10-04 PROCEDURE — G8427 DOCREV CUR MEDS BY ELIG CLIN: HCPCS | Performed by: PODIATRIST

## 2022-10-04 PROCEDURE — 99203 OFFICE O/P NEW LOW 30 MIN: CPT | Performed by: PODIATRIST

## 2022-10-04 NOTE — PROGRESS NOTES
Subjective:  Barbara High is a 50 y.o. male who presents to the office today complaining of wound L big toe. .  Symptoms began 1 week(s) ago. Patient relates pain is Present. Pain is rated 1 out of 10 and is described as mild. Pt caught his nail so had it removed at urgent care last week. Treatments prior to today's visit include: soaking. Currently denies F/C/N/V. Allergies   Allergen Reactions    Keflex [Cephalexin] Other (See Comments)     Migraines, upset stomach     Naproxen      ulcers       Past Medical History:   Diagnosis Date    ADHD (attention deficit hyperactivity disorder)     don't remember    Allergic rhinitis     Anxiety     Asthma     Bipolar disorder ([address]     Diagnosed in 1998    Chronic back pain     Chronic obstructive pulmonary disease (COPD) (HCC)     Depression     Erectile dysfunction     Headache(784.0)     Osteoarthritis     TMJ (dislocation of temporomandibular joint)     Torticollis        Prior to Admission medications    Medication Sig Start Date End Date Taking? Authorizing Provider   silver sulfADIAZINE (SILVADENE) 1 % cream Apply topically daily.  10/4/22  Yes Pasha Holguin DPM   tiotropium (SPIRIVA HANDIHALER) 18 MCG inhalation capsule Inhale 1 puff by mouth once daily 9/28/22  Yes Jak Braswell MD   PROAIR  (90 Base) MCG/ACT inhaler Inhale 2 puffs into the lungs every 6 hours as needed for Wheezing 9/28/22  Yes Jak Braswell MD   risperiDONE (RISPERDAL) 4 MG tablet TAKE 1 TABLET BY MOUTH AT BEDTIME 9/21/22  Yes Jennifer Vance DO   ibuprofen (ADVIL;MOTRIN) 800 MG tablet Take 1 tablet by mouth every 8 hours as needed for Pain 8/15/22  Yes Jennifer Vance DO   melatonin 5 MG TABS tablet Take 1 tablet by mouth nightly as needed (sleep) 8/15/22  Yes Jennifer Vance DO   baclofen (LIORESAL) 10 MG tablet TAKE 1 TABLET BY MOUTH THREE TIMES DAILY AS NEEDED FOR MUSCLE SPASM OR  PAIN 7/9/22  Yes Jennifer Vance DO   divalproex (DEPAKOTE ER) 500 MG extended release tablet date. 21  Yes Tevin Lucero DO   Respiratory Therapy Supplies (NEBULIZER/TUBING/MOUTHPIECE) KIT Use daily as needed for shortness of breath. 21  Yes Yolie Vance DO   Nebulizers (COMPRESSOR/NEBULIZER) MISC Use daily as needed for shortness of breath 21  Yes Yolie Vance DO   albuterol (PROVENTIL) (2.5 MG/3ML) 0.083% nebulizer solution Take 3 mLs by nebulization every 6 hours as needed for Wheezing or Shortness of Breath 5/10/21  Yes Yolie Vance DO   fluticasone (FLONASE) 50 MCG/ACT nasal spray 2 sprays by Nasal route daily 20  Yes Yolie Vance DO   vitamin B-12 (CYANOCOBALAMIN) 1000 MCG tablet Take 1 tablet by mouth daily 11/10/20  Yes Yolie Vance DO   folic acid (FOLVITE) 625 MCG tablet Take 1 tablet by mouth daily 11/10/20  Yes Yolie Vance DO   aspirin (ASPIRIN 81) 81 MG EC tablet Take 1 tablet by mouth daily 11/10/20  Yes Yolie Vance DO   Menthol, Topical Analgesic, (BIOFREEZE) 4 % GEL Apply to shoulders & neck to relieve pain.  10/21/20  Yes Yolie Vance DO   rizatriptan (MAXALT) 10 MG tablet Take 1 tablet by mouth once as needed for Migraine May repeat in 2 hours if needed 22  Tevin Lucero DO   Respiratory Therapy Supplies (NEBULIZER COMPRESSOR) KIT 1 kit by Does not apply route once for 1 dose 19  Claribel Isaacs DO       Past Surgical History:   Procedure Laterality Date    TONSILLECTOMY         Family History   Problem Relation Age of Onset    Stroke Mother     High Blood Pressure Mother     High Blood Pressure Father     Stroke Father     Substance Abuse Father     Learning Disabilities Sister     Substance Abuse Sister     Learning Disabilities Brother     Glaucoma Neg Hx     Diabetes Neg Hx     Cataracts Neg Hx        Social History     Tobacco Use    Smoking status: Former     Packs/day: 0.00     Years: 25.00     Pack years: 0.00     Types: Cigarettes     Start date: 10/16/1992     Quit date: 2017     Years since quittin.2 Smokeless tobacco: Never    Tobacco comments:     handout given. Substance Use Topics    Alcohol use: Never       ROS: All 14 ROS systems reviewed and pertinent positives noted above, all others negative. Lower Extremity Physical Examination:     Vitals:   Vitals:    10/04/22 0831   BP: 130/80   Pulse: 76     General: AAO x 3 in NAD. Vascular: DP and PT pulses palpable 2/4, bilateral.  CFT <3 seconds, bilateral.  Hair growth present to the level of the digits, bilateral.  Edema absent, bilateral.  Varicosities absent, bilateral. Erythema absent, bilateral. Distal Rubor absent bilateral.  Temperature within normal limits bilateral. Hyperpigmentation absent bilateral. No atrophic skin. Neurological: Sensation intact to light touch to level of digits, bilateral.  Protective sensation intact 10/10 sites via 5.07/10g Duarte-Ann Monofilament, bilateral.  negative Tinel's, bilateral.  negative Valleix sign, bilateral.  Vibratory intact bilateral.  Reflexes Decreased bilateral.  Paresthesias negative. Dysthesias negative. Sharp/dull intact bilateral.   Musculoskeletal: Muscle strength 5/5, bilateral.  Pain absent upon palpation bilateral. Normal medial longitudinal arch, bilateral.  Ankle ROM within normal limits,bilateral.  1st MPJ ROM within normal limits, bilateral.  Dorsally contracted digits absent. No other foot deformities. Integument: Warm, dry, supple, bilateral.  Open lesion present, L hallux nail bed. Mild fibrin, healthy tissue base, no signs of infection. Interdigital maceration absent to web spaces bilateral.  Nails within normal limits. Fissures absent, bilateral. Hyperkeratotic tissue is absent. Asessment: Patient is a 50 y.o. male with:    Diagnosis Orders   1. Open wound of toe, initial encounter            Plan: Patient examined and evaluated. Current condition and treatment options discussed in detail.     any increase in pain of signs of infection then patient will be seen sooner. Patient will soak bid in warm soapy water or epsom salts and will use silvadene ointment bid  Orders Placed This Encounter   Medications    silver sulfADIAZINE (SILVADENE) 1 % cream     Sig: Apply topically daily. Dispense:  30 g     Refill:  1   Level medical decision making. Patient is acute uncomplicated condition. 1 new prescription given. Low risk of morbidity the current treatment course. Contact office with any questions/problems/concerns. RTC in 2 week(s).

## 2022-10-10 DIAGNOSIS — B35.1 ONYCHOMYCOSIS: ICD-10-CM

## 2022-10-10 NOTE — TELEPHONE ENCOUNTER
Per Jesus, pt would like medication for toenails so they don't end up like his other one. Abhinav Duque called requesting a refill of the below medication which has been pended for you:     Requested Prescriptions     Pending Prescriptions Disp Refills    ciclopirox (PENLAC) 8 % solution 1 each 2     Sig: Apply topically to affected toenail nightly.        Last Appointment Date: 8/15/2022  Next Appointment Date: 11/15/2022    Allergies   Allergen Reactions    Keflex [Cephalexin] Other (See Comments)     Migraines, upset stomach     Naproxen      ulcers

## 2022-10-12 DIAGNOSIS — G89.29 CHRONIC NECK PAIN: ICD-10-CM

## 2022-10-12 DIAGNOSIS — M54.50 CHRONIC LOW BACK PAIN WITHOUT SCIATICA, UNSPECIFIED BACK PAIN LATERALITY: ICD-10-CM

## 2022-10-12 DIAGNOSIS — G47.00 INSOMNIA, UNSPECIFIED TYPE: ICD-10-CM

## 2022-10-12 DIAGNOSIS — G89.29 CHRONIC LOW BACK PAIN WITHOUT SCIATICA, UNSPECIFIED BACK PAIN LATERALITY: ICD-10-CM

## 2022-10-12 DIAGNOSIS — M54.2 CHRONIC NECK PAIN: ICD-10-CM

## 2022-10-12 DIAGNOSIS — F32.0 CURRENT MILD EPISODE OF MAJOR DEPRESSIVE DISORDER, UNSPECIFIED WHETHER RECURRENT (HCC): ICD-10-CM

## 2022-10-12 DIAGNOSIS — F41.9 ANXIETY: Primary | ICD-10-CM

## 2022-10-12 DIAGNOSIS — R11.10 DRY HEAVES: ICD-10-CM

## 2022-10-12 DIAGNOSIS — R10.13 DYSPEPSIA: ICD-10-CM

## 2022-10-12 RX ORDER — FAMOTIDINE 20 MG/1
TABLET, FILM COATED ORAL
Qty: 180 TABLET | Refills: 0 | OUTPATIENT
Start: 2022-10-12

## 2022-10-12 RX ORDER — VENLAFAXINE HYDROCHLORIDE 150 MG/1
CAPSULE, EXTENDED RELEASE ORAL
Qty: 90 CAPSULE | Refills: 0 | OUTPATIENT
Start: 2022-10-12

## 2022-10-12 NOTE — TELEPHONE ENCOUNTER
Yassine Villarreal called requesting a refill of the below medication which has been pended for you:     Requested Prescriptions     Pending Prescriptions Disp Refills    baclofen (LIORESAL) 10 MG tablet [Pharmacy Med Name: Baclofen 10 MG Oral Tablet] 40 tablet 0     Sig: TAKE 1 TABLET BY MOUTH THREE TIMES DAILY AS NEEDED FOR MUSCLE SPASM OR  PAIN    melatonin 5 MG TABS tablet [Pharmacy Med Name: Melatonin 5 MG Oral Tablet] 30 tablet 0     Sig: TAKE 1 TABLET BY MOUTH NIGHTLY AS NEEDED FOR SLEEP     Refused Prescriptions Disp Refills    venlafaxine (EFFEXOR XR) 150 MG extended release capsule [Pharmacy Med Name: Venlafaxine HCl  MG Oral Capsule Extended Release 24 Hour] 90 capsule 0     Sig: TAKE 1 CAPSULE BY MOUTH ONCE DAILY WITH  WITH  A  75  MG  FOR  225  MG  TOTAL  DAILY    famotidine (PEPCID) 20 MG tablet [Pharmacy Med Name: Famotidine 20 MG Oral Tablet] 180 tablet 0     Sig: Take 1 tablet by mouth twice daily       Last Appointment Date: 8/15/2022  Next Appointment Date: 11/15/2022    Allergies   Allergen Reactions    Keflex [Cephalexin] Other (See Comments)     Migraines, upset stomach     Naproxen      ulcers

## 2022-10-13 RX ORDER — CHOLECALCIFEROL (VITAMIN D3) 125 MCG
5 CAPSULE ORAL NIGHTLY PRN
Qty: 90 TABLET | Refills: 3 | Status: SHIPPED | OUTPATIENT
Start: 2022-10-13

## 2022-10-13 RX ORDER — BACLOFEN 10 MG/1
10 TABLET ORAL 3 TIMES DAILY PRN
Qty: 60 TABLET | Refills: 5 | Status: SHIPPED | OUTPATIENT
Start: 2022-10-13

## 2022-10-13 NOTE — TELEPHONE ENCOUNTER
Pt also requesting a wheeled walker with folding seat, as he is having more trouble with walking due to chronic back pain and fatigue on exertion. Requesting it to be sent to [facility] in [place] - Rx sent.

## 2022-10-20 ENCOUNTER — TELEPHONE (OUTPATIENT)
Dept: FAMILY MEDICINE CLINIC | Age: 47
End: 2022-10-20

## 2022-10-20 DIAGNOSIS — M54.50 CHRONIC LOW BACK PAIN WITHOUT SCIATICA, UNSPECIFIED BACK PAIN LATERALITY: ICD-10-CM

## 2022-10-20 DIAGNOSIS — G89.29 CHRONIC LOW BACK PAIN WITHOUT SCIATICA, UNSPECIFIED BACK PAIN LATERALITY: ICD-10-CM

## 2022-10-20 NOTE — TELEPHONE ENCOUNTER
[facility] would like Insurance, Demographic and chart notes faxed to them, as this is a new patient to them .

## 2022-10-25 ENCOUNTER — PATIENT MESSAGE (OUTPATIENT)
Dept: FAMILY MEDICINE CLINIC | Age: 47
End: 2022-10-25

## 2022-10-25 NOTE — TELEPHONE ENCOUNTER
Patient girl friend called my office requesting an update on the wheelchair order. I returned call today. Patient states that Sabrina Thao Kimball told him that she is out of network with them and to get back in touch with Dr. Jany Holbrook office. I informed patient that I will send you this message.

## 2022-10-26 NOTE — TELEPHONE ENCOUNTER
From: Loreta Sainz  To: Dr. Paz Layne: 10/25/2022 10:55 PM EDT  Subject: 's Toshia Boot on's    J and B say the reason he stopped getting his pull on's is because he needs to be recertified (a reason medically why he needs them) having in large bowels from constipation is that a medical reason ?      Thank you   Oneyda Campbell

## 2022-11-05 ENCOUNTER — HOSPITAL ENCOUNTER (EMERGENCY)
Age: 47
Discharge: HOME | End: 2022-11-05
Attending: EMERGENCY MEDICINE
Payer: MEDICARE

## 2022-11-05 VITALS
HEIGHT: 71 IN | TEMPERATURE: 99.4 F | WEIGHT: 283 LBS | RESPIRATION RATE: 18 BRPM | SYSTOLIC BLOOD PRESSURE: 151 MMHG | HEART RATE: 83 BPM | OXYGEN SATURATION: 94 % | BODY MASS INDEX: 39.62 KG/M2 | DIASTOLIC BLOOD PRESSURE: 93 MMHG

## 2022-11-05 DIAGNOSIS — J02.9 ACUTE PHARYNGITIS, UNSPECIFIED ETIOLOGY: Primary | ICD-10-CM

## 2022-11-05 PROCEDURE — 99283 EMERGENCY DEPT VISIT LOW MDM: CPT

## 2022-11-05 RX ORDER — AZITHROMYCIN 250 MG/1
TABLET, FILM COATED ORAL
Qty: 1 PACKET | Refills: 0 | Status: SHIPPED | OUTPATIENT
Start: 2022-11-05 | End: 2022-11-09

## 2022-11-05 ASSESSMENT — ENCOUNTER SYMPTOMS
TROUBLE SWALLOWING: 0
COUGH: 1
VOMITING: 0
NAUSEA: 0
DIARRHEA: 0
ABDOMINAL PAIN: 0
SORE THROAT: 1
SHORTNESS OF BREATH: 0
WHEEZING: 0

## 2022-11-05 NOTE — ED PROVIDER NOTES
[facility]  eMERGENCY dEPARTMENT eNCOUnter      Pt Name: Aster Garzon  MRN: [id number]  [place] 1975  Date of evaluation: 11/5/2022      CHIEF COMPLAINT       Chief Complaint   Patient presents with    Pharyngitis           HISTORY OF PRESENT ILLNESS    Aster Garzon is a 50 y.o. male who presents with chief complaint of sore throat to be on for several days it hurts to swallow is able to swallow is a little bit of a cough some body aches and fever no nausea vomiting or diarrhea      REVIEW OF SYSTEMS         Review of Systems   Constitutional:  Positive for fatigue and fever. Negative for chills. HENT:  Positive for sore throat. Negative for congestion, dental problem and trouble swallowing. Eyes:  Negative for visual disturbance. Respiratory:  Positive for cough. Negative for shortness of breath and wheezing. Cardiovascular:  Negative for chest pain, palpitations and leg swelling. Gastrointestinal:  Negative for abdominal pain, diarrhea, nausea and vomiting. Genitourinary:  Negative for difficulty urinating, dysuria and testicular pain. Musculoskeletal:  Negative for joint swelling and neck pain. Skin:  Negative for rash. Neurological:  Negative for dizziness, syncope, weakness and headaches. Hematological:  Negative for adenopathy. Does not bruise/bleed easily. Psychiatric/Behavioral:  Negative for confusion and suicidal ideas. PAST MEDICAL HISTORY    has a past medical history of ADHD (attention deficit hyperactivity disorder), Allergic rhinitis, Anxiety, Asthma, Bipolar disorder ([address] Chronic back pain, Chronic obstructive pulmonary disease (COPD) ([address] Depression, Erectile dysfunction, Headache(784.0), Osteoarthritis, TMJ (dislocation of temporomandibular joint), and Torticollis. SURGICAL HISTORY      has a past surgical history that includes Tonsillectomy.     CURRENT MEDICATIONS       Previous Medications    ALBUTEROL (PROVENTIL) (2.5 MG/3ML) 0.083% NEBULIZER SOLUTION    Take 3 mLs by nebulization every 6 hours as needed for Wheezing or Shortness of Breath    ASPIRIN (ASPIRIN 81) 81 MG EC TABLET    Take 1 tablet by mouth daily    ATORVASTATIN (LIPITOR) 10 MG TABLET    Take 1 tablet by mouth daily    BACLOFEN (LIORESAL) 10 MG TABLET    Take 1 tablet by mouth 3 times daily as needed (muscle spasm or pain)    BLOOD GLUCOSE MONITOR KIT AND SUPPLIES    Test 3 times a day & as needed for symptoms of irregular blood glucose. BLOOD GLUCOSE TEST STRIPS (ASCENSIA AUTODISC VI;ONE TOUCH ULTRA TEST VI) STRIP    Use daily and as needed to check blood glucose. BUSPIRONE (BUSPAR) 15 MG TABLET    Take 15 mg by mouth 2 times daily as needed (anxiety)    CICLOPIROX (PENLAC) 8 % SOLUTION    Apply topically to affected toenails nightly. Wipe off layers of medication with alcohol swab once weekly. DIVALPROEX (DEPAKOTE ER) 500 MG EXTENDED RELEASE TABLET    Take 1 tablet by mouth 2 times daily    DOCUSATE SODIUM (COLACE) 100 MG CAPSULE    Take 1 capsule by mouth 1-2x's daily as needed for constipation. FAMOTIDINE (PEPCID) 20 MG TABLET    Take 1 tablet by mouth 2 times daily    FLUTICASONE (FLONASE) 50 MCG/ACT NASAL SPRAY    2 sprays by Nasal route daily    FOLIC ACID (FOLVITE) 073 MCG TABLET    Take 1 tablet by mouth daily    HANDICAP PLACARD MISC    by Does not apply route Issue parking placard for person with disability. [facility] [phone]   Applicant meets the qualifying disability criteria. Expires 2 years from issuing date. IBUPROFEN (ADVIL;MOTRIN) 800 MG TABLET    Take 1 tablet by mouth every 8 hours as needed for Pain    LANCETS MISC    Use daily and as needed to check blood glucose. Please dispense per patients insurance. LEVOTHYROXINE (SYNTHROID) 50 MCG TABLET    Take 1 tablet by mouth daily Take first thing in the morning, on an empty stomach, 30 mins to other meds/food.     LORATADINE (CLARITIN) 10 MG TABLET    Take 1 tablet by mouth daily    MELATONIN 5 MG TABS TABLET    Take 1 tablet by mouth nightly as needed (sleep)    MENTHOL, TOPICAL ANALGESIC, (BIOFREEZE) 4 % GEL    Apply to shoulders & neck to relieve pain. MISC. DEVICES (WALKER) MISC    Use daily as needed with ambulation. NEBULIZERS (COMPRESSOR/NEBULIZER) MISC    Use daily as needed for shortness of breath    OMEGA-3 ACID ETHYL ESTERS (LOVAZA) 1 G CAPSULE    Take 2 capsules by mouth 2 times daily Take with meals. PROAIR  (90 BASE) MCG/ACT INHALER    Inhale 2 puffs into the lungs every 6 hours as needed for Wheezing    RESPIRATORY THERAPY SUPPLIES (NEBULIZER COMPRESSOR) KIT    1 kit by Does not apply route once for 1 dose    RESPIRATORY THERAPY SUPPLIES (NEBULIZER/TUBING/MOUTHPIECE) KIT    Use daily as needed for shortness of breath. RISPERIDONE (RISPERDAL) 4 MG TABLET    TAKE 1 TABLET BY MOUTH AT BEDTIME    RIZATRIPTAN (MAXALT) 10 MG TABLET    Take 1 tablet by mouth once as needed for Migraine May repeat in 2 hours if needed    SILVER SULFADIAZINE (SILVADENE) 1 % CREAM    Apply topically daily. TIOTROPIUM (SPIRIVA HANDIHALER) 18 MCG INHALATION CAPSULE    Inhale 1 puff by mouth once daily    VENLAFAXINE (EFFEXOR XR) 150 MG EXTENDED RELEASE CAPSULE    Take 1 capsule by mouth daily Take with 75 mg capsule for total daily dose of 225 mg daily    VENLAFAXINE (EFFEXOR XR) 75 MG EXTENDED RELEASE CAPSULE    Take 1 capsule by mouth daily Take with 150 mg capsule for total daily dose of 225 mg daily. VITAMIN B-12 (CYANOCOBALAMIN) 1000 MCG TABLET    Take 1 tablet by mouth daily       ALLERGIES     is allergic to keflex [cephalexin] and naproxen. FAMILY HISTORY     He indicated that the status of his mother is unknown. He indicated that the status of his father is unknown. He indicated that the status of his sister is unknown. He indicated that the status of his brother is unknown.  He indicated that the status of his neg hx is unknown.     family history includes High Blood Pressure in his father and mother; Learning Disabilities in his brother and sister; Stroke in his father and mother; Substance Abuse in his father and sister. SOCIAL HISTORY      reports that he quit smoking about 5 years ago. His smoking use included cigarettes. He started smoking about 30 years ago. He has never used smokeless tobacco. He reports that he does not drink alcohol and does not use drugs. PHYSICAL EXAM     INITIAL VITALS:  height is 5' 11\" (1.803 m) and weight is 283 lb (128.4 kg). His tympanic temperature is 99.4 °F (37.4 °C). His blood pressure is 151/93 (abnormal) and his pulse is 83. His respiration is 18 and oxygen saturation is 94%. Physical Exam  Constitutional:       Appearance: He is well-developed. HENT:      Head: Normocephalic and atraumatic. Right Ear: External ear normal.      Left Ear: External ear normal.      Mouth/Throat:      Mouth: Mucous membranes are moist. No oral lesions. Pharynx: Pharyngeal swelling and posterior oropharyngeal erythema present. No oropharyngeal exudate. Tonsils: No tonsillar exudate. Eyes:      Pupils: Pupils are equal, round, and reactive to light. Cardiovascular:      Rate and Rhythm: Normal rate and regular rhythm. Pulmonary:      Effort: Pulmonary effort is normal.      Breath sounds: Normal breath sounds. Abdominal:      General: Bowel sounds are normal.      Palpations: Abdomen is soft. Musculoskeletal:         General: Normal range of motion. Cervical back: Normal range of motion and neck supple. Skin:     General: Skin is warm and dry. Capillary Refill: Capillary refill takes less than 2 seconds. Neurological:      Mental Status: He is alert and oriented to person, place, and time.    Psychiatric:         Behavior: Behavior normal.         DIFFERENTIAL DIAGNOSIS/ MDM:     Pharyngitis we will treat    DIAGNOSTIC RESULTS     EKG: All EKG's are interpreted by the Emergency Department Physician who either signs or Co-signs this chart in the absence of a cardiologist.        RADIOLOGY:   I directly visualized the following  images and reviewed the radiologist interpretations:          ED BEDSIDE ULTRASOUND:       LABS:  Labs Reviewed - No data to display        EMERGENCY DEPARTMENT COURSE:   Vitals:    Vitals:    11/05/22 0845   BP: (!) 151/93   Pulse: 83   Resp: 18   Temp: 99.4 °F (37.4 °C)   TempSrc: Tympanic   SpO2: 94%   Weight: 283 lb (128.4 kg)   Height: 5' 11\" (1.803 m)     -------------------------  BP: (!) 151/93, Temp: 99.4 °F (37.4 °C), Heart Rate: 83, Resp: 18        Re-evaluation Notes        CRITICAL CARE:   None        CONSULTS:      PROCEDURES:  None    FINAL IMPRESSION      1. Acute pharyngitis, unspecified etiology          DISPOSITION/PLAN   DISPOSITION Decision To Discharge    Condition on Disposition    Stable    PATIENT REFERRED TO:  DO Adam Patel 34    Schedule an appointment as soon as possible for a visit in 3 days      DISCHARGE MEDICATIONS:  New Prescriptions    AZITHROMYCIN (ZITHROMAX Z-LOC) 250 MG TABLET    Take 2 tablets (500 mg) on Day 1, and then take 1 tablet (250 mg) on days 2 through 5. (Please note that portions of this note were completed with a voice recognition program.  Efforts were made to edit the dictations but occasionally words are mis-transcribed.)    Liza Remy MD,, MD, F.A.A.E.M.   Attending Emergency Physician                           Liza Remy MD  11/05/22 1237

## 2022-11-06 ENCOUNTER — HOSPITAL ENCOUNTER (EMERGENCY)
Age: 47
Discharge: HOME | End: 2022-11-07
Attending: SPECIALIST
Payer: MEDICARE

## 2022-11-06 VITALS — BODY MASS INDEX: 39.62 KG/M2 | RESPIRATION RATE: 19 BRPM | WEIGHT: 283 LBS | OXYGEN SATURATION: 95 % | HEIGHT: 71 IN

## 2022-11-06 DIAGNOSIS — J02.0 STREPTOCOCCAL SORE THROAT: Primary | ICD-10-CM

## 2022-11-06 DIAGNOSIS — U07.1 COVID-19 VIRUS INFECTION: ICD-10-CM

## 2022-11-06 PROCEDURE — 99283 EMERGENCY DEPT VISIT LOW MDM: CPT

## 2022-11-06 PROCEDURE — 87635 SARS-COV-2 COVID-19 AMP PRB: CPT

## 2022-11-06 PROCEDURE — 87880 STREP A ASSAY W/OPTIC: CPT

## 2022-11-06 PROCEDURE — 87804 INFLUENZA ASSAY W/OPTIC: CPT

## 2022-11-06 ASSESSMENT — ENCOUNTER SYMPTOMS
TROUBLE SWALLOWING: 1
DIARRHEA: 0
WHEEZING: 0
ABDOMINAL PAIN: 0
SHORTNESS OF BREATH: 1
SORE THROAT: 1
VOMITING: 0

## 2022-11-07 DIAGNOSIS — E03.9 HYPOTHYROIDISM, UNSPECIFIED TYPE: ICD-10-CM

## 2022-11-07 LAB
FLU A ANTIGEN: NEGATIVE
FLU B ANTIGEN: NEGATIVE
S PYO AG THROAT QL: NEGATIVE
SARS-COV-2, RAPID: DETECTED
SOURCE: NORMAL
SPECIMEN DESCRIPTION: ABNORMAL

## 2022-11-07 PROCEDURE — 6370000000 HC RX 637 (ALT 250 FOR IP): Performed by: SPECIALIST

## 2022-11-07 RX ORDER — PREDNISONE 20 MG/1
60 TABLET ORAL ONCE
Status: COMPLETED | OUTPATIENT
Start: 2022-11-07 | End: 2022-11-07

## 2022-11-07 RX ORDER — PREDNISONE 20 MG/1
20 TABLET ORAL 2 TIMES DAILY
Qty: 6 TABLET | Refills: 0 | Status: SHIPPED | OUTPATIENT
Start: 2022-11-07 | End: 2022-11-10

## 2022-11-07 RX ADMIN — PREDNISONE 60 MG: 20 TABLET ORAL at 00:46

## 2022-11-07 NOTE — DISCHARGE INSTRUCTIONS
Please understand that at this time there is no evidence for a more serious underlying process, but that early in the process of an illness or injury, an emergency department workup can be falsely reassuring. You should contact your family doctor within the next 48 hours for a follow up appointment    Joelle Velasco!!!    From [facility] ([facility]) and [address] [place] [address] Emergency Services    On behalf of the Emergency Department staff at [facility]), I would like to thank you for giving us the opportunity to address your health care needs and concerns. We hope that during your visit, our service was delivered in a professional and caring manner. Please keep [facility] ([facility]) in mind as we walk with you down the path to your own personal wellness. Please expect an automated text message or email from us so we can ask a few questions about your health and progress. Based on your answers, a clinician may call you back to offer help and instructions. Please understand that early in the process of an illness or injury, an emergency department workup can be falsely reassuring. If you notice any worsening, changing or persistent symptoms please call your family doctor or return to the ER immediately. Tell us how we did during your visit at [email or web address]   and let us know about your experience

## 2022-11-07 NOTE — ED TRIAGE NOTES
Pt to ed complaining of sore throat. Pt sts that he was seen in ed 3 days ago for same and dx with strep throat.   Pt started oz azithromycin and sts tat it has not gotten any better yet

## 2022-11-07 NOTE — ED PROVIDER NOTES
[address]      Pt Name: Deandra Lawson  MRN: [id number]  [place] 1975  Date of evaluation: 11/6/2022      CHIEF COMPLAINT       Chief Complaint   Patient presents with    Pharyngitis         HISTORY OF PRESENT ILLNESS    Deandra Lawson is a 50 y.o. male who presents to the emergency department accompanied by his family for evaluation of sore throat associated with pain with the swallowing since 3 days prior to arrival.  Patient was seen in our emergency department yesterday and was prescribed azithromycin pack. Patient has taken 500 mg yesterday and 250 mg today without much relief. Patient did not have any strep screen or COVID swab performed. He has history of COPD and admits to having some shortness of breath but denies any wheezing. He denies any chest pain, palpitations or diaphoresis and no history of fever or chills. He denies any sick or ill contacts or recent travels. Patient has not taken any medications for the pain. Patient is not vaccinated for COVID infection. REVIEW OF SYSTEMS       Review of Systems   Constitutional:  Negative for chills and fever. HENT:  Positive for sore throat and trouble swallowing. Respiratory:  Positive for shortness of breath. Negative for wheezing. Cardiovascular:  Negative for chest pain and palpitations. Gastrointestinal:  Negative for abdominal pain, diarrhea and vomiting. Neurological:  Negative for light-headedness and headaches. All other systems reviewed and are negative. PAST MEDICAL HISTORY    has a past medical history of ADHD (attention deficit hyperactivity disorder), Allergic rhinitis, Anxiety, Asthma, Bipolar disorder ([address] Chronic back pain, Chronic obstructive pulmonary disease (COPD) ([address] Depression, Erectile dysfunction, Headache(784.0), Osteoarthritis, TMJ (dislocation of temporomandibular joint), and Torticollis.     SURGICAL HISTORY      has a past surgical history that includes Tonsillectomy. CURRENT MEDICATIONS       Previous Medications    ALBUTEROL (PROVENTIL) (2.5 MG/3ML) 0.083% NEBULIZER SOLUTION    Take 3 mLs by nebulization every 6 hours as needed for Wheezing or Shortness of Breath    ASPIRIN (ASPIRIN 81) 81 MG EC TABLET    Take 1 tablet by mouth daily    ATORVASTATIN (LIPITOR) 10 MG TABLET    Take 1 tablet by mouth daily    AZITHROMYCIN (ZITHROMAX Z-LOC) 250 MG TABLET    Take 2 tablets (500 mg) on Day 1, and then take 1 tablet (250 mg) on days 2 through 5. BACLOFEN (LIORESAL) 10 MG TABLET    Take 1 tablet by mouth 3 times daily as needed (muscle spasm or pain)    BLOOD GLUCOSE MONITOR KIT AND SUPPLIES    Test 3 times a day & as needed for symptoms of irregular blood glucose. BLOOD GLUCOSE TEST STRIPS (ASCENSIA AUTODISC VI;ONE TOUCH ULTRA TEST VI) STRIP    Use daily and as needed to check blood glucose. BUSPIRONE (BUSPAR) 15 MG TABLET    Take 15 mg by mouth 2 times daily as needed (anxiety)    CICLOPIROX (PENLAC) 8 % SOLUTION    Apply topically to affected toenails nightly. Wipe off layers of medication with alcohol swab once weekly. DIVALPROEX (DEPAKOTE ER) 500 MG EXTENDED RELEASE TABLET    Take 1 tablet by mouth 2 times daily    DOCUSATE SODIUM (COLACE) 100 MG CAPSULE    Take 1 capsule by mouth 1-2x's daily as needed for constipation. FAMOTIDINE (PEPCID) 20 MG TABLET    Take 1 tablet by mouth 2 times daily    FLUTICASONE (FLONASE) 50 MCG/ACT NASAL SPRAY    2 sprays by Nasal route daily    FOLIC ACID (FOLVITE) 806 MCG TABLET    Take 1 tablet by mouth daily    HANDICAP PLACARD MISC    by Does not apply route Issue parking placard for person with disability. [facility] [phone]   Applicant meets the qualifying disability criteria. Expires 2 years from issuing date. IBUPROFEN (ADVIL;MOTRIN) 800 MG TABLET    Take 1 tablet by mouth every 8 hours as needed for Pain    LANCETS MISC    Use daily and as needed to check blood glucose.  Please dispense per patients insurance. LEVOTHYROXINE (SYNTHROID) 50 MCG TABLET    Take 1 tablet by mouth daily Take first thing in the morning, on an empty stomach, 30 mins to other meds/food. LORATADINE (CLARITIN) 10 MG TABLET    Take 1 tablet by mouth daily    MELATONIN 5 MG TABS TABLET    Take 1 tablet by mouth nightly as needed (sleep)    MENTHOL, TOPICAL ANALGESIC, (BIOFREEZE) 4 % GEL    Apply to shoulders & neck to relieve pain. MISC. DEVICES (WALKER) MISC    Use daily as needed with ambulation. NEBULIZERS (COMPRESSOR/NEBULIZER) MISC    Use daily as needed for shortness of breath    OMEGA-3 ACID ETHYL ESTERS (LOVAZA) 1 G CAPSULE    Take 2 capsules by mouth 2 times daily Take with meals. PROAIR  (90 BASE) MCG/ACT INHALER    Inhale 2 puffs into the lungs every 6 hours as needed for Wheezing    RESPIRATORY THERAPY SUPPLIES (NEBULIZER COMPRESSOR) KIT    1 kit by Does not apply route once for 1 dose    RESPIRATORY THERAPY SUPPLIES (NEBULIZER/TUBING/MOUTHPIECE) KIT    Use daily as needed for shortness of breath. RISPERIDONE (RISPERDAL) 4 MG TABLET    TAKE 1 TABLET BY MOUTH AT BEDTIME    RIZATRIPTAN (MAXALT) 10 MG TABLET    Take 1 tablet by mouth once as needed for Migraine May repeat in 2 hours if needed    SILVER SULFADIAZINE (SILVADENE) 1 % CREAM    Apply topically daily. TIOTROPIUM (SPIRIVA HANDIHALER) 18 MCG INHALATION CAPSULE    Inhale 1 puff by mouth once daily    VENLAFAXINE (EFFEXOR XR) 150 MG EXTENDED RELEASE CAPSULE    Take 1 capsule by mouth daily Take with 75 mg capsule for total daily dose of 225 mg daily    VENLAFAXINE (EFFEXOR XR) 75 MG EXTENDED RELEASE CAPSULE    Take 1 capsule by mouth daily Take with 150 mg capsule for total daily dose of 225 mg daily. VITAMIN B-12 (CYANOCOBALAMIN) 1000 MCG TABLET    Take 1 tablet by mouth daily       ALLERGIES     is allergic to keflex [cephalexin] and naproxen. FAMILY HISTORY     He indicated that the status of his mother is unknown. He indicated that the status of his father is unknown. He indicated that the status of his sister is unknown. He indicated that the status of his brother is unknown. He indicated that the status of his neg hx is unknown.     family history includes High Blood Pressure in his father and mother; Learning Disabilities in his brother and sister; Stroke in his father and mother; Substance Abuse in his father and sister. SOCIAL HISTORY      reports that he quit smoking about 5 years ago. His smoking use included cigarettes. He started smoking about 30 years ago. He has never used smokeless tobacco. He reports that he does not drink alcohol and does not use drugs. PHYSICAL EXAM     INITIAL VITALS:  height is 5' 11\" (1.803 m) and weight is 283 lb (128.4 kg). His respiration is 19 and oxygen saturation is 95%. Physical Exam  Vitals and nursing note reviewed. Constitutional:       General: He is not in acute distress. Appearance: He is well-developed. HENT:      Head: Normocephalic and atraumatic. Nose: Nose normal.      Mouth/Throat:      Pharynx: Oropharynx is clear. No oropharyngeal exudate or posterior oropharyngeal erythema. Eyes:      Extraocular Movements: Extraocular movements intact. Pupils: Pupils are equal, round, and reactive to light. Cardiovascular:      Rate and Rhythm: Normal rate and regular rhythm. Heart sounds: Normal heart sounds. No murmur heard. Pulmonary:      Effort: Pulmonary effort is normal. No respiratory distress. Breath sounds: Normal breath sounds. Abdominal:      General: Bowel sounds are normal. There is no distension. Palpations: Abdomen is soft. Tenderness: There is no abdominal tenderness. Musculoskeletal:      Cervical back: Normal range of motion and neck supple. Skin:     General: Skin is warm and dry. Neurological:      General: No focal deficit present. Mental Status: He is alert and oriented to person, place, and time. Psychiatric:         Behavior: Behavior normal.         Thought Content: Thought content normal.        DIFFERENTIAL DIAGNOSIS/ MDM:     COVID infection, viral versus streptococcal pharyngitis, influenza    DIAGNOSTIC RESULTS     EKG: All EKG's are interpreted by the Emergency Department Physician who either signs or Co-signs this chart in the absence of a cardiologist.    None obtained      RADIOLOGY:   Interpretation per the Radiologist below, if available at the time of this note:    No results found. ED BEDSIDE ULTRASOUND:       LABS:  Labs Reviewed   COVID-19, RAPID - Abnormal; Notable for the following components:       Result Value    SARS-CoV-2, Rapid DETECTED (*)     All other components within normal limits   STREP SCREEN GROUP A THROAT - Abnormal; Notable for the following components:    Strep A Ag POSITIVE (*)     All other components within normal limits   RAPID INFLUENZA A/B ANTIGENS       Rapid strep screen and COVID swab are positive, influenza antigens are negative    EMERGENCY DEPARTMENT COURSE:   Vitals:    Vitals:    11/06/22 2303   Resp: 19   SpO2: 95%   Weight: 283 lb (128.4 kg)   Height: 5' 11\" (1.803 m)     -------------------------   ,  ,  , Resp: 19    Orders Placed This Encounter   Medications    predniSONE (DELTASONE) tablet 60 mg    predniSONE (DELTASONE) 20 MG tablet     Sig: Take 1 tablet by mouth 2 times daily for 3 days     Dispense:  6 tablet     Refill:  0       Since patient has persistent sore throat in spite of azithromycin for 2 days and he has pain with the swallowing, plan is to treat the patient with prednisone 60 mg orally and then 20 mg twice daily for 3 days he is advised to finish the course of azithromycin as prescribed, plenty of oral fluids, Tylenol and/or ibuprofen as needed for the pain or fever, monitor pulse oximetry closely at home, self quarantine for 5 days since the onset of symptoms, follow-up with PCP, return if worse.     I have reviewed the disposition diagnosis with the patient and or their family/guardian. I have answered their questions and given discharge instructions. They voiced understanding of these instructions and did not have any further questions or complaints. Re-evaluation Notes    Patient is resting comfortably and does not appear to be in any pain or distress prior to discharge    CRITICAL CARE:   None        CONSULTS:      PROCEDURES:  None    FINAL IMPRESSION      1. Streptococcal sore throat    2. COVID-19 virus infection          DISPOSITION/PLAN   DISPOSITION Decision To Discharge    Condition on Disposition    Stable    PATIENT REFERRED TO:  Leroy Vasquez DO  [address] [facility]  [phone]    Call in 2 days  For reevaluation of current symptoms    [facility] ED  [address]  [address]  [phone]    If symptoms worsen    DISCHARGE MEDICATIONS:  New Prescriptions    PREDNISONE (DELTASONE) 20 MG TABLET    Take 1 tablet by mouth 2 times daily for 3 days       (Please note that portions of this note were completed with a voice recognition program.  Efforts were made to edit the dictations but occasionally words are mis-transcribed.)    Nato Ryan MD,, MD, F.A.C.E.P.   Attending Emergency Physician                          Nato Ryan MD  11/07/22 9119 no

## 2022-11-08 ENCOUNTER — HOSPITAL ENCOUNTER (OUTPATIENT)
Dept: LAB | Age: 47
Discharge: HOME | End: 2022-11-08
Payer: MEDICARE

## 2022-11-08 DIAGNOSIS — E55.9 VITAMIN D DEFICIENCY: ICD-10-CM

## 2022-11-08 DIAGNOSIS — G43.009 MIGRAINE WITHOUT AURA AND WITHOUT STATUS MIGRAINOSUS, NOT INTRACTABLE: ICD-10-CM

## 2022-11-08 DIAGNOSIS — E78.2 HYPERCHOLESTEROLEMIA WITH HYPERTRIGLYCERIDEMIA: ICD-10-CM

## 2022-11-08 DIAGNOSIS — E03.9 HYPOTHYROIDISM, UNSPECIFIED TYPE: ICD-10-CM

## 2022-11-08 DIAGNOSIS — Z79.899 LONG TERM USE OF DRUG: ICD-10-CM

## 2022-11-08 DIAGNOSIS — R73.01 IMPAIRED FASTING GLUCOSE: ICD-10-CM

## 2022-11-08 LAB
ALBUMIN SERPL-MCNC: 4.6 G/DL (ref 3.5–5.2)
ALBUMIN/GLOBULIN RATIO: 1.6 (ref 1–2.5)
ALP BLD-CCNC: 102 U/L (ref 40–129)
ALT SERPL-CCNC: 36 U/L (ref 5–41)
ANION GAP SERPL CALCULATED.3IONS-SCNC: 11 MMOL/L (ref 9–17)
AST SERPL-CCNC: 33 U/L
BILIRUB SERPL-MCNC: 0.4 MG/DL (ref 0.3–1.2)
BUN BLDV-MCNC: 14 MG/DL (ref 6–20)
BUN/CREAT BLD: 15 (ref 9–20)
CALCIUM SERPL-MCNC: 9 MG/DL (ref 8.6–10.4)
CHLORIDE BLD-SCNC: 97 MMOL/L (ref 98–107)
CHOLESTEROL/HDL RATIO: 5.5
CHOLESTEROL: 120 MG/DL
CO2: 31 MMOL/L (ref 20–31)
CREAT SERPL-MCNC: 0.96 MG/DL (ref 0.7–1.2)
GFR SERPL CREATININE-BSD FRML MDRD: >60 ML/MIN/1.73M2
GLUCOSE BLD-MCNC: 101 MG/DL (ref 70–99)
HDLC SERPL-MCNC: 22 MG/DL
LDL CHOLESTEROL: 59 MG/DL (ref 0–130)
POTASSIUM SERPL-SCNC: 3.3 MMOL/L (ref 3.7–5.3)
SODIUM BLD-SCNC: 139 MMOL/L (ref 135–144)
TOTAL PROTEIN: 7.4 G/DL (ref 6.4–8.3)
TRIGL SERPL-MCNC: 194 MG/DL
TSH SERPL DL<=0.05 MIU/L-ACNC: 3.77 UIU/ML (ref 0.3–5)
VALPROIC ACID % FREE: 10.7 % (ref 5–18.4)
VALPROIC ACID LEVEL: 107 UG/ML (ref 50–125)
VALPROIC ACID, FREE: 11.4 UG/ML (ref 7–23)
VITAMIN D 25-HYDROXY: 40.6 NG/ML

## 2022-11-08 PROCEDURE — 80164 ASSAY DIPROPYLACETIC ACD TOT: CPT

## 2022-11-08 PROCEDURE — 80165 DIPROPYLACETIC ACID FREE: CPT

## 2022-11-08 PROCEDURE — 82306 VITAMIN D 25 HYDROXY: CPT

## 2022-11-08 PROCEDURE — 80061 LIPID PANEL: CPT

## 2022-11-08 PROCEDURE — 84443 ASSAY THYROID STIM HORMONE: CPT

## 2022-11-08 PROCEDURE — 80053 COMPREHEN METABOLIC PANEL: CPT

## 2022-11-08 PROCEDURE — 36415 COLL VENOUS BLD VENIPUNCTURE: CPT

## 2022-11-08 NOTE — TELEPHONE ENCOUNTER
Pt completed blood work 11/8/22.       Mary Wilson called requesting a refill of the below medication which has been pended for you:     Requested Prescriptions     Pending Prescriptions Disp Refills    levothyroxine (SYNTHROID) 50 MCG tablet [Pharmacy Med Name: Levothyroxine Sodium 50 MCG Oral Tablet] 90 tablet 0     Sig: TAKE 1 TABLET BY MOUTH THE FIRST THING IN THE MORNING ON AN EMPTY STOMACH 30 MINUTES TO OTHER MEDS OR FOOD       Last Appointment Date: 8/15/2022  Next Appointment Date: 11/15/2022    Allergies   Allergen Reactions    Keflex [Cephalexin] Other (See Comments)     Migraines, upset stomach     Naproxen      ulcers

## 2022-11-09 RX ORDER — LEVOTHYROXINE SODIUM 0.05 MG/1
TABLET ORAL
Qty: 90 TABLET | Refills: 1 | Status: SHIPPED | OUTPATIENT
Start: 2022-11-09

## 2022-11-11 ENCOUNTER — OFFICE VISIT (OUTPATIENT)
Dept: PRIMARY CARE CLINIC | Age: 47
End: 2022-11-11
Payer: MEDICARE

## 2022-11-11 VITALS
TEMPERATURE: 97.7 F | BODY MASS INDEX: 38.3 KG/M2 | SYSTOLIC BLOOD PRESSURE: 138 MMHG | HEART RATE: 87 BPM | DIASTOLIC BLOOD PRESSURE: 70 MMHG | OXYGEN SATURATION: 97 % | RESPIRATION RATE: 22 BRPM | HEIGHT: 71 IN | WEIGHT: 273.6 LBS

## 2022-11-11 DIAGNOSIS — U07.1 COVID-19: ICD-10-CM

## 2022-11-11 DIAGNOSIS — J44.9 CHRONIC OBSTRUCTIVE PULMONARY DISEASE, UNSPECIFIED COPD TYPE (HCC): Primary | ICD-10-CM

## 2022-11-11 PROCEDURE — 99212 OFFICE O/P EST SF 10 MIN: CPT | Performed by: STUDENT IN AN ORGANIZED HEALTH CARE EDUCATION/TRAINING PROGRAM

## 2022-11-11 PROCEDURE — G8417 CALC BMI ABV UP PARAM F/U: HCPCS | Performed by: STUDENT IN AN ORGANIZED HEALTH CARE EDUCATION/TRAINING PROGRAM

## 2022-11-11 PROCEDURE — 99214 OFFICE O/P EST MOD 30 MIN: CPT | Performed by: STUDENT IN AN ORGANIZED HEALTH CARE EDUCATION/TRAINING PROGRAM

## 2022-11-11 PROCEDURE — G8427 DOCREV CUR MEDS BY ELIG CLIN: HCPCS | Performed by: STUDENT IN AN ORGANIZED HEALTH CARE EDUCATION/TRAINING PROGRAM

## 2022-11-11 PROCEDURE — 3023F SPIROM DOC REV: CPT | Performed by: STUDENT IN AN ORGANIZED HEALTH CARE EDUCATION/TRAINING PROGRAM

## 2022-11-11 PROCEDURE — G8484 FLU IMMUNIZE NO ADMIN: HCPCS | Performed by: STUDENT IN AN ORGANIZED HEALTH CARE EDUCATION/TRAINING PROGRAM

## 2022-11-11 PROCEDURE — 1036F TOBACCO NON-USER: CPT | Performed by: STUDENT IN AN ORGANIZED HEALTH CARE EDUCATION/TRAINING PROGRAM

## 2022-11-11 RX ORDER — FLUTICASONE PROPIONATE AND SALMETEROL 100; 50 UG/1; UG/1
1 POWDER RESPIRATORY (INHALATION) EVERY 12 HOURS
Qty: 14 EACH | Refills: 0 | Status: SHIPPED | OUTPATIENT
Start: 2022-11-11 | End: 2022-11-15 | Stop reason: SDUPTHER

## 2022-11-11 ASSESSMENT — ENCOUNTER SYMPTOMS
BLOOD IN STOOL: 0
NAUSEA: 0
WHEEZING: 1
CONSTIPATION: 0
RHINORRHEA: 1
VOMITING: 0
TROUBLE SWALLOWING: 0
ABDOMINAL PAIN: 0
EYE PAIN: 0
COUGH: 1
DIARRHEA: 0
SHORTNESS OF BREATH: 1

## 2022-11-11 NOTE — PROGRESS NOTES
[facility]             [address] [facility], [place], [address]                        Telephone [phone]             Fax [phone]       John Ndiaye  :  1975  Age:  50 y.o. MRN:  [id number]  Date of visit:  2022     Assessment and Plan:    1. Chronic obstructive pulmonary disease, unspecified COPD type ([address]  Likely COPD exacerbation from his COVID-23 infection-okay to continue albuterol as needed for shortness of breath or wheezing. We will add ICS and Tia for his symptoms. Attempted to prescribe Symbicort for short-term however insurance coverage would prefer Advair therefore Advair was prescribed at this time. Patient had stopped taking oral prednisone as well and I did recommend that he restart this medicine should he continue to have shortness of breath. - fluticasone-salmeterol (ADVAIR) 100-50 MCG/ACT AEPB diskus inhaler; Inhale 1 puff into the lungs in the morning and 1 puff in the evening. Dispense: 14 each; Refill: 0    2. COVID-19  On Paxlovid continue at this time. Vitals all within normal limits. Plan as above for COPD. Subjective:    John Ndiaye is a 50 y.o. male who presents to [facility] today (2022) for evaluation of: Other (Covid positive asking for a inhaler not albuteral,difficulty sleeping)    Patient 75-year-old male presents to the office for evaluation of COVID-19. Seen in the ED on 2022 for upper respiratory illness and cough and diagnosed with COVID-19 and was given paxlovid. Also diagnosed with COPD exacerbation at that time given albuterol as well as prednisone. Patient states that he did stop the prednisone as as it was upsetting his stomach and he did not like the taste of the medicine. He states that he has been using his albuterol inhaler 8-9 times a day. He is requesting another inhaler at this time.   He is currently taking his Spiriva daily as previously prescribed. He does have a follow-up with his PCP in 4 days. Chief Complaint   Patient presents with    Other     Covid positive asking for a inhaler not albuteral,difficulty sleeping     He has the following problem list:  Patient Active Problem List   Diagnosis    Chronic neck pain    Anxiety    Bipolar affective disorder, currently depressed, moderate ([address]    Insomnia    Depression    Headache    Intractable migraine without aura and without status migrainosus    Memory problem    Chronic low back pain without sciatica    Hypertriglyceridemia    Folic acid deficiency    B12 deficiency    Degeneration of posterior vitreous body of both eyes    Combined forms of age-related cataract of both eyes    Recurrent corneal erosion, left    COPD (chronic obstructive pulmonary disease) ([address]    Morbidly obese ([address]        Review of Systems   Constitutional:  Negative for chills, fatigue and fever. HENT:  Positive for congestion and rhinorrhea. Negative for ear pain, postnasal drip and trouble swallowing. Eyes:  Negative for pain and visual disturbance. Respiratory:  Positive for cough, shortness of breath and wheezing. Cardiovascular:  Negative for chest pain and palpitations. Gastrointestinal:  Negative for abdominal pain, blood in stool, constipation, diarrhea, nausea and vomiting. Genitourinary:  Negative for dysuria and urgency. Skin:  Negative for rash and wound. Neurological:  Negative for dizziness and headaches. Psychiatric/Behavioral:  Negative for dysphoric mood. The patient is not nervous/anxious. Current medications are:  Current Outpatient Medications   Medication Sig Dispense Refill    fluticasone-salmeterol (ADVAIR) 100-50 MCG/ACT AEPB diskus inhaler Inhale 1 puff into the lungs in the morning and 1 puff in the evening.  14 each 0    levothyroxine (SYNTHROID) 50 MCG tablet Take 1 tab by mouth first thing in the morning, on an empty stomach, 30 mins prior to other meds/food. 90 tablet 1    Misc. Devices [facility]) MISC Use daily as needed with ambulation. 1 each 0    ciclopirox (PENLAC) 8 % solution Apply topically to affected toenails nightly. Wipe off layers of medication with alcohol swab once weekly. 6 mL 2    baclofen (LIORESAL) 10 MG tablet Take 1 tablet by mouth 3 times daily as needed (muscle spasm or pain) 60 tablet 5    melatonin 5 MG TABS tablet Take 1 tablet by mouth nightly as needed (sleep) 90 tablet 3    silver sulfADIAZINE (SILVADENE) 1 % cream Apply topically daily. 30 g 1    tiotropium (SPIRIVA HANDIHALER) 18 MCG inhalation capsule Inhale 1 puff by mouth once daily 90 capsule 3    PROAIR  (90 Base) MCG/ACT inhaler Inhale 2 puffs into the lungs every 6 hours as needed for Wheezing 3 each 3    risperiDONE (RISPERDAL) 4 MG tablet TAKE 1 TABLET BY MOUTH AT BEDTIME 30 tablet 5    ibuprofen (ADVIL;MOTRIN) 800 MG tablet Take 1 tablet by mouth every 8 hours as needed for Pain 90 tablet 3    divalproex (DEPAKOTE ER) 500 MG extended release tablet Take 1 tablet by mouth 2 times daily 60 tablet 5    blood glucose test strips (ASCENSIA AUTODISC VI;ONE TOUCH ULTRA TEST VI) strip Use daily and as needed to check blood glucose. 100 each 3    loratadine (CLARITIN) 10 MG tablet Take 1 tablet by mouth daily 90 tablet 3    venlafaxine (EFFEXOR XR) 150 MG extended release capsule Take 1 capsule by mouth daily Take with 75 mg capsule for total daily dose of 225 mg daily 90 capsule 3    venlafaxine (EFFEXOR XR) 75 MG extended release capsule Take 1 capsule by mouth daily Take with 150 mg capsule for total daily dose of 225 mg daily.  90 capsule 3    famotidine (PEPCID) 20 MG tablet Take 1 tablet by mouth 2 times daily 180 tablet 3    busPIRone (BUSPAR) 15 MG tablet Take 15 mg by mouth 2 times daily as needed (anxiety) 60 tablet 11    docusate sodium (COLACE) 100 MG capsule Take 1 capsule by mouth 1-2x's daily as needed for constipation. 60 capsule 5    Lancets MISC Use daily and as needed to check blood glucose. Please dispense per patients insurance. 100 each 1    omega-3 acid ethyl esters (LOVAZA) 1 g capsule Take 2 capsules by mouth 2 times daily Take with meals. 120 capsule 11    atorvastatin (LIPITOR) 10 MG tablet Take 1 tablet by mouth daily 90 tablet 3    blood glucose monitor kit and supplies Test 3 times a day & as needed for symptoms of irregular blood glucose. 1 kit 0    Handicap Placard MISC by Does not apply route Issue parking placard for person with disability. [facility] [id number]   Applicant meets the qualifying disability criteria. Expires 2 years from issuing date. 1 each 0    Respiratory Therapy Supplies (NEBULIZER/TUBING/MOUTHPIECE) KIT Use daily as needed for shortness of breath. 1 kit 0    Nebulizers (COMPRESSOR/NEBULIZER) MISC Use daily as needed for shortness of breath 1 each 0    albuterol (PROVENTIL) (2.5 MG/3ML) 0.083% nebulizer solution Take 3 mLs by nebulization every 6 hours as needed for Wheezing or Shortness of Breath 120 vial 5    fluticasone (FLONASE) 50 MCG/ACT nasal spray 2 sprays by Nasal route daily 3 Bottle 3    vitamin B-12 (CYANOCOBALAMIN) 1000 MCG tablet Take 1 tablet by mouth daily 30 tablet 11    folic acid (FOLVITE) 546 MCG tablet Take 1 tablet by mouth daily 30 tablet 11    aspirin (ASPIRIN 81) 81 MG EC tablet Take 1 tablet by mouth daily 30 tablet 11    Menthol, Topical Analgesic, (BIOFREEZE) 4 % GEL Apply to shoulders & neck to relieve pain.  237 mL 5    rizatriptan (MAXALT) 10 MG tablet Take 1 tablet by mouth once as needed for Migraine May repeat in 2 hours if needed 30 tablet 5    Respiratory Therapy Supplies (NEBULIZER COMPRESSOR) KIT 1 kit by Does not apply route once for 1 dose 1 kit 0     Current Facility-Administered Medications   Medication Dose Route Frequency Provider Last Rate Last Admin    phenylephrine (MYDFRIN) 2.5 % ophthalmic solution 1 drop  1 drop Both Eyes Once Char Rios MD        tropicamide (MYDRIACYL) 1 % ophthalmic solution 1 drop  1 drop Both Eyes Once Char Rios MD            He is allergic to keflex [cephalexin] and naproxen. He  reports that he quit smoking about 5 years ago. His smoking use included cigarettes. He started smoking about 30 years ago. He has never used smokeless tobacco.      Objective:    Vitals:    11/11/22 1728   BP: 138/70   Pulse: 87   Resp: 22   Temp: 97.7 °F (36.5 °C)   SpO2: 97%   Weight: 273 lb 9.6 oz (124.1 kg)   Height: 5' 11\" (1.803 m)     Body mass index is 38.16 kg/m². Physical Exam  Vitals and nursing note reviewed. Constitutional:       General: He is not in acute distress. Appearance: He is well-developed. He is not diaphoretic. HENT:      Head: Normocephalic and atraumatic. Right Ear: External ear normal.      Left Ear: External ear normal.      Nose: Congestion and rhinorrhea present. Eyes:      General: No scleral icterus. Right eye: No discharge. Left eye: No discharge. Conjunctiva/sclera: Conjunctivae normal.   Cardiovascular:      Rate and Rhythm: Normal rate and regular rhythm. Heart sounds: Normal heart sounds. No murmur heard. Pulmonary:      Effort: Pulmonary effort is normal. No respiratory distress. Breath sounds: Normal breath sounds. No stridor. No wheezing, rhonchi or rales. Musculoskeletal:      Cervical back: Normal range of motion. Skin:     General: Skin is warm and dry. Findings: No erythema or rash. Neurological:      Mental Status: He is alert and oriented to person, place, and time. Cranial Nerves: No cranial nerve deficit. Psychiatric:         Behavior: Behavior normal.         Thought Content:  Thought content normal.         Judgment: Judgment normal.             (Please note that portions of this note were completed with a voice-recognition program. Efforts were made to edit the dictation but occasionally words are mis-transcribed.)

## 2022-11-15 ENCOUNTER — OFFICE VISIT (OUTPATIENT)
Dept: FAMILY MEDICINE CLINIC | Age: 47
End: 2022-11-15
Payer: MEDICARE

## 2022-11-15 VITALS
HEART RATE: 70 BPM | HEIGHT: 71 IN | SYSTOLIC BLOOD PRESSURE: 122 MMHG | WEIGHT: 278 LBS | OXYGEN SATURATION: 98 % | TEMPERATURE: 98 F | BODY MASS INDEX: 38.92 KG/M2 | DIASTOLIC BLOOD PRESSURE: 74 MMHG

## 2022-11-15 DIAGNOSIS — J44.9 CHRONIC OBSTRUCTIVE PULMONARY DISEASE, UNSPECIFIED COPD TYPE (HCC): ICD-10-CM

## 2022-11-15 DIAGNOSIS — F41.9 ANXIETY: ICD-10-CM

## 2022-11-15 DIAGNOSIS — E87.6 HYPOKALEMIA: ICD-10-CM

## 2022-11-15 DIAGNOSIS — R06.02 SOB (SHORTNESS OF BREATH) ON EXERTION: Primary | ICD-10-CM

## 2022-11-15 DIAGNOSIS — E78.2 HYPERCHOLESTEROLEMIA WITH HYPERTRIGLYCERIDEMIA: ICD-10-CM

## 2022-11-15 PROCEDURE — 99213 OFFICE O/P EST LOW 20 MIN: CPT | Performed by: FAMILY MEDICINE

## 2022-11-15 PROCEDURE — 3023F SPIROM DOC REV: CPT | Performed by: FAMILY MEDICINE

## 2022-11-15 PROCEDURE — G8417 CALC BMI ABV UP PARAM F/U: HCPCS | Performed by: FAMILY MEDICINE

## 2022-11-15 PROCEDURE — G8427 DOCREV CUR MEDS BY ELIG CLIN: HCPCS | Performed by: FAMILY MEDICINE

## 2022-11-15 PROCEDURE — 1036F TOBACCO NON-USER: CPT | Performed by: FAMILY MEDICINE

## 2022-11-15 PROCEDURE — G8484 FLU IMMUNIZE NO ADMIN: HCPCS | Performed by: FAMILY MEDICINE

## 2022-11-15 PROCEDURE — 99214 OFFICE O/P EST MOD 30 MIN: CPT | Performed by: FAMILY MEDICINE

## 2022-11-15 RX ORDER — FLUTICASONE PROPIONATE AND SALMETEROL 100; 50 UG/1; UG/1
1 POWDER RESPIRATORY (INHALATION) EVERY 12 HOURS
Qty: 60 EACH | Refills: 9 | Status: SHIPPED | OUTPATIENT
Start: 2022-11-15

## 2022-11-15 RX ORDER — OMEGA-3-ACID ETHYL ESTERS 1 G/1
2 CAPSULE, LIQUID FILLED ORAL 2 TIMES DAILY
Qty: 360 CAPSULE | Refills: 3 | Status: SHIPPED | OUTPATIENT
Start: 2022-11-15

## 2022-11-15 RX ORDER — BUSPIRONE HYDROCHLORIDE 15 MG/1
15 TABLET ORAL 2 TIMES DAILY PRN
Qty: 180 TABLET | Refills: 3 | Status: SHIPPED | OUTPATIENT
Start: 2022-11-15

## 2022-11-15 RX ORDER — ATORVASTATIN CALCIUM 10 MG/1
10 TABLET, FILM COATED ORAL DAILY
Qty: 90 TABLET | Refills: 3 | Status: SHIPPED | OUTPATIENT
Start: 2022-11-15

## 2022-11-15 NOTE — PROGRESS NOTES
[facility] [address]  [address], [address] [place]  [phone]      Edis Burt is a 50 y.o. male who presents today for his medical conditions/complaints as noted below. Edis Burt is c/o of Insomnia and Shortness of Breath (Has improved)      HPI:     Pt here today for follow-up of SOB and insomnia. On 11/5, pt went to ER for pharyngitis - was treated with Azithromycin. He went back to ER the next day for continued sore throat and SOB - was diagnosed with Covid. Received Rx for Prednisone and was informed to finish Azithromycin and quarantine for 5 days. Continued to have SOB at home that was worsening - took almost 20 puffs of Albuterol inhaler before going back to ER on 11/8. He then felt restless, had insomnia and palpitations from the increased Albuterol dosing. Couldn't sleep for almost 30 hours - back to baseline now. Received Decadron for COPD exacerbation and Rx for Paxlovid (did not finish this due to bad taste and diarrhea). Today, he feels a little better, but still not back to baseline. Sore throat and body aches are still mild; still feels like he has a slight \"cold\". Breathing is much better. Has not used Albuterol inhaler or nebulizer at all, but has been doing Advair 1 puff BID. Using the wheeled walker more - does feel that that is helpful. Taking Effexor- mg + 75 mg daily for mood, along with Buspar 15 mg BID and Risperdal 4 mg nightly. Feels this is working pretty well for his mood. Still having trouble staying asleep - takes Melatonin nightly as needed for this, which does help.         Past Medical History:   Diagnosis Date    ADHD (attention deficit hyperactivity disorder)     don't remember    Allergic rhinitis     Anxiety     Asthma     Bipolar disorder ([facility]     Diagnosed in 1998    Chronic back pain     Chronic obstructive pulmonary disease (COPD) ([facility]     Depression     Erectile dysfunction Headache(784.0)     Osteoarthritis     TMJ (dislocation of temporomandibular joint)     Torticollis       Past Surgical History:   Procedure Laterality Date    TONSILLECTOMY       Family History   Problem Relation Age of Onset    Stroke Mother     High Blood Pressure Mother     High Blood Pressure Father     Stroke Father     Substance Abuse Father     Learning Disabilities Sister     Substance Abuse Sister     Learning Disabilities Brother     Glaucoma Neg Hx     Diabetes Neg Hx     Cataracts Neg Hx      Social History     Tobacco Use    Smoking status: Former     Packs/day: 0.00     Years: 25.00     Pack years: 0.00     Types: Cigarettes     Start date: 10/16/1992     Quit date: 2017     Years since quittin.4    Smokeless tobacco: Never    Tobacco comments:     handout given. Substance Use Topics    Alcohol use: Never      Current Outpatient Medications   Medication Sig Dispense Refill    atorvastatin (LIPITOR) 10 MG tablet Take 1 tablet by mouth daily 90 tablet 3    busPIRone (BUSPAR) 15 MG tablet Take 15 mg by mouth 2 times daily as needed (anxiety) 180 tablet 3    omega-3 acid ethyl esters (LOVAZA) 1 g capsule Take 2 capsules by mouth 2 times daily Take with meals. 360 capsule 3    fluticasone-salmeterol (ADVAIR) 100-50 MCG/ACT AEPB diskus inhaler Inhale 1 puff into the lungs in the morning and 1 puff in the evening. 60 each 9    levothyroxine (SYNTHROID) 50 MCG tablet Take 1 tab by mouth first thing in the morning, on an empty stomach, 30 mins prior to other meds/food. 90 tablet 1    Misc. Devices [facility]) MISC Use daily as needed with ambulation. 1 each 0    ciclopirox (PENLAC) 8 % solution Apply topically to affected toenails nightly. Wipe off layers of medication with alcohol swab once weekly.  6 mL 2    baclofen (LIORESAL) 10 MG tablet Take 1 tablet by mouth 3 times daily as needed (muscle spasm or pain) 60 tablet 5    melatonin 5 MG TABS tablet Take 1 tablet by mouth nightly as needed (sleep) 90 tablet 3    silver sulfADIAZINE (SILVADENE) 1 % cream Apply topically daily. 30 g 1    tiotropium (SPIRIVA HANDIHALER) 18 MCG inhalation capsule Inhale 1 puff by mouth once daily 90 capsule 3    PROAIR  (90 Base) MCG/ACT inhaler Inhale 2 puffs into the lungs every 6 hours as needed for Wheezing 3 each 3    risperiDONE (RISPERDAL) 4 MG tablet TAKE 1 TABLET BY MOUTH AT BEDTIME 30 tablet 5    ibuprofen (ADVIL;MOTRIN) 800 MG tablet Take 1 tablet by mouth every 8 hours as needed for Pain 90 tablet 3    blood glucose test strips (ASCENSIA AUTODISC VI;ONE TOUCH ULTRA TEST VI) strip Use daily and as needed to check blood glucose. 100 each 3    loratadine (CLARITIN) 10 MG tablet Take 1 tablet by mouth daily 90 tablet 3    venlafaxine (EFFEXOR XR) 150 MG extended release capsule Take 1 capsule by mouth daily Take with 75 mg capsule for total daily dose of 225 mg daily 90 capsule 3    venlafaxine (EFFEXOR XR) 75 MG extended release capsule Take 1 capsule by mouth daily Take with 150 mg capsule for total daily dose of 225 mg daily. 90 capsule 3    Lancets MISC Use daily and as needed to check blood glucose. Please dispense per patients insurance. 100 each 1    blood glucose monitor kit and supplies Test 3 times a day & as needed for symptoms of irregular blood glucose. 1 kit 0    Handicap Placard MISC by Does not apply route Issue parking placard for person with disability. [facility] [id number]   Applicant meets the qualifying disability criteria. Expires 2 years from issuing date. 1 each 0    Respiratory Therapy Supplies (NEBULIZER/TUBING/MOUTHPIECE) KIT Use daily as needed for shortness of breath.  1 kit 0    Nebulizers (COMPRESSOR/NEBULIZER) MISC Use daily as needed for shortness of breath 1 each 0    albuterol (PROVENTIL) (2.5 MG/3ML) 0.083% nebulizer solution Take 3 mLs by nebulization every 6 hours as needed for Wheezing or Shortness of Breath 120 vial 5    fluticasone (FLONASE) 50 MCG/ACT nasal spray 2 sprays by Nasal route daily 3 Bottle 3    vitamin B-12 (CYANOCOBALAMIN) 1000 MCG tablet Take 1 tablet by mouth daily 30 tablet 11    folic acid (FOLVITE) 086 MCG tablet Take 1 tablet by mouth daily 30 tablet 11    aspirin (ASPIRIN 81) 81 MG EC tablet Take 1 tablet by mouth daily 30 tablet 11    Menthol, Topical Analgesic, (BIOFREEZE) 4 % GEL Apply to shoulders & neck to relieve pain. 237 mL 5    famotidine (PEPCID) 20 MG tablet Take 1 tablet by mouth twice daily 180 tablet 3    divalproex (DEPAKOTE ER) 500 MG extended release tablet Take 1 tablet by mouth in the morning and at bedtime 180 tablet 3    docusate sodium (COLACE) 100 MG capsule Take 1 capsule by mouth 1-2x's daily as needed for constipation.  60 capsule 5    rizatriptan (MAXALT) 10 MG tablet Take 1 tablet by mouth once as needed for Migraine May repeat in 2 hours if needed 30 tablet 5    Respiratory Therapy Supplies (NEBULIZER COMPRESSOR) KIT 1 kit by Does not apply route once for 1 dose 1 kit 0     Current Facility-Administered Medications   Medication Dose Route Frequency Provider Last Rate Last Admin    phenylephrine (MYDFRIN) 2.5 % ophthalmic solution 1 drop  1 drop Both Eyes Once Timi Balderas MD        tropicamide (MYDRIACYL) 1 % ophthalmic solution 1 drop  1 drop Both Eyes Once Timi Balderas MD         Allergies   Allergen Reactions    Keflex [Cephalexin] Other (See Comments)     Migraines, upset stomach     Naproxen      ulcers       Health Maintenance   Topic Date Due    COVID-19 Vaccine (1) Never done    HIV screen  Never done    Hepatitis C screen  Never done    DTaP/Tdap/Td vaccine (1 - Tdap) Never done    Colorectal Cancer Screen  Never done    Flu vaccine (1) Never done    Pneumococcal 0-64 years Vaccine (1 - PCV) 10/03/2028 (Originally 9/7/1981)    Depression Monitoring  08/15/2023    Lipids  11/08/2023    Diabetes screen  10/18/2024    Hepatitis A vaccine  Aged Out    Hib vaccine  Aged Out    Meningococcal (ACWY) vaccine  Aged Out       Subjective: Review of Systems   Constitutional:  Unexpected weight change: down 3 lbs since his last OV - eating smaller portions. Objective:     Vitals:    11/15/22 1431   BP: 122/74   Site: Right Upper Arm   Position: Sitting   Cuff Size: Large Adult   Pulse: 70   Temp: 98 °F (36.7 °C)   TempSrc: Temporal   SpO2: 98%   Weight: 278 lb (126.1 kg)   Height: 5' 11\" (1.803 m)     Physical Exam  Constitutional:       General: He is not in acute distress. Appearance: Normal appearance. HENT:      Head: Normocephalic and atraumatic. Eyes:      Conjunctiva/sclera: Conjunctivae normal.   Cardiovascular:      Rate and Rhythm: Normal rate and regular rhythm. Heart sounds: Normal heart sounds. Pulmonary:      Effort: Pulmonary effort is normal. No respiratory distress. Breath sounds: Normal breath sounds. Abdominal:      General: Bowel sounds are normal. There is no distension. Palpations: Abdomen is soft. Tenderness: There is no abdominal tenderness. Musculoskeletal:      Right lower leg: Edema (mild) present. Left lower leg: Edema (mild) present. Skin:     General: Skin is warm and dry. Neurological:      General: No focal deficit present. Mental Status: He is alert and oriented to person, place, and time. Psychiatric:         Mood and Affect: Mood normal.       Assessment:      1. SOB (shortness of breath) on exertion  -     Leslie Lua MD, Cardiology, [place]  2. Hypercholesterolemia with hypertriglyceridemia  -     atorvastatin (LIPITOR) 10 MG tablet; Take 1 tablet by mouth daily, Disp-90 tablet, R-3Normal  -     omega-3 acid ethyl esters (LOVAZA) 1 g capsule; Take 2 capsules by mouth 2 times daily Take with meals. , Disp-360 capsule, R-3Normal  3. Anxiety  -     busPIRone (BUSPAR) 15 MG tablet; Take 15 mg by mouth 2 times daily as needed (anxiety), Disp-180 tablet, R-3Normal  4.  Chronic obstructive pulmonary disease, unspecified COPD type ([facility]  - fluticasone-salmeterol (ADVAIR) 100-50 MCG/ACT AEPB diskus inhaler; Inhale 1 puff into the lungs in the morning and 1 puff in the evening., Disp-60 each, R-9Normal  5. Hypokalemia  -     Potassium; Future         Plan:      Return in about 2 months (around 1/15/2023) for f/u breathing, weight. Orders Placed This Encounter   Procedures    Potassium     Standing Status:   Future     Standing Expiration Date:   11/15/2023    Susan Pena MD, Cardiology, [place]     Referral Priority:   Routine     Referral Type:   Eval and Treat     Referral Reason:   Specialty Services Required     Referred to Provider:   Loan Moran MD     Requested Specialty:   Cardiology     Number of Visits Requested:   1     Orders Placed This Encounter   Medications    atorvastatin (LIPITOR) 10 MG tablet     Sig: Take 1 tablet by mouth daily     Dispense:  90 tablet     Refill:  3    busPIRone (BUSPAR) 15 MG tablet     Sig: Take 15 mg by mouth 2 times daily as needed (anxiety)     Dispense:  180 tablet     Refill:  3    omega-3 acid ethyl esters (LOVAZA) 1 g capsule     Sig: Take 2 capsules by mouth 2 times daily Take with meals. Dispense:  360 capsule     Refill:  3    fluticasone-salmeterol (ADVAIR) 100-50 MCG/ACT AEPB diskus inhaler     Sig: Inhale 1 puff into the lungs in the morning and 1 puff in the evening. Dispense:  60 each     Refill:  9       Patient given educational materials - see patient instructions. Discussed use, benefit, and side effects of prescribed medications. All patient questions answered. Pt voiced understanding. Reviewed health maintenance.             Electronically signed by Meka Gonzalez DO, DO on 11/27/2022 at 11:54 PM

## 2022-11-19 DIAGNOSIS — G43.009 MIGRAINE WITHOUT AURA AND WITHOUT STATUS MIGRAINOSUS, NOT INTRACTABLE: ICD-10-CM

## 2022-11-19 DIAGNOSIS — R11.10 DRY HEAVES: ICD-10-CM

## 2022-11-19 DIAGNOSIS — R10.13 DYSPEPSIA: ICD-10-CM

## 2022-11-19 DIAGNOSIS — E78.2 HYPERCHOLESTEROLEMIA WITH HYPERTRIGLYCERIDEMIA: ICD-10-CM

## 2022-11-21 DIAGNOSIS — K59.03 DRUG-INDUCED CONSTIPATION: ICD-10-CM

## 2022-11-21 DIAGNOSIS — E78.2 HYPERCHOLESTEROLEMIA WITH HYPERTRIGLYCERIDEMIA: ICD-10-CM

## 2022-11-21 RX ORDER — ATORVASTATIN CALCIUM 10 MG/1
TABLET, FILM COATED ORAL
Qty: 90 TABLET | Refills: 0 | OUTPATIENT
Start: 2022-11-21

## 2022-11-21 RX ORDER — OMEGA-3-ACID ETHYL ESTERS 1 G/1
2 CAPSULE, LIQUID FILLED ORAL 2 TIMES DAILY
Qty: 360 CAPSULE | Refills: 3 | OUTPATIENT
Start: 2022-11-21

## 2022-11-21 RX ORDER — ATORVASTATIN CALCIUM 10 MG/1
10 TABLET, FILM COATED ORAL DAILY
Qty: 90 TABLET | Refills: 3 | OUTPATIENT
Start: 2022-11-21

## 2022-11-21 NOTE — TELEPHONE ENCOUNTER
Marion Buerger called requesting a refill of the below medication which has been pended for you:     Requested Prescriptions     Pending Prescriptions Disp Refills    famotidine (PEPCID) 20 MG tablet [Pharmacy Med Name: Famotidine 20 MG Oral Tablet] 180 tablet 0     Sig: Take 1 tablet by mouth twice daily    divalproex (DEPAKOTE ER) 500 MG extended release tablet [Pharmacy Med Name: Divalproex Sodium  MG Oral Tablet Extended Release 24 Hour] 60 tablet 0     Sig: Take 1 tablet by mouth twice daily     Refused Prescriptions Disp Refills    atorvastatin (LIPITOR) 10 MG tablet [Pharmacy Med Name: Atorvastatin Calcium 10 MG Oral Tablet] 90 tablet 0     Sig: Take 1 tablet by mouth once daily       Last Appointment Date: 11/15/2022  Next Appointment Date: 1/16/2023    Allergies   Allergen Reactions    Keflex [Cephalexin] Other (See Comments)     Migraines, upset stomach     Naproxen      ulcers

## 2022-11-21 NOTE — TELEPHONE ENCOUNTER
Sushila Schroeder called requesting a refill of the below medication which has been pended for you:     Requested Prescriptions     Pending Prescriptions Disp Refills    docusate sodium (COLACE) 100 MG capsule 60 capsule 5     Sig: Take 1 capsule by mouth 1-2x's daily as needed for constipation. Refused Prescriptions Disp Refills    omega-3 acid ethyl esters (LOVAZA) 1 g capsule 360 capsule 3     Sig: Take 2 capsules by mouth 2 times daily Take with meals.     atorvastatin (LIPITOR) 10 MG tablet 90 tablet 3     Sig: Take 1 tablet by mouth daily       Last Appointment Date: 11/15/2022  Next Appointment Date: 1/16/2023    Allergies   Allergen Reactions    Keflex [Cephalexin] Other (See Comments)     Migraines, upset stomach     Naproxen      ulcers

## 2022-11-24 RX ORDER — DOCUSATE SODIUM 100 MG/1
CAPSULE, LIQUID FILLED ORAL
Qty: 60 CAPSULE | Refills: 5 | Status: SHIPPED | OUTPATIENT
Start: 2022-11-24

## 2022-11-24 RX ORDER — DIVALPROEX SODIUM 500 MG/1
500 TABLET, EXTENDED RELEASE ORAL 2 TIMES DAILY
Qty: 180 TABLET | Refills: 3 | Status: SHIPPED | OUTPATIENT
Start: 2022-11-24

## 2022-11-24 RX ORDER — FAMOTIDINE 20 MG/1
TABLET, FILM COATED ORAL
Qty: 180 TABLET | Refills: 3 | Status: SHIPPED | OUTPATIENT
Start: 2022-11-24

## 2022-11-28 ENCOUNTER — TELEPHONE (OUTPATIENT)
Dept: SLEEP CENTER | Age: 47
End: 2022-11-28

## 2022-11-28 NOTE — TELEPHONE ENCOUNTER
6/6/22 Called pt left message that I would call when I had dates to schedule to schedule. 7/5/22 called both numbers listed and left messages to call back to schedule. 8/1/22 Pt wants a home sleep study, got the orders sent pt an info packect. 8/25/22 Pt mailed back the info packet but did not call to make appt. 9/21/22 CAlled pt left message to call to [facility]. 10/3/22 Called pt left message. If pt wants to do in the future then pt will need to see doctor for a updated H&P and orders.

## 2022-12-04 ENCOUNTER — PATIENT MESSAGE (OUTPATIENT)
Dept: FAMILY MEDICINE CLINIC | Age: 47
End: 2022-12-04

## 2022-12-05 NOTE — TELEPHONE ENCOUNTER
From: Devin Samuels  To: Dr. Izabella Mandujano: 12/4/2022 3:11 AM EST  Subject: blood work     do i need to fast for my up coming blood work I started taking 99mg potassium pills will those be good enough    thank you  Nina Clarke

## 2022-12-14 ENCOUNTER — OFFICE VISIT (OUTPATIENT)
Dept: CARDIOLOGY | Age: 47
End: 2022-12-14
Payer: MEDICARE

## 2022-12-14 ENCOUNTER — HOSPITAL ENCOUNTER (OUTPATIENT)
Age: 47
Discharge: HOME | End: 2022-12-14
Payer: MEDICARE

## 2022-12-14 VITALS
HEIGHT: 71 IN | WEIGHT: 278 LBS | SYSTOLIC BLOOD PRESSURE: 133 MMHG | DIASTOLIC BLOOD PRESSURE: 72 MMHG | HEART RATE: 72 BPM | BODY MASS INDEX: 38.92 KG/M2

## 2022-12-14 DIAGNOSIS — R06.09 DYSPNEA ON EXERTION: Primary | ICD-10-CM

## 2022-12-14 DIAGNOSIS — R94.31 ABNORMAL ECG: ICD-10-CM

## 2022-12-14 DIAGNOSIS — E78.5 HYPERLIPIDEMIA, UNSPECIFIED HYPERLIPIDEMIA TYPE: ICD-10-CM

## 2022-12-14 DIAGNOSIS — E87.6 HYPOKALEMIA: ICD-10-CM

## 2022-12-14 LAB — POTASSIUM SERPL-SCNC: 4.2 MMOL/L (ref 3.7–5.3)

## 2022-12-14 PROCEDURE — 93010 ELECTROCARDIOGRAM REPORT: CPT | Performed by: INTERNAL MEDICINE

## 2022-12-14 PROCEDURE — G8417 CALC BMI ABV UP PARAM F/U: HCPCS | Performed by: INTERNAL MEDICINE

## 2022-12-14 PROCEDURE — 93005 ELECTROCARDIOGRAM TRACING: CPT | Performed by: INTERNAL MEDICINE

## 2022-12-14 PROCEDURE — 84132 ASSAY OF SERUM POTASSIUM: CPT

## 2022-12-14 PROCEDURE — G8484 FLU IMMUNIZE NO ADMIN: HCPCS | Performed by: INTERNAL MEDICINE

## 2022-12-14 PROCEDURE — 36415 COLL VENOUS BLD VENIPUNCTURE: CPT

## 2022-12-14 PROCEDURE — 99214 OFFICE O/P EST MOD 30 MIN: CPT | Performed by: INTERNAL MEDICINE

## 2022-12-14 PROCEDURE — 99204 OFFICE O/P NEW MOD 45 MIN: CPT | Performed by: INTERNAL MEDICINE

## 2022-12-14 PROCEDURE — 1036F TOBACCO NON-USER: CPT | Performed by: INTERNAL MEDICINE

## 2022-12-14 PROCEDURE — G8427 DOCREV CUR MEDS BY ELIG CLIN: HCPCS | Performed by: INTERNAL MEDICINE

## 2022-12-14 NOTE — PROGRESS NOTES
Today's Date: 12/14/2022  Patient's Name: Brittany Harvey  Patient's age: 50 y.o., 1975    Subjective:  Brittany Harvey is being seen in clinic today regarding   Chief Complaint   Patient presents with    Establish Cardiologist    Shortness of Breath         he is here to establish cardiology care. He reports he has COPD. He gets out of breath with minimal activities such as doing dishes. He denies any chest pain. No PND, no syncope or pre-syncope, no orthopnea. He quit smoking in 2017        Past Medical History:   has a past medical history of ADHD (attention deficit hyperactivity disorder), Allergic rhinitis, Anxiety, Asthma, Bipolar disorder ([address] Chronic back pain, Chronic obstructive pulmonary disease (COPD) ([address] Depression, Erectile dysfunction, Headache(784.0), Osteoarthritis, TMJ (dislocation of temporomandibular joint), and Torticollis. Past Surgical History:   has a past surgical history that includes Tonsillectomy. Home Medications:  Prior to Admission medications    Medication Sig Start Date End Date Taking? Authorizing Provider   famotidine (PEPCID) 20 MG tablet Take 1 tablet by mouth twice daily 11/24/22  Yes Gerald Vance DO   divalproex (DEPAKOTE ER) 500 MG extended release tablet Take 1 tablet by mouth in the morning and at bedtime 11/24/22  Yes Gerald Vance DO   docusate sodium (COLACE) 100 MG capsule Take 1 capsule by mouth 1-2x's daily as needed for constipation. 11/24/22  Yes Gerald Vance DO   atorvastatin (LIPITOR) 10 MG tablet Take 1 tablet by mouth daily 11/15/22  Yes Gerald Vance DO   busPIRone (BUSPAR) 15 MG tablet Take 15 mg by mouth 2 times daily as needed (anxiety) 11/15/22  Yes Gerald Vance DO   omega-3 acid ethyl esters (LOVAZA) 1 g capsule Take 2 capsules by mouth 2 times daily Take with meals.  11/15/22  Yes Gerald Vance DO   fluticasone-salmeterol (ADVAIR) 100-50 MCG/ACT AEPB diskus inhaler Inhale 1 puff into the lungs in the morning and 1 puff in the evening. 11/15/22  Yes Medina Vance, DO   levothyroxine (SYNTHROID) 50 MCG tablet Take 1 tab by mouth first thing in the morning, on an empty stomach, 30 mins prior to other meds/food. 11/9/22  Yes Marshall Garcia, DO   Misc. Devices [facility]) MISC Use daily as needed with ambulation. 10/25/22  Yes Medina Vance, DO   ciclopirox (PENLAC) 8 % solution Apply topically to affected toenails nightly. Wipe off layers of medication with alcohol swab once weekly. 10/13/22  Yes Medina Vance, DO   baclofen (LIORESAL) 10 MG tablet Take 1 tablet by mouth 3 times daily as needed (muscle spasm or pain) 10/13/22  Yes Medina Vance, DO   melatonin 5 MG TABS tablet Take 1 tablet by mouth nightly as needed (sleep) 10/13/22  Yes Medina Vance, DO   silver sulfADIAZINE (SILVADENE) 1 % cream Apply topically daily. 10/4/22  Yes Isidoro Holguin DPM   tiotropium (SPIRIVA HANDIHALER) 18 MCG inhalation capsule Inhale 1 puff by mouth once daily 9/28/22  Yes Jatinder Sherman MD   PROAIR  (90 Base) MCG/ACT inhaler Inhale 2 puffs into the lungs every 6 hours as needed for Wheezing 9/28/22  Yes Jatinder Sherman MD   risperiDONE (RISPERDAL) 4 MG tablet TAKE 1 TABLET BY MOUTH AT BEDTIME 9/21/22  Yes Medina Vance, DO   ibuprofen (ADVIL;MOTRIN) 800 MG tablet Take 1 tablet by mouth every 8 hours as needed for Pain 8/15/22  Yes Medina Vance, DO   blood glucose test strips (ASCENSIA AUTODISC VI;ONE TOUCH ULTRA TEST VI) strip Use daily and as needed to check blood glucose. 5/13/22  Yes Medina Mcmillanek Black, DO   loratadine (CLARITIN) 10 MG tablet Take 1 tablet by mouth daily 2/4/22  Yes Medina Vance, DO   venlafaxine (EFFEXOR XR) 150 MG extended release capsule Take 1 capsule by mouth daily Take with 75 mg capsule for total daily dose of 225 mg daily 1/27/22  Yes Medina Vance DO   venlafaxine (EFFEXOR XR) 75 MG extended release capsule Take 1 capsule by mouth daily Take with 150 mg capsule for total daily dose of 225 mg daily.  1/27/22  Yes Medina Gatica Black, DO   Lancets MISC Use daily and as needed to check blood glucose. Please dispense per patients insurance. 11/12/21  Yes Neela Vance, DO   blood glucose monitor kit and supplies Test 3 times a day & as needed for symptoms of irregular blood glucose. 11/12/21  Yes Neela Vance, DO   Handicap Placard MISC by Does not apply route Issue parking placard for person with disability. [facility] [id number]   Applicant meets the qualifying disability criteria. Expires 2 years from issuing date. 6/11/21  Yes Neela Vance, DO   Respiratory Therapy Supplies (NEBULIZER/TUBING/MOUTHPIECE) KIT Use daily as needed for shortness of breath. 5/13/21  Yes Neela Vance, DO   Nebulizers (COMPRESSOR/NEBULIZER) MISC Use daily as needed for shortness of breath 5/13/21  Yes Neela Vance, DO   albuterol (PROVENTIL) (2.5 MG/3ML) 0.083% nebulizer solution Take 3 mLs by nebulization every 6 hours as needed for Wheezing or Shortness of Breath 5/10/21  Yes Neela Vance, DO   fluticasone (FLONASE) 50 MCG/ACT nasal spray 2 sprays by Nasal route daily 12/31/20  Yes Neela Vance DO   vitamin B-12 (CYANOCOBALAMIN) 1000 MCG tablet Take 1 tablet by mouth daily 11/10/20  Yes Neela Vance DO   folic acid (FOLVITE) 984 MCG tablet Take 1 tablet by mouth daily 11/10/20  Yes Neela Vance DO   aspirin (ASPIRIN 81) 81 MG EC tablet Take 1 tablet by mouth daily 11/10/20  Yes Nelea Vance DO   Menthol, Topical Analgesic, (BIOFREEZE) 4 % GEL Apply to shoulders & neck to relieve pain. 10/21/20  Yes Neela Vance DO   rizatriptan (MAXALT) 10 MG tablet Take 1 tablet by mouth once as needed for Migraine May repeat in 2 hours if needed 4/21/22 9/28/22  Vi Draper, DO   Respiratory Therapy Supplies (NEBULIZER COMPRESSOR) KIT 1 kit by Does not apply route once for 1 dose 7/22/19 9/28/22  Mitesh Gutierrez DO       Allergies:  Keflex [cephalexin] and Naproxen    Social History:   reports that he quit smoking about 5 years ago.  His smoking use included cigarettes. He started smoking about 30 years ago. He has never used smokeless tobacco. He reports that he does not drink alcohol and does not use drugs. Family History: family history includes High Blood Pressure in his father and mother; Learning Disabilities in his brother and sister; Stroke in his father and mother; Substance Abuse in his father and sister. No h/o sudden cardiac death. No for premature CAD    REVIEW OF SYSTEMS:    Constitutional: there has been no unanticipated weight loss. There's been No change in energy level, No change in activity level. Eyes: No visual changes or diplopia. No scleral icterus. ENT: No Headaches, hearing loss or vertigo. No mouth sores or sore throat. Cardiovascular: see above  Respiratory: see above  Gastrointestinal: No abdominal pain, appetite loss, blood in stools. Genitourinary: No dysuria, trouble voiding, or hematuria. Musculoskeletal:  No gait disturbance, No weakness or joint complaints. Integumentary: No rash or pruritis. Neurological: No headache or diplopia. No tingling  Psychiatric: No anxiety, or depression. Endocrine: No temperature intolerance. Hematologic/Lymphatic: No abnormal bruising or bleeding, blood clots or swollen lymph nodes. Allergic/Immunologic: No nasal congestion or hives. PHYSICAL EXAM:      Ht 5' 11\" (1.803 m)   BMI 38.77 kg/m²    Vitals:    12/14/22 0927   BP: 133/72   Pulse:        Constitutional and General Appearance: alert, cooperative, no distress and appears stated age  HEENT: PERRL, no cervical lymphadenopathy. No masses palpable. Normal oral mucosa  Respiratory:  Normal excursion and expansion without use of accessory muscles  Resp Auscultation: Good respiratory effort. No for increased work of breathing.  On auscultation: clear to auscultation bilaterally  Cardiovascular:  Heart tones are crisp and normal. regular S1 and S2.  Jugular venous pulsation Normal  The carotid upstroke is normal in amplitude and contour without delay or bruit   Abdomen:   soft  Bowel sounds present  Extremities:   No edema  Neurological:  Alert and oriented. Cardiac Data:  EKG: SR, NS ST abnormality    Labs:     CBC: No results for input(s): WBC, HGB, HCT, PLT in the last 72 hours. BMP: No results for input(s): NA, K, CO2, BUN, CREATININE, LABGLOM, GLUCOSE in the last 72 hours. PT/INR: No results for input(s): PROTIME, INR in the last 72 hours. FASTING LIPID PANEL:  Lab Results   Component Value Date/Time    HDL 22 11/08/2022 08:07 AM    TRIG 194 11/08/2022 08:07 AM     LIVER PROFILE:No results for input(s): AST, ALT, LABALBU in the last 72 hours. Problem List:  Patient Active Problem List   Diagnosis    Chronic neck pain    Anxiety    Bipolar affective disorder, currently depressed, moderate (HCC)    Insomnia    Depression    Headache    Intractable migraine without aura and without status migrainosus    Memory problem    Chronic low back pain without sciatica    Hypertriglyceridemia    Folic acid deficiency    B12 deficiency    Degeneration of posterior vitreous body of both eyes    Combined forms of age-related cataract of both eyes    Recurrent corneal erosion, left    COPD (chronic obstructive pulmonary disease) ([address]    Morbidly obese ([address]       TTE 9/26/18  Summary  Normal left ventricle size, wall thickness and function with an estimated EF > 55%. No segmental wall motion abnormalities seen. No doppler evidence of diastolic dysfunction. Normal valve structures. No significant valvular regurgitation or stenosis seen. Aortic root is mildly dilated at 4.0 cm    Assessment and plan:    -Dyspnea on minimal exertion. Abnormal ECG. I will obtain TTE and lexiscan stress test. ASA 81mg po qday  -COPD-Follows with pulmonary  -Obesity - encouraged diet, exercise, and discussed weight loss extensively. -Hyperlipidemia- Continue atorvastatin. LDL 59 on 11/8/22. .   -GERD- on Pepcid  -Depression- managed by PCP  -RTC 6 months.     [facility]  [phone]

## 2022-12-17 DIAGNOSIS — E78.2 HYPERCHOLESTEROLEMIA WITH HYPERTRIGLYCERIDEMIA: ICD-10-CM

## 2022-12-19 RX ORDER — OMEGA-3-ACID ETHYL ESTERS 1 G/1
CAPSULE, LIQUID FILLED ORAL
Qty: 120 CAPSULE | Refills: 0 | OUTPATIENT
Start: 2022-12-19

## 2022-12-26 DIAGNOSIS — G43.909 MIGRAINE WITHOUT STATUS MIGRAINOSUS, NOT INTRACTABLE, UNSPECIFIED MIGRAINE TYPE: ICD-10-CM

## 2022-12-27 NOTE — TELEPHONE ENCOUNTER
Qi called requesting a refill of the below medication which has been pended for you:     Requested Prescriptions     Pending Prescriptions Disp Refills    rizatriptan (MAXALT) 10 MG tablet 30 tablet 5     Sig: Take 1 tablet by mouth once as needed for Migraine May repeat in 2 hours if needed       Last Appointment Date: 11/15/2022  Next Appointment Date: 1/16/2023    Allergies   Allergen Reactions    Keflex [Cephalexin] Other (See Comments)     Migraines, upset stomach     Naproxen      ulcers

## 2022-12-28 RX ORDER — RIZATRIPTAN BENZOATE 10 MG/1
10 TABLET ORAL
Qty: 30 TABLET | Refills: 11 | Status: SHIPPED | OUTPATIENT
Start: 2022-12-28 | End: 2022-12-28

## 2023-01-03 ENCOUNTER — PATIENT MESSAGE (OUTPATIENT)
Dept: CARDIOLOGY | Age: 48
End: 2023-01-03

## 2023-01-03 DIAGNOSIS — R06.09 DYSPNEA ON EXERTION: Primary | ICD-10-CM

## 2023-01-03 NOTE — TELEPHONE ENCOUNTER
From: Brenda Napier  To: Dr. Clemencia Acosta: 1/3/2023 9:26 AM EST  Subject: Chemical testing    Is there any way I can have a stress test done without doing it with chemicals I have issues with the chemicals they us for the stress test as I had issue when they did the image test I am willing to the stress test the regular way and if they want me to go to [place] there is no way it can be done as our car won't make it

## 2023-01-03 NOTE — TELEPHONE ENCOUNTER
Pt wife notified of appt change for stress test and that pt still has echo tomorrow at 10:45am. Wife verbalized understanding and stated she will let the pt know.

## 2023-01-12 ENCOUNTER — HOSPITAL ENCOUNTER (OUTPATIENT)
Dept: NON INVASIVE DIAGNOSTICS | Age: 48
Discharge: HOME | End: 2023-01-12
Payer: MEDICARE

## 2023-01-12 DIAGNOSIS — R94.31 ABNORMAL ECG: ICD-10-CM

## 2023-01-12 DIAGNOSIS — R06.09 DYSPNEA ON EXERTION: ICD-10-CM

## 2023-01-12 LAB
LV EF: 55 %
LVEF MODALITY: NORMAL

## 2023-01-12 PROCEDURE — 93017 CV STRESS TEST TRACING ONLY: CPT

## 2023-01-12 PROCEDURE — 93306 TTE W/DOPPLER COMPLETE: CPT

## 2023-01-12 NOTE — PROGRESS NOTES
Patient Name:  Mike Porter MRN:  [id number]   :  1975  Age:  50 y.o. Sex: male     Ordering Provider: Kamron Snider MD    Primary Care Provider:  Manoj Robles DO     Indications: dyspnea on exertion, abnormal ekg   Medications:     Current Outpatient Medications:     rizatriptan (MAXALT) 10 MG tablet, Take 1 tablet by mouth once as needed for Migraine May repeat in 2 hours if needed, Disp: 30 tablet, Rfl: 11    famotidine (PEPCID) 20 MG tablet, Take 1 tablet by mouth twice daily, Disp: 180 tablet, Rfl: 3    divalproex (DEPAKOTE ER) 500 MG extended release tablet, Take 1 tablet by mouth in the morning and at bedtime, Disp: 180 tablet, Rfl: 3    docusate sodium (COLACE) 100 MG capsule, Take 1 capsule by mouth 1-2x's daily as needed for constipation. , Disp: 60 capsule, Rfl: 5    atorvastatin (LIPITOR) 10 MG tablet, Take 1 tablet by mouth daily, Disp: 90 tablet, Rfl: 3    busPIRone (BUSPAR) 15 MG tablet, Take 15 mg by mouth 2 times daily as needed (anxiety), Disp: 180 tablet, Rfl: 3    omega-3 acid ethyl esters (LOVAZA) 1 g capsule, Take 2 capsules by mouth 2 times daily Take with meals. , Disp: 360 capsule, Rfl: 3    fluticasone-salmeterol (ADVAIR) 100-50 MCG/ACT AEPB diskus inhaler, Inhale 1 puff into the lungs in the morning and 1 puff in the evening., Disp: 60 each, Rfl: 9    levothyroxine (SYNTHROID) 50 MCG tablet, Take 1 tab by mouth first thing in the morning, on an empty stomach, 30 mins prior to other meds/food. , Disp: 90 tablet, Rfl: 1    Misc. Devices [facility]) MISC, Use daily as needed with ambulation. , Disp: 1 each, Rfl: 0    ciclopirox (PENLAC) 8 % solution, Apply topically to affected toenails nightly. Wipe off layers of medication with alcohol swab once weekly. , Disp: 6 mL, Rfl: 2    baclofen (LIORESAL) 10 MG tablet, Take 1 tablet by mouth 3 times daily as needed (muscle spasm or pain), Disp: 60 tablet, Rfl: 5    melatonin 5 MG TABS tablet, Take 1 tablet by mouth nightly as needed (sleep), Disp: 90 tablet, Rfl: 3    silver sulfADIAZINE (SILVADENE) 1 % cream, Apply topically daily. , Disp: 30 g, Rfl: 1    tiotropium (Chyna Hoist) 18 MCG inhalation capsule, Inhale 1 puff by mouth once daily, Disp: 90 capsule, Rfl: 3    PROAIR  (90 Base) MCG/ACT inhaler, Inhale 2 puffs into the lungs every 6 hours as needed for Wheezing, Disp: 3 each, Rfl: 3    risperiDONE (RISPERDAL) 4 MG tablet, TAKE 1 TABLET BY MOUTH AT BEDTIME, Disp: 30 tablet, Rfl: 5    ibuprofen (ADVIL;MOTRIN) 800 MG tablet, Take 1 tablet by mouth every 8 hours as needed for Pain, Disp: 90 tablet, Rfl: 3    blood glucose test strips (ASCENSIA AUTODISC VI;ONE TOUCH ULTRA TEST VI) strip, Use daily and as needed to check blood glucose., Disp: 100 each, Rfl: 3    loratadine (CLARITIN) 10 MG tablet, Take 1 tablet by mouth daily, Disp: 90 tablet, Rfl: 3    venlafaxine (EFFEXOR XR) 150 MG extended release capsule, Take 1 capsule by mouth daily Take with 75 mg capsule for total daily dose of 225 mg daily, Disp: 90 capsule, Rfl: 3    venlafaxine (EFFEXOR XR) 75 MG extended release capsule, Take 1 capsule by mouth daily Take with 150 mg capsule for total daily dose of 225 mg daily. , Disp: 90 capsule, Rfl: 3    Lancets MISC, Use daily and as needed to check blood glucose. Please dispense per patients insurance., Disp: 100 each, Rfl: 1    blood glucose monitor kit and supplies, Test 3 times a day & as needed for symptoms of irregular blood glucose., Disp: 1 kit, Rfl: 0    Handicap Placard MISC, by Does not apply route Issue parking placard for person with disability. [facility] [id number]   Applicant meets the qualifying disability criteria.   Expires 2 years from issuing date., Disp: 1 each, Rfl: 0    Respiratory Therapy Supplies (NEBULIZER/TUBING/MOUTHPIECE) KIT, Use daily as needed for shortness of breath., Disp: 1 kit, Rfl: 0    Nebulizers (COMPRESSOR/NEBULIZER) MISC, Use daily as needed for shortness of breath, Disp: 1 each, Rfl: 0 albuterol (PROVENTIL) (2.5 MG/3ML) 0.083% nebulizer solution, Take 3 mLs by nebulization every 6 hours as needed for Wheezing or Shortness of Breath, Disp: 120 vial, Rfl: 5    fluticasone (FLONASE) 50 MCG/ACT nasal spray, 2 sprays by Nasal route daily, Disp: 3 Bottle, Rfl: 3    vitamin B-12 (CYANOCOBALAMIN) 1000 MCG tablet, Take 1 tablet by mouth daily, Disp: 30 tablet, Rfl: 11    folic acid (FOLVITE) 747 MCG tablet, Take 1 tablet by mouth daily, Disp: 30 tablet, Rfl: 11    aspirin (ASPIRIN 81) 81 MG EC tablet, Take 1 tablet by mouth daily, Disp: 30 tablet, Rfl: 11    Menthol, Topical Analgesic, (BIOFREEZE) 4 % GEL, Apply to shoulders & neck to relieve pain., Disp: 237 mL, Rfl: 5    Respiratory Therapy Supplies (NEBULIZER COMPRESSOR) KIT, 1 kit by Does not apply route once for 1 dose, Disp: 1 kit, Rfl: 0    Current Facility-Administered Medications:     phenylephrine (MYDFRIN) 2.5 % ophthalmic solution 1 drop, 1 drop, Both Eyes, Once, Francesca Silveira MD    tropicamide (MYDRIACYL) 1 % ophthalmic solution 1 drop, 1 drop, Both Eyes, Once, Francesca Silveira MD     ------------------------------------------------------------------------------------------------    Predicted Heart Rate:  Maximum:  171   85%:  146     Heart Rate Restin   Standin     Blood Pressure Restin/90  Standin/90     Exercise Protocol Used:  BAN   Exercise Duration/Stage:  03:08 (Stage II)     Test terminated due to: shortness of breath  Imaging: None    Maximum Heart Rate:  164 (95%) Max BP: 230/80  Max Workload: 4.70 METS     Chest Pain: No  Onset:  n/a  Severity:  n/a     Electronically signed by Sophy Hawkins RCP on 23 at 8:33 AM EST    -------------------------------------------------------------------------------------------------  Baseline EKG : Normal sinus rhythm, LVH with repull changes    EKG Changes: No significant ST-T abnormality on peak exercise to suggest ischemia      Maximum Change:   N/A    Leads with maximum changes: N/A    Interpretation:    -Average functional/exercise capacity. Patient achieved only 4.7 METS.   -Negative ECG for ischemia  -Hypertensive blood pressure response to exercise      Supervising Physician:  Tonny Goldmann, MD

## 2023-01-13 DIAGNOSIS — R52 GENERALIZED PAIN: ICD-10-CM

## 2023-01-13 DIAGNOSIS — F32.0 CURRENT MILD EPISODE OF MAJOR DEPRESSIVE DISORDER, UNSPECIFIED WHETHER RECURRENT (HCC): ICD-10-CM

## 2023-01-13 DIAGNOSIS — M54.2 CHRONIC NECK PAIN: ICD-10-CM

## 2023-01-13 DIAGNOSIS — G89.29 CHRONIC NECK PAIN: ICD-10-CM

## 2023-01-13 DIAGNOSIS — F41.9 ANXIETY: ICD-10-CM

## 2023-01-13 RX ORDER — VENLAFAXINE HYDROCHLORIDE 150 MG/1
CAPSULE, EXTENDED RELEASE ORAL
Qty: 90 CAPSULE | Refills: 0 | OUTPATIENT
Start: 2023-01-13

## 2023-01-13 RX ORDER — IBUPROFEN 800 MG/1
800 TABLET ORAL EVERY 8 HOURS PRN
Qty: 90 TABLET | Refills: 0 | OUTPATIENT
Start: 2023-01-13

## 2023-01-16 ENCOUNTER — OFFICE VISIT (OUTPATIENT)
Dept: FAMILY MEDICINE CLINIC | Age: 48
End: 2023-01-16
Payer: MEDICARE

## 2023-01-16 VITALS
TEMPERATURE: 98.2 F | SYSTOLIC BLOOD PRESSURE: 128 MMHG | OXYGEN SATURATION: 96 % | DIASTOLIC BLOOD PRESSURE: 86 MMHG | HEART RATE: 73 BPM | BODY MASS INDEX: 38.22 KG/M2 | WEIGHT: 273 LBS | HEIGHT: 71 IN

## 2023-01-16 DIAGNOSIS — G47.00 INSOMNIA, UNSPECIFIED TYPE: ICD-10-CM

## 2023-01-16 DIAGNOSIS — J44.9 CHRONIC OBSTRUCTIVE PULMONARY DISEASE, UNSPECIFIED COPD TYPE (HCC): ICD-10-CM

## 2023-01-16 DIAGNOSIS — L21.9 SEBORRHEIC DERMATITIS: ICD-10-CM

## 2023-01-16 DIAGNOSIS — E78.2 HYPERCHOLESTEROLEMIA WITH HYPERTRIGLYCERIDEMIA: Primary | ICD-10-CM

## 2023-01-16 DIAGNOSIS — E55.9 VITAMIN D DEFICIENCY: ICD-10-CM

## 2023-01-16 DIAGNOSIS — Z12.11 SCREEN FOR COLON CANCER: ICD-10-CM

## 2023-01-16 DIAGNOSIS — I77.810 AORTIC ROOT DILATION (HCC): ICD-10-CM

## 2023-01-16 DIAGNOSIS — F32.0 CURRENT MILD EPISODE OF MAJOR DEPRESSIVE DISORDER, UNSPECIFIED WHETHER RECURRENT (HCC): ICD-10-CM

## 2023-01-16 DIAGNOSIS — R73.01 IMPAIRED FASTING GLUCOSE: ICD-10-CM

## 2023-01-16 DIAGNOSIS — E87.6 HYPOKALEMIA: ICD-10-CM

## 2023-01-16 DIAGNOSIS — F41.9 ANXIETY: ICD-10-CM

## 2023-01-16 DIAGNOSIS — E03.9 HYPOTHYROIDISM, UNSPECIFIED TYPE: ICD-10-CM

## 2023-01-16 DIAGNOSIS — G43.909 MIGRAINE WITHOUT STATUS MIGRAINOSUS, NOT INTRACTABLE, UNSPECIFIED MIGRAINE TYPE: ICD-10-CM

## 2023-01-16 PROCEDURE — 3023F SPIROM DOC REV: CPT | Performed by: FAMILY MEDICINE

## 2023-01-16 PROCEDURE — 99213 OFFICE O/P EST LOW 20 MIN: CPT | Performed by: FAMILY MEDICINE

## 2023-01-16 PROCEDURE — 99214 OFFICE O/P EST MOD 30 MIN: CPT | Performed by: FAMILY MEDICINE

## 2023-01-16 PROCEDURE — G8417 CALC BMI ABV UP PARAM F/U: HCPCS | Performed by: FAMILY MEDICINE

## 2023-01-16 PROCEDURE — 1036F TOBACCO NON-USER: CPT | Performed by: FAMILY MEDICINE

## 2023-01-16 PROCEDURE — G8484 FLU IMMUNIZE NO ADMIN: HCPCS | Performed by: FAMILY MEDICINE

## 2023-01-16 PROCEDURE — G8427 DOCREV CUR MEDS BY ELIG CLIN: HCPCS | Performed by: FAMILY MEDICINE

## 2023-01-16 RX ORDER — KETOCONAZOLE 20 MG/ML
SHAMPOO TOPICAL
Qty: 120 ML | Refills: 5 | Status: SHIPPED | OUTPATIENT
Start: 2023-01-16

## 2023-01-16 RX ORDER — RIZATRIPTAN BENZOATE 10 MG/1
10 TABLET ORAL
Qty: 30 TABLET | Refills: 11 | Status: CANCELLED | OUTPATIENT
Start: 2023-01-16 | End: 2023-01-16

## 2023-01-16 RX ORDER — VENLAFAXINE HYDROCHLORIDE 150 MG/1
150 CAPSULE, EXTENDED RELEASE ORAL DAILY
Qty: 90 CAPSULE | Refills: 3 | Status: SHIPPED | OUTPATIENT
Start: 2023-01-16

## 2023-01-16 RX ORDER — VENLAFAXINE HYDROCHLORIDE 75 MG/1
75 CAPSULE, EXTENDED RELEASE ORAL DAILY
Qty: 90 CAPSULE | Refills: 3 | Status: SHIPPED | OUTPATIENT
Start: 2023-01-16

## 2023-01-16 ASSESSMENT — PATIENT HEALTH QUESTIONNAIRE - PHQ9
SUM OF ALL RESPONSES TO PHQ QUESTIONS 1-9: 0
SUM OF ALL RESPONSES TO PHQ QUESTIONS 1-9: 0
7. TROUBLE CONCENTRATING ON THINGS, SUCH AS READING THE NEWSPAPER OR WATCHING TELEVISION: 0
9. THOUGHTS THAT YOU WOULD BE BETTER OFF DEAD, OR OF HURTING YOURSELF: 0
6. FEELING BAD ABOUT YOURSELF - OR THAT YOU ARE A FAILURE OR HAVE LET YOURSELF OR YOUR FAMILY DOWN: 0
10. IF YOU CHECKED OFF ANY PROBLEMS, HOW DIFFICULT HAVE THESE PROBLEMS MADE IT FOR YOU TO DO YOUR WORK, TAKE CARE OF THINGS AT HOME, OR GET ALONG WITH OTHER PEOPLE: 0
SUM OF ALL RESPONSES TO PHQ QUESTIONS 1-9: 0
2. FEELING DOWN, DEPRESSED OR HOPELESS: 0
8. MOVING OR SPEAKING SO SLOWLY THAT OTHER PEOPLE COULD HAVE NOTICED. OR THE OPPOSITE, BEING SO FIGETY OR RESTLESS THAT YOU HAVE BEEN MOVING AROUND A LOT MORE THAN USUAL: 0
SUM OF ALL RESPONSES TO PHQ9 QUESTIONS 1 & 2: 0
1. LITTLE INTEREST OR PLEASURE IN DOING THINGS: 0
SUM OF ALL RESPONSES TO PHQ QUESTIONS 1-9: 0
3. TROUBLE FALLING OR STAYING ASLEEP: 0
5. POOR APPETITE OR OVEREATING: 0
4. FEELING TIRED OR HAVING LITTLE ENERGY: 0

## 2023-01-16 NOTE — PROGRESS NOTES
EKATERINA Barreto [address]  [address], [address] [place]  [phone]      Nohemi Cohen is a 50 y.o. male who presents today for his medical conditions/complaints as noted below. Nohemi Cohen is c/o of Breathing Problem (F/u, reports his SOB is better) and Weight Management      HPI:     Pt here today for follow-up of weight and SOB. Weight management- lost 5 lbs over last month. Eating \"less junk foods\" and smaller portions. Does not eat too many veggies. Drinks Hi-C and 1 mountain dew a day. States he does not drink water or coffee. Pt notes he does not exercise but is open to starting to walk 1-2 times. Pt states his head has been itchy for a long time and is interested in starting a medicated shampoo. Keeps it short but is still itching. Sleep- goes to bed 11 pm and get up around 4, goes back to bed at 9 am and sleeps until 11am. Has a hard time falling asleep. Pt notes he lays for 1-1.5 hours before falling asleep. He notes his room is dark and quiet, he does not have tv on or scroll on his phone. Takes naps every evening from 6-9pm. Still having trouble staying asleep - takes Melatonin 30 mg nightly as needed for this, which does help a little. Taking this maybe 3/7 nights per week. Taking Effexor- mg + 75 mg daily for mood, along with Buspar 15 mg BID and Risperdal 4 mg nightly. Feels this is working pretty well for his mood. COPD- Last exacerbation was in Nov 2022 while he had COVID. Currently uses Advair BID and Spiriva QD. Feels SOB sometimes when he walks short distances, but not at rest. Has not used albuterol since Nov 2022 when he got covid because he is scared to overuse it again. Pt notes he has a hard time breathing at night if he sleeps flat on his back, better if he sleeps on his side. Next pulm gab 9/27/23. Saw cardio 12/14/22.  Stress test on 1/3/23 was normal. Echo 1/12/23: Left ventricular systolic function is normal. Estimated Left ventricular  ejection fraction 55 %. Mild left ventricular hypertrophy. No doppler evidence of diastolic dysfunction. No significant valvular regurgitation or stenosis seen. No pericardial effusion. Aortic root is mildly dilated at 3.8 cm. No chest pain, wheezing, cough, congestion, f/c. Next cardio gab 23. Pt notes his LE swelling has been stable. His socks will leave a print in his ankles. Has not used compression socks because his insurance does not cover them. Migraines- 1-2x per month. Taking depakote 500 mg BID and Maxalt as needed. GERD- Taking Pepcid as needed- about once a week. Taking Lipitor 10 mg daily. Tolerating well. Started taking Potassium every day since his last level was low at 3.3 on 22. Past Medical History:   Diagnosis Date    ADHD (attention deficit hyperactivity disorder)     don't remember    Allergic rhinitis     Anxiety     Asthma     Bipolar disorder ([facility] [address]     Diagnosed in     Chronic back pain     Chronic obstructive pulmonary disease (COPD) ([facility] [address]     Depression     Erectile dysfunction     Headache(784.0)     Osteoarthritis     TMJ (dislocation of temporomandibular joint)     Torticollis       Past Surgical History:   Procedure Laterality Date    TONSILLECTOMY       Family History   Problem Relation Age of Onset    Stroke Mother     High Blood Pressure Mother     High Blood Pressure Father     Stroke Father     Substance Abuse Father     Learning Disabilities Sister     Substance Abuse Sister     Learning Disabilities Brother     Glaucoma Neg Hx     Diabetes Neg Hx     Cataracts Neg Hx      Social History     Tobacco Use    Smoking status: Former     Packs/day: 0.00     Years: 25.00     Pack years: 0.00     Types: Cigarettes     Start date: 10/16/1992     Quit date: 2017     Years since quittin.5    Smokeless tobacco: Never    Tobacco comments:     handout given.     Substance Use Topics    Alcohol use: Never Current Outpatient Medications   Medication Sig Dispense Refill    Potassium 99 MG TABS Take by mouth      venlafaxine (EFFEXOR XR) 150 MG extended release capsule Take 1 capsule by mouth daily Take with 75 mg capsule for total daily dose of 225 mg daily 90 capsule 3    venlafaxine (EFFEXOR XR) 75 MG extended release capsule Take 1 capsule by mouth daily Take with 150 mg capsule for total daily dose of 225 mg daily. 90 capsule 3    ketoconazole (NIZORAL) 2 % shampoo Apply topically daily to affected areas x 10 days, then twice weekly as needed. 120 mL 5    rizatriptan (MAXALT) 10 MG tablet Take 1 tablet by mouth once as needed for Migraine May repeat in 2 hours if needed 30 tablet 11    famotidine (PEPCID) 20 MG tablet Take 1 tablet by mouth twice daily 180 tablet 3    divalproex (DEPAKOTE ER) 500 MG extended release tablet Take 1 tablet by mouth in the morning and at bedtime 180 tablet 3    docusate sodium (COLACE) 100 MG capsule Take 1 capsule by mouth 1-2x's daily as needed for constipation. 60 capsule 5    atorvastatin (LIPITOR) 10 MG tablet Take 1 tablet by mouth daily 90 tablet 3    busPIRone (BUSPAR) 15 MG tablet Take 15 mg by mouth 2 times daily as needed (anxiety) 180 tablet 3    omega-3 acid ethyl esters (LOVAZA) 1 g capsule Take 2 capsules by mouth 2 times daily Take with meals. 360 capsule 3    fluticasone-salmeterol (ADVAIR) 100-50 MCG/ACT AEPB diskus inhaler Inhale 1 puff into the lungs in the morning and 1 puff in the evening. 60 each 9    levothyroxine (SYNTHROID) 50 MCG tablet Take 1 tab by mouth first thing in the morning, on an empty stomach, 30 mins prior to other meds/food. 90 tablet 1    Misc. Devices [facility]) MISC Use daily as needed with ambulation.  1 each 0    baclofen (LIORESAL) 10 MG tablet Take 1 tablet by mouth 3 times daily as needed (muscle spasm or pain) 60 tablet 5    melatonin 5 MG TABS tablet Take 1 tablet by mouth nightly as needed (sleep) 90 tablet 3    silver sulfADIAZINE (SILVADENE) 1 % cream Apply topically daily. 30 g 1    tiotropium (SPIRIVA HANDIHALER) 18 MCG inhalation capsule Inhale 1 puff by mouth once daily 90 capsule 3    PROAIR  (90 Base) MCG/ACT inhaler Inhale 2 puffs into the lungs every 6 hours as needed for Wheezing 3 each 3    risperiDONE (RISPERDAL) 4 MG tablet TAKE 1 TABLET BY MOUTH AT BEDTIME 30 tablet 5    ibuprofen (ADVIL;MOTRIN) 800 MG tablet Take 1 tablet by mouth every 8 hours as needed for Pain 90 tablet 3    blood glucose test strips (ASCENSIA AUTODISC VI;ONE TOUCH ULTRA TEST VI) strip Use daily and as needed to check blood glucose. 100 each 3    loratadine (CLARITIN) 10 MG tablet Take 1 tablet by mouth daily 90 tablet 3    Lancets MISC Use daily and as needed to check blood glucose. Please dispense per patients insurance. 100 each 1    blood glucose monitor kit and supplies Test 3 times a day & as needed for symptoms of irregular blood glucose. 1 kit 0    Handicap Placard MISC by Does not apply route Issue parking placard for person with disability. [facility] [phone]   Applicant meets the qualifying disability criteria. Expires 2 years from issuing date. 1 each 0    Respiratory Therapy Supplies (NEBULIZER/TUBING/MOUTHPIECE) KIT Use daily as needed for shortness of breath.  1 kit 0    Nebulizers (COMPRESSOR/NEBULIZER) MISC Use daily as needed for shortness of breath 1 each 0    albuterol (PROVENTIL) (2.5 MG/3ML) 0.083% nebulizer solution Take 3 mLs by nebulization every 6 hours as needed for Wheezing or Shortness of Breath 120 vial 5    fluticasone (FLONASE) 50 MCG/ACT nasal spray 2 sprays by Nasal route daily 3 Bottle 3    vitamin B-12 (CYANOCOBALAMIN) 1000 MCG tablet Take 1 tablet by mouth daily 30 tablet 11    folic acid (FOLVITE) 156 MCG tablet Take 1 tablet by mouth daily 30 tablet 11    aspirin (ASPIRIN 81) 81 MG EC tablet Take 1 tablet by mouth daily 30 tablet 11    Menthol, Topical Analgesic, (BIOFREEZE) 4 % GEL Apply to shoulders & neck to relieve pain. 237 mL 5    ciclopirox (PENLAC) 8 % solution Apply topically to affected toenails nightly. Wipe off layers of medication with alcohol swab once weekly. (Patient not taking: Reported on 1/16/2023) 6 mL 2    Respiratory Therapy Supplies (NEBULIZER COMPRESSOR) KIT 1 kit by Does not apply route once for 1 dose 1 kit 0     Current Facility-Administered Medications   Medication Dose Route Frequency Provider Last Rate Last Admin    phenylephrine (MYDFRIN) 2.5 % ophthalmic solution 1 drop  1 drop Both Eyes Once Starling Her, MD        tropicamide (MYDRIACYL) 1 % ophthalmic solution 1 drop  1 drop Both Eyes Once Starling Her, MD         Allergies   Allergen Reactions    Keflex [Cephalexin] Other (See Comments)     Migraines, upset stomach     Naproxen      ulcers       Health Maintenance   Topic Date Due    COVID-19 Vaccine (1) Never done    HIV screen  Never done    Hepatitis C screen  Never done    DTaP/Tdap/Td vaccine (1 - Tdap) Never done    Colorectal Cancer Screen  Never done    Flu vaccine (1) Never done    Pneumococcal 0-64 years Vaccine (1 - PCV) 10/03/2028 (Originally 9/7/1981)    Lipids  11/08/2023    Depression Monitoring  01/16/2024    Diabetes screen  10/18/2024    Hepatitis A vaccine  Aged Out    Hib vaccine  Aged Out    Meningococcal (ACWY) vaccine  Aged Out       Subjective:      Review of Systems   Constitutional:  Unexpected weight change: down 5 lbs recently. Cardiovascular:  Positive for leg swelling. Objective:     Vitals:    01/16/23 1505   BP: 128/86   Site: Right Upper Arm   Position: Sitting   Cuff Size: Large Adult   Pulse: 73   Temp: 98.2 °F (36.8 °C)   TempSrc: Temporal   SpO2: 96%   Weight: 273 lb (123.8 kg)   Height: 5' 11\" (1.803 m)     Physical Exam  Vitals and nursing note reviewed. Constitutional:       General: He is not in acute distress. Appearance: He is well-developed. HENT:      Head: Normocephalic and atraumatic.       Right Ear: Tympanic membrane, ear canal and external ear normal.      Left Ear: Tympanic membrane, ear canal and external ear normal.      Nose: Nose normal.      Mouth/Throat:      Mouth: Mucous membranes are moist.      Pharynx: Oropharynx is clear. No oropharyngeal exudate. Eyes:      Conjunctiva/sclera: Conjunctivae normal.   Cardiovascular:      Rate and Rhythm: Normal rate and regular rhythm. Heart sounds: Normal heart sounds. Pulmonary:      Effort: Pulmonary effort is normal. No respiratory distress. Breath sounds: Normal breath sounds. Comments: Mildly decreased breath sounds noted throughout  Abdominal:      General: Bowel sounds are normal. There is no distension. Palpations: Abdomen is soft. Tenderness: There is no abdominal tenderness. Musculoskeletal:      Right lower leg: Edema (minimal) present. Left lower leg: Edema (mild) present. Skin:     General: Skin is warm and dry. Neurological:      Mental Status: He is alert and oriented to person, place, and time. Assessment:      1. Hypercholesterolemia with hypertriglyceridemia  -     Lipid Panel; Future  -     Comprehensive Metabolic Panel; Future  -     CBC with Auto Differential; Future  2. Insomnia, unspecified type  3. Migraine without status migrainosus, not intractable, unspecified migraine type  4. Anxiety  -     venlafaxine (EFFEXOR XR) 150 MG extended release capsule; Take 1 capsule by mouth daily Take with 75 mg capsule for total daily dose of 225 mg daily, Disp-90 capsule, R-3Normal  -     venlafaxine (EFFEXOR XR) 75 MG extended release capsule; Take 1 capsule by mouth daily Take with 150 mg capsule for total daily dose of 225 mg daily. , Disp-90 capsule, R-3Normal  5. Current mild episode of major depressive disorder, unspecified whether recurrent (HCC)  -     venlafaxine (EFFEXOR XR) 150 MG extended release capsule;  Take 1 capsule by mouth daily Take with 75 mg capsule for total daily dose of 225 mg daily, Disp-90 capsule, R-3Normal  - venlafaxine (EFFEXOR XR) 75 MG extended release capsule; Take 1 capsule by mouth daily Take with 150 mg capsule for total daily dose of 225 mg daily. , Disp-90 capsule, R-3Normal  6. Hypothyroidism, unspecified type  -     TSH with Reflex; Future  7. Seborrheic dermatitis  -     ketoconazole (NIZORAL) 2 % shampoo; Apply topically daily to affected areas x 10 days, then twice weekly as needed. , Disp-120 mL, R-5, Normal  8. Vitamin D deficiency  -     Vitamin D 25 Hydroxy; Future  9. Hypokalemia  -     Potassium; Future  10. Aortic root dilation (HCC)  11. Chronic obstructive pulmonary disease, unspecified COPD type ([facility] [address]  12. Impaired fasting glucose  -     Hemoglobin A1C; Future  13. Screen for colon cancer         Plan:      Attempted to order Cologuard for pt for screening for colon CA, but it flagged an ABN for not being covered. Discussed with pt that this was likely due to him not being 46 yet, so will wait to order until he turns 48 later this year. Return in about 3 months (around 4/16/2023) for f/u weight, labs. Orders Placed This Encounter   Procedures    Lipid Panel     Standing Status:   Future     Standing Expiration Date:   1/16/2024     Order Specific Question:   Is Patient Fasting?/# of Hours     Answer:   15     Order Specific Question:   Has the patient fasted?      Answer:   Yes    Comprehensive Metabolic Panel     Standing Status:   Future     Standing Expiration Date:   1/16/2024    TSH with Reflex     Standing Status:   Future     Standing Expiration Date:   1/16/2024    Vitamin D 25 Hydroxy     Standing Status:   Future     Standing Expiration Date:   1/16/2024    Potassium     Standing Status:   Future     Standing Expiration Date:   1/16/2024    Hemoglobin A1C     Standing Status:   Future     Standing Expiration Date:   1/16/2024    CBC with Auto Differential     Standing Status:   Future     Standing Expiration Date:   1/16/2024     Orders Placed This Encounter   Medications venlafaxine (EFFEXOR XR) 150 MG extended release capsule     Sig: Take 1 capsule by mouth daily Take with 75 mg capsule for total daily dose of 225 mg daily     Dispense:  90 capsule     Refill:  3    venlafaxine (EFFEXOR XR) 75 MG extended release capsule     Sig: Take 1 capsule by mouth daily Take with 150 mg capsule for total daily dose of 225 mg daily. Dispense:  90 capsule     Refill:  3    ketoconazole (NIZORAL) 2 % shampoo     Sig: Apply topically daily to affected areas x 10 days, then twice weekly as needed. Dispense:  120 mL     Refill:  5       Patient given educational materials - see patient instructions. Discussed use, benefit, and side effects of prescribed medications. All patient questions answered. Pt voiced understanding. Reviewed health maintenance.             Electronically signed by Paolo Michael DO, DO on 1/22/2023 at 11:57 PM

## 2023-02-13 DIAGNOSIS — R09.81 NASAL CONGESTION: ICD-10-CM

## 2023-02-13 NOTE — TELEPHONE ENCOUNTER
Giuliano Larios called requesting a refill of the below medication which has been pended for you:     Requested Prescriptions     Pending Prescriptions Disp Refills    loratadine (CLARITIN) 10 MG tablet 90 tablet 3     Sig: Take 1 tablet by mouth daily       Last Appointment Date: 1/16/2023  Next Appointment Date: 4/17/2023    Allergies   Allergen Reactions    Keflex [Cephalexin] Other (See Comments)     Migraines, upset stomach     Naproxen      ulcers

## 2023-02-16 RX ORDER — LORATADINE 10 MG/1
10 TABLET ORAL DAILY
Qty: 90 TABLET | Refills: 3 | Status: SHIPPED | OUTPATIENT
Start: 2023-02-16

## 2023-02-20 ENCOUNTER — PATIENT MESSAGE (OUTPATIENT)
Dept: FAMILY MEDICINE CLINIC | Age: 48
End: 2023-02-20

## 2023-02-20 NOTE — TELEPHONE ENCOUNTER
From: Davis Just  To: Dr. Zunilda Gramajo: 2/20/2023 2:22 PM EST  Subject: migrain    I have had migraine's all day this is the third one I have had to day is it safe for me to take some more Rizatripton or as i already took 2 dose's 2 hours a part  Thanks   Evan Patel

## 2023-02-21 ENCOUNTER — HOSPITAL ENCOUNTER (EMERGENCY)
Age: 48
Discharge: HOME | End: 2023-02-21
Attending: SPECIALIST
Payer: MEDICARE

## 2023-02-21 VITALS
TEMPERATURE: 97.3 F | OXYGEN SATURATION: 97 % | RESPIRATION RATE: 18 BRPM | HEART RATE: 82 BPM | DIASTOLIC BLOOD PRESSURE: 94 MMHG | BODY MASS INDEX: 39.2 KG/M2 | HEIGHT: 71 IN | SYSTOLIC BLOOD PRESSURE: 163 MMHG | WEIGHT: 280 LBS

## 2023-02-21 DIAGNOSIS — J01.10 ACUTE NON-RECURRENT FRONTAL SINUSITIS: Primary | ICD-10-CM

## 2023-02-21 PROCEDURE — 99283 EMERGENCY DEPT VISIT LOW MDM: CPT

## 2023-02-21 RX ORDER — AMOXICILLIN 500 MG/1
500 CAPSULE ORAL 3 TIMES DAILY
Qty: 30 CAPSULE | Refills: 0 | Status: SHIPPED | OUTPATIENT
Start: 2023-02-21 | End: 2023-03-03

## 2023-02-21 ASSESSMENT — PAIN DESCRIPTION - DESCRIPTORS: DESCRIPTORS: ACHING

## 2023-02-21 ASSESSMENT — PAIN DESCRIPTION - FREQUENCY: FREQUENCY: CONTINUOUS

## 2023-02-21 ASSESSMENT — PAIN DESCRIPTION - ONSET: ONSET: ON-GOING

## 2023-02-21 ASSESSMENT — PAIN SCALES - GENERAL
PAINLEVEL_OUTOF10: 5
PAINLEVEL_OUTOF10: 8

## 2023-02-21 ASSESSMENT — PAIN - FUNCTIONAL ASSESSMENT
PAIN_FUNCTIONAL_ASSESSMENT: 0-10
PAIN_FUNCTIONAL_ASSESSMENT: 0-10

## 2023-02-21 ASSESSMENT — PAIN DESCRIPTION - LOCATION: LOCATION: HEAD

## 2023-02-21 ASSESSMENT — PAIN DESCRIPTION - PAIN TYPE: TYPE: ACUTE PAIN

## 2023-02-22 ENCOUNTER — CARE COORDINATION (OUTPATIENT)
Dept: CARE COORDINATION | Age: 48
End: 2023-02-22

## 2023-02-22 ASSESSMENT — ENCOUNTER SYMPTOMS
SINUS PRESSURE: 1
NAUSEA: 1
RHINORRHEA: 1
COUGH: 0
SHORTNESS OF BREATH: 0
ABDOMINAL PAIN: 0
SINUS PAIN: 1

## 2023-02-22 NOTE — ED PROVIDER NOTES
[address]      Pt Name: Slim Rutherford  MRN: [id number]  [place] 1975  Date of evaluation: 2/21/2023      CHIEF COMPLAINT       Chief Complaint   Patient presents with    Migraine         HISTORY OF PRESENT ILLNESS    Slim Rutherford is a 50 y.o. male who presents to the emergency department accompanied by his family for evaluation of bifrontal headache since 4 days prior to arrival associate with nausea, runny nose, congestion and sneezing. He denies any photophobia, phonophobia, tingling, numbness or weakness in any of the extremities. No history of fever or chills. Patient has history of migraine headaches and headache is similar to prior episodes. In addition patient and his significant other are concerned about sinusitis. Patient has taken Maxalt at home without any relief. He has had CAT scan of the brain in the past which has been normal.  There are no exacerbating or relieving factors. He denies any neck pain, stiffness, he also denies any chest pain, shortness of breath, cough, abdominal pain, vomiting or diarrhea. REVIEW OF SYSTEMS       Review of Systems   Constitutional:  Negative for chills and fever. HENT:  Positive for congestion, rhinorrhea, sinus pressure, sinus pain and sneezing. Respiratory:  Negative for cough and shortness of breath. Cardiovascular:  Negative for chest pain and palpitations. Gastrointestinal:  Positive for nausea. Negative for abdominal pain. Neurological:  Positive for headaches. Negative for syncope, speech difficulty, weakness and numbness. All other systems reviewed and are negative.      PAST MEDICAL HISTORY    has a past medical history of ADHD (attention deficit hyperactivity disorder), Allergic rhinitis, Anxiety, Asthma, Bipolar disorder ([address] Chronic back pain, Chronic obstructive pulmonary disease (COPD) ([address] Depression, Erectile dysfunction, Headache(784.0), Osteoarthritis, TMJ (dislocation of temporomandibular joint), and Torticollis. SURGICAL HISTORY      has a past surgical history that includes Tonsillectomy. CURRENT MEDICATIONS       Discharge Medication List as of 2/21/2023  9:46 PM        CONTINUE these medications which have NOT CHANGED    Details   loratadine (CLARITIN) 10 MG tablet Take 1 tablet by mouth daily, Disp-90 tablet, R-3Normal      Potassium 99 MG TABS Take by mouthHistorical Med      !! venlafaxine (EFFEXOR XR) 150 MG extended release capsule Take 1 capsule by mouth daily Take with 75 mg capsule for total daily dose of 225 mg daily, Disp-90 capsule, R-3Normal      !! venlafaxine (EFFEXOR XR) 75 MG extended release capsule Take 1 capsule by mouth daily Take with 150 mg capsule for total daily dose of 225 mg daily. , Disp-90 capsule, R-3Normal      ketoconazole (NIZORAL) 2 % shampoo Apply topically daily to affected areas x 10 days, then twice weekly as needed. , Disp-120 mL, R-5, Normal      rizatriptan (MAXALT) 10 MG tablet Take 1 tablet by mouth once as needed for Migraine May repeat in 2 hours if needed, Disp-30 tablet, R-11Normal      famotidine (PEPCID) 20 MG tablet Take 1 tablet by mouth twice daily, Disp-180 tablet, R-3Normal      divalproex (DEPAKOTE ER) 500 MG extended release tablet Take 1 tablet by mouth in the morning and at bedtime, Disp-180 tablet, R-3Normal      docusate sodium (COLACE) 100 MG capsule Take 1 capsule by mouth 1-2x's daily as needed for constipation. , Disp-60 capsule, R-5Normal      atorvastatin (LIPITOR) 10 MG tablet Take 1 tablet by mouth daily, Disp-90 tablet, R-3Normal      busPIRone (BUSPAR) 15 MG tablet Take 15 mg by mouth 2 times daily as needed (anxiety), Disp-180 tablet, R-3Normal      omega-3 acid ethyl esters (LOVAZA) 1 g capsule Take 2 capsules by mouth 2 times daily Take with meals. , Disp-360 capsule, R-3Normal      fluticasone-salmeterol (ADVAIR) 100-50 MCG/ACT AEPB diskus inhaler Inhale 1 puff into the lungs in the morning and 1 puff in the evening., Disp-60 each, R-9Normal      levothyroxine (SYNTHROID) 50 MCG tablet Take 1 tab by mouth first thing in the morning, on an empty stomach, 30 mins prior to other meds/food. , Disp-90 tablet, R-1Normal      Misc. Devices (WALKER) MISC Disp-1 each, R-0, PrintUse daily as needed with ambulation. ciclopirox (PENLAC) 8 % solution Apply topically to affected toenails nightly. Wipe off layers of medication with alcohol swab once weekly. , Disp-6 mL, R-2, Normal      baclofen (LIORESAL) 10 MG tablet Take 1 tablet by mouth 3 times daily as needed (muscle spasm or pain), Disp-60 tablet, R-5Normal      melatonin 5 MG TABS tablet Take 1 tablet by mouth nightly as needed (sleep), Disp-90 tablet, R-3Normal      silver sulfADIAZINE (SILVADENE) 1 % cream Apply topically daily. , Disp-30 g, R-1, Normal      tiotropium (SPIRIVA HANDIHALER) 18 MCG inhalation capsule Inhale 1 puff by mouth once daily, Disp-90 capsule, R-3Normal      PROAIR  (90 Base) MCG/ACT inhaler Inhale 2 puffs into the lungs every 6 hours as needed for Wheezing, Disp-3 each, R-3, DAWNormal      risperiDONE (RISPERDAL) 4 MG tablet TAKE 1 TABLET BY MOUTH AT BEDTIME, Disp-30 tablet, R-5Normal      ibuprofen (ADVIL;MOTRIN) 800 MG tablet Take 1 tablet by mouth every 8 hours as needed for Pain, Disp-90 tablet, R-3Normal      blood glucose test strips (ASCENSIA AUTODISC VI;ONE TOUCH ULTRA TEST VI) strip Disp-100 each, R-3, NormalUse daily and as needed to check blood glucose. Lancets MISC Disp-100 each, R-1, NormalUse daily and as needed to check blood glucose. Please dispense per patients insurance. blood glucose monitor kit and supplies Test 3 times a day & as needed for symptoms of irregular blood glucose., Disp-1 kit, R-0, Normal      Handicap Placard [facility] Starting Fri 6/11/2021, Disp-1 each, R-0, PrintIssue parking placard for person with disability.   [facility] [id number]   Applicant meets the qualifying disability criteria. Expires 2 years from issuing date. Respiratory Therapy Supplies (NEBULIZER/TUBING/MOUTHPIECE) KIT Disp-1 kit, R-0, NormalUse daily as needed for shortness of breath. Nebulizers (COMPRESSOR/NEBULIZER) MISC Disp-1 each, R-0, NormalUse daily as needed for shortness of breath      albuterol (PROVENTIL) (2.5 MG/3ML) 0.083% nebulizer solution Take 3 mLs by nebulization every 6 hours as needed for Wheezing or Shortness of Breath, Disp-120 vial, R-5Normal      fluticasone (FLONASE) 50 MCG/ACT nasal spray 2 sprays by Nasal route daily, Disp-3 Bottle, R-3Normal      vitamin B-12 (CYANOCOBALAMIN) 1000 MCG tablet Take 1 tablet by mouth daily, Disp-30 OUDEUU,[id number]      folic acid (FOLVITE) 700 MCG tablet Take 1 tablet by mouth daily, Disp-30 tablet,R-11Normal      aspirin (ASPIRIN 81) 81 MG EC tablet Take 1 tablet by mouth daily, Disp-30 tablet,R-11Normal      Menthol, Topical Analgesic, (BIOFREEZE) 4 % GEL Apply to shoulders & neck to relieve pain., Disp-237 mL,R-5Normal       !! - Potential duplicate medications found. Please discuss with provider. ALLERGIES     is allergic to keflex [cephalexin] and naproxen. FAMILY HISTORY     He indicated that the status of his mother is unknown. He indicated that the status of his father is unknown. He indicated that the status of his sister is unknown. He indicated that the status of his brother is unknown. He indicated that the status of his neg hx is unknown.     family history includes High Blood Pressure in his father and mother; Learning Disabilities in his brother and sister; Stroke in his father and mother; Substance Abuse in his father and sister. SOCIAL HISTORY      reports that he quit smoking about 5 years ago. His smoking use included cigarettes. He started smoking about 30 years ago. He has never used smokeless tobacco. He reports that he does not drink alcohol and does not use drugs.     SCREENINGS    Nguyen Coma Scale  Eye Opening: Spontaneous  Best Verbal Response: Oriented  Best Motor Response: Obeys commands  Nguyen Coma Scale Score: 15      PHYSICAL EXAM     INITIAL VITALS:  height is 5' 11\" (1.803 m) and weight is 280 lb (127 kg). His tympanic temperature is 97.3 °F (36.3 °C). His blood pressure is 163/94 (abnormal) and his pulse is 82. His respiration is 18 and oxygen saturation is 97%. Physical Exam  Vitals and nursing note reviewed. Constitutional:       General: He is not in acute distress. Appearance: He is well-developed. HENT:      Head: Normocephalic and atraumatic. Nose:      Right Sinus: Frontal sinus tenderness present. Left Sinus: Frontal sinus tenderness present. Eyes:      Extraocular Movements: Extraocular movements intact. Pupils: Pupils are equal, round, and reactive to light. Funduscopic exam:     Right eye: No papilledema. Left eye: No papilledema. Neck:      Meningeal: Brudzinski's sign and Kernig's sign absent. Cardiovascular:      Rate and Rhythm: Normal rate and regular rhythm. Heart sounds: Normal heart sounds. No murmur heard. Pulmonary:      Effort: Pulmonary effort is normal. No respiratory distress. Breath sounds: Normal breath sounds. Abdominal:      General: Bowel sounds are normal. There is no distension. Palpations: Abdomen is soft. Tenderness: There is no abdominal tenderness. Musculoskeletal:      Cervical back: Normal range of motion and neck supple. Skin:     General: Skin is warm and dry. Neurological:      General: No focal deficit present. Mental Status: He is alert and oriented to person, place, and time. GCS: GCS eye subscore is 4. GCS verbal subscore is 5. GCS motor subscore is 6. Cranial Nerves: Cranial nerves 2-12 are intact. Sensory: Sensation is intact. Motor: Motor function is intact. Coordination: Coordination is intact. Gait: Gait is intact.    Psychiatric:         Behavior: Behavior normal.         Thought Content: Thought content normal.        DIFFERENTIAL DIAGNOSIS/ MDM:     Differential diagnosis considered: Tension headache, migraine headache, cluster headache, sinusitis. Intracranial pathologies such as brain tumor, SAH, encephalitis, meningitis are discussed too, although unlikely at this time. DIAGNOSTIC RESULTS     EKG: All EKG's are interpreted by the Emergency Department Physician who either signs or Co-signs this chart in the absence of a cardiologist.    None obtained      RADIOLOGY:   Interpretation per the Radiologist below, if available at the time of this note:    No results found. ED BEDSIDE ULTRASOUND:       LABS:  Labs Reviewed - No data to display        EMERGENCY DEPARTMENT COURSE:   Vitals:    Vitals:    02/21/23 2055   BP: (!) 163/94   Pulse: 82   Resp: 18   Temp: 97.3 °F (36.3 °C)   TempSrc: Tympanic   SpO2: 97%   Weight: 280 lb (127 kg)   Height: 5' 11\" (1.803 m)     -------------------------  BP: (!) 163/94, Temp: 97.3 °F (36.3 °C), Heart Rate: 82, Resp: 18    Orders Placed This Encounter   Medications    amoxicillin (AMOXIL) 500 MG capsule     Sig: Take 1 capsule by mouth 3 times daily for 10 days     Dispense:  30 capsule     Refill:  0       During the emergency department course, patient declined any medications for the headache. He prefers to be treated for sinusitis. Patient was prescribed amoxicillin 500 mg 3 times daily for 10 days, plenty of oral fluids, continue current medications, take Tylenol and/or ibuprofen as needed for the headache, follow-up with PCP, return if worse. I have reviewed the disposition diagnosis with the patient and or their family/guardian. I have answered their questions and given discharge instructions. They voiced understanding of these instructions and did not have any further questions or complaints.     Re-evaluation Notes    Patient remained hemodynamically stable and neurologically intact throughout the ED course. CRITICAL CARE:   None        CONSULTS:      PROCEDURES:  None    FINAL IMPRESSION      1. Acute non-recurrent frontal sinusitis          DISPOSITION/PLAN   DISPOSITION Decision To Discharge    Condition on Disposition    Stable    PATIENT REFERRED TO:  Izzy Lee DO  [address] [facility]  [phone]    Call in 2 days  For reevaluation of current symptoms    [facility] ED  [address]  [phone]    If symptoms worsen      DISCHARGE MEDICATIONS:  Discharge Medication List as of 2/21/2023  9:46 PM        START taking these medications    Details   amoxicillin (AMOXIL) 500 MG capsule Take 1 capsule by mouth 3 times daily for 10 days, Disp-30 capsule, R-0Normal             (Please note that portions of this note were completed with a voice recognition program.  Efforts were made to edit the dictations but occasionally words are mis-transcribed.)    Rosanna Yañez MD,, MD, F.A.C.E.P.   Attending Emergency Physician                          Rosanna Yañez MD  02/22/23 9848

## 2023-02-22 NOTE — CARE COORDINATION
Ambulatory Care Coordination  ED Follow up Call    Cassandra Liao was seen at [facility] ED 2/21/2023 2/22/2023- 1:28 pm F/U call for ED visit-  Left HIPAA compliant voice message requesting return call @ [phone]       2/23/2023- 10:10 am Left message requesting return call @ [phone] re: ED F/U call.       Reason for ED visit:  sinusitis      FU appts/Provider:    Future Appointments   Date Time Provider Haley Corado   4/17/2023  2:20 PM DO JUAN Tanner [facility]   6/21/2023  9:45 AM Ashish Coe MD [facility]   9/27/2023  3:00 PM Jam Hassan MD [facility]

## 2023-02-22 NOTE — DISCHARGE INSTRUCTIONS
Please understand that at this time there is no evidence for a more serious underlying process, but that early in the process of an illness or injury, an emergency department workup can be falsely reassuring. You should contact your family doctor within the next 48 hours for a follow up appointment    Joelle Velasco!!!    From [facility] Emergency Department ([facility]) and [facility] Emergency Services    On behalf of the Emergency Department staff at [facility]), I would like to thank you for giving us the opportunity to address your health care needs and concerns. We hope that during your visit, our service was delivered in a professional and caring manner. Please keep [facility] ([facility]) in mind as we walk with you down the path to your own personal wellness. Please expect an automated text message or email from us so we can ask a few questions about your health and progress. Based on your answers, a clinician may call you back to offer help and instructions. Please understand that early in the process of an illness or injury, an emergency department workup can be falsely reassuring. If you notice any worsening, changing or persistent symptoms please call your family doctor or return to the ER immediately. Tell us how we did during your visit at [email or web address]   and let us know about your experience

## 2023-04-17 ENCOUNTER — OFFICE VISIT (OUTPATIENT)
Dept: FAMILY MEDICINE CLINIC | Age: 48
End: 2023-04-17
Payer: MEDICARE

## 2023-04-17 VITALS
TEMPERATURE: 98 F | WEIGHT: 275 LBS | HEART RATE: 75 BPM | DIASTOLIC BLOOD PRESSURE: 86 MMHG | BODY MASS INDEX: 38.5 KG/M2 | OXYGEN SATURATION: 98 % | SYSTOLIC BLOOD PRESSURE: 132 MMHG | HEIGHT: 71 IN

## 2023-04-17 DIAGNOSIS — G43.909 MIGRAINE WITHOUT STATUS MIGRAINOSUS, NOT INTRACTABLE, UNSPECIFIED MIGRAINE TYPE: ICD-10-CM

## 2023-04-17 DIAGNOSIS — G47.00 INSOMNIA, UNSPECIFIED TYPE: ICD-10-CM

## 2023-04-17 DIAGNOSIS — S39.012A STRAIN OF LUMBAR REGION, INITIAL ENCOUNTER: Primary | ICD-10-CM

## 2023-04-17 PROCEDURE — G8427 DOCREV CUR MEDS BY ELIG CLIN: HCPCS | Performed by: FAMILY MEDICINE

## 2023-04-17 PROCEDURE — 1036F TOBACCO NON-USER: CPT | Performed by: FAMILY MEDICINE

## 2023-04-17 PROCEDURE — 99213 OFFICE O/P EST LOW 20 MIN: CPT | Performed by: FAMILY MEDICINE

## 2023-04-17 PROCEDURE — G8417 CALC BMI ABV UP PARAM F/U: HCPCS | Performed by: FAMILY MEDICINE

## 2023-04-17 PROCEDURE — 99214 OFFICE O/P EST MOD 30 MIN: CPT | Performed by: FAMILY MEDICINE

## 2023-04-17 RX ORDER — PREDNISONE 20 MG/1
TABLET ORAL
Qty: 15 TABLET | Refills: 0 | Status: SHIPPED | OUTPATIENT
Start: 2023-04-17

## 2023-04-17 SDOH — ECONOMIC STABILITY: HOUSING INSECURITY
IN THE LAST 12 MONTHS, WAS THERE A TIME WHEN YOU DID NOT HAVE A STEADY PLACE TO SLEEP OR SLEPT IN A SHELTER (INCLUDING NOW)?: NO

## 2023-04-17 SDOH — ECONOMIC STABILITY: FOOD INSECURITY: WITHIN THE PAST 12 MONTHS, THE FOOD YOU BOUGHT JUST DIDN'T LAST AND YOU DIDN'T HAVE MONEY TO GET MORE.: NEVER TRUE

## 2023-04-17 SDOH — ECONOMIC STABILITY: FOOD INSECURITY: WITHIN THE PAST 12 MONTHS, YOU WORRIED THAT YOUR FOOD WOULD RUN OUT BEFORE YOU GOT MONEY TO BUY MORE.: NEVER TRUE

## 2023-04-17 SDOH — ECONOMIC STABILITY: INCOME INSECURITY: HOW HARD IS IT FOR YOU TO PAY FOR THE VERY BASICS LIKE FOOD, HOUSING, MEDICAL CARE, AND HEATING?: NOT HARD AT ALL

## 2023-04-17 ASSESSMENT — ENCOUNTER SYMPTOMS: BACK PAIN: 1

## 2023-04-24 DIAGNOSIS — S39.012A STRAIN OF LUMBAR REGION, INITIAL ENCOUNTER: ICD-10-CM

## 2023-04-24 RX ORDER — PREDNISONE 20 MG/1
TABLET ORAL
Qty: 15 TABLET | Refills: 0 | OUTPATIENT
Start: 2023-04-24

## 2023-05-04 ENCOUNTER — HOSPITAL ENCOUNTER (OUTPATIENT)
Age: 48
Discharge: HOME | End: 2023-05-04
Payer: MEDICARE

## 2023-05-04 ENCOUNTER — OFFICE VISIT (OUTPATIENT)
Dept: FAMILY MEDICINE CLINIC | Age: 48
End: 2023-05-04

## 2023-05-04 VITALS
SYSTOLIC BLOOD PRESSURE: 122 MMHG | BODY MASS INDEX: 38.92 KG/M2 | WEIGHT: 278 LBS | RESPIRATION RATE: 18 BRPM | HEART RATE: 69 BPM | OXYGEN SATURATION: 96 % | HEIGHT: 71 IN | DIASTOLIC BLOOD PRESSURE: 86 MMHG

## 2023-05-04 DIAGNOSIS — R73.01 IMPAIRED FASTING GLUCOSE: ICD-10-CM

## 2023-05-04 DIAGNOSIS — M54.50 CHRONIC MIDLINE LOW BACK PAIN WITHOUT SCIATICA: Primary | ICD-10-CM

## 2023-05-04 DIAGNOSIS — G89.29 CHRONIC MIDLINE LOW BACK PAIN WITHOUT SCIATICA: Primary | ICD-10-CM

## 2023-05-04 DIAGNOSIS — E87.6 HYPOKALEMIA: ICD-10-CM

## 2023-05-04 DIAGNOSIS — E78.2 HYPERCHOLESTEROLEMIA WITH HYPERTRIGLYCERIDEMIA: ICD-10-CM

## 2023-05-04 LAB
ABSOLUTE EOS #: 0.14 K/UL (ref 0–0.44)
ABSOLUTE IMMATURE GRANULOCYTE: 0.35 K/UL (ref 0–0.3)
ABSOLUTE LYMPH #: 2.82 K/UL (ref 1.1–3.7)
ABSOLUTE MONO #: 0.72 K/UL (ref 0.1–1.2)
BASOPHILS # BLD: 1 % (ref 0–2)
BASOPHILS ABSOLUTE: 0.1 K/UL (ref 0–0.2)
EOSINOPHILS RELATIVE PERCENT: 2 % (ref 1–4)
HCT VFR BLD AUTO: 37.8 % (ref 40.7–50.3)
HGB BLD-MCNC: 12.8 G/DL (ref 13–17)
IMMATURE GRANULOCYTES: 4 %
LYMPHOCYTES # BLD: 32 % (ref 24–43)
MCH RBC QN AUTO: 31.2 PG (ref 25.2–33.5)
MCHC RBC AUTO-ENTMCNC: 33.9 G/DL (ref 25.2–33.5)
MCV RBC AUTO: 92.2 FL (ref 82.6–102.9)
MONOCYTES # BLD: 8 % (ref 3–12)
NRBC AUTOMATED: 0 PER 100 WBC
PDW BLD-RTO: 12.9 % (ref 11.8–14.4)
PLATELET # BLD AUTO: 334 K/UL (ref 138–453)
PMV BLD AUTO: 9.8 FL (ref 8.1–13.5)
POTASSIUM SERPL-SCNC: 3.9 MMOL/L (ref 3.7–5.3)
RBC # BLD: 4.1 M/UL (ref 4.21–5.77)
SEG NEUTROPHILS: 53 % (ref 36–65)
SEGMENTED NEUTROPHILS ABSOLUTE COUNT: 4.65 K/UL (ref 1.5–8.1)
WBC # BLD AUTO: 8.8 K/UL (ref 3.5–11.3)

## 2023-05-04 PROCEDURE — 36415 COLL VENOUS BLD VENIPUNCTURE: CPT

## 2023-05-04 PROCEDURE — 85025 COMPLETE CBC W/AUTO DIFF WBC: CPT

## 2023-05-04 PROCEDURE — 84132 ASSAY OF SERUM POTASSIUM: CPT

## 2023-05-04 PROCEDURE — 83036 HEMOGLOBIN GLYCOSYLATED A1C: CPT

## 2023-05-04 RX ORDER — TRAMADOL HYDROCHLORIDE 50 MG/1
50 TABLET ORAL EVERY 6 HOURS PRN
Qty: 12 TABLET | Refills: 0 | Status: SHIPPED | OUTPATIENT
Start: 2023-05-04 | End: 2023-05-07
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
## 2023-05-05 LAB
EST. AVERAGE GLUCOSE BLD GHB EST-MCNC: 114 MG/DL
HBA1C MFR BLD: 5.6 % (ref 4–6)

## 2023-05-05 ASSESSMENT — ENCOUNTER SYMPTOMS: BACK PAIN: 1

## 2023-05-16 DIAGNOSIS — G89.29 CHRONIC NECK PAIN: ICD-10-CM

## 2023-05-16 DIAGNOSIS — M54.2 CHRONIC NECK PAIN: ICD-10-CM

## 2023-05-16 DIAGNOSIS — R09.81 NASAL CONGESTION: ICD-10-CM

## 2023-05-16 DIAGNOSIS — E03.9 HYPOTHYROIDISM, UNSPECIFIED TYPE: ICD-10-CM

## 2023-05-16 DIAGNOSIS — G47.00 INSOMNIA, UNSPECIFIED TYPE: ICD-10-CM

## 2023-05-16 DIAGNOSIS — R73.01 IMPAIRED FASTING GLUCOSE: ICD-10-CM

## 2023-05-16 DIAGNOSIS — F31.32 BIPOLAR AFFECTIVE DISORDER, CURRENTLY DEPRESSED, MODERATE (HCC): ICD-10-CM

## 2023-05-16 RX ORDER — LORATADINE 10 MG/1
10 TABLET ORAL DAILY
Qty: 90 TABLET | Refills: 3 | Status: CANCELLED | OUTPATIENT
Start: 2023-05-16

## 2023-05-17 NOTE — TELEPHONE ENCOUNTER
Mindy Jennings called requesting a refill of the below medication which has been pended for you:     Requested Prescriptions     Pending Prescriptions Disp Refills    blood glucose test strips (ASCENSIA AUTODISC VI;ONE TOUCH ULTRA TEST VI) strip 100 each 3     Sig: Use daily and as needed to check blood glucose. risperiDONE (RISPERDAL) 4 MG tablet 30 tablet 5     Sig: Take 1 tablet by mouth at bedtime    baclofen (LIORESAL) 10 MG tablet 60 tablet 5     Sig: Take 1 tablet by mouth 3 times daily as needed (muscle spasm or pain)    levothyroxine (SYNTHROID) 50 MCG tablet 90 tablet 1     Sig: Take 1 tab by mouth first thing in the morning, on an empty stomach, 30 mins prior to other meds/food.        Last Appointment Date: 4/17/2023  Next Appointment Date: 7/17/2023    Allergies   Allergen Reactions    Keflex [Cephalexin] Other (See Comments)     Migraines, upset stomach     Naproxen      ulcers

## 2023-05-19 DIAGNOSIS — E03.9 HYPOTHYROIDISM, UNSPECIFIED TYPE: ICD-10-CM

## 2023-05-20 RX ORDER — BACLOFEN 10 MG/1
10 TABLET ORAL 3 TIMES DAILY PRN
Qty: 180 TABLET | Refills: 1 | Status: SHIPPED | OUTPATIENT
Start: 2023-05-20

## 2023-05-20 RX ORDER — RISPERIDONE 4 MG/1
4 TABLET ORAL NIGHTLY
Qty: 90 TABLET | Refills: 1 | Status: SHIPPED | OUTPATIENT
Start: 2023-05-20

## 2023-05-20 RX ORDER — LEVOTHYROXINE SODIUM 0.05 MG/1
TABLET ORAL
Qty: 90 TABLET | Refills: 1 | Status: SHIPPED | OUTPATIENT
Start: 2023-05-20

## 2023-05-21 DIAGNOSIS — R73.01 IMPAIRED FASTING GLUCOSE: ICD-10-CM

## 2023-05-22 RX ORDER — BLOOD SUGAR DIAGNOSTIC
STRIP MISCELLANEOUS
Qty: 50 EACH | Refills: 0 | OUTPATIENT
Start: 2023-05-22

## 2023-05-22 RX ORDER — LEVOTHYROXINE SODIUM 0.05 MG/1
TABLET ORAL
Qty: 90 TABLET | Refills: 1 | OUTPATIENT
Start: 2023-05-22

## 2023-06-21 ENCOUNTER — OFFICE VISIT (OUTPATIENT)
Dept: CARDIOLOGY | Age: 48
End: 2023-06-21
Payer: MEDICARE

## 2023-06-21 VITALS
HEART RATE: 78 BPM | HEIGHT: 71 IN | WEIGHT: 278 LBS | DIASTOLIC BLOOD PRESSURE: 88 MMHG | BODY MASS INDEX: 38.92 KG/M2 | SYSTOLIC BLOOD PRESSURE: 130 MMHG | OXYGEN SATURATION: 96 %

## 2023-06-21 DIAGNOSIS — R94.31 ABNORMAL ECG: ICD-10-CM

## 2023-06-21 DIAGNOSIS — R06.09 DYSPNEA ON EXERTION: ICD-10-CM

## 2023-06-21 DIAGNOSIS — E78.5 HYPERLIPIDEMIA, UNSPECIFIED HYPERLIPIDEMIA TYPE: Primary | ICD-10-CM

## 2023-06-21 PROCEDURE — 99212 OFFICE O/P EST SF 10 MIN: CPT | Performed by: INTERNAL MEDICINE

## 2023-06-21 PROCEDURE — 99214 OFFICE O/P EST MOD 30 MIN: CPT | Performed by: INTERNAL MEDICINE

## 2023-06-21 PROCEDURE — 93010 ELECTROCARDIOGRAM REPORT: CPT | Performed by: INTERNAL MEDICINE

## 2023-06-21 PROCEDURE — 1036F TOBACCO NON-USER: CPT | Performed by: INTERNAL MEDICINE

## 2023-06-21 PROCEDURE — 93005 ELECTROCARDIOGRAM TRACING: CPT | Performed by: INTERNAL MEDICINE

## 2023-06-21 PROCEDURE — G8417 CALC BMI ABV UP PARAM F/U: HCPCS | Performed by: INTERNAL MEDICINE

## 2023-06-21 PROCEDURE — G8427 DOCREV CUR MEDS BY ELIG CLIN: HCPCS | Performed by: INTERNAL MEDICINE

## 2023-06-21 NOTE — PROGRESS NOTES
Today's Date: 6/21/2023  Patient's Name: Eliz Solomon  Patient's age: 50 y.o., 1975    Subjective:  Eliz Solomon is being seen in clinic today regarding   Chief Complaint   Patient presents with    COPD    Abnormal Test Results     Echo Aortic root dilation 3.8    Hyperlipidemia     The patient is here for routine follow-up. He has baseline dyspnea on exertion which has been stable without any recent worsening. He denies any chest pain or angina. He underwent echocardiogram and treadmill stress test as below. He denies any lower extremity edema or palpitations. He has quit smoking since 2017 however his significant other still smokes. Past Medical History:   has a past medical history of ADHD (attention deficit hyperactivity disorder), Allergic rhinitis, Anxiety, Asthma, Bipolar disorder ([address] Chronic back pain, Chronic obstructive pulmonary disease (COPD) ([address] Depression, Erectile dysfunction, Headache(784.0), Osteoarthritis, TMJ (dislocation of temporomandibular joint), and Torticollis. Past Surgical History:   has a past surgical history that includes Tonsillectomy. Home Medications:  Prior to Admission medications    Medication Sig Start Date End Date Taking? Authorizing Provider   blood glucose test strips (ASCENSIA AUTODISC VI;ONE TOUCH ULTRA TEST VI) strip Use daily and as needed to check blood glucose. 5/20/23  Yes Eulalio Vance, DO   risperiDONE (RISPERDAL) 4 MG tablet Take 1 tablet by mouth at bedtime 5/20/23  Yes Eulalio Vance DO   baclofen (LIORESAL) 10 MG tablet Take 1 tablet by mouth 3 times daily as needed (muscle spasm or pain) 5/20/23  Yes Eulalio Vance DO   levothyroxine (SYNTHROID) 50 MCG tablet Take 1 tab by mouth first thing in the morning, on an empty stomach, 30 mins prior to other meds/food. 5/20/23  Yes Eulalio Vance DO   predniSONE (DELTASONE) 20 MG tablet Take 2 tabs by mouth daily x 5 days, then 1 tab daily x 5 days.  4/17/23  Yes Bing Noguera, DO

## 2023-06-22 ENCOUNTER — OFFICE VISIT (OUTPATIENT)
Dept: PRIMARY CARE CLINIC | Age: 48
End: 2023-06-22
Payer: MEDICARE

## 2023-06-22 VITALS
HEIGHT: 71 IN | DIASTOLIC BLOOD PRESSURE: 94 MMHG | HEART RATE: 82 BPM | SYSTOLIC BLOOD PRESSURE: 142 MMHG | BODY MASS INDEX: 39.39 KG/M2 | OXYGEN SATURATION: 97 % | TEMPERATURE: 97.8 F | WEIGHT: 281.38 LBS | RESPIRATION RATE: 20 BRPM

## 2023-06-22 DIAGNOSIS — M54.50 CHRONIC LOW BACK PAIN WITHOUT SCIATICA, UNSPECIFIED BACK PAIN LATERALITY: Primary | ICD-10-CM

## 2023-06-22 DIAGNOSIS — G89.29 CHRONIC LOW BACK PAIN WITHOUT SCIATICA, UNSPECIFIED BACK PAIN LATERALITY: Primary | ICD-10-CM

## 2023-06-22 PROCEDURE — G8427 DOCREV CUR MEDS BY ELIG CLIN: HCPCS

## 2023-06-22 PROCEDURE — 99212 OFFICE O/P EST SF 10 MIN: CPT

## 2023-06-22 PROCEDURE — G8417 CALC BMI ABV UP PARAM F/U: HCPCS

## 2023-06-22 PROCEDURE — 1036F TOBACCO NON-USER: CPT

## 2023-06-22 PROCEDURE — 99213 OFFICE O/P EST LOW 20 MIN: CPT

## 2023-06-22 ASSESSMENT — ENCOUNTER SYMPTOMS
NAUSEA: 0
BLOOD IN STOOL: 0
BACK PAIN: 1
VOMITING: 0
BOWEL INCONTINENCE: 0
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
## 2023-07-17 ENCOUNTER — OFFICE VISIT (OUTPATIENT)
Dept: FAMILY MEDICINE CLINIC | Age: 48
End: 2023-07-17
Payer: MEDICARE

## 2023-07-17 VITALS
BODY MASS INDEX: 38.25 KG/M2 | DIASTOLIC BLOOD PRESSURE: 88 MMHG | TEMPERATURE: 98.2 F | HEART RATE: 70 BPM | SYSTOLIC BLOOD PRESSURE: 136 MMHG | HEIGHT: 71 IN | RESPIRATION RATE: 16 BRPM | WEIGHT: 273.2 LBS | OXYGEN SATURATION: 95 %

## 2023-07-17 DIAGNOSIS — M54.2 CHRONIC MIDLINE POSTERIOR NECK PAIN: Primary | ICD-10-CM

## 2023-07-17 DIAGNOSIS — R09.81 NASAL CONGESTION: ICD-10-CM

## 2023-07-17 DIAGNOSIS — G89.29 CHRONIC MIDLINE LOW BACK PAIN WITHOUT SCIATICA: ICD-10-CM

## 2023-07-17 DIAGNOSIS — M54.50 CHRONIC MIDLINE LOW BACK PAIN WITHOUT SCIATICA: ICD-10-CM

## 2023-07-17 DIAGNOSIS — Z12.11 SCREENING FOR COLORECTAL CANCER: ICD-10-CM

## 2023-07-17 DIAGNOSIS — G89.29 CHRONIC MIDLINE POSTERIOR NECK PAIN: Primary | ICD-10-CM

## 2023-07-17 DIAGNOSIS — J44.9 CHRONIC OBSTRUCTIVE PULMONARY DISEASE, UNSPECIFIED COPD TYPE (HCC): ICD-10-CM

## 2023-07-17 DIAGNOSIS — Z12.12 SCREENING FOR COLORECTAL CANCER: ICD-10-CM

## 2023-07-17 DIAGNOSIS — R73.01 IMPAIRED FASTING GLUCOSE: ICD-10-CM

## 2023-07-17 PROCEDURE — G8427 DOCREV CUR MEDS BY ELIG CLIN: HCPCS | Performed by: FAMILY MEDICINE

## 2023-07-17 PROCEDURE — 1036F TOBACCO NON-USER: CPT | Performed by: FAMILY MEDICINE

## 2023-07-17 PROCEDURE — G8417 CALC BMI ABV UP PARAM F/U: HCPCS | Performed by: FAMILY MEDICINE

## 2023-07-17 PROCEDURE — 3023F SPIROM DOC REV: CPT | Performed by: FAMILY MEDICINE

## 2023-07-17 PROCEDURE — 99214 OFFICE O/P EST MOD 30 MIN: CPT | Performed by: FAMILY MEDICINE

## 2023-07-17 RX ORDER — GLUCOSAMINE HCL/CHONDROITIN SU 500-400 MG
CAPSULE ORAL
Qty: 100 STRIP | Refills: 3 | Status: SHIPPED | OUTPATIENT
Start: 2023-07-17

## 2023-07-17 RX ORDER — CELECOXIB 100 MG/1
100 CAPSULE ORAL 2 TIMES DAILY
Qty: 60 CAPSULE | Refills: 0 | Status: SHIPPED | OUTPATIENT
Start: 2023-07-17

## 2023-07-17 RX ORDER — FLUTICASONE PROPIONATE 50 MCG
1-2 SPRAY, SUSPENSION (ML) NASAL DAILY
Qty: 3 EACH | Refills: 3 | Status: SHIPPED | OUTPATIENT
Start: 2023-07-17
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
## 2023-07-18 ENCOUNTER — PATIENT MESSAGE (OUTPATIENT)
Dept: FAMILY MEDICINE CLINIC | Age: 48
End: 2023-07-18

## 2023-07-18 NOTE — TELEPHONE ENCOUNTER
From: Pradeep Andrea  To: Dr. Blackwood Olp: 7/18/2023 1:12 AM EDT  Subject: test meter    the meter I have is a accu-check guide hope u all have a great day  thank you  [facility]

## 2023-08-01 ENCOUNTER — OFFICE VISIT (OUTPATIENT)
Dept: PAIN MANAGEMENT | Age: 48
End: 2023-08-01
Payer: MEDICARE

## 2023-08-01 VITALS
BODY MASS INDEX: 30.8 KG/M2 | HEIGHT: 71 IN | DIASTOLIC BLOOD PRESSURE: 78 MMHG | WEIGHT: 220 LBS | OXYGEN SATURATION: 97 % | HEART RATE: 79 BPM | SYSTOLIC BLOOD PRESSURE: 120 MMHG

## 2023-08-01 DIAGNOSIS — M47.816 LUMBAR FACET JOINT SYNDROME: ICD-10-CM

## 2023-08-01 DIAGNOSIS — M47.812 CERVICAL FACET JOINT SYNDROME: Primary | ICD-10-CM

## 2023-08-01 PROCEDURE — 1036F TOBACCO NON-USER: CPT | Performed by: PHYSICAL MEDICINE & REHABILITATION

## 2023-08-01 PROCEDURE — 99204 OFFICE O/P NEW MOD 45 MIN: CPT | Performed by: PHYSICAL MEDICINE & REHABILITATION

## 2023-08-01 PROCEDURE — G8417 CALC BMI ABV UP PARAM F/U: HCPCS | Performed by: PHYSICAL MEDICINE & REHABILITATION

## 2023-08-01 PROCEDURE — G8427 DOCREV CUR MEDS BY ELIG CLIN: HCPCS | Performed by: PHYSICAL MEDICINE & REHABILITATION

## 2023-08-01 ASSESSMENT — ENCOUNTER SYMPTOMS
NAUSEA: 0
CONSTIPATION: 0
ALLERGIC/IMMUNOLOGIC NEGATIVE: 1
BACK PAIN: 1
RESPIRATORY NEGATIVE: 1
EYES NEGATIVE: 1

## 2023-08-01 NOTE — PROGRESS NOTES
spent face to face with patient was 25 minutes inwhich  50% or more of the time was spent in counseling, education about risk andbenefits of the above plan, and coordination of care.

## 2023-08-02 ENCOUNTER — HOSPITAL ENCOUNTER (OUTPATIENT)
Dept: MRI IMAGING | Age: 48
Discharge: HOME | End: 2023-08-04
Attending: FAMILY MEDICINE
Payer: MEDICARE

## 2023-08-02 DIAGNOSIS — G89.29 CHRONIC MIDLINE LOW BACK PAIN WITHOUT SCIATICA: ICD-10-CM

## 2023-08-02 DIAGNOSIS — M54.50 CHRONIC MIDLINE LOW BACK PAIN WITHOUT SCIATICA: ICD-10-CM

## 2023-08-02 DIAGNOSIS — M54.2 CHRONIC MIDLINE POSTERIOR NECK PAIN: ICD-10-CM

## 2023-08-02 DIAGNOSIS — G89.29 CHRONIC MIDLINE POSTERIOR NECK PAIN: ICD-10-CM

## 2023-08-02 PROCEDURE — 72148 MRI LUMBAR SPINE W/O DYE: CPT

## 2023-08-02 PROCEDURE — 72141 MRI NECK SPINE W/O DYE: CPT

## 2023-08-10 ENCOUNTER — OFFICE VISIT (OUTPATIENT)
Dept: PAIN MANAGEMENT | Age: 48
End: 2023-08-10
Payer: MEDICARE

## 2023-08-10 VITALS
WEIGHT: 270 LBS | DIASTOLIC BLOOD PRESSURE: 83 MMHG | BODY MASS INDEX: 37.8 KG/M2 | SYSTOLIC BLOOD PRESSURE: 139 MMHG | HEIGHT: 71 IN

## 2023-08-10 DIAGNOSIS — M47.812 CERVICAL SPONDYLOSIS: ICD-10-CM

## 2023-08-10 DIAGNOSIS — F39 MOOD DISORDER (HCC): ICD-10-CM

## 2023-08-10 DIAGNOSIS — M47.812 CERVICAL FACET JOINT SYNDROME: Primary | ICD-10-CM

## 2023-08-10 DIAGNOSIS — M47.816 LUMBAR FACET JOINT SYNDROME: ICD-10-CM

## 2023-08-10 PROCEDURE — 99214 OFFICE O/P EST MOD 30 MIN: CPT | Performed by: PHYSICAL MEDICINE & REHABILITATION

## 2023-08-10 PROCEDURE — 1036F TOBACCO NON-USER: CPT | Performed by: PHYSICAL MEDICINE & REHABILITATION

## 2023-08-10 PROCEDURE — G8417 CALC BMI ABV UP PARAM F/U: HCPCS | Performed by: PHYSICAL MEDICINE & REHABILITATION

## 2023-08-10 PROCEDURE — G8427 DOCREV CUR MEDS BY ELIG CLIN: HCPCS | Performed by: PHYSICAL MEDICINE & REHABILITATION

## 2023-08-10 ASSESSMENT — ENCOUNTER SYMPTOMS
CONSTIPATION: 0
EYES NEGATIVE: 1
BACK PAIN: 1
NAUSEA: 0
RESPIRATORY NEGATIVE: 1
ALLERGIC/IMMUNOLOGIC NEGATIVE: 1

## 2023-08-10 NOTE — PROGRESS NOTES
2. Lumbar facet joint syndrome        3. Cervical spondylosis        4. Mood disorder (HCC)            Medical Comorbidities:  As listed in the patient's past medical and surgical history    Functional Limitations:   Pain limits function and quality of life. Plan:   Recommend  Right  [id number]- Diagnostic  Medial Branch to start   Patient wants to hold off     Meds:   Controlled Substances Monitoring: Pt educated about the risks of taking opiates,including increased sedation, constipation, slowed breathing, tolerance, dependence,and addiction. New Prescriptions    No medications on file      No orders of the defined types were placed in this encounter. No orders of the defined types were placed in this encounter. No follow-ups on file. Opioid medication has  significant  risk  benefit concerns. We  instruct    our patients that  a Random Urine Drug Screen is required  along with a  an Opioid assessment questionaire such as ORT  or SOAPP  The patient expressed understanding of the above assessment and plan. Totaltime spent face to face with patient was 25 minutes inwhich  50% or more of the time was spent in counseling, education about risk andbenefits of the above plan, and coordination of care.

## 2023-08-14 ENCOUNTER — PATIENT MESSAGE (OUTPATIENT)
Dept: FAMILY MEDICINE CLINIC | Age: 48
End: 2023-08-14

## 2023-08-16 ENCOUNTER — PATIENT MESSAGE (OUTPATIENT)
Dept: FAMILY MEDICINE CLINIC | Age: 48
End: 2023-08-16

## 2023-08-16 DIAGNOSIS — R73.01 IMPAIRED FASTING GLUCOSE: ICD-10-CM

## 2023-08-16 NOTE — TELEPHONE ENCOUNTER
From: Kaitlin Irene  To: Dr. Alison Rehman: 2023 11:17 PM EDT  Subject: celecoxib    I just wanted to let you know that I have stopped taking the celecoxib as it wasn't working and I could only take it twice a day even though ibuprofen really don't touch the pain either at least I can take it every 8 hours I went to [facility] and they wouldn't even consider giving me something for the pain other then injections and you know that is not an option with me could you prescript something stronger then my ibuprofen 800 that would help with the pain please  Thank you    Caleb Cohen

## 2023-08-18 NOTE — TELEPHONE ENCOUNTER
Lul Arredondo called requesting a refill of the below medication which has been pended for you:     Requested Prescriptions     Pending Prescriptions Disp Refills    Lancets MISC 100 each 1     Sig: Use daily and as needed to check blood glucose. Please dispense per patients insurance.        Last Appointment Date: 7/17/2023  Next Appointment Date: 11/17/2023    Allergies   Allergen Reactions    Keflex [Cephalexin] Other (See Comments)     Migraines, upset stomach     Naproxen      ulcers

## 2023-08-18 NOTE — TELEPHONE ENCOUNTER
From: Remedios Agustin  To: Dr. Hogan Presume: 8/16/2023 7:44 PM EDT  Subject: accu-chek guide    can you send in a script for more lacets please

## 2023-08-19 RX ORDER — LANCETS 30 GAUGE
EACH MISCELLANEOUS
Qty: 100 EACH | Refills: 1 | Status: SHIPPED | OUTPATIENT
Start: 2023-08-19

## 2023-08-24 ENCOUNTER — OFFICE VISIT (OUTPATIENT)
Dept: PRIMARY CARE CLINIC | Age: 48
End: 2023-08-24
Payer: MEDICARE

## 2023-08-24 VITALS
DIASTOLIC BLOOD PRESSURE: 74 MMHG | TEMPERATURE: 98.3 F | OXYGEN SATURATION: 98 % | HEART RATE: 74 BPM | BODY MASS INDEX: 37.94 KG/M2 | SYSTOLIC BLOOD PRESSURE: 126 MMHG | WEIGHT: 272 LBS

## 2023-08-24 DIAGNOSIS — M54.2 CHRONIC CERVICAL PAIN: Primary | ICD-10-CM

## 2023-08-24 DIAGNOSIS — G89.29 CHRONIC CERVICAL PAIN: Primary | ICD-10-CM

## 2023-08-24 PROCEDURE — 99213 OFFICE O/P EST LOW 20 MIN: CPT | Performed by: NURSE PRACTITIONER

## 2023-08-24 PROCEDURE — G8417 CALC BMI ABV UP PARAM F/U: HCPCS | Performed by: NURSE PRACTITIONER

## 2023-08-24 PROCEDURE — 1036F TOBACCO NON-USER: CPT | Performed by: NURSE PRACTITIONER

## 2023-08-24 PROCEDURE — G8427 DOCREV CUR MEDS BY ELIG CLIN: HCPCS | Performed by: NURSE PRACTITIONER

## 2023-08-24 PROCEDURE — 99212 OFFICE O/P EST SF 10 MIN: CPT | Performed by: NURSE PRACTITIONER

## 2023-08-24 RX ORDER — PREDNISONE 20 MG/1
20 TABLET ORAL 2 TIMES DAILY
Qty: 10 TABLET | Refills: 0 | Status: SHIPPED | OUTPATIENT
Start: 2023-08-24 | End: 2023-08-29

## 2023-08-24 ASSESSMENT — ENCOUNTER SYMPTOMS
TROUBLE SWALLOWING: 0
VISUAL CHANGE: 0
PHOTOPHOBIA: 0

## 2023-08-24 ASSESSMENT — PATIENT HEALTH QUESTIONNAIRE - PHQ9
1. LITTLE INTEREST OR PLEASURE IN DOING THINGS: 0
SUM OF ALL RESPONSES TO PHQ QUESTIONS 1-9: 0
2. FEELING DOWN, DEPRESSED OR HOPELESS: 0
SUM OF ALL RESPONSES TO PHQ QUESTIONS 1-9: 0
SUM OF ALL RESPONSES TO PHQ9 QUESTIONS 1 & 2: 0

## 2023-08-24 NOTE — PROGRESS NOTES
omega-3 acid ethyl esters (LOVAZA) 1 g capsule Take 2 capsules by mouth 2 times daily Take with meals. 360 capsule 3    fluticasone-salmeterol (ADVAIR) 100-50 MCG/ACT AEPB diskus inhaler Inhale 1 puff into the lungs in the morning and 1 puff in the evening. 60 each 9    Misc. Devices [facility]) MISC Use daily as needed with ambulation. 1 each 0    melatonin 5 MG TABS tablet Take 1 tablet by mouth nightly as needed (sleep) 90 tablet 3    tiotropium (SPIRIVA HANDIHALER) 18 MCG inhalation capsule Inhale 1 puff by mouth once daily 90 capsule 3    ibuprofen (ADVIL;MOTRIN) 800 MG tablet Take 1 tablet by mouth every 8 hours as needed for Pain 90 tablet 3    albuterol (PROVENTIL) (2.5 MG/3ML) 0.083% nebulizer solution Take 3 mLs by nebulization every 6 hours as needed for Wheezing or Shortness of Breath 120 vial 5    vitamin B-12 (CYANOCOBALAMIN) 1000 MCG tablet Take 1 tablet by mouth daily 30 tablet 11    folic acid (FOLVITE) 782 MCG tablet Take 1 tablet by mouth daily 30 tablet 11    aspirin (ASPIRIN 81) 81 MG EC tablet Take 1 tablet by mouth daily 30 tablet 11    Menthol, Topical Analgesic, (BIOFREEZE) 4 % GEL Apply to shoulders & neck to relieve pain. 237 mL 5    celecoxib (CELEBREX) 100 MG capsule Take 1 capsule by mouth 2 times daily (Patient not taking: Reported on 8/24/2023) 60 capsule 0    rizatriptan (MAXALT) 10 MG tablet Take 1 tablet by mouth once as needed for Migraine May repeat in 2 hours if needed 30 tablet 11     Current Facility-Administered Medications   Medication Dose Route Frequency Provider Last Rate Last Admin    phenylephrine (MYDFRIN) 2.5 % ophthalmic solution 1 drop  1 drop Both Eyes Once Yvette Chaney MD        tropicamide (MYDRIACYL) 1 % ophthalmic solution 1 drop  1 drop Both Eyes Once Yvette Chaney MD            He is allergic to keflex [cephalexin] and naproxen. Sofía Diogo He  reports that he quit smoking about 6 years ago. His smoking use included cigarettes. He started smoking about 30 years ago.  He has

## 2023-09-01 ENCOUNTER — PATIENT MESSAGE (OUTPATIENT)
Dept: FAMILY MEDICINE CLINIC | Age: 48
End: 2023-09-01

## 2023-09-05 NOTE — TELEPHONE ENCOUNTER
From: Anselmo Howard  To: Dr. Hever Frederick: 9/1/2023 10:47 AM EDT  Subject: neck pain    I finished my steriods was feeling okay but that didn't last very long as once again I have major neck pain only able to sleep an hour at a time cause my neck wakes me up i need a constate pain medicine please i am so tired of being in pain all the time thank you     Todd Lopez

## 2023-09-21 DIAGNOSIS — M54.2 CHRONIC NECK PAIN: ICD-10-CM

## 2023-09-21 DIAGNOSIS — M54.2 CHRONIC MIDLINE POSTERIOR NECK PAIN: ICD-10-CM

## 2023-09-21 DIAGNOSIS — R10.13 DYSPEPSIA: ICD-10-CM

## 2023-09-21 DIAGNOSIS — E03.9 HYPOTHYROIDISM, UNSPECIFIED TYPE: ICD-10-CM

## 2023-09-21 DIAGNOSIS — M54.50 CHRONIC MIDLINE LOW BACK PAIN WITHOUT SCIATICA: ICD-10-CM

## 2023-09-21 DIAGNOSIS — G89.29 CHRONIC MIDLINE LOW BACK PAIN WITHOUT SCIATICA: ICD-10-CM

## 2023-09-21 DIAGNOSIS — G89.29 CHRONIC MIDLINE POSTERIOR NECK PAIN: ICD-10-CM

## 2023-09-21 DIAGNOSIS — G89.29 CHRONIC NECK PAIN: ICD-10-CM

## 2023-09-21 DIAGNOSIS — F31.32 BIPOLAR AFFECTIVE DISORDER, CURRENTLY DEPRESSED, MODERATE (HCC): ICD-10-CM

## 2023-09-21 DIAGNOSIS — E78.2 HYPERCHOLESTEROLEMIA WITH HYPERTRIGLYCERIDEMIA: ICD-10-CM

## 2023-09-21 DIAGNOSIS — G47.00 INSOMNIA, UNSPECIFIED TYPE: ICD-10-CM

## 2023-09-21 DIAGNOSIS — R52 GENERALIZED PAIN: ICD-10-CM

## 2023-09-21 DIAGNOSIS — G43.009 MIGRAINE WITHOUT AURA AND WITHOUT STATUS MIGRAINOSUS, NOT INTRACTABLE: ICD-10-CM

## 2023-09-22 RX ORDER — DIVALPROEX SODIUM 500 MG/1
500 TABLET, EXTENDED RELEASE ORAL 2 TIMES DAILY
Qty: 180 TABLET | Refills: 3 | OUTPATIENT
Start: 2023-09-22

## 2023-09-22 RX ORDER — OMEGA-3-ACID ETHYL ESTERS 1 G/1
2 CAPSULE, LIQUID FILLED ORAL 2 TIMES DAILY
Qty: 360 CAPSULE | Refills: 3 | OUTPATIENT
Start: 2023-09-22

## 2023-09-22 RX ORDER — CELECOXIB 100 MG/1
100 CAPSULE ORAL 2 TIMES DAILY
Qty: 60 CAPSULE | Refills: 0 | OUTPATIENT
Start: 2023-09-22

## 2023-09-22 RX ORDER — RISPERIDONE 4 MG/1
4 TABLET ORAL NIGHTLY
Qty: 90 TABLET | Refills: 1 | OUTPATIENT
Start: 2023-09-22

## 2023-09-22 RX ORDER — LEVOTHYROXINE SODIUM 0.05 MG/1
TABLET ORAL
Qty: 90 TABLET | Refills: 1 | OUTPATIENT
Start: 2023-09-22

## 2023-09-22 RX ORDER — BACLOFEN 10 MG/1
10 TABLET ORAL 3 TIMES DAILY PRN
Qty: 180 TABLET | Refills: 1 | OUTPATIENT
Start: 2023-09-22

## 2023-09-22 RX ORDER — ATORVASTATIN CALCIUM 10 MG/1
10 TABLET, FILM COATED ORAL DAILY
Qty: 90 TABLET | Refills: 3 | OUTPATIENT
Start: 2023-09-22

## 2023-09-22 RX ORDER — FAMOTIDINE 20 MG/1
20 TABLET, FILM COATED ORAL 2 TIMES DAILY
Qty: 180 TABLET | Refills: 3 | OUTPATIENT
Start: 2023-09-22

## 2023-09-22 NOTE — TELEPHONE ENCOUNTER
Too soon to send script in for all other medications. Balbina Schneider called requesting a refill of the below medication which has been pended for you:     Requested Prescriptions     Pending Prescriptions Disp Refills    ibuprofen (ADVIL;MOTRIN) 800 MG tablet 90 tablet 3     Sig: Take 1 tablet by mouth every 8 hours as needed for Pain     Refused Prescriptions Disp Refills    atorvastatin (LIPITOR) 10 MG tablet 90 tablet 3     Sig: Take 1 tablet by mouth daily    omega-3 acid ethyl esters (LOVAZA) 1 g capsule 360 capsule 3     Sig: Take 2 capsules by mouth 2 times daily Take with meals. famotidine (PEPCID) 20 MG tablet 180 tablet 3     Sig: Take 1 tablet by mouth 2 times daily    divalproex (DEPAKOTE ER) 500 MG extended release tablet 180 tablet 3     Sig: Take 1 tablet by mouth in the morning and at bedtime    risperiDONE (RISPERDAL) 4 MG tablet 90 tablet 1     Sig: Take 1 tablet by mouth at bedtime    baclofen (LIORESAL) 10 MG tablet 180 tablet 1     Sig: Take 1 tablet by mouth 3 times daily as needed (muscle spasm or pain)    levothyroxine (SYNTHROID) 50 MCG tablet 90 tablet 1     Sig: Take 1 tab by mouth first thing in the morning, on an empty stomach, 30 mins prior to other meds/food.     celecoxib (CELEBREX) 100 MG capsule 60 capsule 0     Sig: Take 1 capsule by mouth 2 times daily       Last Appointment Date: 7/17/2023  Next Appointment Date: 11/17/2023    Allergies   Allergen Reactions    Keflex [Cephalexin] Other (See Comments)     Migraines, upset stomach     Naproxen      ulcers

## 2023-09-23 RX ORDER — IBUPROFEN 800 MG/1
800 TABLET ORAL DAILY PRN
Qty: 90 TABLET | Refills: 3 | Status: SHIPPED | OUTPATIENT
Start: 2023-09-23

## 2023-10-09 ENCOUNTER — HOSPITAL ENCOUNTER (EMERGENCY)
Age: 48
Discharge: HOME | End: 2023-10-09
Attending: SPECIALIST
Payer: MEDICARE

## 2023-10-09 VITALS
HEIGHT: 71 IN | OXYGEN SATURATION: 97 % | DIASTOLIC BLOOD PRESSURE: 99 MMHG | WEIGHT: 280 LBS | HEART RATE: 89 BPM | TEMPERATURE: 97.9 F | SYSTOLIC BLOOD PRESSURE: 183 MMHG | RESPIRATION RATE: 18 BRPM | BODY MASS INDEX: 39.2 KG/M2

## 2023-10-09 DIAGNOSIS — M54.50 ACUTE EXACERBATION OF CHRONIC LOW BACK PAIN: Primary | ICD-10-CM

## 2023-10-09 DIAGNOSIS — G89.29 ACUTE EXACERBATION OF CHRONIC LOW BACK PAIN: Primary | ICD-10-CM

## 2023-10-09 PROCEDURE — 6370000000 HC RX 637 (ALT 250 FOR IP): Performed by: SPECIALIST

## 2023-10-09 PROCEDURE — 99283 EMERGENCY DEPT VISIT LOW MDM: CPT

## 2023-10-09 RX ORDER — IBUPROFEN 800 MG/1
800 TABLET ORAL ONCE
Status: COMPLETED | OUTPATIENT
Start: 2023-10-09 | End: 2023-10-09

## 2023-10-09 RX ADMIN — IBUPROFEN 800 MG: 800 TABLET, FILM COATED ORAL at 01:44

## 2023-10-09 ASSESSMENT — PAIN - FUNCTIONAL ASSESSMENT: PAIN_FUNCTIONAL_ASSESSMENT: 0-10

## 2023-10-09 ASSESSMENT — PAIN SCALES - GENERAL
PAINLEVEL_OUTOF10: 8
PAINLEVEL_OUTOF10: 8

## 2023-10-09 ASSESSMENT — ENCOUNTER SYMPTOMS
COUGH: 0
BACK PAIN: 1
NAUSEA: 0
ABDOMINAL PAIN: 0
SHORTNESS OF BREATH: 0
SORE THROAT: 0

## 2023-10-09 ASSESSMENT — PAIN DESCRIPTION - LOCATION
LOCATION: BACK
LOCATION: BACK

## 2023-10-09 ASSESSMENT — PAIN DESCRIPTION - ORIENTATION: ORIENTATION: LOWER

## 2023-10-09 NOTE — DISCHARGE INSTRUCTIONS
PLEASE RETURN TO THE EMERGENCY DEPARTMENT IMMEDIATELY if your symptoms worsen in anyway or in 1-2 days if not improved for re-evaluation. You should immediately return to the ER for symptoms such as worsening pain, abdominal pain, bloody stools, numbness or weakness to the arms or legs, difficulty with urination or defecation, difficulty with ambulation, fever, coolness or color change to the extremity, chest pain, shortness of breath, a feeling that you are going to pass out, light headed, dizziness. Take your medication as indicated and prescribed. If you are given an antibiotic then, make sure you get the prescription filled and take the antibiotics until finished. Please understand that at this time there is no evidence for a more serious underlying process, but that early in the process of an illness or injury, an emergency department workup can be falsely reassuring. You should contact your family doctor within the next 48 hours for a follow up appointment    [facility]!!!    From [address] [place] [address] and [facility] Emergency Services    On behalf of the Emergency Department staff at [address] [place] [address], I would like to thank you for giving us the opportunity to address your health care needs and concerns. We hope that during your visit, our service was delivered in a professional and caring manner. Please keep [address] [place] [address] in mind as we walk with you down the path to your own personal wellness. Please expect an automated text message or email from us so we can ask a few questions about your health and progress. Based on your answers, a clinician may call you back to offer help and instructions. Please understand that early in the process of an illness or injury, an emergency department workup can be falsely reassuring. If you notice any worsening, changing or persistent symptoms please call your family doctor or return to the ER immediately.      Tell us how we did during your visit at

## 2023-10-09 NOTE — ED PROVIDER NOTES
[place]      Pt Name: Herman Agee  MRN: [id number]  [address] [facility] [place] 1975  Date of evaluation: 10/9/2023      CHIEF COMPLAINT       Chief Complaint   Patient presents with    Back Pain     Pt c/o chronic lower back pain, denies radiation of pain, denies incontinence of urine/stool         HISTORY OF PRESENT ILLNESS    Herman Agee is a 46 y.o. male who presents to the emergency department for evaluation of chronic low back pain which has been worsening for last 2 to 3 months. Patient takes ibuprofen 800 mg 3 times daily and last dose was 8 hours prior to arrival.  Patient states he ran out of his ibuprofen and is requesting a dose of ibuprofen in the emergency department and then prescription. He denies any fall or injuries and denies any radiation of back pain to the abdomen or to the lower extremities. He also denies any tingling, numbness or weakness in the lower extremities. He denies any bladder or bowel incontinence. No history of fever or chills. He also denies any urinary frequency, urgency, dysuria or hematuria and denies any lightheadedness or dizziness. Pain is worse with the movements and better with rest.  He grades pain as 8 out of 10 in intensity. REVIEW OF SYSTEMS       Review of Systems   Constitutional:  Negative for chills, diaphoresis and fever. HENT:  Negative for congestion and sore throat. Respiratory:  Negative for cough and shortness of breath. Cardiovascular:  Negative for chest pain and palpitations. Gastrointestinal:  Negative for abdominal pain and nausea. Genitourinary:  Negative for decreased urine volume, difficulty urinating, dysuria and hematuria. Musculoskeletal:  Positive for back pain. Negative for gait problem and neck pain. Skin:  Negative for rash. Neurological:  Negative for dizziness and light-headedness. All other systems reviewed and are negative.        PAST MEDICAL HISTORY

## 2023-10-17 ENCOUNTER — HOSPITAL ENCOUNTER (OUTPATIENT)
Age: 48
Discharge: HOME | End: 2023-10-17
Payer: MEDICARE

## 2023-10-17 DIAGNOSIS — E55.9 VITAMIN D DEFICIENCY: ICD-10-CM

## 2023-10-17 DIAGNOSIS — E78.2 HYPERCHOLESTEROLEMIA WITH HYPERTRIGLYCERIDEMIA: ICD-10-CM

## 2023-10-17 DIAGNOSIS — Z12.5 PROSTATE CANCER SCREENING: ICD-10-CM

## 2023-10-17 DIAGNOSIS — Z12.5 PROSTATE CANCER SCREENING: Primary | ICD-10-CM

## 2023-10-17 DIAGNOSIS — E03.9 HYPOTHYROIDISM, UNSPECIFIED TYPE: ICD-10-CM

## 2023-10-17 LAB
25(OH)D3 SERPL-MCNC: 27.8 NG/ML
ALBUMIN SERPL-MCNC: 5 G/DL (ref 3.5–5.2)
ALBUMIN/GLOB SERPL: 2 {RATIO} (ref 1–2.5)
ALP SERPL-CCNC: 79 U/L (ref 40–129)
ALT SERPL-CCNC: 36 U/L (ref 5–41)
ANION GAP SERPL CALCULATED.3IONS-SCNC: 9 MMOL/L (ref 9–17)
AST SERPL-CCNC: 27 U/L
BILIRUB SERPL-MCNC: 0.4 MG/DL (ref 0.3–1.2)
BUN SERPL-MCNC: 14 MG/DL (ref 6–20)
BUN/CREAT SERPL: 18 (ref 9–20)
CALCIUM SERPL-MCNC: 9.8 MG/DL (ref 8.6–10.4)
CHLORIDE SERPL-SCNC: 105 MMOL/L (ref 98–107)
CHOLEST SERPL-MCNC: 160 MG/DL
CHOLESTEROL/HDL RATIO: 5
CO2 SERPL-SCNC: 29 MMOL/L (ref 20–31)
CREAT SERPL-MCNC: 0.8 MG/DL (ref 0.7–1.2)
GFR SERPL CREATININE-BSD FRML MDRD: >60 ML/MIN/1.73M2
GLUCOSE SERPL-MCNC: 101 MG/DL (ref 70–99)
HDLC SERPL-MCNC: 32 MG/DL
LDLC SERPL CALC-MCNC: 77 MG/DL (ref 0–130)
POTASSIUM SERPL-SCNC: 4.1 MMOL/L (ref 3.7–5.3)
PROT SERPL-MCNC: 7.5 G/DL (ref 6.4–8.3)
PSA SERPL-MCNC: 0.53 NG/ML
SODIUM SERPL-SCNC: 143 MMOL/L (ref 135–144)
TRIGL SERPL-MCNC: 257 MG/DL
TSH SERPL DL<=0.05 MIU/L-ACNC: 1.28 UIU/ML (ref 0.3–5)

## 2023-10-17 PROCEDURE — 80061 LIPID PANEL: CPT

## 2023-10-17 PROCEDURE — 82306 VITAMIN D 25 HYDROXY: CPT

## 2023-10-17 PROCEDURE — 36415 COLL VENOUS BLD VENIPUNCTURE: CPT

## 2023-10-17 PROCEDURE — 80053 COMPREHEN METABOLIC PANEL: CPT

## 2023-10-17 PROCEDURE — 84443 ASSAY THYROID STIM HORMONE: CPT

## 2023-10-17 PROCEDURE — G0103 PSA SCREENING: HCPCS

## 2023-10-17 NOTE — TELEPHONE ENCOUNTER
Informed pt he would need to be seen to discuss medication changes and new orders - scheduled for 10/20.

## 2023-10-20 ENCOUNTER — OFFICE VISIT (OUTPATIENT)
Dept: FAMILY MEDICINE CLINIC | Age: 48
End: 2023-10-20
Payer: MEDICARE

## 2023-10-20 VITALS
DIASTOLIC BLOOD PRESSURE: 82 MMHG | OXYGEN SATURATION: 96 % | RESPIRATION RATE: 16 BRPM | BODY MASS INDEX: 38.4 KG/M2 | TEMPERATURE: 97.2 F | SYSTOLIC BLOOD PRESSURE: 138 MMHG | WEIGHT: 274.3 LBS | HEIGHT: 71 IN | HEART RATE: 67 BPM

## 2023-10-20 DIAGNOSIS — G89.29 CHRONIC MIDLINE LOW BACK PAIN WITHOUT SCIATICA: ICD-10-CM

## 2023-10-20 DIAGNOSIS — M54.50 CHRONIC MIDLINE LOW BACK PAIN WITHOUT SCIATICA: ICD-10-CM

## 2023-10-20 DIAGNOSIS — E78.2 HYPERCHOLESTEROLEMIA WITH HYPERTRIGLYCERIDEMIA: ICD-10-CM

## 2023-10-20 DIAGNOSIS — G43.009 MIGRAINE WITHOUT AURA AND WITHOUT STATUS MIGRAINOSUS, NOT INTRACTABLE: ICD-10-CM

## 2023-10-20 DIAGNOSIS — M54.2 CHRONIC MIDLINE POSTERIOR NECK PAIN: ICD-10-CM

## 2023-10-20 DIAGNOSIS — F32.0 CURRENT MILD EPISODE OF MAJOR DEPRESSIVE DISORDER, UNSPECIFIED WHETHER RECURRENT (HCC): ICD-10-CM

## 2023-10-20 DIAGNOSIS — R10.13 DYSPEPSIA: ICD-10-CM

## 2023-10-20 DIAGNOSIS — Z12.5 SCREENING FOR PROSTATE CANCER: ICD-10-CM

## 2023-10-20 DIAGNOSIS — M48.02 NEUROFORAMINAL STENOSIS OF CERVICAL SPINE: Primary | ICD-10-CM

## 2023-10-20 DIAGNOSIS — F41.9 ANXIETY: ICD-10-CM

## 2023-10-20 DIAGNOSIS — E03.9 HYPOTHYROIDISM, UNSPECIFIED TYPE: ICD-10-CM

## 2023-10-20 DIAGNOSIS — J44.9 CHRONIC OBSTRUCTIVE PULMONARY DISEASE, UNSPECIFIED COPD TYPE (HCC): ICD-10-CM

## 2023-10-20 DIAGNOSIS — M48.061 NEUROFORAMINAL STENOSIS OF LUMBAR SPINE: ICD-10-CM

## 2023-10-20 DIAGNOSIS — F31.32 BIPOLAR AFFECTIVE DISORDER, CURRENTLY DEPRESSED, MODERATE (HCC): ICD-10-CM

## 2023-10-20 DIAGNOSIS — K59.03 DRUG-INDUCED CONSTIPATION: ICD-10-CM

## 2023-10-20 DIAGNOSIS — G47.00 INSOMNIA, UNSPECIFIED TYPE: ICD-10-CM

## 2023-10-20 DIAGNOSIS — G89.29 CHRONIC MIDLINE POSTERIOR NECK PAIN: ICD-10-CM

## 2023-10-20 DIAGNOSIS — E55.9 VITAMIN D DEFICIENCY: ICD-10-CM

## 2023-10-20 PROCEDURE — G8427 DOCREV CUR MEDS BY ELIG CLIN: HCPCS | Performed by: FAMILY MEDICINE

## 2023-10-20 PROCEDURE — G8484 FLU IMMUNIZE NO ADMIN: HCPCS | Performed by: FAMILY MEDICINE

## 2023-10-20 PROCEDURE — 1036F TOBACCO NON-USER: CPT | Performed by: FAMILY MEDICINE

## 2023-10-20 PROCEDURE — 3017F COLORECTAL CA SCREEN DOC REV: CPT | Performed by: FAMILY MEDICINE

## 2023-10-20 PROCEDURE — G8417 CALC BMI ABV UP PARAM F/U: HCPCS | Performed by: FAMILY MEDICINE

## 2023-10-20 PROCEDURE — 99214 OFFICE O/P EST MOD 30 MIN: CPT | Performed by: FAMILY MEDICINE

## 2023-10-20 PROCEDURE — 3023F SPIROM DOC REV: CPT | Performed by: FAMILY MEDICINE

## 2023-10-20 PROCEDURE — 99214 OFFICE O/P EST MOD 30 MIN: CPT

## 2023-10-20 RX ORDER — FLUTICASONE PROPIONATE 50 MCG
1-2 SPRAY, SUSPENSION (ML) NASAL DAILY
Qty: 3 EACH | Refills: 3 | Status: CANCELLED | OUTPATIENT
Start: 2023-10-20

## 2023-10-20 RX ORDER — RIZATRIPTAN BENZOATE 10 MG/1
10 TABLET ORAL
Qty: 30 TABLET | Refills: 11 | Status: SHIPPED | OUTPATIENT
Start: 2023-10-20 | End: 2023-10-20

## 2023-10-20 RX ORDER — BACLOFEN 10 MG/1
10 TABLET ORAL 3 TIMES DAILY PRN
Qty: 270 TABLET | Refills: 3 | Status: SHIPPED | OUTPATIENT
Start: 2023-10-20

## 2023-10-20 RX ORDER — CELECOXIB 100 MG/1
100 CAPSULE ORAL 2 TIMES DAILY
Qty: 60 CAPSULE | Refills: 0 | Status: CANCELLED | OUTPATIENT
Start: 2023-10-20

## 2023-10-20 RX ORDER — FAMOTIDINE 20 MG/1
20 TABLET, FILM COATED ORAL 2 TIMES DAILY
Qty: 180 TABLET | Refills: 3 | Status: SHIPPED | OUTPATIENT
Start: 2023-10-20

## 2023-10-20 RX ORDER — PREGABALIN 50 MG/1
50 CAPSULE ORAL 2 TIMES DAILY
Qty: 60 CAPSULE | Refills: 0 | Status: SHIPPED | OUTPATIENT
Start: 2023-10-20 | End: 2023-11-19

## 2023-10-20 RX ORDER — RISPERIDONE 2 MG/1
TABLET ORAL
Qty: 9 TABLET | Refills: 0 | Status: SHIPPED | OUTPATIENT
Start: 2023-10-20

## 2023-10-20 RX ORDER — OMEGA-3-ACID ETHYL ESTERS 1 G/1
2 CAPSULE, LIQUID FILLED ORAL 2 TIMES DAILY
Qty: 360 CAPSULE | Refills: 3 | Status: SHIPPED | OUTPATIENT
Start: 2023-10-20

## 2023-10-20 RX ORDER — VENLAFAXINE HYDROCHLORIDE 75 MG/1
75 CAPSULE, EXTENDED RELEASE ORAL DAILY
Qty: 90 CAPSULE | Refills: 3 | Status: SHIPPED | OUTPATIENT
Start: 2023-10-20

## 2023-10-20 RX ORDER — CHOLECALCIFEROL (VITAMIN D3) 125 MCG
5 CAPSULE ORAL NIGHTLY PRN
Qty: 90 TABLET | Refills: 3 | Status: CANCELLED | OUTPATIENT
Start: 2023-10-20

## 2023-10-20 RX ORDER — TIOTROPIUM BROMIDE 18 UG/1
CAPSULE ORAL; RESPIRATORY (INHALATION)
Qty: 90 CAPSULE | Refills: 3 | Status: SHIPPED | OUTPATIENT
Start: 2023-10-20

## 2023-10-20 RX ORDER — LEVOTHYROXINE SODIUM 0.05 MG/1
TABLET ORAL
Qty: 90 TABLET | Refills: 3 | Status: SHIPPED | OUTPATIENT
Start: 2023-10-20

## 2023-10-20 RX ORDER — BUSPIRONE HYDROCHLORIDE 15 MG/1
15 TABLET ORAL 2 TIMES DAILY PRN
Qty: 180 TABLET | Refills: 3 | Status: SHIPPED | OUTPATIENT
Start: 2023-10-20

## 2023-10-20 RX ORDER — ATORVASTATIN CALCIUM 10 MG/1
10 TABLET, FILM COATED ORAL DAILY
Qty: 90 TABLET | Refills: 3 | Status: SHIPPED | OUTPATIENT
Start: 2023-10-20

## 2023-10-20 RX ORDER — FLUTICASONE PROPIONATE AND SALMETEROL 100; 50 UG/1; UG/1
1 POWDER RESPIRATORY (INHALATION) EVERY 12 HOURS
Qty: 60 EACH | Refills: 11 | Status: SHIPPED | OUTPATIENT
Start: 2023-10-20

## 2023-10-20 RX ORDER — DIVALPROEX SODIUM 500 MG/1
500 TABLET, EXTENDED RELEASE ORAL 2 TIMES DAILY
Qty: 180 TABLET | Refills: 3 | Status: SHIPPED | OUTPATIENT
Start: 2023-10-20

## 2023-10-20 RX ORDER — RISPERIDONE 4 MG/1
4 TABLET ORAL NIGHTLY
Qty: 90 TABLET | Refills: 3 | Status: SHIPPED | OUTPATIENT
Start: 2023-10-20 | End: 2023-10-20 | Stop reason: ALTCHOICE

## 2023-10-20 RX ORDER — VENLAFAXINE HYDROCHLORIDE 150 MG/1
150 CAPSULE, EXTENDED RELEASE ORAL DAILY
Qty: 90 CAPSULE | Refills: 3 | Status: SHIPPED | OUTPATIENT
Start: 2023-10-20

## 2023-10-20 RX ORDER — QUETIAPINE FUMARATE 25 MG/1
TABLET, FILM COATED ORAL
Qty: 45 TABLET | Refills: 0 | Status: SHIPPED | OUTPATIENT
Start: 2023-10-20

## 2023-10-20 RX ORDER — DOCUSATE SODIUM 100 MG/1
CAPSULE, LIQUID FILLED ORAL
Qty: 60 CAPSULE | Refills: 5 | Status: SHIPPED | OUTPATIENT
Start: 2023-10-20

## 2023-10-20 NOTE — PROGRESS NOTES
[address] [facility]  [address]  [phone]      Godfrey Regalado is a 46 y.o. male who presents today for his medical conditions/complaints as noted below. Godfrey Regalado is c/o of Neck Pain and Back Pain (Lower)      HPI:     Pt here today for neck pain and sleep issues. Neck pain has been worse     Taking Ibuprofen 800 mg every 8 hours, along with BioFreeze and heat. Pt has been having more migraines again from his neck pain - coming up from occipital region and over top of head. Taking Maxalt for these as needed, sometimes 2-3x's per week. Does need the second dose each time he takes it. Does seem to help with migraines a little. Reviewed lab results with pt today     Lipitor and Lovaza     Taking Melatonin 5 mg nightly some nights for sleep, but does not feel like it helps a whole lot. Using Advair 1 puff BID for COPD, but admits that he only uses this once per week on average. Pt states he does have some trouble with his breathing still at times. Using Colace 100 mg as needed for constipation - takes this maybe once per week.         Past Medical History:   Diagnosis Date    ADHD (attention deficit hyperactivity disorder)     don't remember    Allergic rhinitis     Anxiety     Asthma     Bipolar disorder ([address])     Diagnosed in 1998    Chronic back pain     Chronic obstructive pulmonary disease (COPD) ([address])     Depression     Erectile dysfunction     Headache(784.0)     Osteoarthritis     TMJ (dislocation of temporomandibular joint)     Torticollis       Past Surgical History:   Procedure Laterality Date    TONSILLECTOMY       Family History   Problem Relation Age of Onset    Stroke Mother     High Blood Pressure Mother     High Blood Pressure Father     Stroke Father     Substance Abuse Father     Learning Disabilities Sister     Substance Abuse Sister     Learning Disabilities Brother     Glaucoma Neg Hx     Diabetes Neg Hx

## 2023-10-24 ENCOUNTER — PATIENT MESSAGE (OUTPATIENT)
Dept: FAMILY MEDICINE CLINIC | Age: 48
End: 2023-10-24

## 2023-10-25 NOTE — TELEPHONE ENCOUNTER
From: Gerson Friend  To: Dr. Jamie Colindres: 10/24/2023 5:40 PM EDT  Subject: Pain Medicine and sleep pills    The Pain Pills sort of work I have little pain now not as much as before which I think is good but possible later once I am used to the pills increasing the dose if possible. The sleep pills I get about 4 hours now at night not 2 but I am still at one pill maybe after the 10 days when I can increase the dose I will get more hours I will let you know what happens. Thank you for all ur time and Help Dr Zakiya Morales and Staff have a great rest of the week.      Petty Abbasi

## 2023-11-01 ENCOUNTER — PATIENT MESSAGE (OUTPATIENT)
Dept: FAMILY MEDICINE CLINIC | Age: 48
End: 2023-11-01

## 2023-11-01 ENCOUNTER — HOSPITAL ENCOUNTER (EMERGENCY)
Age: 48
Discharge: HOME | End: 2023-11-01
Attending: SPECIALIST
Payer: MEDICARE

## 2023-11-01 VITALS
DIASTOLIC BLOOD PRESSURE: 99 MMHG | RESPIRATION RATE: 18 BRPM | BODY MASS INDEX: 39.2 KG/M2 | HEART RATE: 77 BPM | OXYGEN SATURATION: 98 % | HEIGHT: 71 IN | SYSTOLIC BLOOD PRESSURE: 199 MMHG | WEIGHT: 280 LBS | TEMPERATURE: 97.6 F

## 2023-11-01 DIAGNOSIS — G89.29 CHRONIC NECK PAIN: Primary | ICD-10-CM

## 2023-11-01 DIAGNOSIS — M54.2 CHRONIC NECK PAIN: Primary | ICD-10-CM

## 2023-11-01 DIAGNOSIS — M50.30 DDD (DEGENERATIVE DISC DISEASE), CERVICAL: ICD-10-CM

## 2023-11-01 PROCEDURE — 6370000000 HC RX 637 (ALT 250 FOR IP): Performed by: SPECIALIST

## 2023-11-01 PROCEDURE — 99283 EMERGENCY DEPT VISIT LOW MDM: CPT

## 2023-11-01 RX ORDER — IBUPROFEN 600 MG/1
600 TABLET ORAL ONCE
Status: COMPLETED | OUTPATIENT
Start: 2023-11-01 | End: 2023-11-01

## 2023-11-01 RX ORDER — PREDNISONE 20 MG/1
60 TABLET ORAL ONCE
Status: COMPLETED | OUTPATIENT
Start: 2023-11-01 | End: 2023-11-01

## 2023-11-01 RX ORDER — PREDNISONE 20 MG/1
20 TABLET ORAL 2 TIMES DAILY
Qty: 8 TABLET | Refills: 0 | Status: SHIPPED | OUTPATIENT
Start: 2023-11-01 | End: 2023-11-05

## 2023-11-01 RX ADMIN — IBUPROFEN 600 MG: 600 TABLET, FILM COATED ORAL at 05:32

## 2023-11-01 RX ADMIN — PREDNISONE 60 MG: 20 TABLET ORAL at 05:32

## 2023-11-01 ASSESSMENT — PAIN SCALES - GENERAL: PAINLEVEL_OUTOF10: 9

## 2023-11-01 ASSESSMENT — ENCOUNTER SYMPTOMS
SHORTNESS OF BREATH: 0
ABDOMINAL PAIN: 0
COUGH: 0

## 2023-11-01 ASSESSMENT — PAIN DESCRIPTION - LOCATION: LOCATION: NECK

## 2023-11-01 ASSESSMENT — LIFESTYLE VARIABLES
HOW MANY STANDARD DRINKS CONTAINING ALCOHOL DO YOU HAVE ON A TYPICAL DAY: PATIENT DOES NOT DRINK
HOW OFTEN DO YOU HAVE A DRINK CONTAINING ALCOHOL: NEVER

## 2023-11-01 ASSESSMENT — PAIN - FUNCTIONAL ASSESSMENT: PAIN_FUNCTIONAL_ASSESSMENT: 0-10

## 2023-11-01 NOTE — ED PROVIDER NOTES
disposition diagnosis with the patient and or their family/guardian. I have answered their questions and given discharge instructions. They voiced understanding of these instructions and did not have any further questions or complaints. Re-evaluation Notes    Patient is resting comfortably and does not appear to be in severe pain or any distress prior to discharge    CRITICAL CARE:   None        CONSULTS:      PROCEDURES:  None    FINAL IMPRESSION      1. Chronic neck pain    2. DDD (degenerative disc disease), cervical          DISPOSITION/PLAN   DISPOSITION Decision To Discharge    Condition on Disposition    Stable    PATIENT REFERRED TO:  Frank Matthews DO  [address]  [phone]    Call in 2 days  For reevaluation of current symptoms    [facility] ED  [address] [place] [address]  [phone]    If symptoms worsen      DISCHARGE MEDICATIONS:  New Prescriptions    PREDNISONE (DELTASONE) 20 MG TABLET    Take 1 tablet by mouth 2 times daily for 4 days       (Please note that portions of this note were completed with a voice recognition program.  Efforts were made to edit the dictations but occasionally words are mis-transcribed.)    Marlin Colunga MD,, MD, F.A.C.E.P.   Attending Emergency Physician                           Marlin Colunga MD  11/01/23 [id number]

## 2023-11-01 NOTE — DISCHARGE INSTRUCTIONS
Please understand that at this time there is no evidence for a more serious underlying process, but that early in the process of an illness or injury, an emergency department workup can be falsely reassuring. You should contact your family doctor within the next 48 hours for a follow up appointment    [address]!!!    From [facility]) and [facility] Emergency Services    On behalf of the Emergency Department staff at [facility]), I would like to thank you for giving us the opportunity to address your health care needs and concerns. We hope that during your visit, our service was delivered in a professional and caring manner. Please keep [facility] ([facility]) in mind as we walk with you down the path to your own personal wellness. Please expect an automated text message or email from us so we can ask a few questions about your health and progress. Based on your answers, a clinician may call you back to offer help and instructions. Please understand that early in the process of an illness or injury, an emergency department workup can be falsely reassuring. If you notice any worsening, changing or persistent symptoms please call your family doctor or return to the ER immediately. Tell us how we did during your visit at [email or web address]   and let us know about your experience

## 2023-11-01 NOTE — TELEPHONE ENCOUNTER
From: Gerson Friend  To: Dr. Jamie Colindres: 11/1/2023 3:33 PM EDT  Subject: Rizatriptan Quantity     I went and refilled my Rizatripan and I only got 12 pills last few months I was getting 30 can you adjust this so I get 30 again please Thank you    Petty Abbasi

## 2023-11-03 ENCOUNTER — PATIENT MESSAGE (OUTPATIENT)
Dept: FAMILY MEDICINE CLINIC | Age: 48
End: 2023-11-03

## 2023-11-03 NOTE — TELEPHONE ENCOUNTER
From: Facundo Hudson  To: Dr. Lisa Choudhury: 11/3/2023 9:56 AM EDT  Subject: pain and Shoulder going numb    I am still having back pain and my right shoulder and arm has been going numb the samantha surgeon still has not got a hold of me    Thanks   Renae Aaron

## 2023-11-20 ENCOUNTER — PATIENT MESSAGE (OUTPATIENT)
Dept: FAMILY MEDICINE CLINIC | Age: 48
End: 2023-11-20

## 2023-11-20 DIAGNOSIS — G89.29 CHRONIC MIDLINE POSTERIOR NECK PAIN: ICD-10-CM

## 2023-11-20 DIAGNOSIS — M54.2 CHRONIC MIDLINE POSTERIOR NECK PAIN: ICD-10-CM

## 2023-11-20 NOTE — TELEPHONE ENCOUNTER
Ayaka Reeves called requesting a refill of the below medication which has been pended for you:     Requested Prescriptions     Pending Prescriptions Disp Refills    pregabalin (LYRICA) 50 MG capsule 60 capsule 0     Sig: Take 1 capsule by mouth 2 times daily for 30 days.  Max Daily Amount: 100 mg       Last Appointment Date: 10/20/2023  Next Appointment Date: 12/20/2023    Allergies   Allergen Reactions    Keflex [Cephalexin] Other (See Comments)     Migraines, upset stomach     Naproxen      ulcers

## 2023-11-20 NOTE — TELEPHONE ENCOUNTER
From: Kuldip Almendarez  To: Dr. Zonia Mejia: 11/20/2023 2:23 AM EST  Subject: pregabalin 50 MG capsule    I Know I got an appointment coming up and you wanted to talk about how they work before refiling more I wanted to let you know that they help with the pain but I still have a little pain with them but now with out them the pain is back full force     Thank you all there have a great week and Alexandra Pal

## 2023-11-23 RX ORDER — PREGABALIN 50 MG/1
50 CAPSULE ORAL 2 TIMES DAILY
Qty: 60 CAPSULE | Refills: 0 | Status: SHIPPED | OUTPATIENT
Start: 2023-11-23 | End: 2023-12-23

## 2023-11-23 NOTE — TELEPHONE ENCOUNTER
Controlled Substance Monitoring:    Acute and Chronic Pain Monitoring:   RX Monitoring Periodic Controlled Substance Monitoring   11/23/2023  12:44 PM No signs of potential drug abuse or diversion identified.

## 2023-12-20 ENCOUNTER — OFFICE VISIT (OUTPATIENT)
Dept: FAMILY MEDICINE CLINIC | Age: 48
End: 2023-12-20
Payer: MEDICARE

## 2023-12-20 VITALS
HEART RATE: 72 BPM | TEMPERATURE: 97.8 F | OXYGEN SATURATION: 97 % | DIASTOLIC BLOOD PRESSURE: 80 MMHG | RESPIRATION RATE: 16 BRPM | SYSTOLIC BLOOD PRESSURE: 122 MMHG | WEIGHT: 260.5 LBS | BODY MASS INDEX: 36.47 KG/M2 | HEIGHT: 71 IN

## 2023-12-20 DIAGNOSIS — M48.02 NEUROFORAMINAL STENOSIS OF CERVICAL SPINE: ICD-10-CM

## 2023-12-20 DIAGNOSIS — G43.009 MIGRAINE WITHOUT AURA AND WITHOUT STATUS MIGRAINOSUS, NOT INTRACTABLE: ICD-10-CM

## 2023-12-20 DIAGNOSIS — G47.00 INSOMNIA, UNSPECIFIED TYPE: Primary | ICD-10-CM

## 2023-12-20 DIAGNOSIS — Z12.11 SCREENING FOR COLON CANCER: ICD-10-CM

## 2023-12-20 DIAGNOSIS — M54.2 CHRONIC MIDLINE POSTERIOR NECK PAIN: ICD-10-CM

## 2023-12-20 DIAGNOSIS — G89.29 CHRONIC MIDLINE POSTERIOR NECK PAIN: ICD-10-CM

## 2023-12-20 PROCEDURE — 3017F COLORECTAL CA SCREEN DOC REV: CPT | Performed by: FAMILY MEDICINE

## 2023-12-20 PROCEDURE — G8417 CALC BMI ABV UP PARAM F/U: HCPCS | Performed by: FAMILY MEDICINE

## 2023-12-20 PROCEDURE — 99214 OFFICE O/P EST MOD 30 MIN: CPT | Performed by: FAMILY MEDICINE

## 2023-12-20 PROCEDURE — G8427 DOCREV CUR MEDS BY ELIG CLIN: HCPCS | Performed by: FAMILY MEDICINE

## 2023-12-20 PROCEDURE — G8484 FLU IMMUNIZE NO ADMIN: HCPCS | Performed by: FAMILY MEDICINE

## 2023-12-20 PROCEDURE — 1036F TOBACCO NON-USER: CPT | Performed by: FAMILY MEDICINE

## 2023-12-20 RX ORDER — PREGABALIN 75 MG/1
75 CAPSULE ORAL 2 TIMES DAILY
Qty: 60 CAPSULE | Refills: 0 | Status: SHIPPED | OUTPATIENT
Start: 2023-12-20 | End: 2024-01-19

## 2023-12-20 RX ORDER — QUETIAPINE FUMARATE 50 MG/1
TABLET, FILM COATED ORAL
Qty: 60 TABLET | Refills: 0 | Status: SHIPPED | OUTPATIENT
Start: 2023-12-20

## 2023-12-20 NOTE — PROGRESS NOTES
[address] [facility]  [address] [place] [address]  [phone]      Lobo Gomez is a 46 y.o. male who presents today for his medical conditions/complaints as noted below. Lobo Gomez is c/o of No chief complaint on file. HPI:     Pt here today for neck pain and sleep issues. Patient was taking Lyrica 50 mg twice daily for 30 days. States he ran out of Lyrica 3 weeks ago. States the Lyrica helped with pain. Still had some pain but significant improvement. Currently taking Ibuprofen 800mg every 8 hours and Baclofen 10mg three times daily, minimal improvement with this. States pain is mostly in the neck and lower back. Rates pain as 8/10. Right shoulder started going numb 1 month ago. States he has difficulty sleeping lately due to pain. Taking Seroquel 25mg nightly with no improvement. Currently sleeping 2 hours a night. Had some improvement in sleep on Lyrica- slept around 4 hours a night. Migraines have been slightly better. States he hasn't had a migraine in a month. States if he moves a certain direction, it triggers a migraine. He is afraid of contact a neurosurgeon due to the possibility of surgery. Taking Maxalt for these as needed, typically helps. Occasionally needs to take a second dose of Maxalt. Has started to eat less over the past month - eating smaller portions and less meals/snacking through the day. Has lost 14 lbs in the past 2 months.           Past Medical History:   Diagnosis Date    ADHD (attention deficit hyperactivity disorder)     don't remember    Allergic rhinitis     Anxiety     Asthma     Bipolar disorder ([address] [facility])     Diagnosed in 1998    Chronic back pain     Chronic obstructive pulmonary disease (COPD) ([address])     Depression     Erectile dysfunction     Headache(784.0)     Osteoarthritis     TMJ (dislocation of temporomandibular joint)     Torticollis       Past Surgical History:   Procedure Laterality Date    TONSILLECTOMY

## 2024-01-02 ENCOUNTER — PATIENT MESSAGE (OUTPATIENT)
Dept: FAMILY MEDICINE CLINIC | Age: 49
End: 2024-01-02

## 2024-01-03 NOTE — TELEPHONE ENCOUNTER
From: Efren Tang  To: Dr. Karen Vance  Sent: 1/2/2024 1:28 AM EST  Subject: Shoulder pain    I finally figured out how to explain what my shoulder is doing it is a mix of major muscle spasm and a major cramp what can I do to help stop this from happening as it does it about 80% of the time    Thank you  Alberto

## 2024-01-13 DIAGNOSIS — G89.29 CHRONIC MIDLINE POSTERIOR NECK PAIN: ICD-10-CM

## 2024-01-13 DIAGNOSIS — M54.2 CHRONIC MIDLINE POSTERIOR NECK PAIN: ICD-10-CM

## 2024-01-15 NOTE — TELEPHONE ENCOUNTER
See mychart msg in this encounter.    Per [facility], last fill 12/20, quantity 60 for 30 days.     Efren called requesting a refill of the below medication which has been pended for you:     Requested Prescriptions     Pending Prescriptions Disp Refills    pregabalin (LYRICA) 75 MG capsule 60 capsule 0     Sig: Take 1 capsule by mouth 2 times daily for 30 days. Max Daily Amount: 150 mg       Last Appointment Date: 12/20/2023  Next Appointment Date: 2/19/2024    Allergies   Allergen Reactions    Keflex [Cephalexin] Other (See Comments)     Migraines, upset stomach     Naproxen      ulcers

## 2024-01-16 DIAGNOSIS — G47.00 INSOMNIA, UNSPECIFIED TYPE: ICD-10-CM

## 2024-01-16 RX ORDER — QUETIAPINE FUMARATE 50 MG/1
TABLET, FILM COATED ORAL
Qty: 60 TABLET | Refills: 0 | Status: CANCELLED | OUTPATIENT
Start: 2024-01-16

## 2024-01-16 RX ORDER — PREGABALIN 75 MG/1
75 CAPSULE ORAL 2 TIMES DAILY
Qty: 60 CAPSULE | Refills: 0 | Status: SHIPPED | OUTPATIENT
Start: 2024-01-19 | End: 2024-02-18

## 2024-01-16 NOTE — TELEPHONE ENCOUNTER
Controlled Substance Monitoring:    Acute and Chronic Pain Monitoring:   RX Monitoring Periodic Controlled Substance Monitoring Chronic Pain > 80 MEDD   1/16/2024   1:43 PM No signs of potential drug abuse or diversion identified. Obtained or confirmed \"Medication Contract\" on file.

## 2024-01-17 ENCOUNTER — HOSPITAL ENCOUNTER (OUTPATIENT)
Dept: GENERAL RADIOLOGY | Age: 49
Discharge: HOME | End: 2024-01-19
Payer: MEDICARE

## 2024-01-17 DIAGNOSIS — M79.671 RIGHT FOOT PAIN: Primary | ICD-10-CM

## 2024-01-17 DIAGNOSIS — M79.671 RIGHT FOOT PAIN: ICD-10-CM

## 2024-01-17 PROCEDURE — 73630 X-RAY EXAM OF FOOT: CPT

## 2024-01-17 NOTE — PROGRESS NOTES
Pt accompanied significant other to visit today - reported that he tripped and fell over a fan and has pain over 3rd-5th toes and dorsum of R foot.  Will order x-ray to rule out metatarsal fracture today.

## 2024-02-19 ENCOUNTER — OFFICE VISIT (OUTPATIENT)
Dept: FAMILY MEDICINE CLINIC | Age: 49
End: 2024-02-19
Payer: MEDICARE

## 2024-02-19 VITALS
DIASTOLIC BLOOD PRESSURE: 80 MMHG | HEART RATE: 64 BPM | SYSTOLIC BLOOD PRESSURE: 130 MMHG | RESPIRATION RATE: 16 BRPM | WEIGHT: 261.7 LBS | OXYGEN SATURATION: 97 % | HEIGHT: 71 IN | BODY MASS INDEX: 36.64 KG/M2 | TEMPERATURE: 97.6 F

## 2024-02-19 DIAGNOSIS — J44.9 CHRONIC OBSTRUCTIVE PULMONARY DISEASE, UNSPECIFIED COPD TYPE (HCC): ICD-10-CM

## 2024-02-19 DIAGNOSIS — G47.00 INSOMNIA, UNSPECIFIED TYPE: ICD-10-CM

## 2024-02-19 DIAGNOSIS — M54.2 CHRONIC MIDLINE POSTERIOR NECK PAIN: Primary | ICD-10-CM

## 2024-02-19 DIAGNOSIS — G89.29 CHRONIC MIDLINE POSTERIOR NECK PAIN: Primary | ICD-10-CM

## 2024-02-19 DIAGNOSIS — R09.81 NASAL CONGESTION: ICD-10-CM

## 2024-02-19 PROCEDURE — G8417 CALC BMI ABV UP PARAM F/U: HCPCS | Performed by: FAMILY MEDICINE

## 2024-02-19 PROCEDURE — 3017F COLORECTAL CA SCREEN DOC REV: CPT | Performed by: FAMILY MEDICINE

## 2024-02-19 PROCEDURE — G8484 FLU IMMUNIZE NO ADMIN: HCPCS | Performed by: FAMILY MEDICINE

## 2024-02-19 PROCEDURE — 99214 OFFICE O/P EST MOD 30 MIN: CPT | Performed by: FAMILY MEDICINE

## 2024-02-19 PROCEDURE — 99214 OFFICE O/P EST MOD 30 MIN: CPT

## 2024-02-19 PROCEDURE — 3023F SPIROM DOC REV: CPT | Performed by: FAMILY MEDICINE

## 2024-02-19 PROCEDURE — G8427 DOCREV CUR MEDS BY ELIG CLIN: HCPCS | Performed by: FAMILY MEDICINE

## 2024-02-19 PROCEDURE — 1036F TOBACCO NON-USER: CPT | Performed by: FAMILY MEDICINE

## 2024-02-19 RX ORDER — PREGABALIN 75 MG/1
75 CAPSULE ORAL 2 TIMES DAILY
Qty: 60 CAPSULE | Refills: 1 | Status: SHIPPED | OUTPATIENT
Start: 2024-02-19 | End: 2024-04-19

## 2024-02-19 RX ORDER — LORATADINE 10 MG/1
10 TABLET ORAL DAILY
Qty: 90 TABLET | Refills: 3 | Status: SHIPPED | OUTPATIENT
Start: 2024-02-19

## 2024-02-19 RX ORDER — QUETIAPINE FUMARATE 50 MG/1
75 TABLET, FILM COATED ORAL NIGHTLY
Qty: 135 TABLET | Refills: 0 | Status: SHIPPED | OUTPATIENT
Start: 2024-02-19

## 2024-02-19 NOTE — PROGRESS NOTES
[facility]  [address] [place] [facility] [place] [address]  [phone]      Efren Tang is a 48 y.o. male who presents today for his medical conditions/complaints as noted below.  Efren Tang is c/o of discuss meds      HPI:     Pt here today for follow-up of Lyrica.    Pt increased Lyrica to 75 mg BID after last OV two months ago - states he is getting both doses in each day.  Does feel like this is really helping reduce low back pain; neck pain feels the same.  No side effects to the higher dose.    Taking Seroquel 50 mg nightly for sleep - helps a little bit.  Tried increasing Seroquel to 100 mg (2 tabs), but he was off-balance and hanging onto the walls on the way to the bathroom.      Breathing has been \"OK\" - has not had to use inhalers as much recently.          Past Medical History:   Diagnosis Date    ADHD (attention deficit hyperactivity disorder)     don't remember    Allergic rhinitis     Anxiety     Asthma     Bipolar disorder ([facility])     Diagnosed in     Chronic back pain     Chronic obstructive pulmonary disease (COPD) ([facility])     Depression     Erectile dysfunction     Headache(784.0)     Osteoarthritis     TMJ (dislocation of temporomandibular joint)     Torticollis       Past Surgical History:   Procedure Laterality Date    TONSILLECTOMY       Family History   Problem Relation Age of Onset    Stroke Mother     High Blood Pressure Mother     High Blood Pressure Father     Stroke Father     Substance Abuse Father     Learning Disabilities Sister     Substance Abuse Sister     Learning Disabilities Brother     Glaucoma Neg Hx     Diabetes Neg Hx     Cataracts Neg Hx      Social History     Tobacco Use    Smoking status: Former     Current packs/day: 0.00     Types: Cigarettes     Start date: 10/16/1992     Quit date: 2017     Years since quittin.6     Passive exposure: Current    Smokeless tobacco: Never    Tobacco comments:     handout given.

## 2024-02-26 LAB — NONINV COLON CA DNA+OCC BLD SCRN STL QL: NEGATIVE

## 2024-02-26 ASSESSMENT — ENCOUNTER SYMPTOMS: SHORTNESS OF BREATH: 1

## 2024-03-07 ENCOUNTER — PATIENT MESSAGE (OUTPATIENT)
Dept: FAMILY MEDICINE CLINIC | Age: 49
End: 2024-03-07

## 2024-03-07 NOTE — TELEPHONE ENCOUNTER
From: Efren Tang  To: Dr. Karen Vance  Sent: 3/7/2024 12:10 AM EST  Subject: Pregablin    Hello DR Vance and Staff As of the 28th of Feb I been dealing with really bad lower back pain as I carried in 2 40 packs of water and a 40 lbs bag of cat food I know I shouldn't have carried either of them in I told John from now on she will have to do it but I was wondering if you could increase my Pregablin to a higher dose please I can't hardly move and standing and sitting is really uncomfortable.    Thank you    Alberto

## 2024-04-19 ENCOUNTER — OFFICE VISIT (OUTPATIENT)
Dept: NEUROSURGERY | Age: 49
End: 2024-04-19
Payer: MEDICARE

## 2024-04-19 VITALS
HEART RATE: 80 BPM | SYSTOLIC BLOOD PRESSURE: 126 MMHG | BODY MASS INDEX: 37.8 KG/M2 | OXYGEN SATURATION: 98 % | RESPIRATION RATE: 16 BRPM | WEIGHT: 270 LBS | HEIGHT: 71 IN | DIASTOLIC BLOOD PRESSURE: 84 MMHG

## 2024-04-19 DIAGNOSIS — M47.812 CERVICAL SPONDYLOSIS: Primary | ICD-10-CM

## 2024-04-19 DIAGNOSIS — M48.02 CERVICAL STENOSIS OF SPINE: ICD-10-CM

## 2024-04-19 PROCEDURE — 99213 OFFICE O/P EST LOW 20 MIN: CPT | Performed by: NURSE PRACTITIONER

## 2024-04-19 NOTE — PROGRESS NOTES
[facility]  [address] [facility] [address]  Dept: [phone]    Patient:  Efren Tang  YOB: 1975  Date: 4/19/24    The patient is a 48 y.o. male who presents today for consult of the following problems:     Chief Complaint   Patient presents with    New Patient    Neck Pain    Back Pain         HPI:     Efren Tang is a 48 y.o. male on whom neurosurgical consultation was requested by Karen Vance DO for management of neck pain. Has had issues with neck pain for the last 20 years. Pain is 10/10 on average, described as an aching pain. Pain is axial in nature, denies any radiation to extremities. No associated numbness or tingling. Does report associated headaches. Last therapy measures around 2 years ago, made pain worse. Denies any distinct issues with dexterity. Does have some issues with balance, has had falls in the past, last one around 6 months ago, will lose balance and fall. Does sometimes drop things.     Using heating pad, OTC topical gels. OTC ibuprofen, muscle relaxer.  Completed remote physical therapy, nothing recent.  Was evaluated by pain management, however declined any interventions as he states that he is severely afraid of needles.    History:     Past Medical History:   Diagnosis Date    ADHD (attention deficit hyperactivity disorder)     don't remember    Allergic rhinitis     Anxiety     Asthma     Bipolar disorder ([facility])     Diagnosed in 1998    Chronic back pain     Chronic obstructive pulmonary disease (COPD) ([facility])     Depression     Erectile dysfunction     Headache(784.0)     Osteoarthritis     TMJ (dislocation of temporomandibular joint)     Torticollis      Past Surgical History:   Procedure Laterality Date    TONSILLECTOMY       Family History   Problem Relation Age of Onset    Stroke Mother     High Blood Pressure

## 2024-04-20 ENCOUNTER — PATIENT MESSAGE (OUTPATIENT)
Dept: FAMILY MEDICINE CLINIC | Age: 49
End: 2024-04-20

## 2024-04-20 DIAGNOSIS — G89.29 CHRONIC MIDLINE POSTERIOR NECK PAIN: ICD-10-CM

## 2024-04-20 DIAGNOSIS — M54.2 CHRONIC MIDLINE POSTERIOR NECK PAIN: ICD-10-CM

## 2024-04-22 NOTE — TELEPHONE ENCOUNTER
From: Efren Tang  To: Dr. Karen Vance  Sent: 4/20/2024 6:28 PM EDT  Subject: Needing something else for my Migraine's     I am needing something else for my migraines as the Rizatriptan I am now only able to get 12 it used to be 30 but for some reason my Insurance won't pay for that many and I Keep running out of the Rizatriptan I get migraines cause of my neck can I please get something else my insurance will cover more of

## 2024-04-22 NOTE — TELEPHONE ENCOUNTER
Per [facility], last fill 3/17, quantity 60 for 30 days.        Efren called requesting a refill of the below medication which has been pended for you:     Requested Prescriptions     Pending Prescriptions Disp Refills    pregabalin (LYRICA) 75 MG capsule 60 capsule 1     Sig: Take 1 capsule by mouth 2 times daily for 60 days. Max Daily Amount: 150 mg       Last Appointment Date: 2/19/2024  Next Appointment Date: 6/19/2024    Allergies   Allergen Reactions    Keflex [Cephalexin] Other (See Comments)     Migraines, upset stomach     Naproxen      ulcers

## 2024-04-24 RX ORDER — PREGABALIN 75 MG/1
75 CAPSULE ORAL 2 TIMES DAILY
Qty: 60 CAPSULE | Refills: 1 | Status: SHIPPED | OUTPATIENT
Start: 2024-04-24 | End: 2024-06-23

## 2024-04-24 NOTE — TELEPHONE ENCOUNTER
Controlled Substance Monitoring:    Acute and Chronic Pain Monitoring:   RX Monitoring Periodic Controlled Substance Monitoring Chronic Pain > 80 MEDD   4/24/2024   2:14 PM No signs of potential drug abuse or diversion identified. Obtained or confirmed \"Medication Contract\" on file.

## 2024-04-30 DIAGNOSIS — G43.009 MIGRAINE WITHOUT AURA AND WITHOUT STATUS MIGRAINOSUS, NOT INTRACTABLE: ICD-10-CM

## 2024-04-30 NOTE — TELEPHONE ENCOUNTER
Reordered med now per drug rep guidelines for approval. Changed requested quantity to 8 pills per 30 days to use PRN.    Efren called requesting a refill of the below medication which has been pended for you:     Requested Prescriptions     Pending Prescriptions Disp Refills    rimegepant sulfate 75 MG TBDP 8 tablet 0     Sig: Take 75 mg by mouth as needed (migraine) Max dose of 75 mg (1 tab) per 24 hours.       Last Appointment Date: 2/19/2024  Next Appointment Date: 6/26/2024    Allergies   Allergen Reactions    Keflex [Cephalexin] Other (See Comments)     Migraines, upset stomach     Naproxen      ulcers

## 2024-05-23 ENCOUNTER — PATIENT MESSAGE (OUTPATIENT)
Dept: FAMILY MEDICINE CLINIC | Age: 49
End: 2024-05-23

## 2024-05-23 NOTE — TELEPHONE ENCOUNTER
From: Efren Tang  To: Dr. Karen Vance  Sent: 5/23/2024 4:08 PM EDT  Subject: surgery     I had a MRI that I was suppose to go do but I cancelled it as I figured why waste their time and insurance Money as I decided I wasn't going to have surgery on my neck cause my sister just had neck surgery and now she is not able to use her right arm and she is in lots of pain no thanks :)

## 2024-06-01 ENCOUNTER — HOSPITAL ENCOUNTER (EMERGENCY)
Age: 49
Discharge: HOME | End: 2024-06-01
Attending: SPECIALIST
Payer: MEDICARE

## 2024-06-01 ENCOUNTER — APPOINTMENT (OUTPATIENT)
Dept: GENERAL RADIOLOGY | Age: 49
End: 2024-06-01
Payer: MEDICARE

## 2024-06-01 VITALS
HEART RATE: 66 BPM | BODY MASS INDEX: 39.2 KG/M2 | OXYGEN SATURATION: 94 % | WEIGHT: 280 LBS | DIASTOLIC BLOOD PRESSURE: 85 MMHG | HEIGHT: 71 IN | SYSTOLIC BLOOD PRESSURE: 164 MMHG | RESPIRATION RATE: 17 BRPM | TEMPERATURE: 97.7 F

## 2024-06-01 DIAGNOSIS — R03.0 ELEVATED BLOOD PRESSURE READING: ICD-10-CM

## 2024-06-01 DIAGNOSIS — M25.511 ACUTE PAIN OF RIGHT SHOULDER: Primary | ICD-10-CM

## 2024-06-01 LAB
ANION GAP SERPL CALCULATED.3IONS-SCNC: 11 MMOL/L (ref 9–17)
BASOPHILS # BLD: 0.12 K/UL (ref 0–0.2)
BASOPHILS NFR BLD: 1 % (ref 0–2)
BUN SERPL-MCNC: 17 MG/DL (ref 6–20)
BUN/CREAT SERPL: 21 (ref 9–20)
CALCIUM SERPL-MCNC: 9.6 MG/DL (ref 8.6–10.4)
CHLORIDE SERPL-SCNC: 104 MMOL/L (ref 98–107)
CO2 SERPL-SCNC: 27 MMOL/L (ref 20–31)
CREAT SERPL-MCNC: 0.8 MG/DL (ref 0.7–1.2)
EOSINOPHIL # BLD: 0.28 K/UL (ref 0–0.44)
EOSINOPHILS RELATIVE PERCENT: 3 % (ref 1–4)
ERYTHROCYTE [DISTWIDTH] IN BLOOD BY AUTOMATED COUNT: 12.8 % (ref 11.8–14.4)
GFR, ESTIMATED: >90 ML/MIN/1.73M2
GLUCOSE SERPL-MCNC: 94 MG/DL (ref 70–99)
HCT VFR BLD AUTO: 39.5 % (ref 40.7–50.3)
HGB BLD-MCNC: 13.8 G/DL (ref 13–17)
IMM GRANULOCYTES # BLD AUTO: 0.18 K/UL (ref 0–0.3)
IMM GRANULOCYTES NFR BLD: 2 %
LYMPHOCYTES NFR BLD: 3.07 K/UL (ref 1.1–3.7)
LYMPHOCYTES RELATIVE PERCENT: 34 % (ref 24–43)
MCH RBC QN AUTO: 30.7 PG (ref 25.2–33.5)
MCHC RBC AUTO-ENTMCNC: 34.9 G/DL (ref 25.2–33.5)
MCV RBC AUTO: 87.8 FL (ref 82.6–102.9)
MONOCYTES NFR BLD: 0.94 K/UL (ref 0.1–1.2)
MONOCYTES NFR BLD: 10 % (ref 3–12)
NEUTROPHILS NFR BLD: 50 % (ref 36–65)
NEUTS SEG NFR BLD: 4.43 K/UL (ref 1.5–8.1)
NRBC BLD-RTO: 0 PER 100 WBC
PLATELET # BLD AUTO: 325 K/UL (ref 138–453)
PMV BLD AUTO: 9.8 FL (ref 8.1–13.5)
POTASSIUM SERPL-SCNC: 3.8 MMOL/L (ref 3.7–5.3)
RBC # BLD AUTO: 4.5 M/UL (ref 4.21–5.77)
SODIUM SERPL-SCNC: 142 MMOL/L (ref 135–144)
TROPONIN I SERPL HS-MCNC: 13 NG/L (ref 0–22)
WBC OTHER # BLD: 9 K/UL (ref 3.5–11.3)

## 2024-06-01 PROCEDURE — 99285 EMERGENCY DEPT VISIT HI MDM: CPT

## 2024-06-01 PROCEDURE — 2580000003 HC RX 258: Performed by: SPECIALIST

## 2024-06-01 PROCEDURE — 84484 ASSAY OF TROPONIN QUANT: CPT

## 2024-06-01 PROCEDURE — 80048 BASIC METABOLIC PNL TOTAL CA: CPT

## 2024-06-01 PROCEDURE — 71045 X-RAY EXAM CHEST 1 VIEW: CPT

## 2024-06-01 PROCEDURE — 93005 ELECTROCARDIOGRAM TRACING: CPT | Performed by: SPECIALIST

## 2024-06-01 PROCEDURE — 85025 COMPLETE CBC W/AUTO DIFF WBC: CPT

## 2024-06-01 RX ORDER — 0.9 % SODIUM CHLORIDE 0.9 %
1000 INTRAVENOUS SOLUTION INTRAVENOUS ONCE
Status: COMPLETED | OUTPATIENT
Start: 2024-06-01 | End: 2024-06-01

## 2024-06-01 RX ADMIN — SODIUM CHLORIDE 1000 ML: 9 INJECTION, SOLUTION INTRAVENOUS at 20:06

## 2024-06-01 ASSESSMENT — PAIN - FUNCTIONAL ASSESSMENT: PAIN_FUNCTIONAL_ASSESSMENT: 0-10

## 2024-06-01 ASSESSMENT — PAIN DESCRIPTION - LOCATION: LOCATION: SHOULDER

## 2024-06-01 ASSESSMENT — PAIN SCALES - GENERAL: PAINLEVEL_OUTOF10: 7

## 2024-06-01 ASSESSMENT — PAIN DESCRIPTION - ORIENTATION: ORIENTATION: RIGHT

## 2024-06-01 NOTE — ED TRIAGE NOTES
Pt arrives with chronic right shoulder pain that he is using baclofen, ibuprofen, rubs, and heating pad for.  No new injury but states pain meds are not helping.

## 2024-06-02 ENCOUNTER — OFFICE VISIT (OUTPATIENT)
Dept: PRIMARY CARE CLINIC | Age: 49
End: 2024-06-02

## 2024-06-02 VITALS
SYSTOLIC BLOOD PRESSURE: 160 MMHG | OXYGEN SATURATION: 98 % | HEIGHT: 71 IN | DIASTOLIC BLOOD PRESSURE: 84 MMHG | TEMPERATURE: 98.2 F | BODY MASS INDEX: 37.8 KG/M2 | RESPIRATION RATE: 18 BRPM | WEIGHT: 270 LBS | HEART RATE: 83 BPM

## 2024-06-02 DIAGNOSIS — M25.511 ACUTE PAIN OF RIGHT SHOULDER: Primary | ICD-10-CM

## 2024-06-02 LAB
EKG ATRIAL RATE: 77 BPM
EKG P AXIS: 48 DEGREES
EKG P-R INTERVAL: 170 MS
EKG Q-T INTERVAL: 400 MS
EKG QRS DURATION: 94 MS
EKG QTC CALCULATION (BAZETT): 452 MS
EKG R AXIS: 2 DEGREES
EKG T AXIS: 116 DEGREES
EKG VENTRICULAR RATE: 77 BPM

## 2024-06-02 RX ORDER — PREDNISONE 20 MG/1
20 TABLET ORAL 2 TIMES DAILY
Qty: 10 TABLET | Refills: 0 | Status: SHIPPED | OUTPATIENT
Start: 2024-06-02 | End: 2024-06-07

## 2024-06-02 SDOH — ECONOMIC STABILITY: INCOME INSECURITY: HOW HARD IS IT FOR YOU TO PAY FOR THE VERY BASICS LIKE FOOD, HOUSING, MEDICAL CARE, AND HEATING?: NOT HARD AT ALL

## 2024-06-02 SDOH — ECONOMIC STABILITY: FOOD INSECURITY: WITHIN THE PAST 12 MONTHS, YOU WORRIED THAT YOUR FOOD WOULD RUN OUT BEFORE YOU GOT MONEY TO BUY MORE.: NEVER TRUE

## 2024-06-02 SDOH — ECONOMIC STABILITY: FOOD INSECURITY: WITHIN THE PAST 12 MONTHS, THE FOOD YOU BOUGHT JUST DIDN'T LAST AND YOU DIDN'T HAVE MONEY TO GET MORE.: NEVER TRUE

## 2024-06-02 ASSESSMENT — PATIENT HEALTH QUESTIONNAIRE - PHQ9
SUM OF ALL RESPONSES TO PHQ QUESTIONS 1-9: 0
2. FEELING DOWN, DEPRESSED OR HOPELESS: NOT AT ALL
SUM OF ALL RESPONSES TO PHQ9 QUESTIONS 1 & 2: 0
SUM OF ALL RESPONSES TO PHQ QUESTIONS 1-9: 0
SUM OF ALL RESPONSES TO PHQ QUESTIONS 1-9: 0
1. LITTLE INTEREST OR PLEASURE IN DOING THINGS: NOT AT ALL
SUM OF ALL RESPONSES TO PHQ QUESTIONS 1-9: 0

## 2024-06-02 NOTE — ED PROVIDER NOTES
put on the monitor which reveals normal sinus rhythm without any ectopy.  Patient's initial blood pressure was 210/135 which gradually came down to 164/85 by itself prior to discharge.  He declined any medications for the pain.  Plan is to discharge the patient with instructions to take Tylenol and or ibuprofen as needed for the pain, baclofen for muscle relaxant, plenty of oral fluids, follow-up with PCP for reevaluation and repeat blood pressure check, return anytime for worsening symptoms or any new symptoms.    I have reviewed the disposition diagnosis with the patient and or their family/guardian. I have answered their questions and given discharge instructions. They voiced understanding of these instructions and did not have any further questions or complaints.    Re-evaluation Notes    Patient is resting comfortably and does not appear to be in any pain or distress prior to discharge.    CRITICAL CARE:   None        CONSULTS:      PROCEDURES:  None    FINAL IMPRESSION      1. Acute pain of right shoulder    2. Elevated blood pressure reading          DISPOSITION/PLAN   DISPOSITION Decision To Discharge    Condition on Disposition    Stable    PATIENT REFERRED TO:  Karen Vance,   [address] [address]  [phone]    Call in 2 days  For reevaluation of current symptoms    [facility] ED  [address] [facility] [address] [address]  [phone]    If symptoms worsen      DISCHARGE MEDICATIONS:  Discharge Medication List as of 6/1/2024  9:58 PM          (Please note that portions of this note were completed with a voice recognition program.  Efforts were made to edit the dictations but occasionally words are mis-transcribed.)    Alistair Cordero MD,, MD, F.A.C.E.P.  Attending Emergency Physician                           Alistair Cordero MD  06/02/24 0535

## 2024-06-02 NOTE — DISCHARGE INSTRUCTIONS
Please understand that at this time there is no evidence for a more serious underlying process, but that early in the process of an illness or injury, an emergency department workup can be falsely reassuring.  You should contact your family doctor within the next 48 hours for a follow up appointment    THANK YOU!!!    From [facility] and [facility]    On behalf of the Emergency Department staff at [facility], I would like to thank you for giving us the opportunity to address your health care needs and concerns.    We hope that during your visit, our service was delivered in a professional and caring manner. Please keep [facility] in mind as we walk with you down the path to your own personal wellness.     Please expect an automated text message or email from us so we can ask a few questions about your health and progress. Based on your answers, a clinician may call you back to offer help and instructions.    Please understand that early in the process of an illness or injury, an emergency department workup can be falsely reassuring.  If you notice any worsening, changing or persistent symptoms please call your family doctor or return to the ER immediately.     Tell us how we did during your visit at [email or web address]   and let us know about your experience

## 2024-06-06 ENCOUNTER — PATIENT MESSAGE (OUTPATIENT)
Dept: FAMILY MEDICINE CLINIC | Age: 49
End: 2024-06-06

## 2024-06-07 NOTE — TELEPHONE ENCOUNTER
From: Efren Tang  To: Dr. Karen Vance  Sent: 6/6/2024 9:33 AM EDT  Subject: Medicine and other concerns    At my appointment coming up I want to talk about medicine they use that might help with my shoulder and my Torticollis it is used to treat tremors of Parkinson disease it may help me get rid of this constant Shoulder pain I have been dealing with for a month now. Also I need to talk to you about my blood pressure as it was high when I went to the ER and also when I went to urgent care I went there to get help with my shoulder.     Thank you Hope you all have a great Day   Alberto

## 2024-06-08 DIAGNOSIS — M54.2 CHRONIC MIDLINE POSTERIOR NECK PAIN: ICD-10-CM

## 2024-06-08 DIAGNOSIS — G89.29 CHRONIC MIDLINE POSTERIOR NECK PAIN: ICD-10-CM

## 2024-06-10 NOTE — TELEPHONE ENCOUNTER
Efren called requesting a refill of the below medication which has been pended for you:     Requested Prescriptions     Pending Prescriptions Disp Refills    pregabalin (LYRICA) 75 MG capsule 60 capsule 1     Sig: Take 1 capsule by mouth 2 times daily for 60 days. Max Daily Amount: 150 mg       Last Appointment Date: 2/19/2024  Next Appointment Date: 6/26/2024    Allergies   Allergen Reactions    Keflex [Cephalexin] Other (See Comments)     Migraines, upset stomach     Naproxen      ulcers

## 2024-06-12 ENCOUNTER — PATIENT MESSAGE (OUTPATIENT)
Dept: FAMILY MEDICINE CLINIC | Age: 49
End: 2024-06-12

## 2024-06-12 NOTE — TELEPHONE ENCOUNTER
From: Efren Tang  To: Dr. Karen Vance  Sent: 6/12/2024 11:14 AM EDT  Subject: Shoulder Pain     I was wondering what Else I could do to ease the pain in my shoulder as everything I have tried isn't working and the pain is driving me nuts I went to the ER and they did nothing but wonder if I was having heart issues and they don't even look at or do anything about my shoulder went to urgent care there they put me on steroids and they don't help at all I really need something Please     Thank you Alberto

## 2024-06-13 RX ORDER — PREGABALIN 75 MG/1
75 CAPSULE ORAL 2 TIMES DAILY
Qty: 60 CAPSULE | Refills: 1 | OUTPATIENT
Start: 2024-06-13 | End: 2024-08-12

## 2024-06-13 NOTE — TELEPHONE ENCOUNTER
Med was just filled at [facility] on 5/26 for 30-day supply; should not yet need refill.  Please confirm.

## 2024-06-23 ENCOUNTER — PATIENT MESSAGE (OUTPATIENT)
Dept: FAMILY MEDICINE CLINIC | Age: 49
End: 2024-06-23

## 2024-06-24 NOTE — TELEPHONE ENCOUNTER
From: Efren Tang  To: Dr. Karen Vance  Sent: 6/23/2024 12:26 PM EDT  Subject: Medicine    Just wanted to let you know that a lot of my medicines are out dated I wanted to let you know so if I forget to tell you

## 2024-06-26 ENCOUNTER — OFFICE VISIT (OUTPATIENT)
Dept: FAMILY MEDICINE CLINIC | Age: 49
End: 2024-06-26
Payer: MEDICARE

## 2024-06-26 ENCOUNTER — PATIENT MESSAGE (OUTPATIENT)
Dept: FAMILY MEDICINE CLINIC | Age: 49
End: 2024-06-26

## 2024-06-26 VITALS
WEIGHT: 264 LBS | SYSTOLIC BLOOD PRESSURE: 132 MMHG | HEIGHT: 71 IN | TEMPERATURE: 97.8 F | OXYGEN SATURATION: 96 % | DIASTOLIC BLOOD PRESSURE: 76 MMHG | HEART RATE: 73 BPM | BODY MASS INDEX: 36.96 KG/M2 | RESPIRATION RATE: 16 BRPM

## 2024-06-26 DIAGNOSIS — J44.9 CHRONIC OBSTRUCTIVE PULMONARY DISEASE, UNSPECIFIED COPD TYPE (HCC): ICD-10-CM

## 2024-06-26 DIAGNOSIS — G47.00 INSOMNIA, UNSPECIFIED TYPE: Primary | ICD-10-CM

## 2024-06-26 DIAGNOSIS — G47.10 HYPERSOMNOLENCE: ICD-10-CM

## 2024-06-26 DIAGNOSIS — R73.01 IMPAIRED FASTING GLUCOSE: ICD-10-CM

## 2024-06-26 DIAGNOSIS — G43.009 MIGRAINE WITHOUT AURA AND WITHOUT STATUS MIGRAINOSUS, NOT INTRACTABLE: ICD-10-CM

## 2024-06-26 DIAGNOSIS — G89.29 CHRONIC MIDLINE POSTERIOR NECK PAIN: ICD-10-CM

## 2024-06-26 DIAGNOSIS — M54.2 CHRONIC MIDLINE POSTERIOR NECK PAIN: ICD-10-CM

## 2024-06-26 PROCEDURE — 3017F COLORECTAL CA SCREEN DOC REV: CPT | Performed by: FAMILY MEDICINE

## 2024-06-26 PROCEDURE — G8427 DOCREV CUR MEDS BY ELIG CLIN: HCPCS | Performed by: FAMILY MEDICINE

## 2024-06-26 PROCEDURE — 99214 OFFICE O/P EST MOD 30 MIN: CPT | Performed by: FAMILY MEDICINE

## 2024-06-26 PROCEDURE — G8417 CALC BMI ABV UP PARAM F/U: HCPCS | Performed by: FAMILY MEDICINE

## 2024-06-26 PROCEDURE — 1036F TOBACCO NON-USER: CPT | Performed by: FAMILY MEDICINE

## 2024-06-26 PROCEDURE — 3023F SPIROM DOC REV: CPT | Performed by: FAMILY MEDICINE

## 2024-06-26 RX ORDER — ATOGEPANT 60 MG/1
60 TABLET ORAL DAILY
Qty: 30 TABLET | Refills: 1 | Status: SHIPPED | OUTPATIENT
Start: 2024-06-26

## 2024-06-26 RX ORDER — LANCETS 30 GAUGE
EACH MISCELLANEOUS
Qty: 100 EACH | Refills: 3 | Status: SHIPPED | OUTPATIENT
Start: 2024-06-26

## 2024-06-26 RX ORDER — DOCUSATE SODIUM 100 MG/1
CAPSULE, LIQUID FILLED ORAL
Qty: 60 CAPSULE | Refills: 5 | Status: CANCELLED | OUTPATIENT
Start: 2024-06-26

## 2024-06-26 RX ORDER — QUETIAPINE FUMARATE 50 MG/1
50 TABLET, FILM COATED ORAL NIGHTLY
Qty: 90 TABLET | Refills: 3 | Status: SHIPPED | OUTPATIENT
Start: 2024-06-26

## 2024-06-26 RX ORDER — GLUCOSAMINE HCL/CHONDROITIN SU 500-400 MG
CAPSULE ORAL
Qty: 100 STRIP | Refills: 3 | Status: SHIPPED | OUTPATIENT
Start: 2024-06-26

## 2024-06-26 RX ORDER — PREGABALIN 75 MG/1
75 CAPSULE ORAL 2 TIMES DAILY
Qty: 60 CAPSULE | Refills: 2 | Status: SHIPPED | OUTPATIENT
Start: 2024-06-26 | End: 2024-09-24

## 2024-06-26 RX ORDER — ALBUTEROL SULFATE 2.5 MG/3ML
2.5 SOLUTION RESPIRATORY (INHALATION) EVERY 6 HOURS PRN
Qty: 120 EACH | Refills: 5 | Status: SHIPPED | OUTPATIENT
Start: 2024-06-26

## 2024-06-26 NOTE — TELEPHONE ENCOUNTER
From: Efren Tang  To: Dr. Karen Vance  Sent: 6/26/2024 2:58 PM EDT  Subject: Nurtec    Nertec is to replace my Divaproex er right ? The Pharmacy filled it so I am oziel guessing my insurance covers it When am I suppose to start it and how should I slowly stop my current one    Thank you Dr Vance And Staff    Alberto Alicea

## 2024-06-26 NOTE — PROGRESS NOTES
Declines shingles and tetanus booster today.  
    Social History     Tobacco Use    Smoking status: Former     Current packs/day: 0.00     Types: Cigarettes     Start date: 10/16/1992     Quit date: 2017     Years since quittin.0     Passive exposure: Current    Smokeless tobacco: Never    Tobacco comments:     handout given.    Substance Use Topics    Alcohol use: Never      Current Outpatient Medications   Medication Sig Dispense Refill    QUEtiapine (SEROQUEL) 50 MG tablet Take 1 tablet by mouth at bedtime 90 tablet 3    pregabalin (LYRICA) 75 MG capsule Take 1 capsule by mouth 2 times daily for 90 days. Max Daily Amount: 150 mg 60 capsule 2    blood glucose monitor strips Use to test blood glucose twice weekly. 100 strip 3    Lancets MISC Use daily and as needed to check blood glucose. Please dispense per patients insurance. 100 each 3    albuterol (PROVENTIL) (2.5 MG/3ML) 0.083% nebulizer solution Take 3 mLs by nebulization every 6 hours as needed for Wheezing or Shortness of Breath 120 each 5    rimegepant sulfate 75 MG TBDP Take 75 mg by mouth as needed (migraine) Max dose of 75 mg (1 tab) per 24 hours. 10 tablet 1    Atogepant (QULIPTA) 60 MG TABS Take 60 mg by mouth daily 30 tablet 1    loratadine (CLARITIN) 10 MG tablet Take 1 tablet by mouth daily 90 tablet 3    venlafaxine (EFFEXOR XR) 150 MG extended release capsule Take 1 capsule by mouth daily Take with 75 mg capsule for total daily dose of 225 mg daily 90 capsule 3    venlafaxine (EFFEXOR XR) 75 MG extended release capsule Take 1 capsule by mouth daily Take with 150 mg capsule for total daily dose of 225 mg daily. 90 capsule 3    docusate sodium (COLACE) 100 MG capsule Take 1 capsule by mouth 1-2x's daily as needed for constipation. 60 capsule 5    divalproex (DEPAKOTE ER) 500 MG extended release tablet Take 1 tablet by mouth in the morning and at bedtime 180 tablet 3    famotidine (PEPCID) 20 MG tablet Take 1 tablet by mouth 2 times daily 180 tablet 3    fluticasone-salmeterol (ADVAIR)

## 2024-07-18 ENCOUNTER — PATIENT MESSAGE (OUTPATIENT)
Dept: FAMILY MEDICINE CLINIC | Age: 49
End: 2024-07-18

## 2024-07-19 NOTE — TELEPHONE ENCOUNTER
From: Efren Tang  To: Dr. Karen Vance  Sent: 7/18/2024 12:40 PM EDT  Subject: Needing Medicine for the cat scraches    as we talked about at my last appointment i am needing antibiotics my legs are all scratched up casi says it looks like theres an infection starting from the cats clawing at my legs weve been trying to rehome them but no avail

## 2024-07-31 DIAGNOSIS — G43.009 MIGRAINE WITHOUT AURA AND WITHOUT STATUS MIGRAINOSUS, NOT INTRACTABLE: ICD-10-CM

## 2024-08-04 DIAGNOSIS — M54.2 CHRONIC MIDLINE POSTERIOR NECK PAIN: ICD-10-CM

## 2024-08-04 DIAGNOSIS — G89.29 CHRONIC MIDLINE POSTERIOR NECK PAIN: ICD-10-CM

## 2024-08-05 NOTE — TELEPHONE ENCOUNTER
Per [facility], last fill 6/26, quantity 60 for 30 days.        Efren called requesting a refill of the below medication which has been pended for you:     Requested Prescriptions     Pending Prescriptions Disp Refills    pregabalin (LYRICA) 75 MG capsule 60 capsule 2     Sig: Take 1 capsule by mouth 2 times daily for 90 days. Max Daily Amount: 150 mg       Last Appointment Date: 6/26/2024  Next Appointment Date: 8/5/2024    Allergies   Allergen Reactions    Keflex [Cephalexin] Other (See Comments)     Migraines, upset stomach     Naproxen      ulcers

## 2024-08-08 ENCOUNTER — APPOINTMENT (OUTPATIENT)
Dept: GENERAL RADIOLOGY | Age: 49
End: 2024-08-08
Payer: MEDICARE

## 2024-08-08 ENCOUNTER — HOSPITAL ENCOUNTER (EMERGENCY)
Age: 49
Discharge: HOME | End: 2024-08-08
Attending: EMERGENCY MEDICINE
Payer: MEDICARE

## 2024-08-08 VITALS
TEMPERATURE: 98.1 F | HEIGHT: 71 IN | RESPIRATION RATE: 18 BRPM | DIASTOLIC BLOOD PRESSURE: 104 MMHG | BODY MASS INDEX: 39.2 KG/M2 | OXYGEN SATURATION: 94 % | WEIGHT: 280 LBS | SYSTOLIC BLOOD PRESSURE: 147 MMHG | HEART RATE: 80 BPM

## 2024-08-08 DIAGNOSIS — M25.561 ACUTE PAIN OF RIGHT KNEE: Primary | ICD-10-CM

## 2024-08-08 PROCEDURE — 6370000000 HC RX 637 (ALT 250 FOR IP): Performed by: EMERGENCY MEDICINE

## 2024-08-08 PROCEDURE — 99283 EMERGENCY DEPT VISIT LOW MDM: CPT

## 2024-08-08 PROCEDURE — 73562 X-RAY EXAM OF KNEE 3: CPT

## 2024-08-08 RX ORDER — PREDNISONE 50 MG/1
50 TABLET ORAL DAILY
Qty: 5 TABLET | Refills: 0 | Status: SHIPPED | OUTPATIENT
Start: 2024-08-08 | End: 2024-08-13

## 2024-08-08 RX ORDER — PREDNISONE 20 MG/1
50 TABLET ORAL ONCE
Status: COMPLETED | OUTPATIENT
Start: 2024-08-08 | End: 2024-08-08

## 2024-08-08 RX ADMIN — PREDNISONE 50 MG: 20 TABLET ORAL at 23:12

## 2024-08-08 ASSESSMENT — PAIN SCALES - GENERAL: PAINLEVEL_OUTOF10: 9

## 2024-08-08 ASSESSMENT — PAIN DESCRIPTION - ORIENTATION: ORIENTATION: LEFT

## 2024-08-08 ASSESSMENT — PAIN - FUNCTIONAL ASSESSMENT
PAIN_FUNCTIONAL_ASSESSMENT: 0-10
PAIN_FUNCTIONAL_ASSESSMENT: PREVENTS OR INTERFERES SOME ACTIVE ACTIVITIES AND ADLS

## 2024-08-08 ASSESSMENT — PAIN DESCRIPTION - DESCRIPTORS: DESCRIPTORS: ACHING

## 2024-08-08 ASSESSMENT — LIFESTYLE VARIABLES
HOW OFTEN DO YOU HAVE A DRINK CONTAINING ALCOHOL: NEVER
HOW MANY STANDARD DRINKS CONTAINING ALCOHOL DO YOU HAVE ON A TYPICAL DAY: PATIENT DOES NOT DRINK

## 2024-08-08 ASSESSMENT — PAIN DESCRIPTION - LOCATION: LOCATION: KNEE

## 2024-08-09 RX ORDER — PREGABALIN 75 MG/1
75 CAPSULE ORAL 2 TIMES DAILY
Qty: 60 CAPSULE | Refills: 2 | Status: SHIPPED | OUTPATIENT
Start: 2024-08-09 | End: 2024-11-07

## 2024-08-09 NOTE — TELEPHONE ENCOUNTER
Controlled Substance Monitoring:    Acute and Chronic Pain Monitoring:   RX Monitoring Periodic Controlled Substance Monitoring Chronic Pain > 80 MEDD   8/9/2024  10:55 AM No signs of potential drug abuse or diversion identified. Obtained or confirmed \"Medication Contract\" on file.

## 2024-08-09 NOTE — ED PROVIDER NOTES
[facility] ED  [address] [address]  Phone: [phone]      Pt Name: Efren Tang  MRN:[id number]  Birthdate 1975  Date of evaluation: 8/8/2024      CHIEF COMPLAINT       Chief Complaint   Patient presents with    Knee Pain     Started two night ago. Pain worsening and needed to come in  9/10. Left knee pain. Denies injury           HISTORY OF PRESENT ILLNESS    48-year-old male presents to the emergency department today complaint of left knee pain.  Pain is going on over the past 2 days.  Denies any known injury or trauma.  Pain is diffuse throughout his entire knee and does not radiate anywhere.  Ambulation makes pain worse.  Nothing is pain better.  He tried over-the-counter medication without any improvement.  No recent long car or plane trips anywhere.  No calf tenderness.  No chest pain or shortness of breath.  Is been no other contemporaneous evaluation management of the symptoms prior to arrival.     REVIEWOF SYSTEMS     Review of Systems   All other systems reviewed and are negative.        PAST MEDICAL HISTORY    has a past medical history of ADHD (attention deficit hyperactivity disorder), Allergic rhinitis, Anxiety, Asthma, Bipolar disorder ([facility]), Chronic back pain, Chronic obstructive pulmonary disease (COPD) ([facility]), Depression, Erectile dysfunction, Headache(784.0), Osteoarthritis, TMJ (dislocation of temporomandibular joint), and Torticollis.    SURGICAL HISTORY      has a past surgical history that includes Tonsillectomy.    CURRENTMEDICATIONS       Previous Medications    ALBUTEROL (PROVENTIL) (2.5 MG/3ML) 0.083% NEBULIZER SOLUTION    Take 3 mLs by nebulization every 6 hours as needed for Wheezing or Shortness of Breath    ASPIRIN (ASPIRIN 81) 81 MG EC TABLET    Take 1 tablet by mouth daily    ATOGEPANT (QULIPTA) 60 MG TABS    Take 60 mg by mouth daily    ATORVASTATIN (LIPITOR) 10 MG TABLET    Take 1 tablet by mouth daily    BACLOFEN (LIORESAL) 10 MG TABLET

## 2024-08-19 ENCOUNTER — HOSPITAL ENCOUNTER (EMERGENCY)
Age: 49
Discharge: HOME | End: 2024-08-19
Attending: EMERGENCY MEDICINE
Payer: MEDICARE

## 2024-08-19 VITALS
HEART RATE: 81 BPM | HEIGHT: 71 IN | OXYGEN SATURATION: 99 % | RESPIRATION RATE: 18 BRPM | TEMPERATURE: 98 F | WEIGHT: 280 LBS | BODY MASS INDEX: 39.2 KG/M2

## 2024-08-19 DIAGNOSIS — R51.9 NONINTRACTABLE EPISODIC HEADACHE, UNSPECIFIED HEADACHE TYPE: Primary | ICD-10-CM

## 2024-08-19 PROCEDURE — 99284 EMERGENCY DEPT VISIT MOD MDM: CPT

## 2024-08-19 PROCEDURE — 96372 THER/PROPH/DIAG INJ SC/IM: CPT

## 2024-08-19 PROCEDURE — 6360000002 HC RX W HCPCS: Performed by: EMERGENCY MEDICINE

## 2024-08-19 RX ORDER — KETOROLAC TROMETHAMINE 30 MG/ML
30 INJECTION, SOLUTION INTRAMUSCULAR; INTRAVENOUS ONCE
Status: COMPLETED | OUTPATIENT
Start: 2024-08-19 | End: 2024-08-19

## 2024-08-19 RX ADMIN — KETOROLAC TROMETHAMINE 30 MG: 30 INJECTION, SOLUTION INTRAMUSCULAR at 01:06

## 2024-08-19 ASSESSMENT — PAIN DESCRIPTION - PAIN TYPE: TYPE: ACUTE PAIN

## 2024-08-19 ASSESSMENT — PAIN DESCRIPTION - DESCRIPTORS: DESCRIPTORS: ACHING

## 2024-08-19 ASSESSMENT — PAIN - FUNCTIONAL ASSESSMENT
PAIN_FUNCTIONAL_ASSESSMENT: 0-10
PAIN_FUNCTIONAL_ASSESSMENT: ACTIVITIES ARE NOT PREVENTED

## 2024-08-19 ASSESSMENT — PAIN SCALES - GENERAL: PAINLEVEL_OUTOF10: 8

## 2024-08-19 ASSESSMENT — PAIN DESCRIPTION - LOCATION: LOCATION: HEAD

## 2024-08-19 NOTE — ED PROVIDER NOTES
B-12 (CYANOCOBALAMIN) 1000 MCG TABLET    Take 1 tablet by mouth daily       ALLERGIES     is allergic to keflex [cephalexin] and naproxen.    FAMILY HISTORY     He indicated that the status of his mother is unknown. He indicated that the status of his father is unknown. He indicated that the status of his sister is unknown. He indicated that the status of his brother is unknown. He indicated that the status of his neg hx is unknown.     family history includes High Blood Pressure in his father and mother; Learning Disabilities in his brother and sister; Stroke in his father and mother; Substance Abuse in his father and sister.    SOCIAL HISTORY      reports that he quit smoking about 7 years ago. His smoking use included cigarettes. He started smoking about 31 years ago. He has been exposed to tobacco smoke. He has never used smokeless tobacco. He reports that he does not drink alcohol and does not use drugs.    PHYSICAL EXAM     INITIAL VITALS:  height is 1.803 m (5' 11\") and weight is 127 kg (280 lb). His tympanic temperature is 98 °F (36.7 °C). His pulse is 81. His respiration is 18 and oxygen saturation is 99%.      General: Patient alert nontoxic-appearing male in no apparent distress  HEENT: Head is atraumatic conjunctiva clear pupils are equal reactive globes are soft no tenderness over temporal arteries  Neck: Supple  Respiratory: Lung sounds are clear bilateral  Cardiac: Heart is regular rate and rhythm  Neuro: No gross focal neurological deficits at bedside exam    DIFFERENTIAL DIAGNOSIS/ MDM:     Migraine    DIAGNOSTIC RESULTS     EKG: All EKG's are interpreted by the Emergency Department Physician who either signs or Co-signs this chart in the absence of a cardiologist.        RADIOLOGY:   I directly visualized the following  images and reviewed the radiologist interpretations:  No orders to display         LABS:  Labs Reviewed - No data to display      EMERGENCY DEPARTMENT COURSE:   Vitals:    Vitals:

## 2024-08-20 ENCOUNTER — CARE COORDINATION (OUTPATIENT)
Dept: CARE COORDINATION | Age: 49
End: 2024-08-20

## 2024-08-20 NOTE — CARE COORDINATION
Ambulatory Care Coordination Note     8/20/2024 1:32 PM     Patient outreach attempt by this ACM today to ED F/U call and offer care management services. ACM was unable to reach the patient by telephone today; left voice message requesting a return phone call to this ACM.     ACM: Monika Alvarez, GALLO     Care Summary Note:     Alberto was seen at [facility] ED 8/19/2024- HA.     8/20/2024- 1:37 pm  Left HIPAA compliant voice message requesting return call @ [phone] re: ACM F/U call.       PCP/Specialist follow up:   Future Appointments         Provider Specialty Dept Phone    10/28/2024 10:40 AM Karen Vance, DO Family Medicine [phone]            Follow Up:   Plan for next ACM outreach in approximately 1-2 days  to complete:  - outreach attempt to offer care management services  ED F/U call .

## 2024-08-22 ENCOUNTER — CARE COORDINATION (OUTPATIENT)
Dept: CARE COORDINATION | Age: 49
End: 2024-08-22

## 2024-08-22 NOTE — CARE COORDINATION
Ambulatory Care Coordination Note     8/22/2024 2:52 PM     Patient outreach attempt by this ACM today to ED F/U call and to offer care management services. AC was unable to reach the patient by telephone today; left voice message requesting a return phone call to this ACM.     ACM: Monika Alvarez, RN     Care Summary Note:     Alberto was seen at [facility] ED 8/19/2024- HA.      8/20/2024- 1:37 pm  Left HIPAA compliant voice message requesting return call @ [phone] re: AC F/U call.   8/22/2024-  Left final HIPAA compliant voice message requesting return call @ [phone].        PCP/Specialist follow up:   Future Appointments         Provider Specialty Dept Phone    10/28/2024 10:40 AM Karen Vance, DO Family Medicine [phone]            Follow Up:   No further outreaches at this time.

## 2024-08-29 ENCOUNTER — PATIENT MESSAGE (OUTPATIENT)
Dept: FAMILY MEDICINE CLINIC | Age: 49
End: 2024-08-29

## 2024-08-31 ENCOUNTER — HOSPITAL ENCOUNTER (EMERGENCY)
Age: 49
Discharge: HOME | End: 2024-08-31
Attending: EMERGENCY MEDICINE
Payer: MEDICARE

## 2024-08-31 VITALS
DIASTOLIC BLOOD PRESSURE: 85 MMHG | SYSTOLIC BLOOD PRESSURE: 145 MMHG | OXYGEN SATURATION: 96 % | BODY MASS INDEX: 37.66 KG/M2 | WEIGHT: 270 LBS | RESPIRATION RATE: 16 BRPM | HEART RATE: 75 BPM | TEMPERATURE: 98 F

## 2024-08-31 DIAGNOSIS — L03.031 CELLULITIS OF TOE OF RIGHT FOOT: Primary | ICD-10-CM

## 2024-08-31 PROCEDURE — 99283 EMERGENCY DEPT VISIT LOW MDM: CPT

## 2024-08-31 RX ORDER — SULFAMETHOXAZOLE/TRIMETHOPRIM 800-160 MG
1 TABLET ORAL 2 TIMES DAILY
Qty: 14 TABLET | Refills: 0 | Status: SHIPPED | OUTPATIENT
Start: 2024-08-31 | End: 2024-09-07

## 2024-08-31 ASSESSMENT — ENCOUNTER SYMPTOMS
VOMITING: 0
SHORTNESS OF BREATH: 0
COUGH: 0
SORE THROAT: 0
BACK PAIN: 0
NAUSEA: 0

## 2024-08-31 ASSESSMENT — PAIN - FUNCTIONAL ASSESSMENT
PAIN_FUNCTIONAL_ASSESSMENT: NONE - DENIES PAIN
PAIN_FUNCTIONAL_ASSESSMENT: NONE - DENIES PAIN

## 2024-08-31 NOTE — ED PROVIDER NOTES
[facility]  eMERGENCY dEPARTMENT eNCOUnter      Pt Name: Efren Tang  MRN: [id number]  Birthdate 1975  Date of evaluation: 8/31/2024      CHIEF COMPLAINT       Chief Complaint   Patient presents with    Toe Injury         HISTORY OF PRESENT ILLNESS    Efren Tang is a 48 y.o. male who presents with right great toe redness.  Patient said he bumped it and it bled underneath the nail now is got a little bit of redness to it he is afraid it is infected he has had there is no pain currently he is able to move the foot without any problem  There is been no fevers chills cough or shortness of breath    REVIEW OF SYSTEMS         Review of Systems   Constitutional:  Negative for chills, fatigue and fever.   HENT:  Negative for congestion and sore throat.    Respiratory:  Negative for cough and shortness of breath.    Gastrointestinal:  Negative for nausea and vomiting.   Musculoskeletal:  Negative for back pain.        Right great toe redness         PAST MEDICAL HISTORY    has a past medical history of ADHD (attention deficit hyperactivity disorder), Allergic rhinitis, Anxiety, Asthma, Bipolar disorder ([facility]), Chronic back pain, Chronic obstructive pulmonary disease (COPD) ([facility]), Depression, Erectile dysfunction, Headache(784.0), Osteoarthritis, TMJ (dislocation of temporomandibular joint), and Torticollis.    SURGICAL HISTORY      has a past surgical history that includes Tonsillectomy.    CURRENT MEDICATIONS       Previous Medications    ALBUTEROL (PROVENTIL) (2.5 MG/3ML) 0.083% NEBULIZER SOLUTION    Take 3 mLs by nebulization every 6 hours as needed for Wheezing or Shortness of Breath    ASPIRIN (ASPIRIN 81) 81 MG EC TABLET    Take 1 tablet by mouth daily    ATOGEPANT (QULIPTA) 60 MG TABS    Take 60 mg by mouth daily    ATORVASTATIN (LIPITOR) 10 MG TABLET    Take 1 tablet by mouth daily    BACLOFEN (LIORESAL) 10 MG TABLET    Take 1 tablet by mouth 3 times daily as needed (muscle spasm or pain)

## 2024-09-03 ENCOUNTER — PATIENT MESSAGE (OUTPATIENT)
Dept: FAMILY MEDICINE CLINIC | Age: 49
End: 2024-09-03

## 2024-09-09 DIAGNOSIS — G47.00 INSOMNIA, UNSPECIFIED TYPE: ICD-10-CM

## 2024-09-09 DIAGNOSIS — M54.2 CHRONIC MIDLINE POSTERIOR NECK PAIN: ICD-10-CM

## 2024-09-09 DIAGNOSIS — G89.29 CHRONIC MIDLINE POSTERIOR NECK PAIN: ICD-10-CM

## 2024-09-09 RX ORDER — PREGABALIN 75 MG/1
75 CAPSULE ORAL 2 TIMES DAILY
Qty: 60 CAPSULE | Refills: 2 | Status: CANCELLED | OUTPATIENT
Start: 2024-09-09 | End: 2024-12-08

## 2024-09-09 RX ORDER — QUETIAPINE FUMARATE 50 MG/1
50 TABLET, FILM COATED ORAL NIGHTLY
Qty: 90 TABLET | Refills: 3 | Status: CANCELLED | OUTPATIENT
Start: 2024-09-09

## 2024-09-10 ENCOUNTER — PATIENT MESSAGE (OUTPATIENT)
Dept: FAMILY MEDICINE CLINIC | Age: 49
End: 2024-09-10

## 2024-09-11 ENCOUNTER — OFFICE VISIT (OUTPATIENT)
Dept: PRIMARY CARE CLINIC | Age: 49
End: 2024-09-11
Payer: MEDICARE

## 2024-09-11 VITALS
BODY MASS INDEX: 37.24 KG/M2 | WEIGHT: 267 LBS | HEART RATE: 74 BPM | SYSTOLIC BLOOD PRESSURE: 128 MMHG | OXYGEN SATURATION: 98 % | DIASTOLIC BLOOD PRESSURE: 88 MMHG | TEMPERATURE: 96.4 F

## 2024-09-11 DIAGNOSIS — L03.031 CELLULITIS OF GREAT TOE OF RIGHT FOOT: Primary | ICD-10-CM

## 2024-09-11 PROCEDURE — 99212 OFFICE O/P EST SF 10 MIN: CPT

## 2024-09-11 RX ORDER — DOXYCYCLINE HYCLATE 100 MG
100 TABLET ORAL 2 TIMES DAILY
Qty: 20 TABLET | Refills: 0 | Status: SHIPPED | OUTPATIENT
Start: 2024-09-11 | End: 2024-09-21

## 2024-09-11 ASSESSMENT — ENCOUNTER SYMPTOMS: COLOR CHANGE: 1

## 2024-09-18 ENCOUNTER — OFFICE VISIT (OUTPATIENT)
Dept: PODIATRY | Age: 49
End: 2024-09-18
Payer: MEDICARE

## 2024-09-18 VITALS
DIASTOLIC BLOOD PRESSURE: 88 MMHG | WEIGHT: 268.2 LBS | RESPIRATION RATE: 20 BRPM | HEART RATE: 84 BPM | SYSTOLIC BLOOD PRESSURE: 132 MMHG | BODY MASS INDEX: 37.41 KG/M2

## 2024-09-18 DIAGNOSIS — B35.1 DERMATOPHYTOSIS OF NAIL: ICD-10-CM

## 2024-09-18 DIAGNOSIS — L60.8 ACQUIRED DEFORMITY OF NAIL: Primary | ICD-10-CM

## 2024-09-18 DIAGNOSIS — M79.674 PAIN OF TOE OF RIGHT FOOT: ICD-10-CM

## 2024-09-18 PROCEDURE — 3017F COLORECTAL CA SCREEN DOC REV: CPT | Performed by: PODIATRIST

## 2024-09-18 PROCEDURE — 1036F TOBACCO NON-USER: CPT | Performed by: PODIATRIST

## 2024-09-18 PROCEDURE — G8427 DOCREV CUR MEDS BY ELIG CLIN: HCPCS | Performed by: PODIATRIST

## 2024-09-18 PROCEDURE — 99213 OFFICE O/P EST LOW 20 MIN: CPT | Performed by: PODIATRIST

## 2024-09-18 PROCEDURE — G8417 CALC BMI ABV UP PARAM F/U: HCPCS | Performed by: PODIATRIST

## 2024-09-18 PROCEDURE — 11750 EXCISION NAIL&NAIL MATRIX: CPT | Performed by: PODIATRIST

## 2024-09-18 RX ORDER — LIDOCAINE HYDROCHLORIDE 10 MG/ML
6 INJECTION, SOLUTION EPIDURAL; INFILTRATION; INTRACAUDAL; PERINEURAL ONCE
Status: COMPLETED | OUTPATIENT
Start: 2024-09-18 | End: 2024-09-18

## 2024-09-18 RX ORDER — SILVER SULFADIAZINE 10 MG/G
CREAM TOPICAL DAILY
Status: SHIPPED | OUTPATIENT
Start: 2024-09-18

## 2024-09-18 RX ORDER — SILVER SULFADIAZINE 10 MG/G
CREAM TOPICAL
Qty: 30 G | Refills: 1 | Status: SHIPPED | OUTPATIENT
Start: 2024-09-18

## 2024-09-18 RX ADMIN — LIDOCAINE HYDROCHLORIDE 6 ML: 10 INJECTION, SOLUTION EPIDURAL; INFILTRATION; INTRACAUDAL; PERINEURAL at 09:42

## 2024-09-18 RX ADMIN — SILVER SULFADIAZINE 5 G: 10 CREAM TOPICAL at 09:44

## 2024-09-30 ENCOUNTER — HOSPITAL ENCOUNTER (EMERGENCY)
Age: 49
Discharge: HOME | End: 2024-09-30
Attending: EMERGENCY MEDICINE
Payer: MEDICARE

## 2024-09-30 VITALS
BODY MASS INDEX: 37.52 KG/M2 | TEMPERATURE: 98 F | DIASTOLIC BLOOD PRESSURE: 106 MMHG | HEIGHT: 71 IN | RESPIRATION RATE: 18 BRPM | OXYGEN SATURATION: 96 % | HEART RATE: 70 BPM | SYSTOLIC BLOOD PRESSURE: 158 MMHG | WEIGHT: 268 LBS

## 2024-09-30 DIAGNOSIS — M79.674 PAIN OF TOE OF RIGHT FOOT: Primary | ICD-10-CM

## 2024-09-30 PROCEDURE — 99283 EMERGENCY DEPT VISIT LOW MDM: CPT

## 2024-09-30 PROCEDURE — 6370000000 HC RX 637 (ALT 250 FOR IP): Performed by: EMERGENCY MEDICINE

## 2024-09-30 RX ORDER — ACETAMINOPHEN 325 MG/1
650 TABLET ORAL ONCE
Status: COMPLETED | OUTPATIENT
Start: 2024-09-30 | End: 2024-09-30

## 2024-09-30 RX ORDER — DOXYCYCLINE 50 MG/1
100 CAPSULE ORAL EVERY 12 HOURS SCHEDULED
Status: DISCONTINUED | OUTPATIENT
Start: 2024-09-30 | End: 2024-09-30

## 2024-09-30 RX ORDER — DOXYCYCLINE 50 MG/1
100 CAPSULE ORAL ONCE
Status: COMPLETED | OUTPATIENT
Start: 2024-09-30 | End: 2024-09-30

## 2024-09-30 RX ADMIN — DOXYCYCLINE 100 MG: 50 CAPSULE ORAL at 06:49

## 2024-09-30 RX ADMIN — ACETAMINOPHEN 650 MG: 325 TABLET ORAL at 06:49

## 2024-09-30 ASSESSMENT — PAIN DESCRIPTION - PAIN TYPE: TYPE: ACUTE PAIN

## 2024-09-30 ASSESSMENT — PAIN - FUNCTIONAL ASSESSMENT
PAIN_FUNCTIONAL_ASSESSMENT: 0-10
PAIN_FUNCTIONAL_ASSESSMENT: ACTIVITIES ARE NOT PREVENTED

## 2024-09-30 ASSESSMENT — PAIN DESCRIPTION - LOCATION: LOCATION: TOE (COMMENT WHICH ONE)

## 2024-09-30 ASSESSMENT — PAIN DESCRIPTION - ORIENTATION: ORIENTATION: RIGHT

## 2024-09-30 ASSESSMENT — PAIN DESCRIPTION - DESCRIPTORS: DESCRIPTORS: ACHING

## 2024-09-30 ASSESSMENT — PAIN SCALES - GENERAL: PAINLEVEL_OUTOF10: 7

## 2024-09-30 NOTE — ED PROVIDER NOTES
Supple without lymphadenopathy, no posterior midline neck tenderness to palpation  Cardiovascular: Regular rhythm and rate no S3, S4, or murmurs  Respiratory: Clear to auscultation bilaterally no wheezes, rhonchi, rales, no respiratory distress  Gastrointestinal: Soft, nontender, nondistended, positive bowel sounds.  No rebound, rigidity, or guarding.  Musculoskeletal: Examination the patient's great toe on the right reveals a toenail that has been removed there is some slight redness in the dorsum of the toe but there is no drainage or pus  Neurologic: Moving all 4 extremities without difficulty there are no gross focal neurologic deficits  Skin: Warm and dry    DIFFERENTIAL DIAGNOSIS/ MDM:     Toe pain status post toenail removal no obvious sign of infection.  Patient referred back to podiatry.    DIAGNOSTIC RESULTS     EKG: All EKG's are interpreted by the Emergency Department Physician who either signs or Co-signs this chart in the absence of a cardiologist.        Not indicated unless otherwise documented above    LABS:  No results found for this visit on 09/30/24.    Not indicated unless otherwise documented above    RADIOLOGY:   I reviewed the radiologist interpretations:  No orders to display       Not indicated unless otherwise documented above    EMERGENCY DEPARTMENT COURSE:     The patient was given the following medications:  Orders Placed This Encounter   Medications    DISCONTD: doxycycline monohydrate (MONODOX) capsule 100 mg     Order Specific Question:   Antimicrobial Indications     Answer:   Skin and Soft Tissue Infection    acetaminophen (TYLENOL) tablet 650 mg    doxycycline hyclate (VIBRA-TABS) tablet 100 mg     Order Specific Question:   Antimicrobial Indications     Answer:   Skin and Soft Tissue Infection        Vitals:    Vitals:    09/30/24 0623 09/30/24 0630   BP: (!) 181/114 (!) 158/106   Pulse: 80 70   Resp: 18 18   Temp: 98 °F (36.7 °C)    TempSrc: Tympanic    SpO2: 98% 96%   Weight:

## 2024-09-30 NOTE — DISCHARGE INSTRUCTIONS
Call the podiatrist later on today to see what they would like to do we have given you 1 dose of pain medicine and 1 dose of antibiotic and you should follow-up with them.  Return to the emergency department if you are worse in any way.

## 2024-10-01 ENCOUNTER — TELEPHONE (OUTPATIENT)
Dept: PODIATRY | Age: 49
End: 2024-10-01

## 2024-10-01 RX ORDER — DOXYCYCLINE 100 MG/1
100 CAPSULE ORAL 2 TIMES DAILY
Qty: 20 CAPSULE | Refills: 0 | Status: SHIPPED | OUTPATIENT
Start: 2024-10-01 | End: 2024-10-11

## 2024-10-01 NOTE — TELEPHONE ENCOUNTER
Patient notified that a script was sent into the pharmacy and to keep his scheduled appointment with , patient states understanding.

## 2024-10-01 NOTE — TELEPHONE ENCOUNTER
Spoke with patient he states his toe is red and warm to the touch, the toe did have a small amount or red drainage on the band aid, he does not have a fever. Patient states he is doing the soaks and using the cream as instructed. He uses [facility] and patient does have an appointment Wednesday for his routine care.

## 2024-10-02 ENCOUNTER — TELEPHONE (OUTPATIENT)
Dept: SLEEP CENTER | Age: 49
End: 2024-10-02

## 2024-10-02 ENCOUNTER — OFFICE VISIT (OUTPATIENT)
Dept: PODIATRY | Age: 49
End: 2024-10-02
Payer: MEDICARE

## 2024-10-02 VITALS
HEIGHT: 71 IN | WEIGHT: 265 LBS | DIASTOLIC BLOOD PRESSURE: 84 MMHG | SYSTOLIC BLOOD PRESSURE: 136 MMHG | HEART RATE: 84 BPM | BODY MASS INDEX: 37.1 KG/M2

## 2024-10-02 DIAGNOSIS — M79.675 PAIN IN TOES OF BOTH FEET: ICD-10-CM

## 2024-10-02 DIAGNOSIS — M79.674 PAIN IN TOES OF BOTH FEET: ICD-10-CM

## 2024-10-02 DIAGNOSIS — S91.109A OPEN WOUND OF TOE, INITIAL ENCOUNTER: ICD-10-CM

## 2024-10-02 DIAGNOSIS — B35.1 DERMATOPHYTOSIS OF NAIL: Primary | ICD-10-CM

## 2024-10-02 PROCEDURE — 99999 PR OFFICE/OUTPT VISIT,PROCEDURE ONLY: CPT | Performed by: PODIATRIST

## 2024-10-02 PROCEDURE — 97597 DBRDMT OPN WND 1ST 20 CM/<: CPT | Performed by: PODIATRIST

## 2024-10-02 PROCEDURE — 11720 DEBRIDE NAIL 1-5: CPT | Performed by: PODIATRIST

## 2024-10-02 NOTE — PROGRESS NOTES
Subjective:  Patient presents to [facility] today for pain right big toe.  Patient saw a redness recently.  Patient went to the ER for this.  Was given 1 dose of Tylenol and 1 dose of doxycycline.  Prescription is sent in for him from this office.  Also presents for routine footcare.  Allergies   Allergen Reactions    Keflex [Cephalexin] Other (See Comments)     Migraines, upset stomach     Naproxen      ulcers       Past Medical History:   Diagnosis Date    ADHD (attention deficit hyperactivity disorder)     don't remember    Allergic rhinitis     Anxiety     Asthma     Bipolar disorder ([facility])     Diagnosed in 1998    Chronic back pain     Chronic obstructive pulmonary disease (COPD) ([facility])     Depression     Erectile dysfunction     Headache(784.0)     Osteoarthritis     TMJ (dislocation of temporomandibular joint)     Torticollis        Prior to Admission medications    Medication Sig Start Date End Date Taking? Authorizing Provider   doxycycline hyclate (VIBRAMYCIN) 100 MG capsule Take 1 capsule by mouth 2 times daily for 10 days 10/1/24 10/11/24 Yes Brandyn Holguin DPM   silver sulfADIAZINE (SILVADENE) 1 % cream Apply topically daily. 9/18/24  Yes Brandyn Holguin DPM   pregabalin (LYRICA) 75 MG capsule Take 1 capsule by mouth 2 times daily for 90 days. Max Daily Amount: 150 mg 8/9/24 11/7/24 Yes Karen Vance, DO   QUEtiapine (SEROQUEL) 50 MG tablet Take 1 tablet by mouth at bedtime 6/26/24  Yes Karen Vance, DO   blood glucose monitor strips Use to test blood glucose twice weekly. 6/26/24  Yes Karen Vance DO   Lancets MISC Use daily and as needed to check blood glucose. Please dispense per patients insurance. 6/26/24  Yes Karen Vance, DO   albuterol (PROVENTIL) (2.5 MG/3ML) 0.083% nebulizer solution Take 3 mLs by nebulization every 6 hours as needed for Wheezing or Shortness of Breath 6/26/24  Yes Karen Vance, DO   rimegepant sulfate 75 MG TBDP Take 75 mg by mouth as needed

## 2024-10-02 NOTE — TELEPHONE ENCOUNTER
7/5/24 called pt left message to call back to schedule sleep study. 8/20/24 called pt left another message to call us with update. 10/2/24 called pt mailbox was full could not leave a message. If pt wants to do in the future will need a new H&P and orders.

## 2024-10-15 ENCOUNTER — HOSPITAL ENCOUNTER (OUTPATIENT)
Dept: MRI IMAGING | Age: 49
Discharge: HOME | End: 2024-10-17
Payer: MEDICARE

## 2024-10-15 DIAGNOSIS — M48.02 CERVICAL STENOSIS OF SPINE: ICD-10-CM

## 2024-10-15 DIAGNOSIS — M47.812 CERVICAL SPONDYLOSIS: ICD-10-CM

## 2024-10-15 PROCEDURE — 72141 MRI NECK SPINE W/O DYE: CPT

## 2024-10-16 DIAGNOSIS — G43.009 MIGRAINE WITHOUT AURA AND WITHOUT STATUS MIGRAINOSUS, NOT INTRACTABLE: ICD-10-CM

## 2024-10-16 NOTE — TELEPHONE ENCOUNTER
Efren called requesting a refill of the below medication which has been pended for you:     Requested Prescriptions     Pending Prescriptions Disp Refills    QULIPTA 60 MG TABS [Pharmacy Med Name: Qulipta 60 MG Oral Tablet] 30 tablet 0     Sig: Take 1 tablet by mouth once daily       Last Appointment Date: 6/26/2024  Next Appointment Date: 10/28/2024    Allergies   Allergen Reactions    Keflex [Cephalexin] Other (See Comments)     Migraines, upset stomach     Naproxen      ulcers

## 2024-10-19 RX ORDER — ATOGEPANT 60 MG/1
1 TABLET ORAL DAILY
Qty: 30 TABLET | Refills: 0 | Status: SHIPPED | OUTPATIENT
Start: 2024-10-19

## 2024-10-23 DIAGNOSIS — M25.511 RIGHT SHOULDER PAIN, UNSPECIFIED CHRONICITY: Primary | ICD-10-CM

## 2024-10-23 RX ORDER — PREDNISONE 20 MG/1
20 TABLET ORAL 2 TIMES DAILY
Qty: 10 TABLET | Refills: 0 | Status: SHIPPED | OUTPATIENT
Start: 2024-10-23 | End: 2024-10-28

## 2024-10-25 ENCOUNTER — HOSPITAL ENCOUNTER (OUTPATIENT)
Age: 49
Discharge: HOME | End: 2024-10-25
Payer: MEDICARE

## 2024-10-25 ENCOUNTER — PATIENT MESSAGE (OUTPATIENT)
Dept: FAMILY MEDICINE CLINIC | Age: 49
End: 2024-10-25

## 2024-10-25 DIAGNOSIS — E55.9 VITAMIN D DEFICIENCY: ICD-10-CM

## 2024-10-25 DIAGNOSIS — E78.2 HYPERCHOLESTEROLEMIA WITH HYPERTRIGLYCERIDEMIA: ICD-10-CM

## 2024-10-25 DIAGNOSIS — E03.9 HYPOTHYROIDISM, UNSPECIFIED TYPE: ICD-10-CM

## 2024-10-25 DIAGNOSIS — Z12.5 SCREENING FOR PROSTATE CANCER: ICD-10-CM

## 2024-10-25 LAB
25(OH)D3 SERPL-MCNC: 32.6 NG/ML (ref 30–100)
ALBUMIN SERPL-MCNC: 5.2 G/DL (ref 3.5–5.2)
ALBUMIN/GLOB SERPL: 1.7 {RATIO} (ref 1–2.5)
ALP SERPL-CCNC: 98 U/L (ref 40–129)
ALT SERPL-CCNC: 48 U/L (ref 5–41)
ANION GAP SERPL CALCULATED.3IONS-SCNC: 10 MMOL/L (ref 9–17)
AST SERPL-CCNC: 32 U/L
BILIRUB SERPL-MCNC: 0.6 MG/DL (ref 0.3–1.2)
BUN SERPL-MCNC: 11 MG/DL (ref 6–20)
BUN/CREAT SERPL: 16 (ref 9–20)
CALCIUM SERPL-MCNC: 10 MG/DL (ref 8.6–10.4)
CHLORIDE SERPL-SCNC: 103 MMOL/L (ref 98–107)
CHOLEST SERPL-MCNC: 190 MG/DL (ref 0–199)
CHOLESTEROL/HDL RATIO: 5
CO2 SERPL-SCNC: 28 MMOL/L (ref 20–31)
CREAT SERPL-MCNC: 0.7 MG/DL (ref 0.7–1.2)
GFR, ESTIMATED: >90 ML/MIN/1.73M2
GLUCOSE SERPL-MCNC: 119 MG/DL (ref 70–99)
HDLC SERPL-MCNC: 41 MG/DL
LDLC SERPL CALC-MCNC: 119 MG/DL (ref 0–100)
POTASSIUM SERPL-SCNC: 4.2 MMOL/L (ref 3.7–5.3)
PROT SERPL-MCNC: 8.3 G/DL (ref 6.4–8.3)
PSA SERPL-MCNC: 0.7 NG/ML (ref 0–4)
SODIUM SERPL-SCNC: 141 MMOL/L (ref 135–144)
TRIGL SERPL-MCNC: 151 MG/DL
TSH SERPL DL<=0.05 MIU/L-ACNC: 0.53 UIU/ML (ref 0.3–5)
VLDLC SERPL CALC-MCNC: 30 MG/DL

## 2024-10-25 PROCEDURE — 82306 VITAMIN D 25 HYDROXY: CPT

## 2024-10-25 PROCEDURE — 80053 COMPREHEN METABOLIC PANEL: CPT

## 2024-10-25 PROCEDURE — 80061 LIPID PANEL: CPT

## 2024-10-25 PROCEDURE — 36415 COLL VENOUS BLD VENIPUNCTURE: CPT

## 2024-10-25 PROCEDURE — G0103 PSA SCREENING: HCPCS

## 2024-10-25 PROCEDURE — 84443 ASSAY THYROID STIM HORMONE: CPT

## 2024-10-28 ENCOUNTER — OFFICE VISIT (OUTPATIENT)
Dept: FAMILY MEDICINE CLINIC | Age: 49
End: 2024-10-28

## 2024-10-28 VITALS
WEIGHT: 266 LBS | HEART RATE: 59 BPM | HEIGHT: 71 IN | SYSTOLIC BLOOD PRESSURE: 130 MMHG | RESPIRATION RATE: 16 BRPM | OXYGEN SATURATION: 99 % | BODY MASS INDEX: 37.24 KG/M2 | DIASTOLIC BLOOD PRESSURE: 80 MMHG | TEMPERATURE: 97.6 F

## 2024-10-28 DIAGNOSIS — Z12.5 SCREENING FOR PROSTATE CANCER: Primary | ICD-10-CM

## 2024-10-28 DIAGNOSIS — M54.2 CHRONIC MIDLINE POSTERIOR NECK PAIN: ICD-10-CM

## 2024-10-28 DIAGNOSIS — F32.0 CURRENT MILD EPISODE OF MAJOR DEPRESSIVE DISORDER, UNSPECIFIED WHETHER RECURRENT (HCC): ICD-10-CM

## 2024-10-28 DIAGNOSIS — G43.009 MIGRAINE WITHOUT AURA AND WITHOUT STATUS MIGRAINOSUS, NOT INTRACTABLE: ICD-10-CM

## 2024-10-28 DIAGNOSIS — R10.13 DYSPEPSIA: ICD-10-CM

## 2024-10-28 DIAGNOSIS — G89.29 CHRONIC MIDLINE POSTERIOR NECK PAIN: ICD-10-CM

## 2024-10-28 DIAGNOSIS — M54.2 CHRONIC NECK PAIN: ICD-10-CM

## 2024-10-28 DIAGNOSIS — F41.9 ANXIETY: ICD-10-CM

## 2024-10-28 DIAGNOSIS — G89.29 CHRONIC NECK PAIN: ICD-10-CM

## 2024-10-28 DIAGNOSIS — E03.9 HYPOTHYROIDISM, UNSPECIFIED TYPE: ICD-10-CM

## 2024-10-28 DIAGNOSIS — E55.9 VITAMIN D DEFICIENCY: ICD-10-CM

## 2024-10-28 DIAGNOSIS — M54.50 CHRONIC MIDLINE LOW BACK PAIN WITHOUT SCIATICA: ICD-10-CM

## 2024-10-28 DIAGNOSIS — R73.01 IMPAIRED FASTING GLUCOSE: ICD-10-CM

## 2024-10-28 DIAGNOSIS — J44.9 CHRONIC OBSTRUCTIVE PULMONARY DISEASE, UNSPECIFIED COPD TYPE (HCC): ICD-10-CM

## 2024-10-28 DIAGNOSIS — G89.29 CHRONIC MIDLINE LOW BACK PAIN WITHOUT SCIATICA: ICD-10-CM

## 2024-10-28 DIAGNOSIS — K59.03 DRUG-INDUCED CONSTIPATION: ICD-10-CM

## 2024-10-28 DIAGNOSIS — E78.2 HYPERCHOLESTEROLEMIA WITH HYPERTRIGLYCERIDEMIA: ICD-10-CM

## 2024-10-28 DIAGNOSIS — R52 GENERALIZED PAIN: ICD-10-CM

## 2024-10-28 RX ORDER — DIVALPROEX SODIUM 500 MG/1
500 TABLET, FILM COATED, EXTENDED RELEASE ORAL 2 TIMES DAILY
Qty: 180 TABLET | Refills: 3 | Status: CANCELLED | OUTPATIENT
Start: 2024-10-28

## 2024-10-28 RX ORDER — BACLOFEN 10 MG/1
10 TABLET ORAL NIGHTLY PRN
Qty: 90 TABLET | Refills: 3 | Status: SHIPPED | OUTPATIENT
Start: 2024-10-28

## 2024-10-28 RX ORDER — TIOTROPIUM BROMIDE 18 UG/1
CAPSULE ORAL; RESPIRATORY (INHALATION)
Qty: 90 CAPSULE | Refills: 3 | Status: SHIPPED | OUTPATIENT
Start: 2024-10-28

## 2024-10-28 RX ORDER — FAMOTIDINE 20 MG/1
20 TABLET, FILM COATED ORAL 2 TIMES DAILY
Qty: 180 TABLET | Refills: 3 | Status: SHIPPED | OUTPATIENT
Start: 2024-10-28

## 2024-10-28 RX ORDER — FLUTICASONE PROPIONATE AND SALMETEROL 100; 50 UG/1; UG/1
1 POWDER RESPIRATORY (INHALATION) EVERY 12 HOURS
Qty: 60 EACH | Refills: 11 | Status: SHIPPED | OUTPATIENT
Start: 2024-10-28

## 2024-10-28 RX ORDER — VENLAFAXINE HYDROCHLORIDE 150 MG/1
150 CAPSULE, EXTENDED RELEASE ORAL DAILY
Qty: 90 CAPSULE | Refills: 3 | Status: SHIPPED | OUTPATIENT
Start: 2024-10-28

## 2024-10-28 RX ORDER — DIVALPROEX SODIUM 250 MG/1
TABLET, FILM COATED, EXTENDED RELEASE ORAL
Qty: 15 TABLET | Refills: 0 | Status: SHIPPED | OUTPATIENT
Start: 2024-10-28

## 2024-10-28 RX ORDER — OMEGA-3-ACID ETHYL ESTERS 1 G/1
2 CAPSULE, LIQUID FILLED ORAL 2 TIMES DAILY
Qty: 360 CAPSULE | Refills: 3 | Status: SHIPPED | OUTPATIENT
Start: 2024-10-28

## 2024-10-28 RX ORDER — LEVOTHYROXINE SODIUM 50 UG/1
TABLET ORAL
Qty: 90 TABLET | Refills: 3 | Status: SHIPPED | OUTPATIENT
Start: 2024-10-28

## 2024-10-28 RX ORDER — IBUPROFEN 800 MG/1
800 TABLET, FILM COATED ORAL DAILY PRN
Qty: 90 TABLET | Refills: 3 | Status: SHIPPED | OUTPATIENT
Start: 2024-10-28

## 2024-10-28 RX ORDER — BUSPIRONE HYDROCHLORIDE 15 MG/1
15 TABLET ORAL 2 TIMES DAILY
Qty: 180 TABLET | Refills: 3 | Status: SHIPPED | OUTPATIENT
Start: 2024-10-28

## 2024-10-28 RX ORDER — VENLAFAXINE HYDROCHLORIDE 75 MG/1
75 CAPSULE, EXTENDED RELEASE ORAL DAILY
Qty: 90 CAPSULE | Refills: 3 | Status: SHIPPED | OUTPATIENT
Start: 2024-10-28

## 2024-10-28 RX ORDER — ATORVASTATIN CALCIUM 10 MG/1
10 TABLET, FILM COATED ORAL DAILY
Qty: 90 TABLET | Refills: 3 | Status: SHIPPED | OUTPATIENT
Start: 2024-10-28

## 2024-10-28 RX ORDER — DOCUSATE SODIUM 100 MG/1
CAPSULE, LIQUID FILLED ORAL
Qty: 60 CAPSULE | Refills: 5 | Status: CANCELLED | OUTPATIENT
Start: 2024-10-28

## 2024-10-28 NOTE — PROGRESS NOTES
[facility]  [address] [id number]  [phone]      Efren Tang is a 49 y.o. male who presents today for his medical conditions/complaints as noted below.  Efren Tang is c/o of Migraine (F/u) and Shoulder Pain (F/u)      HPI:     Pt here today for follow-up of migraines and review of labs.    Pt taking Vit D3 5000 IU daily    Taking Lipitor 10 mg daily  for HYPERLIPIDEMIA - stable.    Taking Seroquel 50 mg nightly - does not make him drowsy; can fall asleep for maybe 2 hours and then wakes up for the rest of the night.  Does not take anything else for sleep.    Taking Depakote  mg BID    Does not want to see Podiatry any longer           Past Medical History:   Diagnosis Date    ADHD (attention deficit hyperactivity disorder)     don't remember    Allergic rhinitis     Anxiety     Asthma     Bipolar disorder ([facility])     Diagnosed in     Chronic back pain     Chronic obstructive pulmonary disease (COPD) ([facility])     Depression     Erectile dysfunction     Headache(784.0)     Osteoarthritis     TMJ (dislocation of temporomandibular joint)     Torticollis       Past Surgical History:   Procedure Laterality Date    TONSILLECTOMY       Family History   Problem Relation Age of Onset    Stroke Mother     High Blood Pressure Mother     High Blood Pressure Father     Stroke Father     Substance Abuse Father     Learning Disabilities Sister     Substance Abuse Sister     Learning Disabilities Brother     Glaucoma Neg Hx     Diabetes Neg Hx     Cataracts Neg Hx      Social History     Tobacco Use    Smoking status: Former     Current packs/day: 0.00     Types: Cigarettes     Start date: 10/16/1992     Quit date: 2017     Years since quittin.3     Passive exposure: Current    Smokeless tobacco: Never    Tobacco comments:     handout given.    Substance Use Topics    Alcohol use: Never      Current Outpatient Medications   Medication Sig Dispense Refill

## 2024-10-29 ENCOUNTER — PATIENT MESSAGE (OUTPATIENT)
Dept: FAMILY MEDICINE CLINIC | Age: 49
End: 2024-10-29

## 2024-11-09 ENCOUNTER — PATIENT MESSAGE (OUTPATIENT)
Dept: FAMILY MEDICINE CLINIC | Age: 49
End: 2024-11-09

## 2024-11-13 ENCOUNTER — PATIENT MESSAGE (OUTPATIENT)
Dept: FAMILY MEDICINE CLINIC | Age: 49
End: 2024-11-13

## 2024-11-14 NOTE — TELEPHONE ENCOUNTER
Dr Vance, patient is unsure how to proceed with the Depakote you prescribed for his migraines on 10/28/2024 when he finishes

## 2024-12-09 ENCOUNTER — PATIENT MESSAGE (OUTPATIENT)
Dept: FAMILY MEDICINE CLINIC | Age: 49
End: 2024-12-09

## 2025-01-16 ASSESSMENT — PATIENT HEALTH QUESTIONNAIRE - PHQ9
SUM OF ALL RESPONSES TO PHQ QUESTIONS 1-9: 4
6. FEELING BAD ABOUT YOURSELF - OR THAT YOU ARE A FAILURE OR HAVE LET YOURSELF OR YOUR FAMILY DOWN: NOT AT ALL
1. LITTLE INTEREST OR PLEASURE IN DOING THINGS: MORE THAN HALF THE DAYS
SUM OF ALL RESPONSES TO PHQ QUESTIONS 1-9: 4
2. FEELING DOWN, DEPRESSED OR HOPELESS: NOT AT ALL
4. FEELING TIRED OR HAVING LITTLE ENERGY: SEVERAL DAYS
5. POOR APPETITE OR OVEREATING: NOT AT ALL
6. FEELING BAD ABOUT YOURSELF - OR THAT YOU ARE A FAILURE OR HAVE LET YOURSELF OR YOUR FAMILY DOWN: NOT AT ALL
7. TROUBLE CONCENTRATING ON THINGS, SUCH AS READING THE NEWSPAPER OR WATCHING TELEVISION: NOT AT ALL
2. FEELING DOWN, DEPRESSED OR HOPELESS: NOT AT ALL
3. TROUBLE FALLING OR STAYING ASLEEP: SEVERAL DAYS
8. MOVING OR SPEAKING SO SLOWLY THAT OTHER PEOPLE COULD HAVE NOTICED. OR THE OPPOSITE, BEING SO FIGETY OR RESTLESS THAT YOU HAVE BEEN MOVING AROUND A LOT MORE THAN USUAL: NOT AT ALL
SUM OF ALL RESPONSES TO PHQ QUESTIONS 1-9: 4
7. TROUBLE CONCENTRATING ON THINGS, SUCH AS READING THE NEWSPAPER OR WATCHING TELEVISION: NOT AT ALL
SUM OF ALL RESPONSES TO PHQ9 QUESTIONS 1 & 2: 2
10. IF YOU CHECKED OFF ANY PROBLEMS, HOW DIFFICULT HAVE THESE PROBLEMS MADE IT FOR YOU TO DO YOUR WORK, TAKE CARE OF THINGS AT HOME, OR GET ALONG WITH OTHER PEOPLE: NOT DIFFICULT AT ALL
8. MOVING OR SPEAKING SO SLOWLY THAT OTHER PEOPLE COULD HAVE NOTICED. OR THE OPPOSITE - BEING SO FIDGETY OR RESTLESS THAT YOU HAVE BEEN MOVING AROUND A LOT MORE THAN USUAL: NOT AT ALL
10. IF YOU CHECKED OFF ANY PROBLEMS, HOW DIFFICULT HAVE THESE PROBLEMS MADE IT FOR YOU TO DO YOUR WORK, TAKE CARE OF THINGS AT HOME, OR GET ALONG WITH OTHER PEOPLE: NOT DIFFICULT AT ALL
5. POOR APPETITE OR OVEREATING: NOT AT ALL
SUM OF ALL RESPONSES TO PHQ QUESTIONS 1-9: 4
9. THOUGHTS THAT YOU WOULD BE BETTER OFF DEAD, OR OF HURTING YOURSELF: NOT AT ALL
9. THOUGHTS THAT YOU WOULD BE BETTER OFF DEAD, OR OF HURTING YOURSELF: NOT AT ALL
SUM OF ALL RESPONSES TO PHQ QUESTIONS 1-9: 4
4. FEELING TIRED OR HAVING LITTLE ENERGY: SEVERAL DAYS
3. TROUBLE FALLING OR STAYING ASLEEP: SEVERAL DAYS
1. LITTLE INTEREST OR PLEASURE IN DOING THINGS: MORE THAN HALF THE DAYS

## 2025-01-20 ENCOUNTER — OFFICE VISIT (OUTPATIENT)
Dept: FAMILY MEDICINE CLINIC | Age: 50
End: 2025-01-20
Payer: MEDICARE

## 2025-01-20 VITALS
BODY MASS INDEX: 35.52 KG/M2 | RESPIRATION RATE: 16 BRPM | SYSTOLIC BLOOD PRESSURE: 140 MMHG | HEART RATE: 74 BPM | HEIGHT: 71 IN | TEMPERATURE: 97.4 F | OXYGEN SATURATION: 99 % | DIASTOLIC BLOOD PRESSURE: 88 MMHG | WEIGHT: 253.7 LBS

## 2025-01-20 DIAGNOSIS — M54.2 CHRONIC MIDLINE POSTERIOR NECK PAIN: ICD-10-CM

## 2025-01-20 DIAGNOSIS — R73.01 IMPAIRED FASTING GLUCOSE: ICD-10-CM

## 2025-01-20 DIAGNOSIS — M70.62 TROCHANTERIC BURSITIS OF LEFT HIP: Primary | ICD-10-CM

## 2025-01-20 DIAGNOSIS — G43.009 MIGRAINE WITHOUT AURA AND WITHOUT STATUS MIGRAINOSUS, NOT INTRACTABLE: ICD-10-CM

## 2025-01-20 DIAGNOSIS — G89.29 CHRONIC MIDLINE POSTERIOR NECK PAIN: ICD-10-CM

## 2025-01-20 DIAGNOSIS — M79.662 PAIN IN LEFT LOWER LEG: ICD-10-CM

## 2025-01-20 PROCEDURE — 99214 OFFICE O/P EST MOD 30 MIN: CPT

## 2025-01-20 PROCEDURE — 1036F TOBACCO NON-USER: CPT | Performed by: FAMILY MEDICINE

## 2025-01-20 PROCEDURE — 3017F COLORECTAL CA SCREEN DOC REV: CPT | Performed by: FAMILY MEDICINE

## 2025-01-20 PROCEDURE — 99214 OFFICE O/P EST MOD 30 MIN: CPT | Performed by: FAMILY MEDICINE

## 2025-01-20 PROCEDURE — G8417 CALC BMI ABV UP PARAM F/U: HCPCS | Performed by: FAMILY MEDICINE

## 2025-01-20 PROCEDURE — G8427 DOCREV CUR MEDS BY ELIG CLIN: HCPCS | Performed by: FAMILY MEDICINE

## 2025-01-20 RX ORDER — LORATADINE 10 MG/1
10 TABLET ORAL DAILY
Qty: 90 TABLET | Refills: 3 | Status: SHIPPED | OUTPATIENT
Start: 2025-01-20

## 2025-01-20 RX ORDER — GLUCOSAMINE HCL/CHONDROITIN SU 500-400 MG
CAPSULE ORAL
Qty: 100 STRIP | Refills: 3 | Status: SHIPPED | OUTPATIENT
Start: 2025-01-20

## 2025-01-20 RX ORDER — ATOGEPANT 60 MG/1
1 TABLET ORAL DAILY
Qty: 90 TABLET | Refills: 1 | Status: SHIPPED | OUTPATIENT
Start: 2025-01-20

## 2025-01-20 RX ORDER — RIZATRIPTAN BENZOATE 10 MG/1
10 TABLET ORAL
Qty: 10 TABLET | Refills: 11 | Status: SHIPPED | OUTPATIENT
Start: 2025-01-20 | End: 2025-01-20

## 2025-01-20 RX ORDER — PREDNISONE 20 MG/1
TABLET ORAL
Qty: 13 TABLET | Refills: 0 | Status: SHIPPED | OUTPATIENT
Start: 2025-01-20

## 2025-01-20 RX ORDER — PREGABALIN 75 MG/1
75 CAPSULE ORAL 2 TIMES DAILY
Qty: 60 CAPSULE | Refills: 2 | Status: SHIPPED | OUTPATIENT
Start: 2025-01-20 | End: 2025-04-20

## 2025-01-20 SDOH — ECONOMIC STABILITY: FOOD INSECURITY: WITHIN THE PAST 12 MONTHS, YOU WORRIED THAT YOUR FOOD WOULD RUN OUT BEFORE YOU GOT MONEY TO BUY MORE.: NEVER TRUE

## 2025-01-20 SDOH — ECONOMIC STABILITY: FOOD INSECURITY: WITHIN THE PAST 12 MONTHS, THE FOOD YOU BOUGHT JUST DIDN'T LAST AND YOU DIDN'T HAVE MONEY TO GET MORE.: NEVER TRUE

## 2025-01-20 SDOH — HEALTH STABILITY: PHYSICAL HEALTH: ON AVERAGE, HOW MANY MINUTES DO YOU ENGAGE IN EXERCISE AT THIS LEVEL?: 10 MIN

## 2025-01-20 SDOH — HEALTH STABILITY: PHYSICAL HEALTH: ON AVERAGE, HOW MANY DAYS PER WEEK DO YOU ENGAGE IN MODERATE TO STRENUOUS EXERCISE (LIKE A BRISK WALK)?: 2 DAYS

## 2025-01-20 ASSESSMENT — LIFESTYLE VARIABLES
HOW MANY STANDARD DRINKS CONTAINING ALCOHOL DO YOU HAVE ON A TYPICAL DAY: 0
HOW OFTEN DO YOU HAVE A DRINK CONTAINING ALCOHOL: NEVER
HOW OFTEN DO YOU HAVE A DRINK CONTAINING ALCOHOL: 1
HOW OFTEN DO YOU HAVE SIX OR MORE DRINKS ON ONE OCCASION: 1
HOW MANY STANDARD DRINKS CONTAINING ALCOHOL DO YOU HAVE ON A TYPICAL DAY: PATIENT DOES NOT DRINK

## 2025-01-20 ASSESSMENT — PATIENT HEALTH QUESTIONNAIRE - PHQ9
SUM OF ALL RESPONSES TO PHQ QUESTIONS 1-9: 0
SUM OF ALL RESPONSES TO PHQ QUESTIONS 1-9: 0
2. FEELING DOWN, DEPRESSED OR HOPELESS: NOT AT ALL
1. LITTLE INTEREST OR PLEASURE IN DOING THINGS: NOT AT ALL
SUM OF ALL RESPONSES TO PHQ QUESTIONS 1-9: 0
SUM OF ALL RESPONSES TO PHQ QUESTIONS 1-9: 0
SUM OF ALL RESPONSES TO PHQ9 QUESTIONS 1 & 2: 0

## 2025-01-20 NOTE — PROGRESS NOTES
[facility]  [address] [place] [address]  [phone]      Efren Tang is a 49 y.o. male who presents today for his medical conditions/complaints as noted below.  Efren Tang is c/o of Leg Pain (Left calf x3 days ) and Hip Pain (Left x 3 days)      HPI:     Pt here today for L hip pain and L calf pain.    Pt started to have L hip pain 4 days ago - intermittent, achy pain.  Has tried BioFreeze as needed and still taking his chronic Ibuprofen 800 mg intermittently (usually 1-2 daily).  Cannot lay on the hip due to pain; also hurts while walking.  No previous injury to L hip; no recent trauma.      Also having L lower leg pain x past 2-3 days - both shin and calf.  Feels like when he had shin splints when he was younger.  This pain has been more constant, hurts more while walking.  Has tried heating pad on this area.    Taking Qulipta 60 mg daily for migraine prevention - does feel that this is helping him more than Depakote.  Getting maybe 3 migraines per month.  Taking Nurtec PRN for these, but sometimes the single dose of this does not help enough; feels the Maxalt helped more than Nurtec does and would like to go back to that.        Past Medical History:   Diagnosis Date    ADHD (attention deficit hyperactivity disorder)     don't remember    Allergic rhinitis     Anxiety     Asthma     Bipolar disorder ([facility])     Diagnosed in 1998    Chronic back pain     Chronic obstructive pulmonary disease (COPD) ([facility])     Depression     Erectile dysfunction     Fibromyalgia     everything hurts I am always tired    Headache(784.0)     Osteoarthritis     TMJ (dislocation of temporomandibular joint)     Torticollis       Past Surgical History:   Procedure Laterality Date    EYE SURGERY      TONSILLECTOMY       Family History   Problem Relation Age of Onset    Stroke Mother     High Blood Pressure Mother     Alcohol Abuse Mother     Arthritis Mother     Depression Mother

## 2025-01-21 ENCOUNTER — TELEMEDICINE (OUTPATIENT)
Dept: FAMILY MEDICINE CLINIC | Age: 50
End: 2025-01-21

## 2025-01-21 DIAGNOSIS — Z00.00 MEDICARE ANNUAL WELLNESS VISIT, SUBSEQUENT: Primary | ICD-10-CM

## 2025-01-21 NOTE — PROGRESS NOTES
Medicare Annual Wellness Visit    Efren Tang is here for Medicare AWV    Assessment & Plan   Medicare annual wellness visit, subsequent       Return in 1 year (on 1/21/2026) for Medicare AWV.     Subjective     Patient's complete Health Risk Assessment and screening values have been reviewed and are found in Flowsheets. The following problems were reviewed today and where indicated follow up appointments were made and/or referrals ordered.    Positive Risk Factor Screenings with Interventions:     Cognitive:   Clock Drawing Test (CDT): (!) Abnormal (states large hand on the 2 and small hand on the 10. can re evaluate in office)  Words recalled: 2 Words Recalled  Total Score: (!) 2  Total Score Interpretation: Abnormal Mini-Cog  Interventions:  Can reassess in office           General HRA Questions:  Select all that apply: (!) New or Increased Pain  Interventions - Pain:  Pt evaluated in office 1/20 for leg/hip pain, discussed POC with Dr. Vance.       Inactivity:  On average, how many days per week do you engage in moderate to strenuous exercise (like a brisk walk)?: 2 days (!) Abnormal  On average, how many minutes do you engage in exercise at this level?: 10 min  Interventions:  Has been starting to exercise more, plans to increase as tolerated     Abnormal BMI (obese):  There is no height or weight on file to calculate BMI. (!) Abnormal  Interventions:  Will increase exercise as tolerated, eating healthy meals       Dentist Screen:  Have you seen the dentist within the past year?: (!) No    Intervention:  Advised to schedule with their dentist      Safety:  Do you have non-slip mats or non-slip surfaces or shower bars or grab bars in your shower or bathtub?: (!) No  Interventions:  Patient comments: pt states the bottom of tub/shower is rough/textured and not slippery     Advanced Directives:  Do you have a Living Will?: (!) No    Intervention:  has NO advanced directive - information provided

## 2025-01-24 ENCOUNTER — APPOINTMENT (OUTPATIENT)
Dept: GENERAL RADIOLOGY | Age: 50
End: 2025-01-24
Payer: MEDICARE

## 2025-01-24 ENCOUNTER — HOSPITAL ENCOUNTER (EMERGENCY)
Age: 50
Discharge: HOME | End: 2025-01-24
Attending: SPECIALIST
Payer: MEDICARE

## 2025-01-24 VITALS
SYSTOLIC BLOOD PRESSURE: 146 MMHG | TEMPERATURE: 97.8 F | RESPIRATION RATE: 16 BRPM | BODY MASS INDEX: 35.7 KG/M2 | DIASTOLIC BLOOD PRESSURE: 76 MMHG | WEIGHT: 255 LBS | OXYGEN SATURATION: 99 % | HEART RATE: 93 BPM | HEIGHT: 71 IN

## 2025-01-24 DIAGNOSIS — S76.012A STRAIN OF LEFT HIP, INITIAL ENCOUNTER: Primary | ICD-10-CM

## 2025-01-24 PROCEDURE — 73502 X-RAY EXAM HIP UNI 2-3 VIEWS: CPT

## 2025-01-24 PROCEDURE — 96372 THER/PROPH/DIAG INJ SC/IM: CPT

## 2025-01-24 PROCEDURE — 6360000002 HC RX W HCPCS: Performed by: SPECIALIST

## 2025-01-24 PROCEDURE — 99284 EMERGENCY DEPT VISIT MOD MDM: CPT

## 2025-01-24 RX ORDER — KETOROLAC TROMETHAMINE 30 MG/ML
30 INJECTION, SOLUTION INTRAMUSCULAR; INTRAVENOUS ONCE
Status: COMPLETED | OUTPATIENT
Start: 2025-01-24 | End: 2025-01-24

## 2025-01-24 RX ORDER — IBUPROFEN 600 MG/1
600 TABLET, FILM COATED ORAL EVERY 6 HOURS PRN
Qty: 20 TABLET | Refills: 0 | Status: SHIPPED | OUTPATIENT
Start: 2025-01-24 | End: 2025-01-31

## 2025-01-24 RX ADMIN — KETOROLAC TROMETHAMINE 30 MG: 30 INJECTION, SOLUTION INTRAMUSCULAR at 20:56

## 2025-01-24 ASSESSMENT — PAIN - FUNCTIONAL ASSESSMENT
PAIN_FUNCTIONAL_ASSESSMENT: PREVENTS OR INTERFERES SOME ACTIVE ACTIVITIES AND ADLS
PAIN_FUNCTIONAL_ASSESSMENT: 0-10

## 2025-01-24 ASSESSMENT — PAIN DESCRIPTION - PAIN TYPE: TYPE: ACUTE PAIN

## 2025-01-24 ASSESSMENT — PAIN DESCRIPTION - ORIENTATION: ORIENTATION: LEFT

## 2025-01-24 ASSESSMENT — PAIN DESCRIPTION - DESCRIPTORS: DESCRIPTORS: DISCOMFORT

## 2025-01-24 ASSESSMENT — ENCOUNTER SYMPTOMS
BACK PAIN: 1
COUGH: 0
ABDOMINAL PAIN: 0
SHORTNESS OF BREATH: 0
SORE THROAT: 0
NAUSEA: 0

## 2025-01-24 ASSESSMENT — PAIN SCALES - GENERAL: PAINLEVEL_OUTOF10: 9

## 2025-01-24 ASSESSMENT — PAIN DESCRIPTION - FREQUENCY: FREQUENCY: CONTINUOUS

## 2025-01-24 ASSESSMENT — PAIN DESCRIPTION - LOCATION: LOCATION: HIP

## 2025-01-24 ASSESSMENT — PAIN DESCRIPTION - ONSET: ONSET: ON-GOING

## 2025-01-25 NOTE — ED PROVIDER NOTES
[facility]  EMERGENCY DEPARTMENT ENCOUNTER      Pt Name: Efren Tang  MRN: [id number]  Birthdate 1975  Date of evaluation: 1/24/2025      CHIEF COMPLAINT       Chief Complaint   Patient presents with    Hip Pain     Hip pain spontaneously last weekend. Today, leg gave out on him and states pain is no better despite Dr. Vance prescribing steroids on Monday. Ambulatory to room without difficulty.         HISTORY OF PRESENT ILLNESS    Efren Tang is a 49 y.o. male who presents to the emergency department complaining of left hip pain since 6 days prior to arrival.  The pain started spontaneously and patient grades pain as 9 out of 10 in intensity.  He saw his primary care physician in the office 5 days ago and was started on prednisone with last dose of prednisone this morning.  Patient states his leg gave out on him today and patient fell on his knees but denies any knee pain.  He denies any head injury or loss of consciousness and denies any headache or neck pain.  He also denies any tingling, numbness or weakness distally in the left lower extremity and also denies any radiation of pain to the left lower extremity.  Patient has chronic low back pain which is unchanged.  He has taken aspirin at 7 PM as well as taken Tylenol earlier today without much relief.  He denies any chest pain, abdominal pain, shortness of breath, lightheadedness, dizziness or diaphoresis.  He is on Lyrica for chronic back pain.      REVIEW OF SYSTEMS       Review of Systems   Constitutional:  Negative for chills and fever.   HENT:  Negative for congestion and sore throat.    Respiratory:  Negative for cough and shortness of breath.    Cardiovascular:  Negative for chest pain and palpitations.   Gastrointestinal:  Negative for abdominal pain and nausea.   Genitourinary:  Negative for dysuria and frequency.   Musculoskeletal:  Positive for arthralgias, back pain and myalgias. Negative for neck pain.   Skin:

## 2025-01-25 NOTE — DISCHARGE INSTRUCTIONS
Please understand that at this time there is no evidence for a more serious underlying process, but that early in the process of an illness or injury, an emergency department workup can be falsely reassuring.  You should contact your family doctor within the next 48 hours for a follow up appointment    THANK YOU!!!    From [facility] and [facility]    On behalf of the Emergency Department staff at [facility], I would like to thank you for giving us the opportunity to address your health care needs and concerns.    We hope that during your visit, our service was delivered in a professional and caring manner. Please keep [facility] in mind as we walk with you down the path to your own personal wellness.     Please expect an automated text message or email from us so we can ask a few questions about your health and progress. Based on your answers, a clinician may call you back to offer help and instructions.    Please understand that early in the process of an illness or injury, an emergency department workup can be falsely reassuring.  If you notice any worsening, changing or persistent symptoms please call your family doctor or return to the ER immediately.     Tell us how we did during your visit at [email or web address]   and let us know about your experience

## 2025-01-28 ENCOUNTER — PATIENT MESSAGE (OUTPATIENT)
Dept: FAMILY MEDICINE CLINIC | Age: 50
End: 2025-01-28

## 2025-02-02 ENCOUNTER — HOSPITAL ENCOUNTER (EMERGENCY)
Age: 50
Discharge: HOME | End: 2025-02-02
Attending: EMERGENCY MEDICINE
Payer: MEDICAID

## 2025-02-02 ENCOUNTER — PATIENT MESSAGE (OUTPATIENT)
Dept: FAMILY MEDICINE CLINIC | Age: 50
End: 2025-02-02

## 2025-02-02 VITALS
HEART RATE: 93 BPM | HEIGHT: 71 IN | BODY MASS INDEX: 35.56 KG/M2 | RESPIRATION RATE: 20 BRPM | WEIGHT: 254 LBS | OXYGEN SATURATION: 100 %

## 2025-02-02 DIAGNOSIS — G89.29 CHRONIC NECK PAIN: ICD-10-CM

## 2025-02-02 DIAGNOSIS — M54.50 CHRONIC MIDLINE LOW BACK PAIN WITHOUT SCIATICA: Primary | ICD-10-CM

## 2025-02-02 DIAGNOSIS — M54.2 CHRONIC NECK PAIN: ICD-10-CM

## 2025-02-02 DIAGNOSIS — M48.061 NEUROFORAMINAL STENOSIS OF LUMBAR SPINE: ICD-10-CM

## 2025-02-02 DIAGNOSIS — G89.29 CHRONIC MIDLINE LOW BACK PAIN WITHOUT SCIATICA: Primary | ICD-10-CM

## 2025-02-02 DIAGNOSIS — M25.552 LEFT HIP PAIN: Primary | ICD-10-CM

## 2025-02-02 DIAGNOSIS — M48.02 NEUROFORAMINAL STENOSIS OF CERVICAL SPINE: ICD-10-CM

## 2025-02-02 PROCEDURE — 6370000000 HC RX 637 (ALT 250 FOR IP): Performed by: EMERGENCY MEDICINE

## 2025-02-02 PROCEDURE — 6360000002 HC RX W HCPCS: Performed by: EMERGENCY MEDICINE

## 2025-02-02 PROCEDURE — 99284 EMERGENCY DEPT VISIT MOD MDM: CPT

## 2025-02-02 PROCEDURE — 96372 THER/PROPH/DIAG INJ SC/IM: CPT

## 2025-02-02 RX ORDER — OXYCODONE AND ACETAMINOPHEN 5; 325 MG/1; MG/1
1 TABLET ORAL EVERY 6 HOURS PRN
Qty: 3 TABLET | Refills: 0 | Status: SHIPPED | OUTPATIENT
Start: 2025-02-02 | End: 2025-02-03

## 2025-02-02 RX ORDER — LIDOCAINE 4 G/G
1 PATCH TOPICAL DAILY
Status: DISCONTINUED | OUTPATIENT
Start: 2025-02-02 | End: 2025-02-02 | Stop reason: HOSPADM

## 2025-02-02 RX ORDER — LIDOCAINE 50 MG/G
1 PATCH TOPICAL DAILY
Qty: 5 PATCH | Refills: 0 | Status: SHIPPED | OUTPATIENT
Start: 2025-02-02 | End: 2025-02-07

## 2025-02-02 RX ORDER — KETOROLAC TROMETHAMINE 30 MG/ML
30 INJECTION, SOLUTION INTRAMUSCULAR; INTRAVENOUS ONCE
Status: COMPLETED | OUTPATIENT
Start: 2025-02-02 | End: 2025-02-02

## 2025-02-02 RX ADMIN — KETOROLAC TROMETHAMINE 30 MG: 30 INJECTION, SOLUTION INTRAMUSCULAR at 20:04

## 2025-02-02 ASSESSMENT — PAIN - FUNCTIONAL ASSESSMENT
PAIN_FUNCTIONAL_ASSESSMENT: ACTIVITIES ARE NOT PREVENTED
PAIN_FUNCTIONAL_ASSESSMENT: 0-10
PAIN_FUNCTIONAL_ASSESSMENT: ACTIVITIES ARE NOT PREVENTED

## 2025-02-02 ASSESSMENT — PAIN DESCRIPTION - PAIN TYPE: TYPE: ACUTE PAIN

## 2025-02-02 ASSESSMENT — PAIN SCALES - GENERAL
PAINLEVEL_OUTOF10: 10
PAINLEVEL_OUTOF10: 10

## 2025-02-02 ASSESSMENT — PAIN DESCRIPTION - DESCRIPTORS
DESCRIPTORS: ACHING
DESCRIPTORS: ACHING

## 2025-02-02 ASSESSMENT — PAIN DESCRIPTION - ORIENTATION
ORIENTATION: LEFT
ORIENTATION: LEFT

## 2025-02-02 ASSESSMENT — PAIN DESCRIPTION - LOCATION
LOCATION: HIP
LOCATION: HIP

## 2025-02-03 NOTE — ED PROVIDER NOTES
[facility] Emergency Department  [address] [address]  Phone: [phone]        [facility] EMERGENCY DEPARTMENT  EMERGENCY DEPARTMENT ENCOUNTER      Pt Name: Efren Tang  MRN: [id number]  Birthdate 1975  Date of evaluation: 2/2/2025  Provider: Alexx Jovel DO    CHIEF COMPLAINT       Chief Complaint   Patient presents with    Hip Pain         HISTORY OF PRESENT ILLNESS   (Location/Symptom, Timing/Onset,Context/Setting, Quality, Duration, Modifying Factors, Severity)  Note limiting factors.   Efren Tang is a 49 y.o. male who presents to the emergency department with the complaint of left hip pain.  Patient does have a history of chronic left hip pain.  Patient states that it is bothering him.  He normally takes pregabalin but this is not working.  He is requesting a different medication to help    Nursing Notes were reviewed.    REVIEW OF SYSTEMS    (2-9systems for level 4, 10 or more for level 5)     Review of Systems   Constitutional:  Negative for fever.   Musculoskeletal:         L hip pain       Except asnoted above the remainder of the review of systems was reviewed and negative.       PAST MEDICAL HISTORY     Past Medical History:   Diagnosis Date    ADHD (attention deficit hyperactivity disorder)     don't remember    Allergic rhinitis     Anxiety     Asthma     Bipolar disorder ([facility])     Diagnosed in 1998    Chronic back pain     Chronic obstructive pulmonary disease (COPD) ([facility])     Depression     Erectile dysfunction     Fibromyalgia     everything hurts I am always tired    Headache(784.0)     Osteoarthritis     TMJ (dislocation of temporomandibular joint)     Torticollis          SURGICAL HISTORY       Past Surgical History:   Procedure Laterality Date    EYE SURGERY      TONSILLECTOMY           CURRENT MEDICATIONS     Previous Medications    ALBUTEROL (PROVENTIL) (2.5 MG/3ML) 0.083% NEBULIZER SOLUTION    Take 3 mLs by nebulization every 6 hours as needed

## 2025-02-13 ENCOUNTER — PATIENT MESSAGE (OUTPATIENT)
Dept: FAMILY MEDICINE CLINIC | Age: 50
End: 2025-02-13

## 2025-02-13 NOTE — TELEPHONE ENCOUNTER
Last OV 1/20/25  Next OV 5/20/25    Please review [employer] message. Patient requesting medication for pain

## 2025-02-18 ENCOUNTER — PATIENT MESSAGE (OUTPATIENT)
Dept: FAMILY MEDICINE CLINIC | Age: 50
End: 2025-02-18

## 2025-02-18 RX ORDER — GABAPENTIN 100 MG/1
100 CAPSULE ORAL 2 TIMES DAILY
Qty: 60 CAPSULE | Refills: 0 | Status: CANCELLED | OUTPATIENT
Start: 2025-02-18 | End: 2025-03-20

## 2025-02-20 NOTE — TELEPHONE ENCOUNTER
Pt cannot take Gabapentin, as he is already taking Lyrica - MyChart message sent to pt with instructions on titrating up dose of Lyrica over next 5-10 days.

## 2025-02-20 NOTE — TELEPHONE ENCOUNTER
Separate note to patient sent to have him call his insurance and see what other pain management doctors are in network in this area for us to send new referral to since our pain management can not get him in till April

## 2025-02-25 ENCOUNTER — PATIENT MESSAGE (OUTPATIENT)
Dept: FAMILY MEDICINE CLINIC | Age: 50
End: 2025-02-25

## 2025-02-26 ENCOUNTER — PATIENT MESSAGE (OUTPATIENT)
Dept: FAMILY MEDICINE CLINIC | Age: 50
End: 2025-02-26

## 2025-03-06 ENCOUNTER — PATIENT MESSAGE (OUTPATIENT)
Dept: FAMILY MEDICINE CLINIC | Age: 50
End: 2025-03-06

## 2025-03-10 ENCOUNTER — PATIENT MESSAGE (OUTPATIENT)
Dept: FAMILY MEDICINE CLINIC | Age: 50
End: 2025-03-10

## 2025-03-10 DIAGNOSIS — G89.29 CHRONIC MIDLINE POSTERIOR NECK PAIN: ICD-10-CM

## 2025-03-10 DIAGNOSIS — M54.2 CHRONIC MIDLINE POSTERIOR NECK PAIN: ICD-10-CM

## 2025-03-11 RX ORDER — PREGABALIN 75 MG/1
75 CAPSULE ORAL 2 TIMES DAILY
Qty: 60 CAPSULE | Refills: 2 | Status: CANCELLED | OUTPATIENT
Start: 2025-03-11 | End: 2025-06-09

## 2025-03-11 NOTE — TELEPHONE ENCOUNTER
Pt is taking 150mg in AM and 150mg PM. Needs new rx.     Per [facility], last fill 02/20, quantity 60 for 30 days.   Efren called requesting a refill of the below medication which has been pended for you:     Requested Prescriptions     Pending Prescriptions Disp Refills    pregabalin (LYRICA) 75 MG capsule 60 capsule 2     Sig: Take 1 capsule by mouth 2 times daily for 90 days. Max Daily Amount: 150 mg       Last Appointment Date: 1/21/2025  Next Appointment Date: 5/20/2025    Allergies   Allergen Reactions    Keflex [Cephalexin] Other (See Comments)     Migraines, upset stomach     Naproxen      ulcers

## 2025-03-12 RX ORDER — PREGABALIN 150 MG/1
150 CAPSULE ORAL 2 TIMES DAILY
Qty: 60 CAPSULE | Refills: 1 | Status: SHIPPED | OUTPATIENT
Start: 2025-03-12 | End: 2025-05-11

## 2025-03-13 NOTE — TELEPHONE ENCOUNTER
Controlled Substance Monitoring:    Acute and Chronic Pain Monitoring:   RX Monitoring Periodic Controlled Substance Monitoring   3/12/2025   8:34 PM No signs of potential drug abuse or diversion identified.

## 2025-04-25 ENCOUNTER — PATIENT MESSAGE (OUTPATIENT)
Dept: FAMILY MEDICINE CLINIC | Age: 50
End: 2025-04-25

## 2025-05-07 DIAGNOSIS — G89.29 CHRONIC MIDLINE POSTERIOR NECK PAIN: ICD-10-CM

## 2025-05-07 DIAGNOSIS — M54.2 CHRONIC MIDLINE POSTERIOR NECK PAIN: ICD-10-CM

## 2025-05-07 NOTE — TELEPHONE ENCOUNTER
NOT DUE UNTIL 5/21    Per [facility], last fill 4/21, quantity 60 for 30 days.     Efren called requesting a refill of the below medication which has been pended for you:     Requested Prescriptions     Pending Prescriptions Disp Refills    pregabalin (LYRICA) 150 MG capsule 60 capsule 1     Sig: Take 1 capsule by mouth 2 times daily for 60 days. Max Daily Amount: 300 mg       Last Appointment Date: 1/21/2025  Next Appointment Date: Visit date not found    Allergies   Allergen Reactions    Keflex [Cephalexin] Other (See Comments)     Migraines, upset stomach     Naproxen      ulcers

## 2025-05-19 DIAGNOSIS — G89.29 CHRONIC MIDLINE POSTERIOR NECK PAIN: ICD-10-CM

## 2025-05-19 DIAGNOSIS — M54.2 CHRONIC MIDLINE POSTERIOR NECK PAIN: ICD-10-CM

## 2025-05-19 RX ORDER — PREGABALIN 150 MG/1
150 CAPSULE ORAL 2 TIMES DAILY
Qty: 60 CAPSULE | Refills: 1 | OUTPATIENT
Start: 2025-05-19 | End: 2025-07-18

## 2025-05-21 RX ORDER — PREGABALIN 150 MG/1
150 CAPSULE ORAL 2 TIMES DAILY
Qty: 60 CAPSULE | Refills: 1 | Status: SHIPPED | OUTPATIENT
Start: 2025-05-21 | End: 2025-07-20

## 2025-05-21 NOTE — TELEPHONE ENCOUNTER
Controlled Substance Monitoring:    Acute and Chronic Pain Monitoring:   RX Monitoring Periodic Controlled Substance Monitoring   5/21/2025  12:54 PM No signs of potential drug abuse or diversion identified.

## 2025-05-21 NOTE — TELEPHONE ENCOUNTER
Pt was due for OV this month - pt had to cancel due to lack of transportation.  Needs to reschedule in the next 2 months, ideally.

## 2025-05-27 ENCOUNTER — PATIENT MESSAGE (OUTPATIENT)
Dept: FAMILY MEDICINE CLINIC | Age: 50
End: 2025-05-27

## 2025-05-29 DIAGNOSIS — G47.00 INSOMNIA, UNSPECIFIED TYPE: ICD-10-CM

## 2025-05-29 NOTE — TELEPHONE ENCOUNTER
Efren called requesting a refill of the below medication which has been pended for you:     Requested Prescriptions     Pending Prescriptions Disp Refills    QUEtiapine (SEROQUEL) 50 MG tablet [Pharmacy Med Name: QUEtiapine Fumarate 50 MG Oral Tablet] 90 tablet 0     Sig: TAKE 1 TABLET BY MOUTH AT BEDTIME       Last Appointment Date: 1/21/2025  Next Appointment Date: 7/2/2025    Allergies   Allergen Reactions    Keflex [Cephalexin] Other (See Comments)     Migraines, upset stomach     Naproxen      ulcers

## 2025-06-01 RX ORDER — QUETIAPINE FUMARATE 50 MG/1
50 TABLET, FILM COATED ORAL NIGHTLY
Qty: 90 TABLET | Refills: 3 | Status: SHIPPED | OUTPATIENT
Start: 2025-06-01

## 2025-07-03 DIAGNOSIS — G43.009 MIGRAINE WITHOUT AURA AND WITHOUT STATUS MIGRAINOSUS, NOT INTRACTABLE: ICD-10-CM

## 2025-07-03 RX ORDER — ATOGEPANT 60 MG/1
1 TABLET ORAL DAILY
Qty: 30 TABLET | Refills: 0 | Status: SHIPPED | OUTPATIENT
Start: 2025-07-03

## 2025-07-03 NOTE — TELEPHONE ENCOUNTER
Quilipta refilled x 30 days.  Please contact the patient to schedule an appointment with Dr. Vance.

## 2025-07-30 DIAGNOSIS — G43.009 MIGRAINE WITHOUT AURA AND WITHOUT STATUS MIGRAINOSUS, NOT INTRACTABLE: ICD-10-CM

## 2025-07-30 NOTE — TELEPHONE ENCOUNTER
Pt needs OV. Scheduling ticket sent.    Efren called requesting a refill of the below medication which has been pended for you:     Requested Prescriptions     Pending Prescriptions Disp Refills    QULIPTA 60 MG TABS [Pharmacy Med Name: Qulipta 60 MG Oral Tablet] 30 tablet 0     Sig: Take 1 tablet by mouth once daily       Last Appointment Date: Visit date not found  Next Appointment Date: Visit date not found    Allergies   Allergen Reactions    Keflex [Cephalexin] Other (See Comments)     Migraines, upset stomach     Naproxen      ulcers

## 2025-07-31 RX ORDER — ATOGEPANT 60 MG/1
1 TABLET ORAL DAILY
Qty: 90 TABLET | Refills: 0 | Status: SHIPPED | OUTPATIENT
Start: 2025-07-31

## 2025-08-19 ENCOUNTER — PATIENT MESSAGE (OUTPATIENT)
Dept: FAMILY MEDICINE CLINIC | Age: 50
End: 2025-08-19

## 2025-08-30 ENCOUNTER — PATIENT MESSAGE (OUTPATIENT)
Dept: FAMILY MEDICINE CLINIC | Age: 50
End: 2025-08-30

## 2025-09-05 DIAGNOSIS — M25.552 LEFT HIP PAIN: Primary | ICD-10-CM
